# Patient Record
Sex: MALE | Race: WHITE | NOT HISPANIC OR LATINO
[De-identification: names, ages, dates, MRNs, and addresses within clinical notes are randomized per-mention and may not be internally consistent; named-entity substitution may affect disease eponyms.]

---

## 2017-03-01 ENCOUNTER — APPOINTMENT (OUTPATIENT)
Dept: SPINE | Facility: CLINIC | Age: 62
End: 2017-03-01

## 2017-03-01 VITALS
DIASTOLIC BLOOD PRESSURE: 79 MMHG | BODY MASS INDEX: 33.33 KG/M2 | HEART RATE: 79 BPM | WEIGHT: 225 LBS | SYSTOLIC BLOOD PRESSURE: 116 MMHG | HEIGHT: 69 IN

## 2017-03-01 DIAGNOSIS — Z87.891 PERSONAL HISTORY OF NICOTINE DEPENDENCE: ICD-10-CM

## 2017-03-06 ENCOUNTER — APPOINTMENT (OUTPATIENT)
Dept: MRI IMAGING | Facility: CLINIC | Age: 62
End: 2017-03-06

## 2017-03-09 ENCOUNTER — APPOINTMENT (OUTPATIENT)
Dept: MRI IMAGING | Facility: CLINIC | Age: 62
End: 2017-03-09

## 2017-03-09 ENCOUNTER — OUTPATIENT (OUTPATIENT)
Dept: OUTPATIENT SERVICES | Facility: HOSPITAL | Age: 62
LOS: 1 days | End: 2017-03-09
Payer: MEDICAID

## 2017-03-09 DIAGNOSIS — Z98.89 OTHER SPECIFIED POSTPROCEDURAL STATES: Chronic | ICD-10-CM

## 2017-03-09 DIAGNOSIS — Z00.8 ENCOUNTER FOR OTHER GENERAL EXAMINATION: ICD-10-CM

## 2017-03-09 PROCEDURE — 72148 MRI LUMBAR SPINE W/O DYE: CPT

## 2017-03-09 PROCEDURE — 72141 MRI NECK SPINE W/O DYE: CPT

## 2017-03-21 ENCOUNTER — APPOINTMENT (OUTPATIENT)
Dept: SPINE | Facility: CLINIC | Age: 62
End: 2017-03-21

## 2017-03-21 VITALS
HEIGHT: 69 IN | WEIGHT: 225 LBS | SYSTOLIC BLOOD PRESSURE: 124 MMHG | DIASTOLIC BLOOD PRESSURE: 84 MMHG | BODY MASS INDEX: 33.33 KG/M2

## 2017-03-24 ENCOUNTER — OUTPATIENT (OUTPATIENT)
Dept: OUTPATIENT SERVICES | Facility: HOSPITAL | Age: 62
LOS: 1 days | End: 2017-03-24

## 2017-03-24 VITALS
OXYGEN SATURATION: 96 % | HEIGHT: 69 IN | RESPIRATION RATE: 20 BRPM | TEMPERATURE: 98 F | HEART RATE: 77 BPM | WEIGHT: 222.01 LBS | SYSTOLIC BLOOD PRESSURE: 144 MMHG | DIASTOLIC BLOOD PRESSURE: 86 MMHG

## 2017-03-24 DIAGNOSIS — Z98.89 OTHER SPECIFIED POSTPROCEDURAL STATES: Chronic | ICD-10-CM

## 2017-03-24 DIAGNOSIS — M51.25 OTHER INTERVERTEBRAL DISC DISPLACEMENT, THORACOLUMBAR REGION: ICD-10-CM

## 2017-03-24 DIAGNOSIS — Z01.818 ENCOUNTER FOR OTHER PREPROCEDURAL EXAMINATION: ICD-10-CM

## 2017-03-24 NOTE — H&P PST ADULT - HISTORY OF PRESENT ILLNESS
60 yo male with severe back pain, worsening over last year, now hunched over and debilitated with no relief at all. pt states he has tried epidurals with no relief.

## 2017-03-24 NOTE — H&P PST ADULT - NSANTHOSAYNRD_GEN_A_CORE
No. JOSÉ LUIS screening performed.  STOP BANG Legend: 0-2 = LOW Risk; 3-4 = INTERMEDIATE Risk; 5-8 = HIGH Risk

## 2017-03-27 ENCOUNTER — OUTPATIENT (OUTPATIENT)
Dept: OUTPATIENT SERVICES | Facility: HOSPITAL | Age: 62
LOS: 1 days | Discharge: HOME | End: 2017-03-27
Payer: MEDICAID

## 2017-03-27 ENCOUNTER — APPOINTMENT (OUTPATIENT)
Dept: SPINE | Facility: HOSPITAL | Age: 62
End: 2017-03-27

## 2017-03-27 ENCOUNTER — TRANSCRIPTION ENCOUNTER (OUTPATIENT)
Age: 62
End: 2017-03-27

## 2017-03-27 VITALS
RESPIRATION RATE: 15 BRPM | SYSTOLIC BLOOD PRESSURE: 122 MMHG | OXYGEN SATURATION: 100 % | TEMPERATURE: 98 F | HEART RATE: 83 BPM | DIASTOLIC BLOOD PRESSURE: 77 MMHG

## 2017-03-27 VITALS
OXYGEN SATURATION: 95 % | SYSTOLIC BLOOD PRESSURE: 141 MMHG | HEART RATE: 85 BPM | DIASTOLIC BLOOD PRESSURE: 72 MMHG | RESPIRATION RATE: 14 BRPM | TEMPERATURE: 97 F

## 2017-03-27 DIAGNOSIS — Z98.89 OTHER SPECIFIED POSTPROCEDURAL STATES: Chronic | ICD-10-CM

## 2017-03-27 DIAGNOSIS — M51.25 OTHER INTERVERTEBRAL DISC DISPLACEMENT, THORACOLUMBAR REGION: ICD-10-CM

## 2017-03-27 PROCEDURE — 63030 LAMOT DCMPRN NRV RT 1 LMBR: CPT | Mod: RT

## 2017-03-27 PROCEDURE — 76000 FLUOROSCOPY <1 HR PHYS/QHP: CPT

## 2017-03-27 PROCEDURE — 93970 EXTREMITY STUDY: CPT

## 2017-03-27 PROCEDURE — 93970 EXTREMITY STUDY: CPT | Mod: 26

## 2017-03-27 RX ORDER — CYCLOBENZAPRINE HYDROCHLORIDE 10 MG/1
5 TABLET, FILM COATED ORAL THREE TIMES A DAY
Qty: 0 | Refills: 0 | Status: DISCONTINUED | OUTPATIENT
Start: 2017-03-27 | End: 2017-04-11

## 2017-03-27 RX ORDER — CEFAZOLIN SODIUM 1 G
2000 VIAL (EA) INJECTION ONCE
Qty: 0 | Refills: 0 | Status: DISCONTINUED | OUTPATIENT
Start: 2017-03-27 | End: 2017-04-11

## 2017-03-27 RX ORDER — DEXTROSE MONOHYDRATE, SODIUM CHLORIDE, AND POTASSIUM CHLORIDE 50; .745; 4.5 G/1000ML; G/1000ML; G/1000ML
1000 INJECTION, SOLUTION INTRAVENOUS
Qty: 0 | Refills: 0 | Status: DISCONTINUED | OUTPATIENT
Start: 2017-03-27 | End: 2017-04-11

## 2017-03-27 RX ORDER — DEXAMETHASONE 0.5 MG/5ML
8 ELIXIR ORAL ONCE
Qty: 0 | Refills: 0 | Status: COMPLETED | OUTPATIENT
Start: 2017-03-27 | End: 2017-03-27

## 2017-03-27 RX ORDER — PANTOPRAZOLE SODIUM 20 MG/1
40 TABLET, DELAYED RELEASE ORAL
Qty: 0 | Refills: 0 | Status: DISCONTINUED | OUTPATIENT
Start: 2017-03-27 | End: 2017-04-11

## 2017-03-27 RX ORDER — TRAMADOL HYDROCHLORIDE 50 MG/1
50 TABLET ORAL EVERY 4 HOURS
Qty: 0 | Refills: 0 | Status: COMPLETED | OUTPATIENT
Start: 2017-03-27 | End: 2017-04-03

## 2017-03-27 RX ORDER — DOCUSATE SODIUM 100 MG
1 CAPSULE ORAL
Qty: 0 | Refills: 0 | COMMUNITY
Start: 2017-03-27

## 2017-03-27 RX ORDER — ATORVASTATIN CALCIUM 80 MG/1
10 TABLET, FILM COATED ORAL AT BEDTIME
Qty: 0 | Refills: 0 | Status: DISCONTINUED | OUTPATIENT
Start: 2017-03-27 | End: 2017-04-11

## 2017-03-27 RX ORDER — DOCUSATE SODIUM 100 MG
100 CAPSULE ORAL THREE TIMES A DAY
Qty: 0 | Refills: 0 | Status: DISCONTINUED | OUTPATIENT
Start: 2017-03-27 | End: 2017-04-11

## 2017-03-27 RX ORDER — SODIUM CHLORIDE 9 MG/ML
3 INJECTION INTRAMUSCULAR; INTRAVENOUS; SUBCUTANEOUS EVERY 8 HOURS
Qty: 0 | Refills: 0 | Status: DISCONTINUED | OUTPATIENT
Start: 2017-03-27 | End: 2017-03-27

## 2017-03-27 RX ORDER — CEPHALEXIN 500 MG
500 CAPSULE ORAL
Qty: 0 | Refills: 0 | Status: DISCONTINUED | OUTPATIENT
Start: 2017-03-27 | End: 2017-04-11

## 2017-03-27 RX ORDER — CEPHALEXIN 500 MG
1 CAPSULE ORAL
Qty: 4 | Refills: 0 | OUTPATIENT
Start: 2017-03-27

## 2017-03-27 RX ORDER — SENNA PLUS 8.6 MG/1
2 TABLET ORAL
Qty: 0 | Refills: 0 | COMMUNITY
Start: 2017-03-27

## 2017-03-27 RX ORDER — CYCLOBENZAPRINE HYDROCHLORIDE 10 MG/1
1 TABLET, FILM COATED ORAL
Qty: 21 | Refills: 0 | OUTPATIENT
Start: 2017-03-27 | End: 2017-04-03

## 2017-03-27 RX ORDER — ESCITALOPRAM OXALATE 10 MG/1
10 TABLET, FILM COATED ORAL DAILY
Qty: 0 | Refills: 0 | Status: DISCONTINUED | OUTPATIENT
Start: 2017-03-27 | End: 2017-04-11

## 2017-03-27 RX ORDER — BUPROPION HYDROCHLORIDE 150 MG/1
150 TABLET, EXTENDED RELEASE ORAL
Qty: 0 | Refills: 0 | Status: DISCONTINUED | OUTPATIENT
Start: 2017-03-27 | End: 2017-04-11

## 2017-03-27 RX ORDER — CYCLOBENZAPRINE HYDROCHLORIDE 10 MG/1
1 TABLET, FILM COATED ORAL
Qty: 0 | Refills: 0 | DISCHARGE
Start: 2017-03-27

## 2017-03-27 RX ORDER — SENNA PLUS 8.6 MG/1
2 TABLET ORAL AT BEDTIME
Qty: 0 | Refills: 0 | Status: DISCONTINUED | OUTPATIENT
Start: 2017-03-27 | End: 2017-04-11

## 2017-03-27 RX ORDER — GABAPENTIN 400 MG/1
300 CAPSULE ORAL AT BEDTIME
Qty: 0 | Refills: 0 | Status: DISCONTINUED | OUTPATIENT
Start: 2017-03-27 | End: 2017-04-11

## 2017-03-27 RX ORDER — HYDROMORPHONE HYDROCHLORIDE 2 MG/ML
0.5 INJECTION INTRAMUSCULAR; INTRAVENOUS; SUBCUTANEOUS
Qty: 0 | Refills: 0 | Status: DISCONTINUED | OUTPATIENT
Start: 2017-03-27 | End: 2017-03-27

## 2017-03-27 RX ADMIN — DEXTROSE MONOHYDRATE, SODIUM CHLORIDE, AND POTASSIUM CHLORIDE 85 MILLILITER(S): 50; .745; 4.5 INJECTION, SOLUTION INTRAVENOUS at 17:52

## 2017-03-27 RX ADMIN — HYDROMORPHONE HYDROCHLORIDE 0.5 MILLIGRAM(S): 2 INJECTION INTRAMUSCULAR; INTRAVENOUS; SUBCUTANEOUS at 16:38

## 2017-03-27 RX ADMIN — Medication 101.6 MILLIGRAM(S): at 18:22

## 2017-03-27 RX ADMIN — HYDROMORPHONE HYDROCHLORIDE 0.5 MILLIGRAM(S): 2 INJECTION INTRAMUSCULAR; INTRAVENOUS; SUBCUTANEOUS at 16:35

## 2017-03-27 NOTE — ASU DISCHARGE PLAN (ADULT/PEDIATRIC). - NOTIFY
Persistent Nausea and Vomiting/Fever greater than 101/Bleeding that does not stop/Pain not relieved by Medications/Numbness, tingling/Unable to Urinate/Swelling that continues/Increased Irritability or Sluggishness/Numbness, color, or temperature change to extremity

## 2017-03-27 NOTE — ASU DISCHARGE PLAN (ADULT/PEDIATRIC). - MEDICATION SUMMARY - MEDICATIONS TO TAKE
I will START or STAY ON the medications listed below when I get home from the hospital:    Medrol Dosepak 4 mg oral tablet  -- Please dispense a 5 day, 4 mg Medrol Dose pack   -- Directions for Medrol Dosepak (4 mg tablets):  Day 1: Two tablets before breakfast, one after lunch, one after dinner, and two at bedtime. If started late in the day, take all six tablets at once or divide into two or three doses, unless otherwise directed by prescriber.  Day 2: One tablet before breakfast, one after lunch, one after dinner, and two at bedtime  Day 3: One tablet before breakfast, one after lunch, one after dinner, and one at bedtime  Day 4: One tablet before breakfast, one after lunch, and one at bedtime  Day 5: One tablet before breakfast and one at bedtime  Day 6: One tablet before breakfast  -- Indication: For Postop    traMADol 50 mg oral tablet  -- 1 tab(s) by mouth every 4 hours  -- Indication: For Pain    Percocet 5/325 oral tablet  -- 1 tab(s) by mouth every 6 hours  -- Indication: For Pain    gabapentin 300 mg oral capsule  -- 1 cap(s) by mouth once a day (at bedtime)  -- Indication: For Pain    escitalopram 10 mg oral tablet  -- 1 tab(s) by mouth once a day  -- Indication: For Pain    atorvastatin 10 mg oral tablet  -- 1 tab(s) by mouth once a day  -- Indication: For Hyperlipidemia    cephalexin 500 mg oral capsule  -- 1 cap(s) by mouth 4 times a day  -- Indication: For Postoperative    docusate sodium 100 mg oral capsule  -- 1 cap(s) by mouth 3 times a day  -- Indication: For Constipation    senna oral tablet  -- 2 tab(s) by mouth once a day (at bedtime)  -- Indication: For Constipation     cyclobenzaprine 5 mg oral tablet  -- 1 tab(s) by mouth 3 times a day, As needed, Muscle Spasm  -- Indication: For Muscle relaxant    cyclobenzaprine 5 mg oral tablet  -- 1 tab(s) by mouth 3 times a day, As needed, Muscle Spasm  -- Indication: For Muscle relaxant    omeprazole 20 mg oral delayed release capsule  -- 1 cap(s) by mouth once a day  -- Indication: For GERD    Wellbutrin  mg/24 hours oral tablet, extended release  -- 1 tab(s) by mouth every 24 hours  -- Indication: For Pain

## 2017-03-27 NOTE — ASU DISCHARGE PLAN (ADULT/PEDIATRIC). - ASU FOLLOWUP
[facility] ([facility]): 911 or go to the nearest Emergency Room

## 2017-03-30 ENCOUNTER — OTHER (OUTPATIENT)
Age: 62
End: 2017-03-30

## 2017-04-06 ENCOUNTER — APPOINTMENT (OUTPATIENT)
Dept: SPINE | Facility: CLINIC | Age: 62
End: 2017-04-06

## 2017-04-06 VITALS
HEIGHT: 69 IN | SYSTOLIC BLOOD PRESSURE: 117 MMHG | TEMPERATURE: 98.1 F | WEIGHT: 222 LBS | BODY MASS INDEX: 32.88 KG/M2 | HEART RATE: 88 BPM | DIASTOLIC BLOOD PRESSURE: 75 MMHG

## 2017-05-10 ENCOUNTER — APPOINTMENT (OUTPATIENT)
Dept: SPINE | Facility: CLINIC | Age: 62
End: 2017-05-10

## 2017-05-10 VITALS
HEART RATE: 87 BPM | BODY MASS INDEX: 32.88 KG/M2 | SYSTOLIC BLOOD PRESSURE: 116 MMHG | DIASTOLIC BLOOD PRESSURE: 80 MMHG | HEIGHT: 69 IN | WEIGHT: 222 LBS

## 2017-05-10 RX ORDER — PANTOPRAZOLE 20 MG/1
20 TABLET, DELAYED RELEASE ORAL
Qty: 30 | Refills: 0 | Status: COMPLETED | COMMUNITY
Start: 2016-12-22 | End: 2017-05-10

## 2017-05-10 RX ORDER — ATORVASTATIN CALCIUM 10 MG/1
10 TABLET, FILM COATED ORAL
Qty: 30 | Refills: 0 | Status: COMPLETED | COMMUNITY
Start: 2016-12-21 | End: 2017-05-10

## 2017-05-10 RX ORDER — BUPROPION HYDROCHLORIDE 300 MG/1
300 TABLET, EXTENDED RELEASE ORAL
Qty: 30 | Refills: 0 | Status: COMPLETED | COMMUNITY
Start: 2016-12-21 | End: 2017-05-10

## 2017-05-10 RX ORDER — METHYLPREDNISOLONE 4 MG/1
4 TABLET ORAL
Qty: 1 | Refills: 0 | Status: COMPLETED | COMMUNITY
Start: 2017-04-06 | End: 2017-05-10

## 2017-05-10 RX ORDER — ESCITALOPRAM OXALATE 10 MG/1
10 TABLET ORAL
Qty: 30 | Refills: 0 | Status: COMPLETED | COMMUNITY
Start: 2016-08-02 | End: 2017-05-10

## 2017-07-17 ENCOUNTER — APPOINTMENT (OUTPATIENT)
Dept: GASTROENTEROLOGY | Facility: CLINIC | Age: 62
End: 2017-07-17

## 2017-07-17 VITALS
OXYGEN SATURATION: 98 % | HEIGHT: 69 IN | BODY MASS INDEX: 30.81 KG/M2 | DIASTOLIC BLOOD PRESSURE: 78 MMHG | SYSTOLIC BLOOD PRESSURE: 122 MMHG | RESPIRATION RATE: 16 BRPM | HEART RATE: 84 BPM | WEIGHT: 208 LBS

## 2017-07-17 DIAGNOSIS — Z86.39 PERSONAL HISTORY OF OTHER ENDOCRINE, NUTRITIONAL AND METABOLIC DISEASE: ICD-10-CM

## 2017-07-17 DIAGNOSIS — F41.9 ANXIETY DISORDER, UNSPECIFIED: ICD-10-CM

## 2017-07-17 DIAGNOSIS — Z86.59 PERSONAL HISTORY OF OTHER MENTAL AND BEHAVIORAL DISORDERS: ICD-10-CM

## 2017-07-17 RX ORDER — MV-MIN/FOLIC/VIT K/LYCOP/COQ10 200-100MCG
CAPSULE ORAL
Refills: 0 | Status: ACTIVE | COMMUNITY

## 2017-07-28 ENCOUNTER — OUTPATIENT (OUTPATIENT)
Dept: OUTPATIENT SERVICES | Facility: HOSPITAL | Age: 62
LOS: 1 days | Discharge: HOME | End: 2017-07-28

## 2017-07-28 ENCOUNTER — TRANSCRIPTION ENCOUNTER (OUTPATIENT)
Age: 62
End: 2017-07-28

## 2017-07-28 VITALS
RESPIRATION RATE: 12 BRPM | OXYGEN SATURATION: 98 % | DIASTOLIC BLOOD PRESSURE: 70 MMHG | HEIGHT: 69 IN | SYSTOLIC BLOOD PRESSURE: 120 MMHG | HEART RATE: 69 BPM | TEMPERATURE: 97 F | WEIGHT: 215.61 LBS

## 2017-07-28 VITALS
TEMPERATURE: 97 F | DIASTOLIC BLOOD PRESSURE: 83 MMHG | HEART RATE: 70 BPM | SYSTOLIC BLOOD PRESSURE: 139 MMHG | RESPIRATION RATE: 16 BRPM | OXYGEN SATURATION: 97 %

## 2017-07-28 DIAGNOSIS — Z98.89 OTHER SPECIFIED POSTPROCEDURAL STATES: Chronic | ICD-10-CM

## 2017-07-28 RX ORDER — FENTANYL CITRATE 50 UG/ML
50 INJECTION INTRAVENOUS
Qty: 0 | Refills: 0 | Status: DISCONTINUED | OUTPATIENT
Start: 2017-07-28 | End: 2017-07-28

## 2017-07-28 RX ORDER — ONDANSETRON 8 MG/1
4 TABLET, FILM COATED ORAL ONCE
Qty: 0 | Refills: 0 | Status: DISCONTINUED | OUTPATIENT
Start: 2017-07-28 | End: 2017-07-28

## 2017-07-28 RX ORDER — OXYCODONE HYDROCHLORIDE 5 MG/1
1 TABLET ORAL
Qty: 40 | Refills: 0 | OUTPATIENT
Start: 2017-07-28

## 2017-07-28 RX ORDER — TRAMADOL HYDROCHLORIDE 50 MG/1
1 TABLET ORAL
Qty: 0 | Refills: 0 | COMMUNITY

## 2017-07-28 RX ORDER — SODIUM CHLORIDE 9 MG/ML
1000 INJECTION, SOLUTION INTRAVENOUS
Qty: 0 | Refills: 0 | Status: DISCONTINUED | OUTPATIENT
Start: 2017-07-28 | End: 2017-07-28

## 2017-07-28 RX ORDER — FENTANYL CITRATE 50 UG/ML
25 INJECTION INTRAVENOUS
Qty: 0 | Refills: 0 | Status: DISCONTINUED | OUTPATIENT
Start: 2017-07-28 | End: 2017-07-28

## 2017-07-28 RX ORDER — SODIUM CHLORIDE 9 MG/ML
1000 INJECTION, SOLUTION INTRAVENOUS
Qty: 0 | Refills: 0 | Status: DISCONTINUED | OUTPATIENT
Start: 2017-07-28 | End: 2017-08-12

## 2017-07-28 RX ADMIN — SODIUM CHLORIDE 100 MILLILITER(S): 9 INJECTION, SOLUTION INTRAVENOUS at 10:03

## 2017-07-28 NOTE — ASU DISCHARGE PLAN (ADULT/PEDIATRIC). - NOTIFY
Bleeding that does not stop/Pain not relieved by Medications/Numbness, color, or temperature change to extremity/Fever greater than 101

## 2017-07-28 NOTE — BRIEF OPERATIVE NOTE - POST-OP DX
Trigger finger of right thumb  07/28/2017    Active  Severo Olmstead  Trigger middle finger of right hand  07/28/2017    Active  Severo Olmstead

## 2017-07-28 NOTE — ASU DISCHARGE PLAN (ADULT/PEDIATRIC). - MEDICATION SUMMARY - MEDICATIONS TO STOP TAKING
I will STOP taking the medications listed below when I get home from the hospital:    traMADol 50 mg oral tablet  -- 1 tab(s) by mouth every 4 hours    Percocet 5/325 oral tablet  -- 1 tab(s) by mouth every 6 hours, As Needed -for severe pain MDD:4

## 2017-07-28 NOTE — ASU DISCHARGE PLAN (ADULT/PEDIATRIC). - ACTIVITY LEVEL
no exercise/no sports/gym/no heavy lifting/elevate extremity/no weight bearing/No weight bearing Right hand

## 2017-07-28 NOTE — ASU PREOP CHECKLIST - HAND OFF
Anxiety    Claudication in peripheral vascular disease    DVT (deep venous thrombosis)  11/2013  Dyslipidemia    ED (erectile dysfunction)    Pedal edema    PVD (peripheral vascular disease)    Seasonal allergies yes

## 2017-07-28 NOTE — ASU DISCHARGE PLAN (ADULT/PEDIATRIC). - MEDICATION SUMMARY - MEDICATIONS TO TAKE
I will START or STAY ON the medications listed below when I get home from the hospital:    Medrol Dosepak 4 mg oral tablet  -- Please dispense a 5 day, 4 mg Medrol Dose pack   -- Directions for Medrol Dosepak (4 mg tablets):  Day 1: Two tablets before breakfast, one after lunch, one after dinner, and two at bedtime. If started late in the day, take all six tablets at once or divide into two or three doses, unless otherwise directed by prescriber.  Day 2: One tablet before breakfast, one after lunch, one after dinner, and two at bedtime  Day 3: One tablet before breakfast, one after lunch, one after dinner, and one at bedtime  Day 4: One tablet before breakfast, one after lunch, and one at bedtime  Day 5: One tablet before breakfast and one at bedtime  Day 6: One tablet before breakfast  -- Indication: For Home med    acetaminophen-oxycodone 325 mg-5 mg oral tablet  -- 1-2 tab(s) by mouth every 4-6 hours, As Needed -for severe pain MDD:12 tabs  -- Caution federal law prohibits the transfer of this drug to any person other  than the person for whom it was prescribed.  May cause drowsiness.  Alcohol may intensify this effect.  Use care when operating dangerous machinery.  This prescription cannot be refilled.  This product contains acetaminophen.  Do not use  with any other product containing acetaminophen to prevent possible liver damage.  Using more of this medication than prescribed may cause serious breathing problems.    -- Indication: For Pain    gabapentin 300 mg oral capsule  -- 1 cap(s) by mouth once a day (at bedtime)  -- Indication: For home med    escitalopram 10 mg oral tablet  -- 1 tab(s) by mouth once a day  -- Indication: For home med    atorvastatin 10 mg oral tablet  -- 1 tab(s) by mouth once a day  -- Indication: For home med    cephalexin 500 mg oral capsule  -- 1 cap(s) by mouth 4 times a day  -- Indication: For home med    docusate sodium 100 mg oral capsule  -- 1 cap(s) by mouth 3 times a day  -- Indication: For home med    senna oral tablet  -- 2 tab(s) by mouth once a day (at bedtime)  -- Indication: For home med    cyclobenzaprine 5 mg oral tablet  -- 1 tab(s) by mouth 3 times a day, As needed, Muscle Spasm  -- Indication: For home med    omeprazole 20 mg oral delayed release capsule  -- 1 cap(s) by mouth once a day  -- Indication: For home med    Wellbutrin  mg/24 hours oral tablet, extended release  -- 1 tab(s) by mouth every 24 hours  -- Indication: For home med

## 2017-08-01 DIAGNOSIS — Z79.891 LONG TERM (CURRENT) USE OF OPIATE ANALGESIC: ICD-10-CM

## 2017-08-01 DIAGNOSIS — M65.311 TRIGGER THUMB, RIGHT THUMB: ICD-10-CM

## 2017-08-01 DIAGNOSIS — E78.5 HYPERLIPIDEMIA, UNSPECIFIED: ICD-10-CM

## 2017-08-01 DIAGNOSIS — M65.331 TRIGGER FINGER, RIGHT MIDDLE FINGER: ICD-10-CM

## 2017-08-01 DIAGNOSIS — K21.9 GASTRO-ESOPHAGEAL REFLUX DISEASE WITHOUT ESOPHAGITIS: ICD-10-CM

## 2017-08-01 DIAGNOSIS — F41.8 OTHER SPECIFIED ANXIETY DISORDERS: ICD-10-CM

## 2017-08-01 DIAGNOSIS — M65.30 TRIGGER FINGER, UNSPECIFIED FINGER: ICD-10-CM

## 2017-08-05 ENCOUNTER — TRANSCRIPTION ENCOUNTER (OUTPATIENT)
Age: 62
End: 2017-08-05

## 2017-10-10 ENCOUNTER — RX RENEWAL (OUTPATIENT)
Age: 62
End: 2017-10-10

## 2017-11-14 ENCOUNTER — MEDICATION RENEWAL (OUTPATIENT)
Age: 62
End: 2017-11-14

## 2018-04-16 RX ORDER — GABAPENTIN 400 MG/1
1 CAPSULE ORAL
Qty: 0 | Refills: 0 | COMMUNITY

## 2018-04-17 NOTE — H&P ADULT - NSHPREVIEWOFSYSTEMS_GEN_ALL_CORE
General: Pt denies recent weight loss/fever/chills    Neurological: denies numbness or  sensation loss    Cardiovascular: denies chest pain/palpitations/leg edema    Respiratory and Thorax: denies cough/wheezing, (+)DIAL    Gastrointestinal: denies abdominal pain/diarrhea/constipation/bloody stool    Genitourinary: denies urinary frequency/urgency/ dysuria    Musculoskeletal: denies joint pain or swelling, denies restricted motion    Hematologic: denies abnormal bleeding

## 2018-04-17 NOTE — H&P ADULT - NSHPPHYSICALEXAM_GEN_ALL_CORE
NERVOUS SYSTEM:  Alert & Oriented X3, Good concentration  CHEST/LUNG: Clear to auscultation bilaterally; No rales, rhonchi, wheezing, or rubs  HEART: Regular rate and rhythm; No murmurs, rubs, or gallops  ABDOMEN: Soft, Nontender, Nondistended; Bowel sounds present  EXTREMITIES:  2+ Peripheral Pulses, No clubbing, cyanosis, or edema  SKIN: No rashes or lesions NERVOUS SYSTEM:  Alert & Oriented X3, Good concentration  CHEST/LUNG: Clear to auscultation bilaterally; No rales, rhonchi, wheezing, or rubs  HEART: Regular rate and rhythm; (+) HERBERTH at RSB  ABDOMEN: Soft, Nontender, Nondistended; Bowel sounds present  EXTREMITIES:  2+ Peripheral Pulses, No clubbing, cyanosis, or edema  SKIN: No rashes or lesions NERVOUS SYSTEM:  Alert & Oriented X3, Good concentration  CHEST/LUNG: Clear to auscultation bilaterally; No rales, rhonchi, wheezing, or rubs  HEART: Regular rate and rhythm; (+) HERBERTH at RSB  ABDOMEN: Soft, Nontender, Nondistended; Bowel sounds present  EXTREMITIES:  2+ Peripheral Pulses, No clubbing, cyanosis, or edema  SKIN: (+) diffused tattoos on back & all 4 extremities

## 2018-04-17 NOTE — H&P ADULT - ASSESSMENT
This is a 61 y/o male with Hx of HLD smoking hx , with voluntary wt. lose of 67 pounds, pre-op for cosmetic  surgery with possible 4 hours of general  anesthesia.   Patient presents now for an angiogram with possible intervention.  - risk/ benefits discussed  - informed consent obtained This is a 61 y/o male with Hx of HLD smoking hx , with voluntary wt. lose of 67 pounds, pre-op for cosmetic  surgery with possible 4 hours of general  anesthesia. Pt has been c/o DIAL, also echo showed mod-sev AS, Pt referred for Rt & LHC with possible intervention.  -labs & meds reviewed  - risk/ benefits discussed  - informed consent obtained

## 2018-04-18 ENCOUNTER — OUTPATIENT (OUTPATIENT)
Dept: OUTPATIENT SERVICES | Facility: HOSPITAL | Age: 63
LOS: 1 days | Discharge: HOME | End: 2018-04-18

## 2018-04-18 VITALS
HEART RATE: 74 BPM | OXYGEN SATURATION: 100 % | HEIGHT: 69 IN | SYSTOLIC BLOOD PRESSURE: 143 MMHG | RESPIRATION RATE: 18 BRPM | WEIGHT: 166.01 LBS | DIASTOLIC BLOOD PRESSURE: 78 MMHG | TEMPERATURE: 98 F

## 2018-04-18 VITALS
OXYGEN SATURATION: 99 % | SYSTOLIC BLOOD PRESSURE: 120 MMHG | RESPIRATION RATE: 16 BRPM | HEART RATE: 71 BPM | DIASTOLIC BLOOD PRESSURE: 76 MMHG

## 2018-04-18 DIAGNOSIS — Z98.89 OTHER SPECIFIED POSTPROCEDURAL STATES: Chronic | ICD-10-CM

## 2018-04-18 NOTE — ASU PATIENT PROFILE, ADULT - REASON FOR ADMISSION, PROFILE
I was getting clearance for surgery and they found a problem w/ my heart and I had a positive stress test so they sent me here for a heart catheterization

## 2018-04-18 NOTE — CHART NOTE - NSCHARTNOTEFT_GEN_A_CORE
Nurse Practitioner Progress note:     HPI:  This is a 63 y/o male with Hx of HLD smoking hx , with voluntary wt. lose of 67 pounds, pre-op for cosmetic  surgery with possible 4 hours of general  anesthesia. Pt has been c/o DIAL, also echo showed mod-sev AS, Pt referred for Rt & LHC with possible intervention.     s/p LHC : Normal coronaries, Severe AS (ANTWON 0.7)  Pt denies chest pain/SOB/palpitations.    PHYSICAL EXAM:  V/S:  /60           HR  70       RR16  Neurologic: Non-focal, A&Ox3.  No neuro deficits  Cardiac : (+)S1S2,RRR  Lungs: CTA B/L  Procedure Site: Rt. femoral A & V sheath noted. site benign soft no bleeding no hematoma 2+DP      PLAN: 	  -VS, labs, diet, activity as per post cath orders  -Encourage PO fluids  -Continue current medications  -Pt. to be discharged home today  -Plan of care D/W pt. and Dr. Doll, TAVR eval as out pt.  -Post cath instructions reviewed with pt. then pt. verbalizes understanding   -Follow-up with Dr. Doll in 1-2 weeks.

## 2018-04-24 DIAGNOSIS — F41.9 ANXIETY DISORDER, UNSPECIFIED: ICD-10-CM

## 2018-04-24 DIAGNOSIS — I20.9 ANGINA PECTORIS, UNSPECIFIED: ICD-10-CM

## 2018-04-24 DIAGNOSIS — E78.5 HYPERLIPIDEMIA, UNSPECIFIED: ICD-10-CM

## 2018-04-24 DIAGNOSIS — Z87.891 PERSONAL HISTORY OF NICOTINE DEPENDENCE: ICD-10-CM

## 2018-04-24 DIAGNOSIS — I35.0 NONRHEUMATIC AORTIC (VALVE) STENOSIS: ICD-10-CM

## 2018-04-24 DIAGNOSIS — K21.9 GASTRO-ESOPHAGEAL REFLUX DISEASE WITHOUT ESOPHAGITIS: ICD-10-CM

## 2018-05-21 ENCOUNTER — EMERGENCY (EMERGENCY)
Facility: HOSPITAL | Age: 63
LOS: 0 days | Discharge: HOME | End: 2018-05-21
Attending: EMERGENCY MEDICINE | Admitting: EMERGENCY MEDICINE
Payer: MEDICARE

## 2018-05-21 VITALS
RESPIRATION RATE: 15 BRPM | HEART RATE: 85 BPM | SYSTOLIC BLOOD PRESSURE: 112 MMHG | DIASTOLIC BLOOD PRESSURE: 67 MMHG | OXYGEN SATURATION: 99 %

## 2018-05-21 VITALS
OXYGEN SATURATION: 100 % | WEIGHT: 169.98 LBS | HEART RATE: 74 BPM | DIASTOLIC BLOOD PRESSURE: 52 MMHG | SYSTOLIC BLOOD PRESSURE: 99 MMHG | HEIGHT: 69 IN | RESPIRATION RATE: 18 BRPM | TEMPERATURE: 98 F

## 2018-05-21 DIAGNOSIS — K21.9 GASTRO-ESOPHAGEAL REFLUX DISEASE WITHOUT ESOPHAGITIS: ICD-10-CM

## 2018-05-21 DIAGNOSIS — G56.03 CARPAL TUNNEL SYNDROME, BILATERAL UPPER LIMBS: ICD-10-CM

## 2018-05-21 DIAGNOSIS — E78.5 HYPERLIPIDEMIA, UNSPECIFIED: ICD-10-CM

## 2018-05-21 DIAGNOSIS — F32.9 MAJOR DEPRESSIVE DISORDER, SINGLE EPISODE, UNSPECIFIED: ICD-10-CM

## 2018-05-21 DIAGNOSIS — Z98.89 OTHER SPECIFIED POSTPROCEDURAL STATES: Chronic | ICD-10-CM

## 2018-05-21 DIAGNOSIS — R94.31 ABNORMAL ELECTROCARDIOGRAM [ECG] [EKG]: ICD-10-CM

## 2018-05-21 DIAGNOSIS — F41.9 ANXIETY DISORDER, UNSPECIFIED: ICD-10-CM

## 2018-05-21 DIAGNOSIS — I35.0 NONRHEUMATIC AORTIC (VALVE) STENOSIS: ICD-10-CM

## 2018-05-21 DIAGNOSIS — R10.9 UNSPECIFIED ABDOMINAL PAIN: ICD-10-CM

## 2018-05-21 DIAGNOSIS — R10.13 EPIGASTRIC PAIN: ICD-10-CM

## 2018-05-21 LAB
ABO RH CONFIRMATION: SIGNIFICANT CHANGE UP
ALBUMIN SERPL ELPH-MCNC: 3.7 G/DL — SIGNIFICANT CHANGE UP (ref 3.3–5)
ALP SERPL-CCNC: 106 U/L — SIGNIFICANT CHANGE UP (ref 40–120)
ALT FLD-CCNC: 114 U/L — HIGH (ref 12–78)
ANION GAP SERPL CALC-SCNC: 9 MMOL/L — SIGNIFICANT CHANGE UP (ref 5–17)
APPEARANCE UR: CLEAR — SIGNIFICANT CHANGE UP
APTT BLD: 29.2 SEC — SIGNIFICANT CHANGE UP (ref 27.5–37.4)
AST SERPL-CCNC: 156 U/L — HIGH (ref 15–37)
BACTERIA # UR AUTO: (no result)
BASOPHILS # BLD AUTO: 0.06 K/UL — SIGNIFICANT CHANGE UP (ref 0–0.2)
BASOPHILS NFR BLD AUTO: 0.6 % — SIGNIFICANT CHANGE UP (ref 0–2)
BILIRUB SERPL-MCNC: 0.3 MG/DL — SIGNIFICANT CHANGE UP (ref 0.2–1.2)
BILIRUB UR-MCNC: NEGATIVE — SIGNIFICANT CHANGE UP
BLD GP AB SCN SERPL QL: SIGNIFICANT CHANGE UP
BUN SERPL-MCNC: 18 MG/DL — SIGNIFICANT CHANGE UP (ref 7–23)
CALCIUM SERPL-MCNC: 9.2 MG/DL — SIGNIFICANT CHANGE UP (ref 8.5–10.1)
CHLORIDE SERPL-SCNC: 103 MMOL/L — SIGNIFICANT CHANGE UP (ref 96–108)
CK SERPL-CCNC: 316 U/L — HIGH (ref 26–308)
CK SERPL-CCNC: 362 U/L — HIGH (ref 26–308)
CO2 SERPL-SCNC: 27 MMOL/L — SIGNIFICANT CHANGE UP (ref 22–31)
COLOR SPEC: YELLOW — SIGNIFICANT CHANGE UP
CREAT SERPL-MCNC: 1.12 MG/DL — SIGNIFICANT CHANGE UP (ref 0.5–1.3)
DIFF PNL FLD: NEGATIVE — SIGNIFICANT CHANGE UP
EOSINOPHIL # BLD AUTO: 0.03 K/UL — SIGNIFICANT CHANGE UP (ref 0–0.5)
EOSINOPHIL NFR BLD AUTO: 0.3 % — SIGNIFICANT CHANGE UP (ref 0–6)
EPI CELLS # UR: NEGATIVE — SIGNIFICANT CHANGE UP
GLUCOSE SERPL-MCNC: 112 MG/DL — HIGH (ref 70–99)
GLUCOSE UR QL: NEGATIVE MG/DL — SIGNIFICANT CHANGE UP
HCT VFR BLD CALC: 41.6 % — SIGNIFICANT CHANGE UP (ref 39–50)
HGB BLD-MCNC: 14 G/DL — SIGNIFICANT CHANGE UP (ref 13–17)
IMM GRANULOCYTES NFR BLD AUTO: 0.4 % — SIGNIFICANT CHANGE UP (ref 0–1.5)
INR BLD: 0.97 RATIO — SIGNIFICANT CHANGE UP (ref 0.88–1.16)
KETONES UR-MCNC: NEGATIVE — SIGNIFICANT CHANGE UP
LEUKOCYTE ESTERASE UR-ACNC: (no result)
LIDOCAIN IGE QN: 119 U/L — SIGNIFICANT CHANGE UP (ref 73–393)
LYMPHOCYTES # BLD AUTO: 1.5 K/UL — SIGNIFICANT CHANGE UP (ref 1–3.3)
LYMPHOCYTES # BLD AUTO: 15.1 % — SIGNIFICANT CHANGE UP (ref 13–44)
MCHC RBC-ENTMCNC: 30.9 PG — SIGNIFICANT CHANGE UP (ref 27–34)
MCHC RBC-ENTMCNC: 33.7 GM/DL — SIGNIFICANT CHANGE UP (ref 32–36)
MCV RBC AUTO: 91.8 FL — SIGNIFICANT CHANGE UP (ref 80–100)
MONOCYTES # BLD AUTO: 0.57 K/UL — SIGNIFICANT CHANGE UP (ref 0–0.9)
MONOCYTES NFR BLD AUTO: 5.7 % — SIGNIFICANT CHANGE UP (ref 2–14)
NEUTROPHILS # BLD AUTO: 7.75 K/UL — HIGH (ref 1.8–7.4)
NEUTROPHILS NFR BLD AUTO: 77.9 % — HIGH (ref 43–77)
NITRITE UR-MCNC: NEGATIVE — SIGNIFICANT CHANGE UP
NRBC # BLD: 0 /100 WBCS — SIGNIFICANT CHANGE UP (ref 0–0)
PH UR: 8 — SIGNIFICANT CHANGE UP (ref 5–8)
PLATELET # BLD AUTO: 240 K/UL — SIGNIFICANT CHANGE UP (ref 150–400)
POTASSIUM SERPL-MCNC: 3.7 MMOL/L — SIGNIFICANT CHANGE UP (ref 3.5–5.3)
POTASSIUM SERPL-SCNC: 3.7 MMOL/L — SIGNIFICANT CHANGE UP (ref 3.5–5.3)
PROT SERPL-MCNC: 7.1 GM/DL — SIGNIFICANT CHANGE UP (ref 6–8.3)
PROT UR-MCNC: NEGATIVE MG/DL — SIGNIFICANT CHANGE UP
PROTHROM AB SERPL-ACNC: 10.5 SEC — SIGNIFICANT CHANGE UP (ref 9.8–12.7)
RBC # BLD: 4.53 M/UL — SIGNIFICANT CHANGE UP (ref 4.2–5.8)
RBC # FLD: 12.9 % — SIGNIFICANT CHANGE UP (ref 10.3–14.5)
RBC CASTS # UR COMP ASSIST: SIGNIFICANT CHANGE UP /HPF (ref 0–4)
SODIUM SERPL-SCNC: 139 MMOL/L — SIGNIFICANT CHANGE UP (ref 135–145)
SP GR SPEC: 1.01 — SIGNIFICANT CHANGE UP (ref 1.01–1.02)
TROPONIN I SERPL-MCNC: <0.015 NG/ML — SIGNIFICANT CHANGE UP (ref 0.01–0.04)
TROPONIN I SERPL-MCNC: <0.015 NG/ML — SIGNIFICANT CHANGE UP (ref 0.01–0.04)
TYPE + AB SCN PNL BLD: SIGNIFICANT CHANGE UP
UROBILINOGEN FLD QL: NEGATIVE MG/DL — SIGNIFICANT CHANGE UP
WBC # BLD: 9.95 K/UL — SIGNIFICANT CHANGE UP (ref 3.8–10.5)
WBC # FLD AUTO: 9.95 K/UL — SIGNIFICANT CHANGE UP (ref 3.8–10.5)
WBC UR QL: SIGNIFICANT CHANGE UP

## 2018-05-21 PROCEDURE — 71045 X-RAY EXAM CHEST 1 VIEW: CPT | Mod: 26

## 2018-05-21 PROCEDURE — 93010 ELECTROCARDIOGRAM REPORT: CPT

## 2018-05-21 PROCEDURE — 76705 ECHO EXAM OF ABDOMEN: CPT | Mod: 26

## 2018-05-21 PROCEDURE — 74177 CT ABD & PELVIS W/CONTRAST: CPT | Mod: 26

## 2018-05-21 PROCEDURE — 99285 EMERGENCY DEPT VISIT HI MDM: CPT | Mod: 25

## 2018-05-21 RX ORDER — ONDANSETRON 8 MG/1
4 TABLET, FILM COATED ORAL ONCE
Qty: 0 | Refills: 0 | Status: COMPLETED | OUTPATIENT
Start: 2018-05-21 | End: 2018-05-21

## 2018-05-21 RX ORDER — MORPHINE SULFATE 50 MG/1
4 CAPSULE, EXTENDED RELEASE ORAL ONCE
Qty: 0 | Refills: 0 | Status: DISCONTINUED | OUTPATIENT
Start: 2018-05-21 | End: 2018-05-21

## 2018-05-21 RX ORDER — SODIUM CHLORIDE 9 MG/ML
1000 INJECTION INTRAMUSCULAR; INTRAVENOUS; SUBCUTANEOUS ONCE
Qty: 0 | Refills: 0 | Status: COMPLETED | OUTPATIENT
Start: 2018-05-21 | End: 2018-05-21

## 2018-05-21 RX ORDER — SODIUM CHLORIDE 9 MG/ML
3 INJECTION INTRAMUSCULAR; INTRAVENOUS; SUBCUTANEOUS ONCE
Qty: 0 | Refills: 0 | Status: COMPLETED | OUTPATIENT
Start: 2018-05-21 | End: 2018-05-21

## 2018-05-21 RX ADMIN — SODIUM CHLORIDE 3 MILLILITER(S): 9 INJECTION INTRAMUSCULAR; INTRAVENOUS; SUBCUTANEOUS at 02:06

## 2018-05-21 RX ADMIN — SODIUM CHLORIDE 1000 MILLILITER(S): 9 INJECTION INTRAMUSCULAR; INTRAVENOUS; SUBCUTANEOUS at 03:12

## 2018-05-21 RX ADMIN — SODIUM CHLORIDE 1000 MILLILITER(S): 9 INJECTION INTRAMUSCULAR; INTRAVENOUS; SUBCUTANEOUS at 02:07

## 2018-05-21 RX ADMIN — ONDANSETRON 4 MILLIGRAM(S): 8 TABLET, FILM COATED ORAL at 02:06

## 2018-05-21 RX ADMIN — MORPHINE SULFATE 4 MILLIGRAM(S): 50 CAPSULE, EXTENDED RELEASE ORAL at 02:06

## 2018-05-21 NOTE — ED ADULT NURSE REASSESSMENT NOTE - NS ED NURSE REASSESS COMMENT FT1
pt resting comfortably. reports decreased pain s/p administration of morphine 4mg. provided pillow, lights decreased for comfort. copy of lab results and US report printed and provided to pt and daughter. pending CT. updated on poc. questions answered.

## 2018-05-21 NOTE — ED ADULT TRIAGE NOTE - CHIEF COMPLAINT QUOTE
Patient reports that he is having sharp severe epigastric abdominal pain that began at 2100, unrelieved by OTC antacid medications.

## 2018-05-21 NOTE — ED PROVIDER NOTE - CONSTITUTIONAL, MLM
normal... Well appearing, well nourished, awake, alert, oriented to person, place, time/situation and appears uncomfortable holding upper abdomen and moaning in pain.

## 2018-05-21 NOTE — ED ADULT NURSE NOTE - OBJECTIVE STATEMENT
pt c/o sharp, sudden onset of LUQ pain. denies nausea/vomiting. states that he took OTC antacids that lessened the pain for about an hour before coming back. pt unable to lay flat d/t pain.

## 2018-05-21 NOTE — ED PROVIDER NOTE - OBJECTIVE STATEMENT
Pt. is a 63 yo M with depression, anxiety, GERD, hyperlipidemia, aortic stenosis BIB daughter for acute onset of epigastric abdominal pain 5 hours ago.  Pts pain developed 2 hours after dinner and was not associated with eating.  Pt. denies hematuria, dysuria, fever, cough, vomiting or diarrhea.  He c/o low back pain after lifting his grandson a few days ago.  Pt. states pain is in mid epigastric region, non radiating, and has never felt this pain before.  Denies chest pain or trouble breathing.  Took augmentin yesterday prescribed by his daughter for sore throat that developed 2 days ago.  Pt. states "I didn't want to be sick for my aortic valve surgery next week, so I made her prescribed me an antibiotic."  Daughter states she gave zantac and omeprazole prior to arrival without any relief. PMD in [place]

## 2018-06-30 ENCOUNTER — EMERGENCY (EMERGENCY)
Facility: HOSPITAL | Age: 63
LOS: 0 days | Discharge: HOME | End: 2018-06-30
Attending: EMERGENCY MEDICINE | Admitting: EMERGENCY MEDICINE
Payer: MEDICARE

## 2018-06-30 VITALS
RESPIRATION RATE: 18 BRPM | DIASTOLIC BLOOD PRESSURE: 77 MMHG | SYSTOLIC BLOOD PRESSURE: 112 MMHG | OXYGEN SATURATION: 100 % | HEART RATE: 82 BPM | TEMPERATURE: 98 F

## 2018-06-30 VITALS — WEIGHT: 175.93 LBS

## 2018-06-30 DIAGNOSIS — G56.01 CARPAL TUNNEL SYNDROME, RIGHT UPPER LIMB: ICD-10-CM

## 2018-06-30 DIAGNOSIS — Z95.2 PRESENCE OF PROSTHETIC HEART VALVE: ICD-10-CM

## 2018-06-30 DIAGNOSIS — M79.661 PAIN IN RIGHT LOWER LEG: ICD-10-CM

## 2018-06-30 DIAGNOSIS — M54.30 SCIATICA, UNSPECIFIED SIDE: ICD-10-CM

## 2018-06-30 DIAGNOSIS — R25.2 CRAMP AND SPASM: ICD-10-CM

## 2018-06-30 DIAGNOSIS — E78.5 HYPERLIPIDEMIA, UNSPECIFIED: ICD-10-CM

## 2018-06-30 DIAGNOSIS — K21.9 GASTRO-ESOPHAGEAL REFLUX DISEASE WITHOUT ESOPHAGITIS: ICD-10-CM

## 2018-06-30 DIAGNOSIS — Z98.89 OTHER SPECIFIED POSTPROCEDURAL STATES: Chronic | ICD-10-CM

## 2018-06-30 DIAGNOSIS — F41.9 ANXIETY DISORDER, UNSPECIFIED: ICD-10-CM

## 2018-06-30 PROCEDURE — 99285 EMERGENCY DEPT VISIT HI MDM: CPT

## 2018-06-30 PROCEDURE — 93971 EXTREMITY STUDY: CPT | Mod: 26,RT

## 2018-06-30 RX ORDER — DIAZEPAM 5 MG
1 TABLET ORAL
Qty: 12 | Refills: 0 | OUTPATIENT
Start: 2018-06-30 | End: 2018-07-03

## 2018-06-30 RX ORDER — DIAZEPAM 5 MG
1 TABLET ORAL
Qty: 0 | Refills: 0 | DISCHARGE
Start: 2018-06-30 | End: 2018-07-03

## 2018-06-30 RX ORDER — KETOROLAC TROMETHAMINE 30 MG/ML
30 SYRINGE (ML) INJECTION ONCE
Qty: 0 | Refills: 0 | Status: DISCONTINUED | OUTPATIENT
Start: 2018-06-30 | End: 2018-06-30

## 2018-06-30 RX ORDER — LIDOCAINE 4 G/100G
1 CREAM TOPICAL ONCE
Qty: 0 | Refills: 0 | Status: COMPLETED | OUTPATIENT
Start: 2018-06-30 | End: 2018-06-30

## 2018-06-30 RX ADMIN — LIDOCAINE 1 PATCH: 4 CREAM TOPICAL at 11:42

## 2018-06-30 NOTE — ED ADULT NURSE NOTE - OBJECTIVE STATEMENT
intermittent right leg cramping, s/p porcine aortic valve replacement. denies CP, dizziness, SOB. no edema noted. denies recent travel

## 2018-06-30 NOTE — ED STATDOCS - OBJECTIVE STATEMENT
64 y/o M with a PMHx of s/p spine surgery 2 years ago, back pain, aortic valve replacement 4 weeks ago at [facility] presents to the ED c/o RLE tenderness radiating to R hip, back pain. Patient had spine surgery s/p 2 years ago which relieved all back pain. Prior to aortic valve replacement surgery pt notes sudden onset of R calf tenderness described as pulling sensation. Patient states hospitalization (6 days) may have aggravated pain. After hospitalization patient started to take walks which aggravated pain. This morning patient woke up with worsening constant RLE pain radiating to R hip pain. Denies numbness, tingling, bladder incontinence. No blood thinners. Baby ASA in the morning. Denies pain medications. Dr.Mark Stuart, (Spine Surgeon)

## 2018-06-30 NOTE — ED STATDOCS - PROGRESS NOTE DETAILS
SADIQ Teresa:   Patient has been seen, evaluated and orders have been written by the attending in intake. Patient is stable.  I will follow up the results of orders written and I will continue to evaluate/observe the patient.    Pt. with RLE pain radiation from right back to foot.  Posterior aspect of thigh and leg.  Neg. trauma.  History of back surgery 2 years ago.  Neg. trauma.  Pt. states pain x 1 month after surgery.   Denies bladder/bowel incontinence or saddle anesthesia.  Probable sciatica.  Will rule out DVT.  Qiana Teresa PA-C DVT negative.  will treat for sciatica and he will FU with his surgeon for possible MRI.  Qiana eTresa PA-C

## 2018-06-30 NOTE — ED ADULT TRIAGE NOTE - CHIEF COMPLAINT QUOTE
pt c.o right leg cramps, pt states he had an aortic valve replacement 4 weeks ago. pt denies chest pain dizziness, weakness, sob.

## 2018-06-30 NOTE — ED STATDOCS - ATTENDING CONTRIBUTION TO CARE
I, Severo Rg, performed the initial face to face bedside interview with this patient regarding history of present illness, review of symptoms and relevant past medical, social and family history.  I completed an independent physical examination.  I was the initial provider who evaluated this patient. I have signed out the follow up of any pending tests (i.e. labs, radiological studies) to the ACP.  I have communicated the patient’s plan of care and disposition with the ACP.  The history, relevant review of systems, past medical and surgical history, medical decision making, and physical examination was documented by the scribe in my presence and I attest to the accuracy of the documentation.

## 2018-07-25 ENCOUNTER — APPOINTMENT (OUTPATIENT)
Dept: GASTROENTEROLOGY | Facility: CLINIC | Age: 63
End: 2018-07-25
Payer: MEDICARE

## 2018-07-25 VITALS
HEART RATE: 81 BPM | SYSTOLIC BLOOD PRESSURE: 127 MMHG | RESPIRATION RATE: 16 BRPM | DIASTOLIC BLOOD PRESSURE: 77 MMHG | OXYGEN SATURATION: 98 % | BODY MASS INDEX: 26.36 KG/M2 | WEIGHT: 178 LBS | HEIGHT: 69 IN

## 2018-07-25 DIAGNOSIS — Z95.3 PRESENCE OF XENOGENIC HEART VALVE: ICD-10-CM

## 2018-07-25 PROCEDURE — 99214 OFFICE O/P EST MOD 30 MIN: CPT

## 2018-07-25 RX ORDER — OMEPRAZOLE 20 MG/1
20 CAPSULE, DELAYED RELEASE ORAL
Qty: 28 | Refills: 0 | Status: ACTIVE | COMMUNITY
Start: 2018-07-25 | End: 1900-01-01

## 2018-08-09 ENCOUNTER — OTHER (OUTPATIENT)
Age: 63
End: 2018-08-09

## 2018-09-07 LAB
BASOPHILS # BLD AUTO: 0.03 K/UL
BASOPHILS NFR BLD AUTO: 0.4 %
EOSINOPHIL # BLD AUTO: 0.04 K/UL
EOSINOPHIL NFR BLD AUTO: 0.6 %
HCT VFR BLD CALC: 46.4 %
HGB BLD-MCNC: 15 G/DL
IMM GRANULOCYTES NFR BLD AUTO: 0.3 %
LYMPHOCYTES # BLD AUTO: 1.83 K/UL
LYMPHOCYTES NFR BLD AUTO: 26.4 %
MAN DIFF?: NORMAL
MCHC RBC-ENTMCNC: 28.3 PG
MCHC RBC-ENTMCNC: 32.3 GM/DL
MCV RBC AUTO: 87.5 FL
MONOCYTES # BLD AUTO: 0.46 K/UL
MONOCYTES NFR BLD AUTO: 6.6 %
NEUTROPHILS # BLD AUTO: 4.54 K/UL
NEUTROPHILS NFR BLD AUTO: 65.7 %
PLATELET # BLD AUTO: 278 K/UL
RBC # BLD: 5.3 M/UL
RBC # FLD: 15.6 %
WBC # FLD AUTO: 6.92 K/UL

## 2018-09-11 LAB
ANION GAP SERPL CALC-SCNC: 15 MMOL/L
BUN SERPL-MCNC: 17 MG/DL
CALCIUM SERPL-MCNC: 10.2 MG/DL
CHLORIDE SERPL-SCNC: 98 MMOL/L
CO2 SERPL-SCNC: 28 MMOL/L
CREAT SERPL-MCNC: 1.14 MG/DL
GLUCOSE SERPL-MCNC: 122 MG/DL
H PYLORI AG STL QL: NEGATIVE
INR PPP: 0.94 RATIO
MAGNESIUM SERPL-MCNC: 2.2 MG/DL
POTASSIUM SERPL-SCNC: 4.3 MMOL/L
PT BLD: 10.6 SEC
SODIUM SERPL-SCNC: 141 MMOL/L
VIT B12 SERPL-MCNC: 1144 PG/ML

## 2018-10-10 ENCOUNTER — APPOINTMENT (OUTPATIENT)
Dept: GASTROENTEROLOGY | Facility: CLINIC | Age: 63
End: 2018-10-10
Payer: MEDICARE

## 2018-10-10 VITALS
SYSTOLIC BLOOD PRESSURE: 130 MMHG | HEIGHT: 69 IN | WEIGHT: 178 LBS | DIASTOLIC BLOOD PRESSURE: 80 MMHG | BODY MASS INDEX: 26.36 KG/M2 | HEART RATE: 80 BPM

## 2018-10-10 PROCEDURE — 99214 OFFICE O/P EST MOD 30 MIN: CPT

## 2018-12-10 RX ORDER — SODIUM CHLORIDE 9 MG/ML
3 INJECTION INTRAMUSCULAR; INTRAVENOUS; SUBCUTANEOUS EVERY 8 HOURS
Qty: 0 | Refills: 0 | Status: DISCONTINUED | OUTPATIENT
Start: 2018-12-11 | End: 2018-12-11

## 2018-12-11 ENCOUNTER — OUTPATIENT (OUTPATIENT)
Dept: OUTPATIENT SERVICES | Facility: HOSPITAL | Age: 63
LOS: 1 days | Discharge: HOME | End: 2018-12-11
Payer: MEDICARE

## 2018-12-11 ENCOUNTER — RESULT REVIEW (OUTPATIENT)
Age: 63
End: 2018-12-11

## 2018-12-11 VITALS
HEART RATE: 67 BPM | OXYGEN SATURATION: 100 % | WEIGHT: 178.35 LBS | DIASTOLIC BLOOD PRESSURE: 81 MMHG | TEMPERATURE: 97 F | SYSTOLIC BLOOD PRESSURE: 121 MMHG | RESPIRATION RATE: 16 BRPM | HEIGHT: 69 IN

## 2018-12-11 VITALS
DIASTOLIC BLOOD PRESSURE: 95 MMHG | RESPIRATION RATE: 16 BRPM | HEART RATE: 84 BPM | SYSTOLIC BLOOD PRESSURE: 115 MMHG | OXYGEN SATURATION: 97 % | TEMPERATURE: 97 F

## 2018-12-11 DIAGNOSIS — Z98.890 OTHER SPECIFIED POSTPROCEDURAL STATES: Chronic | ICD-10-CM

## 2018-12-11 DIAGNOSIS — Z98.89 OTHER SPECIFIED POSTPROCEDURAL STATES: Chronic | ICD-10-CM

## 2018-12-11 LAB
BLD GP AB SCN SERPL QL: SIGNIFICANT CHANGE UP
HCT VFR BLD CALC: 40.8 % — SIGNIFICANT CHANGE UP (ref 39–50)
HGB BLD-MCNC: 13.5 G/DL — SIGNIFICANT CHANGE UP (ref 13–17)
MCHC RBC-ENTMCNC: 31.1 PG — SIGNIFICANT CHANGE UP (ref 27–34)
MCHC RBC-ENTMCNC: 33.1 GM/DL — SIGNIFICANT CHANGE UP (ref 32–36)
MCV RBC AUTO: 94 FL — SIGNIFICANT CHANGE UP (ref 80–100)
MRSA PCR RESULT.: SIGNIFICANT CHANGE UP
NRBC # BLD: 0 /100 WBCS — SIGNIFICANT CHANGE UP (ref 0–0)
PLATELET # BLD AUTO: 216 K/UL — SIGNIFICANT CHANGE UP (ref 150–400)
RBC # BLD: 4.34 M/UL — SIGNIFICANT CHANGE UP (ref 4.2–5.8)
RBC # FLD: 15 % — HIGH (ref 10.3–14.5)
S AUREUS DNA NOSE QL NAA+PROBE: SIGNIFICANT CHANGE UP
TYPE + AB SCN PNL BLD: SIGNIFICANT CHANGE UP
WBC # BLD: 6.23 K/UL — SIGNIFICANT CHANGE UP (ref 3.8–10.5)
WBC # FLD AUTO: 6.23 K/UL — SIGNIFICANT CHANGE UP (ref 3.8–10.5)

## 2018-12-11 PROCEDURE — 88304 TISSUE EXAM BY PATHOLOGIST: CPT | Mod: 26

## 2018-12-11 RX ORDER — KETOROLAC TROMETHAMINE 30 MG/ML
30 SYRINGE (ML) INJECTION ONCE
Qty: 0 | Refills: 0 | Status: DISCONTINUED | OUTPATIENT
Start: 2018-12-11 | End: 2018-12-11

## 2018-12-11 RX ORDER — OXYCODONE AND ACETAMINOPHEN 5; 325 MG/1; MG/1
1 TABLET ORAL EVERY 4 HOURS
Qty: 0 | Refills: 0 | Status: DISCONTINUED | OUTPATIENT
Start: 2018-12-11 | End: 2018-12-11

## 2018-12-11 RX ORDER — OXYCODONE HYDROCHLORIDE 5 MG/1
5 TABLET ORAL ONCE
Qty: 0 | Refills: 0 | Status: DISCONTINUED | OUTPATIENT
Start: 2018-12-11 | End: 2018-12-11

## 2018-12-11 RX ORDER — FAMOTIDINE 10 MG/ML
20 INJECTION INTRAVENOUS ONCE
Qty: 0 | Refills: 0 | Status: COMPLETED | OUTPATIENT
Start: 2018-12-11 | End: 2018-12-11

## 2018-12-11 RX ORDER — METOCLOPRAMIDE HCL 10 MG
10 TABLET ORAL ONCE
Qty: 0 | Refills: 0 | Status: COMPLETED | OUTPATIENT
Start: 2018-12-11 | End: 2018-12-11

## 2018-12-11 RX ORDER — SODIUM CHLORIDE 9 MG/ML
1000 INJECTION, SOLUTION INTRAVENOUS
Qty: 0 | Refills: 0 | Status: DISCONTINUED | OUTPATIENT
Start: 2018-12-11 | End: 2018-12-26

## 2018-12-11 RX ORDER — FENTANYL CITRATE 50 UG/ML
25 INJECTION INTRAVENOUS
Qty: 0 | Refills: 0 | Status: DISCONTINUED | OUTPATIENT
Start: 2018-12-11 | End: 2018-12-11

## 2018-12-11 RX ORDER — ACETAMINOPHEN WITH CODEINE 300MG-30MG
1 TABLET ORAL EVERY 4 HOURS
Qty: 0 | Refills: 0 | Status: DISCONTINUED | OUTPATIENT
Start: 2018-12-11 | End: 2018-12-11

## 2018-12-11 RX ORDER — OXYCODONE HYDROCHLORIDE 5 MG/1
10 TABLET ORAL ONCE
Qty: 0 | Refills: 0 | Status: DISCONTINUED | OUTPATIENT
Start: 2018-12-11 | End: 2018-12-11

## 2018-12-11 RX ORDER — FENTANYL CITRATE 50 UG/ML
50 INJECTION INTRAVENOUS
Qty: 0 | Refills: 0 | Status: DISCONTINUED | OUTPATIENT
Start: 2018-12-11 | End: 2018-12-11

## 2018-12-11 RX ORDER — ONDANSETRON 8 MG/1
4 TABLET, FILM COATED ORAL ONCE
Qty: 0 | Refills: 0 | Status: DISCONTINUED | OUTPATIENT
Start: 2018-12-11 | End: 2018-12-11

## 2018-12-11 RX ADMIN — OXYCODONE HYDROCHLORIDE 10 MILLIGRAM(S): 5 TABLET ORAL at 07:53

## 2018-12-11 RX ADMIN — Medication 10 MILLIGRAM(S): at 07:52

## 2018-12-11 RX ADMIN — FAMOTIDINE 20 MILLIGRAM(S): 10 INJECTION INTRAVENOUS at 07:52

## 2018-12-11 RX ADMIN — OXYCODONE HYDROCHLORIDE 10 MILLIGRAM(S): 5 TABLET ORAL at 07:52

## 2018-12-11 NOTE — BRIEF OPERATIVE NOTE - PROCEDURE
<<-----Click on this checkbox to enter Procedure Laminectomy by transthoracic approach  12/11/2018    Active  JOSSY

## 2018-12-11 NOTE — ASU DISCHARGE PLAN (ADULT/PEDIATRIC). - NOTIFY
Numbness, color, or temperature change to extremity/Unable to Urinate/Numbness, tingling/Fever greater than 101/Bleeding that does not stop/Increased Irritability or Sluggishness

## 2018-12-11 NOTE — ASU DISCHARGE PLAN (ADULT/PEDIATRIC). - LEAVE STERI-STRIP INTACT
Topic:  Postpartum visit    Normal postpartum visit today    Patient decided on Depo-Provera for her choice of contraception    Prescription was given to her to obtain it and she will call to arrange injection time    Keshawn Brice is doing well and being followed at [facility] hemophilia    Patient will be seen back in 1 year for follow-up    Patient will call should any problems issues or concerns arise during the interim
Statement Selected

## 2018-12-11 NOTE — ASU PATIENT PROFILE, ADULT - NS TRANSFER PROSTHESIS:
Subjective:   Karolina Gilmore is a pleasant 65-year-old female with known history of  1. Pericardial effusion stable  2. Hypertension  3. Dyslipidemia  4. Mild mitral regurgitation   Presenting today for followup evaluation of hypertension, Valvular heart disease and dyslipidemia.  Patient denied any complaints of myalgias while on statins.     No complaints of exertional chest pain pressure or tightness. Denied any complaints of dizziness lightheadedness or LOC. No complaints of lower extremity edema or claudication.        Past Medical History:   Diagnosis Date   • Cerumen debris on tympanic membrane of right ear 5/24/2016   • GERD (gastroesophageal reflux disease)    • WEBBER (nonalcoholic steatohepatitis)    • Vertigo 5/24/2017     Past Surgical History:   Procedure Laterality Date   • CARPAL TUNNEL RELEASE      right   • HYSTERECTOMY, TOTAL ABDOMINAL  96     Family History   Problem Relation Age of Onset   • Heart Attack Mother 76   • Stroke Mother    • Other Father 52     Brain tumour      History   Smoking Status   • Never Smoker   Smokeless Tobacco   • Never Used     Allergies   Allergen Reactions   • Augmentin    • Ciprofloxacin    • Neomycin      Outpatient Encounter Prescriptions as of 10/30/2017   Medication Sig Dispense Refill   • pravastatin (PRAVACHOL) 20 MG Tab Take 1 Tab by mouth every bedtime. 90 Tab 3   • lisinopril (PRINIVIL, ZESTRIL) 40 MG tablet Take 1 Tab by mouth every day. 90 Tab 3   • amlodipine (NORVASC) 10 MG Tab TAKE ONE TABLET BY MOUTH ONE TIME DAILY 90 Tab 3   • estradiol (ESTRACE) 0.5 MG tablet Take 0.5 mg by mouth every day.     • aspirin EC (ECOTRIN) 81 MG Tablet Delayed Response Take 81 mg by mouth every day.     • pantoprazole (PROTONIX) 40 MG TBEC Take 40 mg by mouth every day.     • Probiotic Product (PROBIOTIC DAILY PO) Take  by mouth.     • Multiple Vitamins-Minerals (MULTIVITAMIN PO) Take  by mouth.     • vitamin D (CHOLECALCIFEROL) 1000 UNIT TABS Take 1,000 Units by mouth  "every day.     • docosahexanoic acid (OMEGA 3 FA) 1000 MG CAPS Take 1,000 mg by mouth every day.     • meclizine (ANTIVERT) 12.5 MG Tab TAKE ONE TABLET BY MOUTH THREE TIMES DAILY AS NEEDED 30 Tab 2     No facility-administered encounter medications on file as of 10/30/2017.      Review of Systems   Constitutional: Negative for chills and fever.   HENT: Negative for congestion.    Eyes: Negative for blurred vision.   Respiratory: Negative for cough and shortness of breath.    Cardiovascular: Negative for chest pain, palpitations, orthopnea, claudication, leg swelling and PND.   Gastrointestinal: Negative for abdominal pain.   Musculoskeletal: Negative for joint pain and myalgias.   Skin: Negative for rash.   Neurological: Negative for speech change and loss of consciousness.   Endo/Heme/Allergies: Does not bruise/bleed easily.   Psychiatric/Behavioral: Negative for depression. The patient is not nervous/anxious.    All other systems reviewed and are negative.       Objective:   /60   Pulse 70   Ht 1.626 m (5' 4\")   Wt 84.8 kg (187 lb)   SpO2 93%   BMI 32.10 kg/m²     Physical Exam   Constitutional: She is oriented to person, place, and time. She appears well-developed. No distress.   HENT:   Mouth/Throat: Mucous membranes are normal.   Eyes: Conjunctivae and EOM are normal.   Neck: No JVD present. No tracheal deviation present. No thyroid mass present.   Cardiovascular: Normal rate, regular rhythm and intact distal pulses.    Murmur heard.  Soft systolic murmur best heard in the mitral area   Pulmonary/Chest: Effort normal and breath sounds normal. No respiratory distress. She exhibits no tenderness.   Abdominal: Soft. There is no tenderness.   Musculoskeletal: Normal range of motion. She exhibits no edema.   Neurological: She is alert and oriented to person, place, and time. She has normal strength. She displays no tremor.   Skin: Skin is warm and dry. She is not diaphoretic.   Psychiatric: She has a " normal mood and affect. Her behavior is normal.   Vitals reviewed.  Results for SANTANA MOLINA (MRN [id number]) as of 10/30/2017 13:46   10/5/2016  10/25/2017    Sodium 138 136   Potassium 4.1 4.1   Chloride 105 105   Co2 27 22   Anion Gap 6.0 9.0   Glucose 96 95   Bun 16 12   Creatinine 1.03 0.85   GFR If Non  54 (A) >60   Calcium 9.9 9.1   AST(SGOT) 21 21   ALT(SGPT) 15 15   Alkaline Phosphatase 66 80   Cholesterol,Tot 173 180   Triglycerides 184 (H) 168 (H)   HDL 53 60   LDL 83 86     Thansthoracic Echo: 12/10/13   Normal left ventricular chamber size. Normal left ventricular wall thickness. Normal left ventricular systolic function. Grade I diastolic dysfunction is present. Left ventricular ejection fraction is 60% to 65%. Normal regional wall motion.  Normal left atrial size. Patent foramen ovale present with a left-to-right shunt.  Mild mitral regurgitation.  Aortic valve probably tricuspid. No stenosis or regurgitation seen.   Trace tricuspid regurgitation. Unable to estimate pulmonary artery pressure due to an inadequate tricuspid regurgitant jet.  Small pericardial effusion without evidence of hemodynamic compromise.  Normal aortic root diameter 2.6 cm. Ascending aorta 2.5 cm.   Compared to prior echo done on 13 no change in effusion size.    Transthoracic Echo: 13   Normal left ventricular chamber size. Mild concentric left ventricular hypertrophy. Normal left ventricular systolic function. Grade I diastolic dysfunction is present. Left ventricular ejection fraction is 70% to 75%. Normal regional wall motion.  Trace mitral regurgitation.  Mild tricuspid regurgitation. Right ventricular systolic pressure is estimated to be 15-20 mmHg.  Small pericardial effusion without evidence of hemodynamic compromise.  Normal aortic root diameter.  No prior study for comparison     [id number]   NSR at 66 bpm, normal axis, decreased voltage in limb leads, left atrial enlargement, poor RV  progression,  ms,     CT abdomen: 6/6/13   1. No evidence of urolithiasis.  2. 4-mm angiomyolipoma right kidney.  3. Small cyst in the liver.  4. Moderate pericardial effusion.    Assessment:   1. Hypertension   2. Dyslipidemia  3. Mild mitral regurgitation    Medical Decision Making:  Today's Assessment / Status / Plan:     1. Continue Norvasc 10 mg daily.   Continue lisinopril 40 mg daily.                           2. Continue pravastatin 20 mg qHs.  Hypertriglyceridemia: Improved with dietary modification and exercise.  3. Echo prior to next visit.    Followup in one year.  Echo and labs prior to next visit.    Thank you for allowing me to participate in taking care of patient.    Nuvia Barragan MD.     n/a

## 2018-12-11 NOTE — ASU PATIENT PROFILE, ADULT - PMH
Anxiety    GERD (gastroesophageal reflux disease)    Hyperlipidemia Anxiety    GERD (gastroesophageal reflux disease)    Hyperlipidemia    Skin cancer  external right ear, unsure of type

## 2018-12-11 NOTE — ASU PATIENT PROFILE, ADULT - PSH
S/P carpal tunnel release  right side, left side H/O external ear surgery  excision mass right ear  History of back surgery  laminectomy  S/P carpal tunnel release  right side, left side  S/P trigger finger release  right thumb and middle finger

## 2018-12-12 LAB — SURGICAL PATHOLOGY FINAL REPORT - CH: SIGNIFICANT CHANGE UP

## 2018-12-16 DIAGNOSIS — F41.8 OTHER SPECIFIED ANXIETY DISORDERS: ICD-10-CM

## 2018-12-16 DIAGNOSIS — I10 ESSENTIAL (PRIMARY) HYPERTENSION: ICD-10-CM

## 2018-12-16 DIAGNOSIS — Z95.2 PRESENCE OF PROSTHETIC HEART VALVE: ICD-10-CM

## 2018-12-16 DIAGNOSIS — I51.89 OTHER ILL-DEFINED HEART DISEASES: ICD-10-CM

## 2018-12-16 DIAGNOSIS — M51.26 OTHER INTERVERTEBRAL DISC DISPLACEMENT, LUMBAR REGION: ICD-10-CM

## 2018-12-16 DIAGNOSIS — M47.819 SPONDYLOSIS WITHOUT MYELOPATHY OR RADICULOPATHY, SITE UNSPECIFIED: ICD-10-CM

## 2018-12-16 DIAGNOSIS — Z87.891 PERSONAL HISTORY OF NICOTINE DEPENDENCE: ICD-10-CM

## 2018-12-16 DIAGNOSIS — K21.9 GASTRO-ESOPHAGEAL REFLUX DISEASE WITHOUT ESOPHAGITIS: ICD-10-CM

## 2019-01-04 PROBLEM — C44.90 UNSPECIFIED MALIGNANT NEOPLASM OF SKIN, UNSPECIFIED: Chronic | Status: ACTIVE | Noted: 2018-12-11

## 2019-01-08 ENCOUNTER — APPOINTMENT (OUTPATIENT)
Dept: COLORECTAL SURGERY | Facility: CLINIC | Age: 64
End: 2019-01-08
Payer: MEDICARE

## 2019-01-08 PROCEDURE — 99205 OFFICE O/P NEW HI 60 MIN: CPT

## 2019-01-08 NOTE — HISTORY OF PRESENT ILLNESS
[FreeTextEntry1] : 63-year-old male with complaint of prolapsing internal/external hemorrhoids. Symptoms at the present for many years and worsening. He wishes to have them removed surgically

## 2019-01-08 NOTE — PHYSICAL EXAM
[Excoriation] : excoriations [Manually Reducible] : a manually reducible (grade III) [Tender, Swollen] : tender, swollen [Thrombosed] : that was thrombosed [Skin Tags] : residual hemorrhoidal skin tags were noted [Normal] : was normal [None] : there was no rectal mass  [Normal Breath Sounds] : Normal breath sounds [Normal Heart Sounds] : normal heart sounds [Normal Rate and Rhythm] : normal rate and rhythm [No Rash or Lesion] : No rash or lesion [Alert] : alert [Oriented to Person] : oriented to person [Oriented to Place] : oriented to place [Oriented to Time] : oriented to time [Calm] : calm [Abdomen Masses] : No abdominal masses [Abdomen Tenderness] : ~T No ~M abdominal tenderness [Tender] : nontender [Gross Blood] : no gross blood [JVD] : no jugular venous distention  [de-identified] : Looks well in no distress, of stated age. [de-identified] : pupils equal reactive to light normocephalic atraumatic. [de-identified] : moves all 4 extremities appropriately with 5 over 5 strength

## 2019-01-08 NOTE — ASSESSMENT
[FreeTextEntry1] : 63 year-old male with extensive internal/external hemorrhoids. Recommend hemorrhoidectomy.Risk and benefits of the surgery have been discussed which include bleeding, infection, sepsis, multiorgan failure, inadvertent injury including hollow viscus, solid organ, ureter neurovascular and neurological structures, DVT PE, heart attack, stroke, hernias, recurrence, incontinence and death.

## 2019-01-08 NOTE — CONSULT LETTER
[Dear  ___] : Dear  [unfilled], [Consult Letter:] : I had the pleasure of evaluating your patient, [unfilled]. [Please see my note below.] : Please see my note below. [Consult Closing:] : Thank you very much for allowing me to participate in the care of this patient.  If you have any questions, please do not hesitate to contact me. [Sincerely,] : Sincerely, [FreeTextEntry3] : Bruce Talley M.D. FACS, FASCRS

## 2019-02-20 ENCOUNTER — OUTPATIENT (OUTPATIENT)
Dept: OUTPATIENT SERVICES | Facility: HOSPITAL | Age: 64
LOS: 1 days | Discharge: HOME | End: 2019-02-20
Payer: MEDICARE

## 2019-02-20 VITALS
WEIGHT: 195.99 LBS | DIASTOLIC BLOOD PRESSURE: 88 MMHG | HEIGHT: 69.5 IN | TEMPERATURE: 97 F | RESPIRATION RATE: 18 BRPM | SYSTOLIC BLOOD PRESSURE: 123 MMHG | OXYGEN SATURATION: 97 % | HEART RATE: 68 BPM

## 2019-02-20 DIAGNOSIS — Z98.890 OTHER SPECIFIED POSTPROCEDURAL STATES: Chronic | ICD-10-CM

## 2019-02-20 DIAGNOSIS — Z87.74 PERSONAL HISTORY OF (CORRECTED) CONGENITAL MALFORMATIONS OF HEART AND CIRCULATORY SYSTEM: Chronic | ICD-10-CM

## 2019-02-20 DIAGNOSIS — K64.8 OTHER HEMORRHOIDS: ICD-10-CM

## 2019-02-20 DIAGNOSIS — Z98.89 OTHER SPECIFIED POSTPROCEDURAL STATES: Chronic | ICD-10-CM

## 2019-02-20 LAB
ANION GAP SERPL CALC-SCNC: 6 MMOL/L — SIGNIFICANT CHANGE UP (ref 5–17)
APTT BLD: 30.4 SEC — SIGNIFICANT CHANGE UP (ref 27.5–36.3)
BASOPHILS # BLD AUTO: 0.04 K/UL — SIGNIFICANT CHANGE UP (ref 0–0.2)
BASOPHILS NFR BLD AUTO: 0.6 % — SIGNIFICANT CHANGE UP (ref 0–2)
BUN SERPL-MCNC: 24 MG/DL — HIGH (ref 7–23)
CALCIUM SERPL-MCNC: 9 MG/DL — SIGNIFICANT CHANGE UP (ref 8.5–10.1)
CHLORIDE SERPL-SCNC: 106 MMOL/L — SIGNIFICANT CHANGE UP (ref 96–108)
CO2 SERPL-SCNC: 27 MMOL/L — SIGNIFICANT CHANGE UP (ref 22–31)
CREAT SERPL-MCNC: 1 MG/DL — SIGNIFICANT CHANGE UP (ref 0.5–1.3)
EOSINOPHIL # BLD AUTO: 0.04 K/UL — SIGNIFICANT CHANGE UP (ref 0–0.5)
EOSINOPHIL NFR BLD AUTO: 0.6 % — SIGNIFICANT CHANGE UP (ref 0–6)
GLUCOSE SERPL-MCNC: 86 MG/DL — SIGNIFICANT CHANGE UP (ref 70–99)
HCT VFR BLD CALC: 44.4 % — SIGNIFICANT CHANGE UP (ref 39–50)
HGB BLD-MCNC: 14.3 G/DL — SIGNIFICANT CHANGE UP (ref 13–17)
IMM GRANULOCYTES NFR BLD AUTO: 0.3 % — SIGNIFICANT CHANGE UP (ref 0–1.5)
INR BLD: 0.96 RATIO — SIGNIFICANT CHANGE UP (ref 0.88–1.16)
LYMPHOCYTES # BLD AUTO: 1.96 K/UL — SIGNIFICANT CHANGE UP (ref 1–3.3)
LYMPHOCYTES # BLD AUTO: 31.4 % — SIGNIFICANT CHANGE UP (ref 13–44)
MCHC RBC-ENTMCNC: 31.2 PG — SIGNIFICANT CHANGE UP (ref 27–34)
MCHC RBC-ENTMCNC: 32.2 GM/DL — SIGNIFICANT CHANGE UP (ref 32–36)
MCV RBC AUTO: 96.9 FL — SIGNIFICANT CHANGE UP (ref 80–100)
MONOCYTES # BLD AUTO: 0.43 K/UL — SIGNIFICANT CHANGE UP (ref 0–0.9)
MONOCYTES NFR BLD AUTO: 6.9 % — SIGNIFICANT CHANGE UP (ref 2–14)
NEUTROPHILS # BLD AUTO: 3.76 K/UL — SIGNIFICANT CHANGE UP (ref 1.8–7.4)
NEUTROPHILS NFR BLD AUTO: 60.2 % — SIGNIFICANT CHANGE UP (ref 43–77)
NRBC # BLD: 0 /100 WBCS — SIGNIFICANT CHANGE UP (ref 0–0)
PLATELET # BLD AUTO: 248 K/UL — SIGNIFICANT CHANGE UP (ref 150–400)
POTASSIUM SERPL-MCNC: 5 MMOL/L — SIGNIFICANT CHANGE UP (ref 3.5–5.3)
POTASSIUM SERPL-SCNC: 5 MMOL/L — SIGNIFICANT CHANGE UP (ref 3.5–5.3)
PROTHROM AB SERPL-ACNC: 10.6 SEC — SIGNIFICANT CHANGE UP (ref 10–12.9)
RBC # BLD: 4.58 M/UL — SIGNIFICANT CHANGE UP (ref 4.2–5.8)
RBC # FLD: 12.2 % — SIGNIFICANT CHANGE UP (ref 10.3–14.5)
SODIUM SERPL-SCNC: 139 MMOL/L — SIGNIFICANT CHANGE UP (ref 135–145)
WBC # BLD: 6.25 K/UL — SIGNIFICANT CHANGE UP (ref 3.8–10.5)
WBC # FLD AUTO: 6.25 K/UL — SIGNIFICANT CHANGE UP (ref 3.8–10.5)

## 2019-02-20 PROCEDURE — 93010 ELECTROCARDIOGRAM REPORT: CPT

## 2019-02-20 NOTE — H&P PST ADULT - TEACHING/LEARNING LEARNING PREFERENCES
verbal instruction/computer/internet/skill demonstration/pictorial/individual instruction/group instruction/audio/video/written material

## 2019-02-20 NOTE — H&P PST ADULT - ASSESSMENT
63 y.o male scheduled for Excision of hemorrhoids , proctoplasty  Plan  1. Stop all NSAIDS, herbal supplements and vitamins for 7 days.  2. NPO at midnight.  3. Take the following medications Metoprolol with small sips of water on the morning of your procedure/surgery.  4. Labs, EKG, CXR  as per surgeon  5. PMD Dr Romero medical clearance  for optimization prior to surgery as per surgeon

## 2019-02-20 NOTE — H&P PST ADULT - CARDIOVASCULAR COMMENTS
HTN, Hyperlipidemia, aortic stenosis - bicuspid valve replacement 5/29/2018 stable follows by cardio Dr Doll

## 2019-02-20 NOTE — H&P PST ADULT - PSH
H/O bicuspid aortic valve  replacement- 5/29/18  H/O external ear surgery  excision mass right ear- cancer 2018  History of back surgery  laminectomy x2 last 2018  S/P carpal tunnel release  right side, left side- 2014  S/P trigger finger release  right thumb and middle finger

## 2019-02-20 NOTE — H&P PST ADULT - HISTORY OF PRESENT ILLNESS
63 y.o male presents for PST with hx of recurrent, painful hemorrhoids, bleeding at times, followed by GI, now scheduled for Excision of hemorrhoids, proctoplasty

## 2019-02-20 NOTE — H&P PST ADULT - PMH
Anxiety    Aortic stenosis  Bicuspid Aortic Valve Replacement- 5/29/2018  GERD (gastroesophageal reflux disease)    HTN (hypertension)    Hyperlipidemia    Skin cancer  external right ear, unsure of type

## 2019-02-27 ENCOUNTER — OUTPATIENT (OUTPATIENT)
Dept: OUTPATIENT SERVICES | Facility: HOSPITAL | Age: 64
LOS: 1 days | Discharge: HOME | End: 2019-02-27
Payer: MEDICARE

## 2019-02-27 ENCOUNTER — RESULT REVIEW (OUTPATIENT)
Age: 64
End: 2019-02-27

## 2019-02-27 ENCOUNTER — APPOINTMENT (OUTPATIENT)
Dept: COLORECTAL SURGERY | Facility: HOSPITAL | Age: 64
End: 2019-02-27
Payer: MEDICARE

## 2019-02-27 VITALS
OXYGEN SATURATION: 100 % | DIASTOLIC BLOOD PRESSURE: 55 MMHG | SYSTOLIC BLOOD PRESSURE: 108 MMHG | RESPIRATION RATE: 16 BRPM | HEART RATE: 65 BPM

## 2019-02-27 VITALS
HEART RATE: 65 BPM | OXYGEN SATURATION: 100 % | DIASTOLIC BLOOD PRESSURE: 77 MMHG | TEMPERATURE: 97 F | HEIGHT: 69 IN | WEIGHT: 195.99 LBS | SYSTOLIC BLOOD PRESSURE: 123 MMHG | RESPIRATION RATE: 14 BRPM

## 2019-02-27 DIAGNOSIS — Z98.890 OTHER SPECIFIED POSTPROCEDURAL STATES: Chronic | ICD-10-CM

## 2019-02-27 DIAGNOSIS — Z98.89 OTHER SPECIFIED POSTPROCEDURAL STATES: Chronic | ICD-10-CM

## 2019-02-27 DIAGNOSIS — Z87.74 PERSONAL HISTORY OF (CORRECTED) CONGENITAL MALFORMATIONS OF HEART AND CIRCULATORY SYSTEM: Chronic | ICD-10-CM

## 2019-02-27 PROCEDURE — 45505 REPAIR OF RECTUM: CPT | Mod: 59

## 2019-02-27 PROCEDURE — 88304 TISSUE EXAM BY PATHOLOGIST: CPT | Mod: 26

## 2019-02-27 PROCEDURE — 46260 REMOVE IN/EX HEM GROUPS 2+: CPT

## 2019-02-27 RX ORDER — OXYCODONE HYDROCHLORIDE 5 MG/1
5 TABLET ORAL ONCE
Qty: 0 | Refills: 0 | Status: DISCONTINUED | OUTPATIENT
Start: 2019-02-27 | End: 2019-02-28

## 2019-02-27 RX ORDER — SODIUM CHLORIDE 9 MG/ML
3 INJECTION INTRAMUSCULAR; INTRAVENOUS; SUBCUTANEOUS EVERY 8 HOURS
Qty: 0 | Refills: 0 | Status: DISCONTINUED | OUTPATIENT
Start: 2019-02-27 | End: 2019-02-27

## 2019-02-27 RX ORDER — FAMOTIDINE 10 MG/ML
20 INJECTION INTRAVENOUS ONCE
Qty: 0 | Refills: 0 | Status: COMPLETED | OUTPATIENT
Start: 2019-02-27 | End: 2019-02-27

## 2019-02-27 RX ORDER — ONDANSETRON 8 MG/1
4 TABLET, FILM COATED ORAL EVERY 6 HOURS
Qty: 0 | Refills: 0 | Status: DISCONTINUED | OUTPATIENT
Start: 2019-02-27 | End: 2019-03-14

## 2019-02-27 RX ORDER — ACETAMINOPHEN 500 MG
975 TABLET ORAL ONCE
Qty: 0 | Refills: 0 | Status: COMPLETED | OUTPATIENT
Start: 2019-02-27 | End: 2019-02-27

## 2019-02-27 RX ORDER — OXYCODONE AND ACETAMINOPHEN 5; 325 MG/1; MG/1
1 TABLET ORAL EVERY 4 HOURS
Qty: 0 | Refills: 0 | Status: DISCONTINUED | OUTPATIENT
Start: 2019-02-27 | End: 2019-02-27

## 2019-02-27 RX ORDER — SODIUM CHLORIDE 9 MG/ML
1000 INJECTION, SOLUTION INTRAVENOUS
Qty: 0 | Refills: 0 | Status: DISCONTINUED | OUTPATIENT
Start: 2019-02-27 | End: 2019-02-28

## 2019-02-27 RX ORDER — ASPIRIN/CALCIUM CARB/MAGNESIUM 324 MG
1 TABLET ORAL
Qty: 0 | Refills: 0 | COMMUNITY

## 2019-02-27 RX ORDER — FENTANYL CITRATE 50 UG/ML
50 INJECTION INTRAVENOUS
Qty: 0 | Refills: 0 | Status: DISCONTINUED | OUTPATIENT
Start: 2019-02-27 | End: 2019-02-28

## 2019-02-27 RX ORDER — ONDANSETRON 8 MG/1
4 TABLET, FILM COATED ORAL ONCE
Qty: 0 | Refills: 0 | Status: DISCONTINUED | OUTPATIENT
Start: 2019-02-27 | End: 2019-02-28

## 2019-02-27 RX ADMIN — Medication 975 MILLIGRAM(S): at 08:13

## 2019-02-27 RX ADMIN — FAMOTIDINE 20 MILLIGRAM(S): 10 INJECTION INTRAVENOUS at 08:12

## 2019-02-27 RX ADMIN — SODIUM CHLORIDE 75 MILLILITER(S): 9 INJECTION, SOLUTION INTRAVENOUS at 10:30

## 2019-02-27 NOTE — ASU DISCHARGE PLAN (ADULT/PEDIATRIC). - C. MAKE IMPORTANT PERSONAL OR BUSINESS DECISIONS
Statement Selected Abdominal pain  s/p colonoscopy -2018  BPH (benign prostatic hyperplasia)    DM type 2 (diabetes mellitus, type 2)    Hydrocephalus  s/p  shunt  placed 7/2018  Hypercholesterolemia    PARMINDER (obstructive sleep apnea)    Ptosis of eyelid, bilateral Abdominal pain  s/p colonoscopy -2018  BPH (benign prostatic hyperplasia)    DM type 2 (diabetes mellitus, type 2)    Essential tremor    Hydrocephalus  s/p  shunt  placed 7/2018  Hypercholesterolemia    PARMINDER (obstructive sleep apnea)    Ptosis of eyelid, bilateral

## 2019-02-27 NOTE — ASU DISCHARGE PLAN (ADULT/PEDIATRIC). - MEDICATION SUMMARY - MEDICATIONS TO TAKE
I will START or STAY ON the medications listed below when I get home from the hospital:    Aspirin Enteric Coated 81 mg oral delayed release tablet  -- 1 tab(s) by mouth once a day  -- Indication: For home med    oxycodone-acetaminophen 5 mg-325 mg oral tablet  -- 1 tab(s) by mouth every 6 hours, As Needed -for moderate pain [id number]  -- Caution federal law prohibits the transfer of this drug to any person other  than the person for whom it was prescribed.  May cause drowsiness.  Alcohol may intensify this effect.  Use care when operating dangerous machinery.  This prescription cannot be refilled.  This product contains acetaminophen.  Do not use  with any other product containing acetaminophen to prevent possible liver damage.  Using more of this medication than prescribed may cause serious breathing problems.    -- Indication: For home med    Valium 5 mg oral tablet  -- 1 tab(s) by mouth once a day, As Needed  -- Caution federal law prohibits the transfer of this drug to any person other  than the person for whom it was prescribed.  Do not take this drug if you are pregnant.  May cause drowsiness.  Alcohol may intensify this effect.  Use care when operating dangerous machinery.    -- Indication: For home med    Effexor  mg oral capsule, extended release  -- 1 cap(s) by mouth once a day  -- Indication: For home med    atorvastatin 10 mg oral tablet  -- 1 tab(s) by mouth once a day  -- Indication: For home med    metoprolol succinate 25 mg oral tablet, extended release  -- 1 tab(s) by mouth once a day  -- Indication: For home med    Osteo Bi-Flex  -- orally once a day  -- Indication: For home med    omeprazole 20 mg oral delayed release capsule  -- 1 cap(s) by mouth once a day  -- Indication: For home med    Wellbutrin  mg/24 hours oral tablet, extended release  -- 1 tab(s) by mouth every 24 hours  -- Indication: For home med    multivitamin  -- orally once a day  -- Indication: For home med    Vitamin C  -- orally once a day  -- Indication: For home med

## 2019-02-27 NOTE — ASU DISCHARGE PLAN (ADULT/PEDIATRIC). - NOTIFY
Fever greater than 101/Bleeding that does not stop/Persistent Nausea and Vomiting/Pain not relieved by Medications/Excessive Diarrhea/Unable to Urinate

## 2019-02-27 NOTE — ASU PATIENT PROFILE, ADULT - NS SC CAGE ALCOHOL EYE OPENER
no Eyes with no visual disturbances.  Ears clean and dry and no hearing difficulties. Nose with pink mucosa and no drainage.  Mouth mucous membranes moist and pink.  No tenderness or swelling to throat or neck.

## 2019-02-28 PROBLEM — I10 ESSENTIAL (PRIMARY) HYPERTENSION: Chronic | Status: ACTIVE | Noted: 2019-02-20

## 2019-02-28 PROBLEM — I35.0 NONRHEUMATIC AORTIC (VALVE) STENOSIS: Chronic | Status: ACTIVE | Noted: 2019-02-20

## 2019-02-28 LAB — SURGICAL PATHOLOGY FINAL REPORT - CH: SIGNIFICANT CHANGE UP

## 2019-03-04 DIAGNOSIS — Z85.828 PERSONAL HISTORY OF OTHER MALIGNANT NEOPLASM OF SKIN: ICD-10-CM

## 2019-03-04 DIAGNOSIS — I10 ESSENTIAL (PRIMARY) HYPERTENSION: ICD-10-CM

## 2019-03-04 DIAGNOSIS — I25.10 ATHEROSCLEROTIC HEART DISEASE OF NATIVE CORONARY ARTERY WITHOUT ANGINA PECTORIS: ICD-10-CM

## 2019-03-04 DIAGNOSIS — F41.9 ANXIETY DISORDER, UNSPECIFIED: ICD-10-CM

## 2019-03-04 DIAGNOSIS — Z95.4 PRESENCE OF OTHER HEART-VALVE REPLACEMENT: ICD-10-CM

## 2019-03-04 DIAGNOSIS — Z79.82 LONG TERM (CURRENT) USE OF ASPIRIN: ICD-10-CM

## 2019-03-04 DIAGNOSIS — E78.5 HYPERLIPIDEMIA, UNSPECIFIED: ICD-10-CM

## 2019-03-04 DIAGNOSIS — Z98.61 CORONARY ANGIOPLASTY STATUS: ICD-10-CM

## 2019-03-04 DIAGNOSIS — K64.8 OTHER HEMORRHOIDS: ICD-10-CM

## 2019-03-04 DIAGNOSIS — K21.9 GASTRO-ESOPHAGEAL REFLUX DISEASE WITHOUT ESOPHAGITIS: ICD-10-CM

## 2019-03-04 DIAGNOSIS — K64.4 RESIDUAL HEMORRHOIDAL SKIN TAGS: ICD-10-CM

## 2019-03-29 ENCOUNTER — APPOINTMENT (OUTPATIENT)
Dept: COLORECTAL SURGERY | Facility: CLINIC | Age: 64
End: 2019-03-29
Payer: MEDICARE

## 2019-03-29 VITALS
SYSTOLIC BLOOD PRESSURE: 109 MMHG | RESPIRATION RATE: 14 BRPM | BODY MASS INDEX: 26.36 KG/M2 | WEIGHT: 178 LBS | DIASTOLIC BLOOD PRESSURE: 72 MMHG | TEMPERATURE: 97.8 F | HEIGHT: 69 IN | HEART RATE: 79 BPM

## 2019-03-29 PROCEDURE — 99024 POSTOP FOLLOW-UP VISIT: CPT

## 2019-03-30 NOTE — HISTORY OF PRESENT ILLNESS
[FreeTextEntry1] : 63-year-old male post hemorrhoidectomy. Doing well overall with some minor bleeding and pain

## 2019-03-30 NOTE — ASSESSMENT
[FreeTextEntry1] : 63-year-old male post hemorrhoidectomy.Recommend high fiber diet, Metamucil daily, sitz baths, stool softeners, pain medications p.r.n.

## 2019-03-30 NOTE — PHYSICAL EXAM
[Tender, Swollen] : nontender, non-swollen [Normal] : was normal [None] : there was no rectal mass  [de-identified] : Looks well in no distress, of stated age.

## 2019-05-28 ENCOUNTER — APPOINTMENT (OUTPATIENT)
Dept: UROLOGY | Facility: CLINIC | Age: 64
End: 2019-05-28
Payer: MEDICARE

## 2019-05-28 ENCOUNTER — EMERGENCY (EMERGENCY)
Facility: HOSPITAL | Age: 64
LOS: 0 days | Discharge: HOME | End: 2019-05-28
Attending: EMERGENCY MEDICINE | Admitting: EMERGENCY MEDICINE
Payer: MEDICARE

## 2019-05-28 VITALS — WEIGHT: 169.98 LBS

## 2019-05-28 VITALS
OXYGEN SATURATION: 98 % | HEART RATE: 68 BPM | DIASTOLIC BLOOD PRESSURE: 67 MMHG | RESPIRATION RATE: 16 BRPM | SYSTOLIC BLOOD PRESSURE: 112 MMHG

## 2019-05-28 VITALS
OXYGEN SATURATION: 95 % | HEIGHT: 69 IN | DIASTOLIC BLOOD PRESSURE: 82 MMHG | SYSTOLIC BLOOD PRESSURE: 123 MMHG | HEART RATE: 76 BPM | WEIGHT: 190 LBS | BODY MASS INDEX: 28.14 KG/M2 | TEMPERATURE: 98.1 F

## 2019-05-28 DIAGNOSIS — Z85.828 PERSONAL HISTORY OF OTHER MALIGNANT NEOPLASM OF SKIN: ICD-10-CM

## 2019-05-28 DIAGNOSIS — Z98.890 OTHER SPECIFIED POSTPROCEDURAL STATES: Chronic | ICD-10-CM

## 2019-05-28 DIAGNOSIS — R07.9 CHEST PAIN, UNSPECIFIED: ICD-10-CM

## 2019-05-28 DIAGNOSIS — Z79.82 LONG TERM (CURRENT) USE OF ASPIRIN: ICD-10-CM

## 2019-05-28 DIAGNOSIS — R35.1 NOCTURIA: ICD-10-CM

## 2019-05-28 DIAGNOSIS — Z98.89 OTHER SPECIFIED POSTPROCEDURAL STATES: Chronic | ICD-10-CM

## 2019-05-28 DIAGNOSIS — K21.9 GASTRO-ESOPHAGEAL REFLUX DISEASE WITHOUT ESOPHAGITIS: ICD-10-CM

## 2019-05-28 DIAGNOSIS — Z95.2 PRESENCE OF PROSTHETIC HEART VALVE: ICD-10-CM

## 2019-05-28 DIAGNOSIS — F41.9 ANXIETY DISORDER, UNSPECIFIED: ICD-10-CM

## 2019-05-28 DIAGNOSIS — Z87.74 PERSONAL HISTORY OF (CORRECTED) CONGENITAL MALFORMATIONS OF HEART AND CIRCULATORY SYSTEM: Chronic | ICD-10-CM

## 2019-05-28 DIAGNOSIS — I10 ESSENTIAL (PRIMARY) HYPERTENSION: ICD-10-CM

## 2019-05-28 LAB
ALBUMIN SERPL ELPH-MCNC: 3.6 G/DL — SIGNIFICANT CHANGE UP (ref 3.3–5)
ALP SERPL-CCNC: 100 U/L — SIGNIFICANT CHANGE UP (ref 40–120)
ALT FLD-CCNC: 48 U/L — SIGNIFICANT CHANGE UP (ref 12–78)
ANION GAP SERPL CALC-SCNC: 6 MMOL/L — SIGNIFICANT CHANGE UP (ref 5–17)
APTT BLD: 32.3 SEC — SIGNIFICANT CHANGE UP (ref 27.5–36.3)
AST SERPL-CCNC: 31 U/L — SIGNIFICANT CHANGE UP (ref 15–37)
BILIRUB SERPL-MCNC: 0.3 MG/DL — SIGNIFICANT CHANGE UP (ref 0.2–1.2)
BUN SERPL-MCNC: 20 MG/DL — SIGNIFICANT CHANGE UP (ref 7–23)
CALCIUM SERPL-MCNC: 8.9 MG/DL — SIGNIFICANT CHANGE UP (ref 8.5–10.1)
CHLORIDE SERPL-SCNC: 104 MMOL/L — SIGNIFICANT CHANGE UP (ref 96–108)
CO2 SERPL-SCNC: 27 MMOL/L — SIGNIFICANT CHANGE UP (ref 22–31)
CREAT SERPL-MCNC: 1.14 MG/DL — SIGNIFICANT CHANGE UP (ref 0.5–1.3)
D DIMER BLD IA.RAPID-MCNC: 293 NG/ML DDU — HIGH
GLUCOSE SERPL-MCNC: 81 MG/DL — SIGNIFICANT CHANGE UP (ref 70–99)
HCT VFR BLD CALC: 46 % — SIGNIFICANT CHANGE UP (ref 39–50)
HGB BLD-MCNC: 14.8 G/DL — SIGNIFICANT CHANGE UP (ref 13–17)
INR BLD: 1.06 RATIO — SIGNIFICANT CHANGE UP (ref 0.88–1.16)
MCHC RBC-ENTMCNC: 29.4 PG — SIGNIFICANT CHANGE UP (ref 27–34)
MCHC RBC-ENTMCNC: 32.2 GM/DL — SIGNIFICANT CHANGE UP (ref 32–36)
MCV RBC AUTO: 91.5 FL — SIGNIFICANT CHANGE UP (ref 80–100)
PLATELET # BLD AUTO: 259 K/UL — SIGNIFICANT CHANGE UP (ref 150–400)
POTASSIUM SERPL-MCNC: 4.6 MMOL/L — SIGNIFICANT CHANGE UP (ref 3.5–5.3)
POTASSIUM SERPL-SCNC: 4.6 MMOL/L — SIGNIFICANT CHANGE UP (ref 3.5–5.3)
PROT SERPL-MCNC: 7.9 GM/DL — SIGNIFICANT CHANGE UP (ref 6–8.3)
PROTHROM AB SERPL-ACNC: 11.8 SEC — SIGNIFICANT CHANGE UP (ref 10–12.9)
RBC # BLD: 5.03 M/UL — SIGNIFICANT CHANGE UP (ref 4.2–5.8)
RBC # FLD: 13.1 % — SIGNIFICANT CHANGE UP (ref 10.3–14.5)
SODIUM SERPL-SCNC: 137 MMOL/L — SIGNIFICANT CHANGE UP (ref 135–145)
TROPONIN I SERPL-MCNC: <0.015 NG/ML — SIGNIFICANT CHANGE UP (ref 0.01–0.04)
TROPONIN I SERPL-MCNC: <0.015 NG/ML — SIGNIFICANT CHANGE UP (ref 0.01–0.04)
WBC # BLD: 9.62 K/UL — SIGNIFICANT CHANGE UP (ref 3.8–10.5)
WBC # FLD AUTO: 9.62 K/UL — SIGNIFICANT CHANGE UP (ref 3.8–10.5)

## 2019-05-28 PROCEDURE — 99284 EMERGENCY DEPT VISIT MOD MDM: CPT | Mod: 25

## 2019-05-28 PROCEDURE — 99203 OFFICE O/P NEW LOW 30 MIN: CPT

## 2019-05-28 PROCEDURE — 71275 CT ANGIOGRAPHY CHEST: CPT | Mod: 26

## 2019-05-28 PROCEDURE — 71046 X-RAY EXAM CHEST 2 VIEWS: CPT | Mod: 26

## 2019-05-28 PROCEDURE — 93010 ELECTROCARDIOGRAM REPORT: CPT

## 2019-05-28 RX ORDER — METOPROLOL TARTRATE 25 MG/1
25 TABLET, FILM COATED ORAL
Refills: 0 | Status: ACTIVE | COMMUNITY

## 2019-05-28 RX ORDER — ACETAMINOPHEN 500 MG
1000 TABLET ORAL ONCE
Refills: 0 | Status: COMPLETED | OUTPATIENT
Start: 2019-05-28 | End: 2019-05-28

## 2019-05-28 RX ORDER — BUPROPION HYDROCHLORIDE 300 MG/1
300 TABLET, EXTENDED RELEASE ORAL
Refills: 0 | Status: ACTIVE | COMMUNITY

## 2019-05-28 RX ORDER — VENLAFAXINE HYDROCHLORIDE 150 MG/1
150 CAPSULE, EXTENDED RELEASE ORAL
Refills: 0 | Status: ACTIVE | COMMUNITY

## 2019-05-28 RX ADMIN — Medication 1000 MILLIGRAM(S): at 13:28

## 2019-05-28 RX ADMIN — Medication 1000 MILLIGRAM(S): at 14:10

## 2019-05-28 NOTE — PHYSICAL EXAM
[General Appearance - Well Developed] : well developed [Normal Appearance] : normal appearance [General Appearance - Well Nourished] : well nourished [Well Groomed] : well groomed [Edema] : no peripheral edema [General Appearance - In No Acute Distress] : no acute distress [Abdomen Soft] : soft [Exaggerated Use Of Accessory Muscles For Inspiration] : no accessory muscle use [Respiration, Rhythm And Depth] : normal respiratory rhythm and effort [Urethral Meatus] : meatus normal [Abdomen Tenderness] : non-tender [Costovertebral Angle Tenderness] : no ~M costovertebral angle tenderness [Urinary Bladder Findings] : the bladder was normal on palpation [Scrotum] : the scrotum was normal [Testes Tenderness] : no tenderness of the testes [Prostate Enlargement] : the prostate was not enlarged [Testes Mass (___cm)] : there were no testicular masses [Prostate Tenderness] : the prostate was not tender [Normal Station and Gait] : the gait and station were normal for the patient's age [] : no rash [No Prostate Nodules] : no prostate nodules [No Focal Deficits] : no focal deficits [Oriented To Time, Place, And Person] : oriented to person, place, and time [Affect] : the affect was normal [Mood] : the mood was normal [Not Anxious] : not anxious [No Palpable Adenopathy] : no palpable adenopathy

## 2019-05-28 NOTE — HISTORY OF PRESENT ILLNESS
[FreeTextEntry1] : Mr Bloom is a 62 yo male that presents for annual urologic evaluation.  He has no urinary complaints and states he is doing well.  He recently had lab work done at this PCP's office (Dr Romero) and believes a PSA was done at that time. He does report nocturia 3 times at night which he attributes to drinking coffee until he goes to sleep. This is not bothersome to him.  No hematuria, no dysuria, no flank pain, no fever, no chills, ho urgency, no straining.

## 2019-05-28 NOTE — ASSESSMENT
[FreeTextEntry1] : 62 yo M with nocturia, not bothersome. Otherwise here for routine  exam. No concerning findings. \par Will request labs from PCP. Pt can f/u in 1 year

## 2019-05-28 NOTE — REVIEW OF SYSTEMS
[Heartburn] : heartburn [Wake up at night to urinate  How many times?  ___] : wakes up to urinate [unfilled] times during the night [see HPI] : see HPI [Anxiety] : anxiety [Depression] : depression [Negative] : Endocrine

## 2019-05-28 NOTE — ED ADULT NURSE NOTE - OBJECTIVE STATEMENT
left sided chest wall soreness with deep inspiration x 3 days, denies fever, N/V/D or diaphoresis.  Denies recent travel or illness.  Denies any change in exercise routine.  States he had gastrointestinal issues intermittent, now feels it has resolved.  Back pain x 1.5 weeks ago, tx with steroids and muscle relaxers.

## 2019-05-28 NOTE — ED ADULT TRIAGE NOTE - CHIEF COMPLAINT QUOTE
pt presents to ED due to left sided chest pain x 2 days,  pain with inspiration no hx of recent travel or PE

## 2019-05-28 NOTE — ED STATDOCS - OBJECTIVE STATEMENT
64 y/o male with a PMHx of HTN, GERD, aortic stenosis s/p aortic valve replacement surgery (05/2018) presents to the ED c/o epigastric soreness when breathing deeply for past week after throwing back out. Pt reports evaluation with PMD last week. Pt with h/o back pain and reports his "back went out" last week with onset of left lower back pain. Since then pt c/o constant soreness to epigastric area exclusively with deep breathing. Pt notes h/o similar symptoms and thinks his symptoms are GI related. Takes 81 mg ASA every day, last took today. No recent travel. NKDA. Non-smoker. PCP: [place].

## 2019-05-28 NOTE — ED STATDOCS - ATTENDING CONTRIBUTION TO CARE
I, Alvina Hwang MD,  performed the initial face to face bedside interview with this patient regarding history of present illness, review of symptoms and relevant past medical, social and family history.  I completed an independent physical examination.  I was the initial provider who evaluated this patient. I have signed out the follow up of any pending tests (i.e. labs, radiological studies) to the ACP.  I have communicated the patient’s plan of care and disposition with the ACP.  The history, relevant review of systems, past medical and surgical history, medical decision making, and physical examination was documented by the scribe in my presence and I attest to the accuracy of the documentation.

## 2019-05-28 NOTE — ED STATDOCS - PROGRESS NOTE DETAILS
63 yr. old male PMH: GERD, AS S/P AV replacement, presents to ED with left side chest pain and epigastric soreness upon deep breathing for the past week after throwing back out. No fever or chills. Seen and examined by attending in intake. Plan: IV, Labs, Trop X2 and d-dimer, Chest X-ray. Will F/U with results and re evaluate. Dori SCOTT Results of Lab work and CTA discussed with patient. Instructed to F/U with Cardiologist and PMD. Dori SCOTT

## 2019-05-30 LAB
APPEARANCE: CLEAR
BACTERIA UR CULT: NORMAL
BACTERIA: NEGATIVE
BILIRUBIN URINE: NEGATIVE
BLOOD URINE: NEGATIVE
COLOR: YELLOW
GLUCOSE QUALITATIVE U: NEGATIVE
HYALINE CASTS: 0 /LPF
KETONES URINE: NEGATIVE
LEUKOCYTE ESTERASE URINE: NEGATIVE
MICROSCOPIC-UA: NORMAL
NITRITE URINE: NEGATIVE
PH URINE: 6.5
PROTEIN URINE: NEGATIVE
RED BLOOD CELLS URINE: 0 /HPF
SPECIFIC GRAVITY URINE: 1.02
SQUAMOUS EPITHELIAL CELLS: 0 /HPF
UROBILINOGEN URINE: NORMAL
WHITE BLOOD CELLS URINE: 1 /HPF

## 2019-07-26 NOTE — ASU PATIENT PROFILE, ADULT - PATIENT KNOW
seizure activity. Dysmorphic facial features were again seen: Prominent forehead, mild proptosis,  fish like mouth, hypertelorism, oblique fissures with prominent eyelids. He sat comfortably on the bench.     Reflex Scores: 1+ diffuse     Nursing note and vitals reviewed. Constitutional: he appears well-developed and well-nourished. HENT: Mouth/Throat: Mucous membranes are moist.   Eyes: EOM are normal. Pupils are equal, round, and reactive to light. Neck: Normal range of motion. Neck supple. Cardiovascular: Loud systolic Murmur heard  Pulmonary/Chest: Effort normal and transferred airway sounds heard. Abdominal: Soft. Bowel sounds are normal.   Musculoskeletal: Normal range of motion. Diffuse mild hypotonia. Neurological: he is alert and rest of the exam is as mentioned above. Skin: Skin is warm and dry. Capillary refill takes less than 2 seconds.     RECORD REVIEW: Previous medical records were reviewed at today's visit. 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   2011 - SMA testing - Negative. 2011 - MRI Brain - Normal except for a small cystic area in the left parietal region. 05/2012 - Video EEG - Normal   11/28/2012 - Video EEG - Normal.  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 Minimal, stable. 03/26/2014 - EEG - Normal  10/26/2014 - Video EEG - Normal  04/18/2016 - Video EEG - Normal    07/27/2016 - EEG - Normal   03/31/2017 - Video EEG - Normal   01/10/2018-Video EEG- Normal  04/04/2018- Video EEG- Normal   04/18/2019- EEG- Normal     Results for Dutch Rodríguez (MRN [id number]) as of 7/26/2019 10:03   Ref.  Range 7/22/2019 00:45   Sodium Latest Ref Range: 135 - 144 mmol/L 141   Potassium Latest Ref Range: 3.6 - 4.9 mmol/L 3.6   Chloride Latest Ref Range: 98 - 107 mmol/L 103
yes

## 2020-09-05 ENCOUNTER — EMERGENCY (EMERGENCY)
Facility: HOSPITAL | Age: 65
LOS: 0 days | Discharge: HOME | End: 2020-09-05
Attending: EMERGENCY MEDICINE
Payer: MEDICARE

## 2020-09-05 VITALS
HEART RATE: 77 BPM | OXYGEN SATURATION: 99 % | RESPIRATION RATE: 19 BRPM | TEMPERATURE: 98 F | DIASTOLIC BLOOD PRESSURE: 88 MMHG | SYSTOLIC BLOOD PRESSURE: 144 MMHG

## 2020-09-05 DIAGNOSIS — I10 ESSENTIAL (PRIMARY) HYPERTENSION: ICD-10-CM

## 2020-09-05 DIAGNOSIS — Z98.890 OTHER SPECIFIED POSTPROCEDURAL STATES: Chronic | ICD-10-CM

## 2020-09-05 DIAGNOSIS — G89.29 OTHER CHRONIC PAIN: ICD-10-CM

## 2020-09-05 DIAGNOSIS — M54.5 LOW BACK PAIN: ICD-10-CM

## 2020-09-05 DIAGNOSIS — F41.8 OTHER SPECIFIED ANXIETY DISORDERS: ICD-10-CM

## 2020-09-05 DIAGNOSIS — K21.9 GASTRO-ESOPHAGEAL REFLUX DISEASE WITHOUT ESOPHAGITIS: ICD-10-CM

## 2020-09-05 DIAGNOSIS — Z98.89 OTHER SPECIFIED POSTPROCEDURAL STATES: Chronic | ICD-10-CM

## 2020-09-05 DIAGNOSIS — Z87.74 PERSONAL HISTORY OF (CORRECTED) CONGENITAL MALFORMATIONS OF HEART AND CIRCULATORY SYSTEM: Chronic | ICD-10-CM

## 2020-09-05 DIAGNOSIS — Z95.2 PRESENCE OF PROSTHETIC HEART VALVE: ICD-10-CM

## 2020-09-05 DIAGNOSIS — Z85.828 PERSONAL HISTORY OF OTHER MALIGNANT NEOPLASM OF SKIN: ICD-10-CM

## 2020-09-05 DIAGNOSIS — Z98.61 CORONARY ANGIOPLASTY STATUS: Chronic | ICD-10-CM

## 2020-09-05 PROCEDURE — 99283 EMERGENCY DEPT VISIT LOW MDM: CPT

## 2020-09-05 PROCEDURE — 99283 EMERGENCY DEPT VISIT LOW MDM: CPT | Mod: 25

## 2020-09-05 PROCEDURE — 96372 THER/PROPH/DIAG INJ SC/IM: CPT

## 2020-09-05 RX ORDER — KETOROLAC TROMETHAMINE 30 MG/ML
30 SYRINGE (ML) INJECTION ONCE
Refills: 0 | Status: DISCONTINUED | OUTPATIENT
Start: 2020-09-05 | End: 2020-09-05

## 2020-09-05 RX ADMIN — Medication 30 MILLIGRAM(S): at 16:50

## 2020-09-05 NOTE — ED STATDOCS - NSFOLLOWUPINSTRUCTIONS_ED_ALL_ED_FT
Chronic Back Pain  When back pain lasts longer than 3 months, it is called chronic back pain. The cause of your back pain may not be known. Some common causes include:  Wear and tear (degenerative disease) of the bones, ligaments, or disks in your back.Inflammation and stiffness in your back (arthritis).People who have chronic back pain often go through certain periods in which the pain is more intense (flare-ups). Many people can learn to manage the pain with home care.  Follow these instructions at home:  Pay attention to any changes in your symptoms. Take these actions to help with your pain:  Activity        Avoid bending and other activities that make the problem worse.Maintain a proper position when standing or sitting:  When standing, keep your upper back and neck straight, with your shoulders pulled back. Avoid slouching.When sitting, keep your back straight and relax your shoulders. Do not round your shoulders or pull them backward.Do not sit or  one place for long periods of time.Take brief periods of rest throughout the day. This will reduce your pain. Resting in a lying or standing position is usually better than sitting to rest.When you are resting for longer periods, mix in some mild activity or stretching between periods of rest. This will help to prevent stiffness and pain.Get regular exercise. Ask your health care provider what activities are safe for you.Do not lift anything that is heavier than 10 lb (4.5 kg). Always use proper lifting technique, which includes:  Bending your knees.Keeping the load close to your body.Avoiding twisting.Sleep on a firm mattress in a comfortable position. Try lying on your side with your knees slightly bent. If you lie on your back, put a pillow under your knees.Managing pain     If directed, apply ice to the painful area. Your health care provider may recommend applying ice during the first 24–48 hours after a flare-up begins.  Put ice in a plastic bag.Place a towel between your skin and the bag.Leave the ice on for 20 minutes, 2–3 times per day.If directed, apply heat to the affected area as often as told by your health care provider. Use the heat source that your health care provider recommends, such as a moist heat pack or a heating pad.  Place a towel between your skin and the heat source.Leave the heat on for 20–30 minutes.Remove the heat if your skin turns bright red. This is especially important if you are unable to feel pain, heat, or cold. You may have a greater risk of getting burned.Try soaking in a warm tub.Take over-the-counter and prescription medicines only as told by your health care provider.Keep all follow-up visits as told by your health care provider. This is important.Contact a health care provider if:  You have pain that is not relieved with rest or medicine.Get help right away if:  You have weakness or numbness in one or both of your legs or feet.You have trouble controlling your bladder or your bowels.You have nausea or vomiting.You have pain in your abdomen.You have shortness of breath or you faint.This information is not intended to replace advice given to you by your health care provider. Make sure you discuss any questions you have with your health care provider.

## 2020-09-05 NOTE — ED STATDOCS - OBJECTIVE STATEMENT
64 y/o M with PMHx of chronic back pain s/p 2 spinal surgeries for sciatica with Dr. Staples presents to the ED c/o +chronic back pain exacerbation. Has been doing heavy lifting recently and getting +back spasms. Was given rx for flexeril without relief. Reports only thing that helps is Toradol, went to urgent care they did not have Toradol so was referred to the ED. Has an appt with Dr. Staples on 9/8. No fever. NKDA. 64 y/o M with PMHx of chronic back pain s/p 2 spinal surgeries for sciatica with Dr. Staples presents to the ED c/o +chronic back pain exacerbation. Has been doing heavy lifting recently and getting +back spasms. Was given rx for flexeril without relief. Reports only thing that helps is Toradol, went to urgent care they did not have Toradol so was referred to the ED. Has an appt with Dr. Staples on 9/8. No fever. NKDA. PCP: Dr. Iban Romero.

## 2020-09-05 NOTE — ED STATDOCS - CLINICAL SUMMARY MEDICAL DECISION MAKING FREE TEXT BOX
Acute on chronic back pain.  No red flags.  Here only for Toradol shot.  D/c home in good condition. F/u with spine as scheduled. Acute on chronic back pain.  No red flags.  Here only for Toradol shot.  D/c home in good condition. F/u with spine as scheduled.    PA note: Patient is a 64 y/o male with PMHx of chronic back pain s/p 2 spinal surgeries for sciatica with Dr. Staples presents to [facility] c/o +chronic back pain exacerbation. Patient has been doing heavy lifting recently and getting +back spasms, tried flexeril without relief. Patient states Toradol helps his back pain, went to urgent care they did not have Toradol so was referred to the ED. Has an appt with Dr. Staples on 9/8. No fever. NKDA. PCP: Dr. Iban Romero. ~Jonathon Mcconnell PA-C   Patient seen and evaluated in , toradol given and dc'd patient in stable condition. ~Jonathon Mcconnell PA-C

## 2020-09-05 NOTE — ED STATDOCS - PROGRESS NOTE DETAILS
PA note: Patient is a 66 y/o male with PMHx of chronic back pain s/p 2 spinal surgeries for sciatica with Dr. Staples presents to [facility] c/o +chronic back pain exacerbation. Patient has been doing heavy lifting recently and getting +back spasms, tried flexeril without relief. Patient states Toradol helps his back pain, went to urgent care they did not have Toradol so was referred to the ED. Has an appt with Dr. Staples on 9/8. No fever. NKDA. PCP: Dr. Ibna Romero. ~Jonathon Mcconnell PA-C   Patient seen and evaluated in [facility] Will give Toradol and dc. ~Jonathon Mcconnell PA-C

## 2020-09-05 NOTE — ED STATDOCS - PMH
Anxiety    Aortic stenosis  Bicuspid Aortic Valve Replacement- 5/29/2018  GERD (gastroesophageal reflux disease)    HTN (hypertension)    Hyperlipidemia    Skin cancer  external right ear, unsure of type Anxiety    Aortic stenosis  Bicuspid Aortic Valve Replacement- 5/29/2018  Carpal tunnel syndrome    Chronic neck and back pain    Depressive disorder    Gastrointestinal hemorrhage    GERD (gastroesophageal reflux disease)    Helicobacter pylori infection    Herniated lumbar intervertebral disc    History of colonic polyps    HTN (hypertension)    Hyperlipidemia    Internal and external prolapsed hemorrhoids    Sciatica    Skin cancer  external right ear, unsure of type

## 2020-09-05 NOTE — ED STATDOCS - PHYSICAL EXAMINATION
PA NOTE: GEN: AOX3, NAD. HEENT: Throat clear. Airway is patent. EYES: PERRLA. EOMI. Head: NC/AT. NECK: Supple, No JVD. FROM. C-spine non-tender. CV:S1S2, RRR, LUNGS: Non-labored breathing, no tachypnea. O2sat 100% RA. CTA b/l. No w/r/r. CHEST: Equal chest expansion and rise. No deformity. ABD: Soft, NT/ND, no rebound, no guarding. No CVAT. BACK: +Mild diffuse tenderness lower back lumbar paravertebral area. FROM. No spinal deformity. EXT: No e/c/c. 2+ distal pulses. SKIN: No rashes. NEURO: No focal deficits. CN II-XII intact. FROM. 5/5 motor and sensory. ~Jonathon Mcconnell PA-C

## 2020-09-05 NOTE — ED STATDOCS - PSH
H/O bicuspid aortic valve  replacement- 5/29/18  H/O external ear surgery  excision mass right ear- cancer 2018  History of back surgery  laminectomy x2 last 2018  S/P carpal tunnel release  right side, left side- 2014  S/P trigger finger release  right thumb and middle finger H/O bicuspid aortic valve  replacement- 5/29/18  H/O external ear surgery  excision mass right ear- cancer 2018  History of back surgery  laminectomy x2 last 2018  S/P carpal tunnel release  right side, left side- 2014  S/P trigger finger release  right thumb and middle finger  Status post coronary angioplasty

## 2020-09-05 NOTE — ED STATDOCS - PATIENT PORTAL LINK FT
You can access the FollowMyHealth Patient Portal offered by [facility] by registering at the following website: [email or web address]. By joining [email or web address]’s FollowMyHealth portal, you will also be able to view your health information using other applications (apps) compatible with our system.

## 2020-09-05 NOTE — ED ADULT NURSE NOTE - OBJECTIVE STATEMENT
Patient presents to ED complaining of back pain. Patient complaining of generalized back pain. Patient has hx of chronic back pain, states he has been heavy lifting in last 2 days. Patient states pain is now exacerbated. Patient denies numbness and tingling, fever, chills, CP, SOB

## 2020-09-05 NOTE — ED STATDOCS - CARE PROVIDER_API CALL
Geovanny Staples  ORTHOPAEDIC SURGERY  [address] [place] [id number]  Phone: [phone]  Fax: [phone]  Follow Up Time: 4-6 Days

## 2020-09-08 ENCOUNTER — TRANSCRIPTION ENCOUNTER (OUTPATIENT)
Age: 65
End: 2020-09-08

## 2020-09-08 PROBLEM — F32.9 MAJOR DEPRESSIVE DISORDER, SINGLE EPISODE, UNSPECIFIED: Chronic | Status: ACTIVE | Noted: 2020-09-05

## 2020-09-08 PROBLEM — Z86.010 PERSONAL HISTORY OF COLONIC POLYPS: Chronic | Status: ACTIVE | Noted: 2020-09-05

## 2020-09-08 PROBLEM — A04.8 OTHER SPECIFIED BACTERIAL INTESTINAL INFECTIONS: Chronic | Status: ACTIVE | Noted: 2020-09-05

## 2020-09-08 PROBLEM — Z86.010 PERSONAL HISTORY OF COLON POLYPS: Chronic | Status: ACTIVE | Noted: 2020-09-05

## 2020-09-08 PROBLEM — M54.30 SCIATICA, UNSPECIFIED SIDE: Chronic | Status: ACTIVE | Noted: 2020-09-05

## 2020-09-08 PROBLEM — K92.2 GASTROINTESTINAL HEMORRHAGE, UNSPECIFIED: Chronic | Status: ACTIVE | Noted: 2020-09-05

## 2020-09-08 PROBLEM — M51.26 OTHER INTERVERTEBRAL DISC DISPLACEMENT, LUMBAR REGION: Chronic | Status: ACTIVE | Noted: 2020-09-05

## 2020-09-08 PROBLEM — M54.2 CERVICALGIA: Chronic | Status: ACTIVE | Noted: 2020-09-05

## 2020-09-09 ENCOUNTER — APPOINTMENT (OUTPATIENT)
Dept: NEUROSURGERY | Facility: CLINIC | Age: 65
End: 2020-09-09
Payer: MEDICARE

## 2020-09-09 VITALS
SYSTOLIC BLOOD PRESSURE: 148 MMHG | HEART RATE: 76 BPM | BODY MASS INDEX: 31.99 KG/M2 | WEIGHT: 216 LBS | TEMPERATURE: 93.4 F | HEIGHT: 69 IN | DIASTOLIC BLOOD PRESSURE: 94 MMHG | OXYGEN SATURATION: 97 %

## 2020-09-09 PROCEDURE — 99203 OFFICE O/P NEW LOW 30 MIN: CPT

## 2020-09-09 RX ORDER — OMEPRAZOLE 20 MG/1
20 CAPSULE, DELAYED RELEASE ORAL
Qty: 30 | Refills: 0 | Status: DISCONTINUED | COMMUNITY
Start: 2017-07-17 | End: 2020-09-09

## 2020-09-09 RX ORDER — AMOXICILLIN 500 MG/1
500 TABLET, FILM COATED ORAL TWICE DAILY
Qty: 56 | Refills: 0 | Status: DISCONTINUED | COMMUNITY
Start: 2018-07-25 | End: 2020-09-09

## 2020-09-09 RX ORDER — CLARITHROMYCIN 500 MG/1
500 TABLET, FILM COATED ORAL TWICE DAILY
Qty: 28 | Refills: 0 | Status: DISCONTINUED | COMMUNITY
Start: 2018-07-25 | End: 2020-09-09

## 2020-09-09 RX ORDER — OMEPRAZOLE 20 MG/1
20 CAPSULE, DELAYED RELEASE ORAL
Qty: 90 | Refills: 3 | Status: DISCONTINUED | COMMUNITY
Start: 2017-11-14 | End: 2020-09-09

## 2020-09-09 RX ORDER — ATORVASTATIN CALCIUM 10 MG/1
10 TABLET, FILM COATED ORAL
Refills: 0 | Status: DISCONTINUED | COMMUNITY
End: 2020-09-09

## 2020-09-09 RX ORDER — ESCITALOPRAM OXALATE 10 MG/1
10 TABLET ORAL
Refills: 0 | Status: DISCONTINUED | COMMUNITY
End: 2020-09-09

## 2020-09-09 RX ORDER — BUPROPION HYDROCHLORIDE 300 MG/1
300 TABLET, EXTENDED RELEASE ORAL
Refills: 0 | Status: DISCONTINUED | COMMUNITY
Start: 2017-07-17 | End: 2020-09-09

## 2020-09-09 RX ORDER — OMEPRAZOLE 20 MG/1
20 CAPSULE, DELAYED RELEASE ORAL
Refills: 0 | Status: DISCONTINUED | COMMUNITY
Start: 2017-07-17 | End: 2020-09-09

## 2020-09-15 NOTE — CONSULT LETTER
[Dear  ___] : Dear  [unfilled], [Courtesy Letter:] : I had the pleasure of seeing your patient, [unfilled], in my office today. [Sincerely,] : Sincerely, [FreeTextEntry1] : Mr. Bloom is a pleasant 65-year-old male patient who was seen in our office in regards to chronic low back discomfort radiating down the left leg.\par \par The patient endorses a longstanding history of this discomfort which occurs on an infrequent basis.  The patient states that the pain often occurs with motions of the lower back.  The patient's pain occurs for several minutes and then resolves.  The patient also occasionally has left-sided numbness in the lateral aspect of his thigh.  The patient states that he has been afraid to exercise for fear of precipitating this pain.  The patient denies any new bowel or bladder symptoms.  The  patient does not recall any specific inciting event, but states that he has had a previous laminectomy in the lumbar spine.  The patient has been treating his pain with chiropractic manipulation and nerve blocks/injections but states that this does not provide any lasting relief.\par \par The patient's past medical history includes anxiety and depression, the patient is currently taking metoprolol, bupropion, venlafaxine, omeprazole, and atorvastatin.  The patient also takes cyclobenzaprine as needed.\par \par On examination, the patient is alert, oriented, and compliant with the exam.  The patient demonstrates 5/5 strength in the upper and lower extremities bilaterally.  The patient ambulates well. Currently, the patient is in no pain.\par \par The patient is accompanied with images of the lumbar spine dated October 28, 2019.  These images demonstrate mild degenerative changes in the lumbar spine most pronounced at L2-L5.  There are disc bulges throughout with bilateral foraminal narrowing without compression of nerve roots.  The patient has a disc bulge at L4/5 eccentric to the right side.  There is evidence of a previous  laminotomy at this level.\par \par Taken together, the patient has a clinical history and radiographic findings most consistent with chronic low back pain secondary to muscular causes.  Specifically, the patient describes low back pain most consistent with muscle spasms.  I explained to the patient that the best long-lasting treatment for this particular problem is physical therapy for lumbar spine range of motion as well as strengthening exercises.  However, the patient would like to defer this treatment modality at this time.  The patient had, perhaps jokingly, mentioned the possibility of a spinal fusion in his lumbar spine but I was clear with him that I did not believe that this would provide any benefit with regards to his low back pain based on his description.  I have thus provided the patient a list of exercises to possibly perform at home which he took with him.  The patient had also requested a list of pain management physicians around the area which I provided to him.  At this time, the patient has expressed his desire to follow-up with us on an as-needed basis and will follow up with us at his convenience. [FreeTextEntry3] : Viktor Hwang MD, PhD, FRCPSC \par Attending Neurosurgeon \par [facility] \par [address], [address]nd floor \[place] [address] \par Office: [phone] \par Fax: [phone]\par \par  [FreeTextEntry2] : Iban Arias MD\par [address]\[place] [address]

## 2020-09-23 ENCOUNTER — EMERGENCY (EMERGENCY)
Facility: HOSPITAL | Age: 65
LOS: 0 days | Discharge: HOME | End: 2020-09-23
Attending: EMERGENCY MEDICINE
Payer: MEDICARE

## 2020-09-23 VITALS
HEART RATE: 73 BPM | DIASTOLIC BLOOD PRESSURE: 92 MMHG | RESPIRATION RATE: 18 BRPM | OXYGEN SATURATION: 100 % | TEMPERATURE: 98 F | SYSTOLIC BLOOD PRESSURE: 154 MMHG

## 2020-09-23 VITALS — HEIGHT: 78 IN

## 2020-09-23 DIAGNOSIS — Z86.19 PERSONAL HISTORY OF OTHER INFECTIOUS AND PARASITIC DISEASES: ICD-10-CM

## 2020-09-23 DIAGNOSIS — M51.26 OTHER INTERVERTEBRAL DISC DISPLACEMENT, LUMBAR REGION: ICD-10-CM

## 2020-09-23 DIAGNOSIS — Z98.89 OTHER SPECIFIED POSTPROCEDURAL STATES: Chronic | ICD-10-CM

## 2020-09-23 DIAGNOSIS — Z79.899 OTHER LONG TERM (CURRENT) DRUG THERAPY: ICD-10-CM

## 2020-09-23 DIAGNOSIS — Z87.74 PERSONAL HISTORY OF (CORRECTED) CONGENITAL MALFORMATIONS OF HEART AND CIRCULATORY SYSTEM: Chronic | ICD-10-CM

## 2020-09-23 DIAGNOSIS — K21.9 GASTRO-ESOPHAGEAL REFLUX DISEASE WITHOUT ESOPHAGITIS: ICD-10-CM

## 2020-09-23 DIAGNOSIS — Z95.2 PRESENCE OF PROSTHETIC HEART VALVE: ICD-10-CM

## 2020-09-23 DIAGNOSIS — Z98.890 OTHER SPECIFIED POSTPROCEDURAL STATES: Chronic | ICD-10-CM

## 2020-09-23 DIAGNOSIS — G89.29 OTHER CHRONIC PAIN: ICD-10-CM

## 2020-09-23 DIAGNOSIS — Z98.61 CORONARY ANGIOPLASTY STATUS: Chronic | ICD-10-CM

## 2020-09-23 DIAGNOSIS — M54.9 DORSALGIA, UNSPECIFIED: ICD-10-CM

## 2020-09-23 DIAGNOSIS — E78.5 HYPERLIPIDEMIA, UNSPECIFIED: ICD-10-CM

## 2020-09-23 DIAGNOSIS — I10 ESSENTIAL (PRIMARY) HYPERTENSION: ICD-10-CM

## 2020-09-23 DIAGNOSIS — M54.30 SCIATICA, UNSPECIFIED SIDE: ICD-10-CM

## 2020-09-23 DIAGNOSIS — Z87.19 PERSONAL HISTORY OF OTHER DISEASES OF THE DIGESTIVE SYSTEM: ICD-10-CM

## 2020-09-23 DIAGNOSIS — F32.9 MAJOR DEPRESSIVE DISORDER, SINGLE EPISODE, UNSPECIFIED: ICD-10-CM

## 2020-09-23 PROCEDURE — 99284 EMERGENCY DEPT VISIT MOD MDM: CPT

## 2020-09-23 RX ORDER — LIDOCAINE 4 G/100G
1 CREAM TOPICAL ONCE
Refills: 0 | Status: COMPLETED | OUTPATIENT
Start: 2020-09-23 | End: 2020-09-23

## 2020-09-23 RX ORDER — OXYCODONE HYDROCHLORIDE 5 MG/1
5 TABLET ORAL ONCE
Refills: 0 | Status: DISCONTINUED | OUTPATIENT
Start: 2020-09-23 | End: 2020-09-23

## 2020-09-23 RX ORDER — DIAZEPAM 5 MG
5 TABLET ORAL ONCE
Refills: 0 | Status: DISCONTINUED | OUTPATIENT
Start: 2020-09-23 | End: 2020-09-23

## 2020-09-23 RX ORDER — IBUPROFEN 200 MG
600 TABLET ORAL ONCE
Refills: 0 | Status: COMPLETED | OUTPATIENT
Start: 2020-09-23 | End: 2020-09-23

## 2020-09-23 RX ADMIN — Medication 600 MILLIGRAM(S): at 16:55

## 2020-09-23 RX ADMIN — OXYCODONE HYDROCHLORIDE 5 MILLIGRAM(S): 5 TABLET ORAL at 16:56

## 2020-09-23 RX ADMIN — Medication 5 MILLIGRAM(S): at 16:55

## 2020-09-23 RX ADMIN — LIDOCAINE 1 PATCH: 4 CREAM TOPICAL at 16:55

## 2020-09-23 NOTE — ED STATDOCS - PROGRESS NOTE DETAILS
66 y/o M presents with back pain x 2 hours. Pt reports chronic back pain, is having a neurostimulator placed on friday. Today his back went into spasm, no improvement with home meds. Denies urinary retention/incontinence, saddle anesthesias, weakness, or other complaints at this time. -Amari Glynn PA-C pt feeling better, pt to FU with Dr. hernandez. Pt asked to return to ED immediately for any new or concerning sx or worsening. Pt acknowledges and understands plan -Amari Glynn PA-C

## 2020-09-23 NOTE — ED STATDOCS - NS ED ROS FT
Constitutional: No fever or chills  Eyes: No visual changes  HEENT: No throat pain  CV: No chest pain  Resp: No SOB no cough  GI: No abd pain, nausea or vomiting  : No dysuria  MSK: +back pain   Skin: No rash  Neuro: No headache

## 2020-09-23 NOTE — ED STATDOCS - PMH
Anxiety    Aortic stenosis  Bicuspid Aortic Valve Replacement- 5/29/2018  Carpal tunnel syndrome    Chronic neck and back pain    Depressive disorder    Gastrointestinal hemorrhage    GERD (gastroesophageal reflux disease)    Helicobacter pylori infection    Herniated lumbar intervertebral disc    History of colonic polyps    HTN (hypertension)    Hyperlipidemia    Internal and external prolapsed hemorrhoids    Sciatica    Skin cancer  external right ear, unsure of type

## 2020-09-23 NOTE — ED STATDOCS - NSFOLLOWUPINSTRUCTIONS_ED_ALL_ED_FT
Please follow up with Primary MD in 1-2 days. Return to ED immediately for any new or concerning symptoms or worsening symptoms    Back Pain    WHAT YOU NEED TO KNOW:    Back pain is common. It can be caused by many conditions, such as arthritis or the breakdown of spinal discs. Your risk for back pain is increased by injuries, lack of activity, or repeated bending and twisting. You may feel sore or stiff on one or both sides of your back. The pain may spread to your buttocks or thighs.    DISCHARGE INSTRUCTIONS:    Return to the emergency department if:     You have pain, numbness, or weakness in one or both legs.      Your pain becomes so severe that you cannot walk.      You cannot control your urine or bowel movements.      You have severe back pain with chest pain.      You have severe back pain, nausea, and vomiting.      You have severe back pain that spreads to your side or genital area.    Contact your healthcare provider if:     You have back pain that does not get better with rest and pain medicine.      You have a fever.      You have pain that worsens when you are on your back or when you rest.      You have pain that worsens when you cough or sneeze.      You lose weight without trying.      You have questions or concerns about your condition or care.    Medicines:     NSAIDs help decrease swelling and pain. This medicine is available with or without a doctor's order. NSAIDs can cause stomach bleeding or kidney problems in certain people. If you take blood thinner medicine, always ask your healthcare provider if NSAIDs are safe for you. Always read the medicine label and follow directions.      Acetaminophen decreases pain and fever. It is available without a doctor's order. Ask how much to take and how often to take it. Follow directions. Read the labels of all other medicines you are using to see if they also contain acetaminophen, or ask your doctor or pharmacist. Acetaminophen can cause liver damage if not taken correctly. Do not use more than 4 grams (4,000 milligrams) total of acetaminophen in one day.       Muscle relaxers help decrease muscle spasms and back pain.      Prescription pain medicine may be given. Ask your healthcare provider how to take this medicine safely. Some prescription pain medicines contain acetaminophen. Do not take other medicines that contain acetaminophen without talking to your healthcare provider. Too much acetaminophen may cause liver damage. Prescription pain medicine may cause constipation. Ask your healthcare provider how to prevent or treat constipation.       Take your medicine as directed. Contact your healthcare provider if you think your medicine is not helping or if you have side effects. Tell him or her if you are allergic to any medicine. Keep a list of the medicines, vitamins, and herbs you take. Include the amounts, and when and why you take them. Bring the list or the pill bottles to follow-up visits. Carry your medicine list with you in case of an emergency.    How to manage your back pain:     Apply ice on your back for 15 to 20 minutes every hour or as directed. Use an ice pack, or put crushed ice in a plastic bag. Cover it with a towel before you apply it to your skin. Ice helps prevent tissue damage and decreases pain.      Apply heat on your back for 20 to 30 minutes every 2 hours for as many days as directed. Heat helps decrease pain and muscle spasms.      Stay active as much as you can without causing more pain. Bed rest could make your back pain worse. Avoid heavy lifting until your pain is gone.      Go to physical therapy as directed. A physical therapist can teach you exercises to help improve movement and strength, and to decrease pain.    Follow up with your healthcare provider in 2 weeks, or as directed: Write down your questions so you remember to ask them during your visits.

## 2020-09-23 NOTE — ED STATDOCS - PATIENT PORTAL LINK FT
You can access the FollowMyHealth Patient Portal offered by [facility] by registering at the following website: [email or web address]. By joining [facility] FollowMyHealth portal, you will also be able to view your health information using other applications (apps) compatible with our system.

## 2020-09-23 NOTE — ED STATDOCS - ATTENDING CONTRIBUTION TO CARE
I evaluated the patient in the intake section of the emergency department. The initial assessment was performed by myself and then the patient was handed off to the ACP. The patient was followed and re-evaluated by the ACP. All labs, imaging and procedures were evaluated and performed by the ACP and I was available for consultation if any questions in the patients care came up.
no

## 2020-09-23 NOTE — ED STATDOCS - OBJECTIVE STATEMENT
66 y/o M with a PMHx of chronic back pain resenting to the ED c/o worsening back pain x2 hours. Patient reports that he has a hx of back pain, spasms, states he is due for surgery on Friday. Patient had worsening spasms today, typically resolves shortly after onset, has been lasting for the past few hours now. Came to the ED for further evaluation. No relief with muscle relaxers.  Denies any recent trauma, urinary symptoms, abd pain, CP, fever or chills.

## 2020-09-23 NOTE — ED STATDOCS - CLINICAL SUMMARY MEDICAL DECISION MAKING FREE TEXT BOX
65 M presents to the ED with back pain/spams. Patient is due for surgery on Friday for spasms. No red flags. No midline pain. Denies urinary complaints, abd pain, CP, fever or chills. Likely MSK, will symptom control, reassess. 65 M presents to the ED with back pain/spams. Patient is due for surgery on Friday for spasms. No red flags. No midline pain. Denies urinary complaints, abd pain, CP, fever or chills. Likely MSK, No risk factors or findings concerning for epidural abscess, diskitis, vertebral osteo, cord compression, cauda equina, vertebral fracture or viri malignancy, AAA, or pyelonephritis.  Patient instructed to consider further imaging and workup through their spine surgeon as an outpatient, if symptoms persist.

## 2020-09-23 NOTE — ED STATDOCS - NS_ ATTENDINGSCRIBEDETAILS _ED_A_ED_FT
I, Bruce Sherman MD,  performed the initial face to face bedside interview with this patient regarding history of present illness, review of symptoms and relevant past medical, social and family history.  I completed an independent physical examination.  I was the initial provider who evaluated this patient.  The history, relevant review of systems, past medical and surgical history, medical decision making, and physical examination was documented by the scribe in my presence and I attest to the accuracy of the documentation.

## 2020-09-23 NOTE — ED STATDOCS - PHYSICAL EXAMINATION
Constitutional: mild distress AAOx3  Eyes: PERRLA EOMI  Head: Normocephalic atraumatic  Mouth: MMM  Cardiac: regular rate   Resp: Lungs CTAB  GI: Abd s/nt/nd No CVA TTP.   Neuro: CN2-12 intact  Skin: No rashes   MSK: No midline spinal tenderness. No saddle anesthesia. +b/l lumbar tenderness

## 2020-10-28 ENCOUNTER — OUTPATIENT (OUTPATIENT)
Dept: OUTPATIENT SERVICES | Facility: HOSPITAL | Age: 65
LOS: 1 days | End: 2020-10-28
Payer: MEDICARE

## 2020-10-28 VITALS
TEMPERATURE: 98 F | OXYGEN SATURATION: 97 % | HEIGHT: 69 IN | SYSTOLIC BLOOD PRESSURE: 134 MMHG | HEART RATE: 75 BPM | WEIGHT: 214.95 LBS | RESPIRATION RATE: 18 BRPM | DIASTOLIC BLOOD PRESSURE: 93 MMHG

## 2020-10-28 DIAGNOSIS — Z01.818 ENCOUNTER FOR OTHER PREPROCEDURAL EXAMINATION: ICD-10-CM

## 2020-10-28 DIAGNOSIS — Z98.890 OTHER SPECIFIED POSTPROCEDURAL STATES: Chronic | ICD-10-CM

## 2020-10-28 DIAGNOSIS — Z90.89 ACQUIRED ABSENCE OF OTHER ORGANS: Chronic | ICD-10-CM

## 2020-10-28 DIAGNOSIS — Z98.89 OTHER SPECIFIED POSTPROCEDURAL STATES: Chronic | ICD-10-CM

## 2020-10-28 DIAGNOSIS — Z98.61 CORONARY ANGIOPLASTY STATUS: Chronic | ICD-10-CM

## 2020-10-28 DIAGNOSIS — Z87.74 PERSONAL HISTORY OF (CORRECTED) CONGENITAL MALFORMATIONS OF HEART AND CIRCULATORY SYSTEM: Chronic | ICD-10-CM

## 2020-10-28 LAB
ANION GAP SERPL CALC-SCNC: 2 MMOL/L — LOW (ref 5–17)
APPEARANCE UR: CLEAR — SIGNIFICANT CHANGE UP
BASOPHILS # BLD AUTO: 0.04 K/UL — SIGNIFICANT CHANGE UP (ref 0–0.2)
BASOPHILS NFR BLD AUTO: 0.6 % — SIGNIFICANT CHANGE UP (ref 0–2)
BILIRUB UR-MCNC: NEGATIVE — SIGNIFICANT CHANGE UP
BUN SERPL-MCNC: 23 MG/DL — SIGNIFICANT CHANGE UP (ref 7–23)
CALCIUM SERPL-MCNC: 9.3 MG/DL — SIGNIFICANT CHANGE UP (ref 8.5–10.1)
CHLORIDE SERPL-SCNC: 107 MMOL/L — SIGNIFICANT CHANGE UP (ref 96–108)
CO2 SERPL-SCNC: 30 MMOL/L — SIGNIFICANT CHANGE UP (ref 22–31)
COLOR SPEC: YELLOW — SIGNIFICANT CHANGE UP
CREAT SERPL-MCNC: 1.31 MG/DL — HIGH (ref 0.5–1.3)
DIFF PNL FLD: NEGATIVE — SIGNIFICANT CHANGE UP
EOSINOPHIL # BLD AUTO: 0.04 K/UL — SIGNIFICANT CHANGE UP (ref 0–0.5)
EOSINOPHIL NFR BLD AUTO: 0.6 % — SIGNIFICANT CHANGE UP (ref 0–6)
GLUCOSE SERPL-MCNC: 94 MG/DL — SIGNIFICANT CHANGE UP (ref 70–99)
GLUCOSE UR QL: NEGATIVE MG/DL — SIGNIFICANT CHANGE UP
HCT VFR BLD CALC: 45.5 % — SIGNIFICANT CHANGE UP (ref 39–50)
HGB BLD-MCNC: 15 G/DL — SIGNIFICANT CHANGE UP (ref 13–17)
IMM GRANULOCYTES NFR BLD AUTO: 0.3 % — SIGNIFICANT CHANGE UP (ref 0–1.5)
INR BLD: 1.1 RATIO — SIGNIFICANT CHANGE UP (ref 0.88–1.16)
KETONES UR-MCNC: NEGATIVE — SIGNIFICANT CHANGE UP
LEUKOCYTE ESTERASE UR-ACNC: NEGATIVE — SIGNIFICANT CHANGE UP
LYMPHOCYTES # BLD AUTO: 1.48 K/UL — SIGNIFICANT CHANGE UP (ref 1–3.3)
LYMPHOCYTES # BLD AUTO: 21.6 % — SIGNIFICANT CHANGE UP (ref 13–44)
MCHC RBC-ENTMCNC: 29.4 PG — SIGNIFICANT CHANGE UP (ref 27–34)
MCHC RBC-ENTMCNC: 33 GM/DL — SIGNIFICANT CHANGE UP (ref 32–36)
MCV RBC AUTO: 89.2 FL — SIGNIFICANT CHANGE UP (ref 80–100)
MONOCYTES # BLD AUTO: 0.51 K/UL — SIGNIFICANT CHANGE UP (ref 0–0.9)
MONOCYTES NFR BLD AUTO: 7.4 % — SIGNIFICANT CHANGE UP (ref 2–14)
NEUTROPHILS # BLD AUTO: 4.77 K/UL — SIGNIFICANT CHANGE UP (ref 1.8–7.4)
NEUTROPHILS NFR BLD AUTO: 69.5 % — SIGNIFICANT CHANGE UP (ref 43–77)
NITRITE UR-MCNC: NEGATIVE — SIGNIFICANT CHANGE UP
PH UR: 6 — SIGNIFICANT CHANGE UP (ref 5–8)
PLATELET # BLD AUTO: 264 K/UL — SIGNIFICANT CHANGE UP (ref 150–400)
POTASSIUM SERPL-MCNC: 4.5 MMOL/L — SIGNIFICANT CHANGE UP (ref 3.5–5.3)
POTASSIUM SERPL-SCNC: 4.5 MMOL/L — SIGNIFICANT CHANGE UP (ref 3.5–5.3)
PROT UR-MCNC: NEGATIVE MG/DL — SIGNIFICANT CHANGE UP
PROTHROM AB SERPL-ACNC: 12.8 SEC — SIGNIFICANT CHANGE UP (ref 10.6–13.6)
RBC # BLD: 5.1 M/UL — SIGNIFICANT CHANGE UP (ref 4.2–5.8)
RBC # FLD: 13.2 % — SIGNIFICANT CHANGE UP (ref 10.3–14.5)
SODIUM SERPL-SCNC: 139 MMOL/L — SIGNIFICANT CHANGE UP (ref 135–145)
SP GR SPEC: 1.02 — SIGNIFICANT CHANGE UP (ref 1.01–1.02)
UROBILINOGEN FLD QL: NEGATIVE MG/DL — SIGNIFICANT CHANGE UP
WBC # BLD: 6.86 K/UL — SIGNIFICANT CHANGE UP (ref 3.8–10.5)
WBC # FLD AUTO: 6.86 K/UL — SIGNIFICANT CHANGE UP (ref 3.8–10.5)

## 2020-10-28 PROCEDURE — 87641 MR-STAPH DNA AMP PROBE: CPT

## 2020-10-28 PROCEDURE — 86900 BLOOD TYPING SEROLOGIC ABO: CPT

## 2020-10-28 PROCEDURE — G0463: CPT | Mod: 25

## 2020-10-28 PROCEDURE — 87640 STAPH A DNA AMP PROBE: CPT

## 2020-10-28 PROCEDURE — 86901 BLOOD TYPING SEROLOGIC RH(D): CPT

## 2020-10-28 PROCEDURE — 93005 ELECTROCARDIOGRAM TRACING: CPT

## 2020-10-28 PROCEDURE — 36415 COLL VENOUS BLD VENIPUNCTURE: CPT

## 2020-10-28 PROCEDURE — 85610 PROTHROMBIN TIME: CPT

## 2020-10-28 PROCEDURE — 93010 ELECTROCARDIOGRAM REPORT: CPT

## 2020-10-28 PROCEDURE — 85025 COMPLETE CBC W/AUTO DIFF WBC: CPT

## 2020-10-28 PROCEDURE — 71046 X-RAY EXAM CHEST 2 VIEWS: CPT | Mod: 26

## 2020-10-28 PROCEDURE — 81003 URINALYSIS AUTO W/O SCOPE: CPT

## 2020-10-28 PROCEDURE — 80048 BASIC METABOLIC PNL TOTAL CA: CPT

## 2020-10-28 PROCEDURE — 86850 RBC ANTIBODY SCREEN: CPT

## 2020-10-28 PROCEDURE — 71046 X-RAY EXAM CHEST 2 VIEWS: CPT

## 2020-10-28 RX ORDER — GLUCOSAMINE HCL/CHONDROITIN SU 500-400 MG
0 CAPSULE ORAL
Qty: 0 | Refills: 0 | DISCHARGE

## 2020-10-28 NOTE — H&P PST ADULT - HISTORY OF PRESENT ILLNESS
64 y/o male with complain of chronic lower back pain, back spasms and numbness to right lower leg. Pt takes oxycodone for pain with mild pain relief. He is here for PST for planned Dorsal column stimulator implant, pulse generator implant, complex programming.

## 2020-10-28 NOTE — H&P PST ADULT - SKIN
Wound culture results reviewed.  Wound notes and photos reviewed. Message left on pt's VM. Bactrim DS ordered BID x 10 days.  
No lesions; no rash

## 2020-10-28 NOTE — H&P PST ADULT - ASSESSMENT
64 y/o male with complain of chronic lower back pain, back spasms and numbness to right lower leg. Pt scheduled for Dorsal column stimulator implant, pulse generator implant, complex programming.   Plan  1. Stop all NSAIDS, herbal supplements and vitamins for 7 days.  2. NPO as per ASU instructions  3. Take the following medications ( Metoprolol, Omeprazole, Wellbutrin, Effexor ) with small sips of water on the morning of your procedure/surgery.  4. Use EZ sponges as directed  5. Use mupirocin as directed  6. Labs, EKG, CXR as per surgeon. COVID test on 11/03/2020, pt instructed.  7. PMD visit for optimization prior to surgery as per surgeon.  8. Advised to quarantine prior to surgery

## 2020-10-28 NOTE — H&P PST ADULT - TEACHING/LEARNING LEARNING PREFERENCES
skill demonstration/individual instruction/audio/group instruction/video/written material/computer/internet/pictorial/verbal instruction

## 2020-10-28 NOTE — H&P PST ADULT - NSICDXPASTSURGICALHX_GEN_ALL_CORE_FT
PAST SURGICAL HISTORY:  H/O bicuspid aortic valve replacement- 5/29/18    H/O external ear surgery excision mass right ear- cancer 2018    H/O hemorrhoidectomy     History of back surgery laminectomy x2 last 2018    S/P carpal tunnel release right side, left side- 2014    S/P tonsillectomy     S/P trigger finger release right thumb and middle finger    Status post coronary angioplasty pt not sure he had this procedure

## 2020-10-29 DIAGNOSIS — Z01.818 ENCOUNTER FOR OTHER PREPROCEDURAL EXAMINATION: ICD-10-CM

## 2020-10-29 LAB
MRSA PCR RESULT.: SIGNIFICANT CHANGE UP
S AUREUS DNA NOSE QL NAA+PROBE: SIGNIFICANT CHANGE UP

## 2020-11-02 DIAGNOSIS — M54.16 RADICULOPATHY, LUMBAR REGION: ICD-10-CM

## 2020-11-03 ENCOUNTER — OUTPATIENT (OUTPATIENT)
Dept: OUTPATIENT SERVICES | Facility: HOSPITAL | Age: 65
LOS: 1 days | End: 2020-11-03
Payer: MEDICARE

## 2020-11-03 DIAGNOSIS — M54.16 RADICULOPATHY, LUMBAR REGION: ICD-10-CM

## 2020-11-03 DIAGNOSIS — Z98.890 OTHER SPECIFIED POSTPROCEDURAL STATES: Chronic | ICD-10-CM

## 2020-11-03 DIAGNOSIS — Z90.89 ACQUIRED ABSENCE OF OTHER ORGANS: Chronic | ICD-10-CM

## 2020-11-03 DIAGNOSIS — Z11.59 ENCOUNTER FOR SCREENING FOR OTHER VIRAL DISEASES: ICD-10-CM

## 2020-11-03 DIAGNOSIS — Z87.74 PERSONAL HISTORY OF (CORRECTED) CONGENITAL MALFORMATIONS OF HEART AND CIRCULATORY SYSTEM: Chronic | ICD-10-CM

## 2020-11-03 DIAGNOSIS — Z98.89 OTHER SPECIFIED POSTPROCEDURAL STATES: Chronic | ICD-10-CM

## 2020-11-03 DIAGNOSIS — Z98.61 CORONARY ANGIOPLASTY STATUS: Chronic | ICD-10-CM

## 2020-11-03 LAB — SARS-COV-2 RNA SPEC QL NAA+PROBE: SIGNIFICANT CHANGE UP

## 2020-11-03 PROCEDURE — U0003: CPT

## 2020-11-05 RX ORDER — ONDANSETRON 8 MG/1
4 TABLET, FILM COATED ORAL EVERY 6 HOURS
Refills: 0 | Status: DISCONTINUED | OUTPATIENT
Start: 2020-11-06 | End: 2020-11-06

## 2020-11-05 RX ORDER — SODIUM CHLORIDE 9 MG/ML
1000 INJECTION, SOLUTION INTRAVENOUS
Refills: 0 | Status: DISCONTINUED | OUTPATIENT
Start: 2020-11-06 | End: 2020-11-06

## 2020-11-05 RX ORDER — FENTANYL CITRATE 50 UG/ML
50 INJECTION INTRAVENOUS
Refills: 0 | Status: DISCONTINUED | OUTPATIENT
Start: 2020-11-06 | End: 2020-11-06

## 2020-11-06 ENCOUNTER — OUTPATIENT (OUTPATIENT)
Dept: INPATIENT UNIT | Facility: HOSPITAL | Age: 65
LOS: 1 days | Discharge: HOME | End: 2020-11-06
Payer: MEDICARE

## 2020-11-06 VITALS
TEMPERATURE: 97 F | HEIGHT: 69 IN | HEART RATE: 85 BPM | OXYGEN SATURATION: 100 % | SYSTOLIC BLOOD PRESSURE: 135 MMHG | WEIGHT: 214.95 LBS | RESPIRATION RATE: 16 BRPM | DIASTOLIC BLOOD PRESSURE: 91 MMHG

## 2020-11-06 VITALS
OXYGEN SATURATION: 98 % | TEMPERATURE: 98 F | SYSTOLIC BLOOD PRESSURE: 113 MMHG | DIASTOLIC BLOOD PRESSURE: 71 MMHG | HEART RATE: 67 BPM | RESPIRATION RATE: 14 BRPM

## 2020-11-06 DIAGNOSIS — Z98.890 OTHER SPECIFIED POSTPROCEDURAL STATES: Chronic | ICD-10-CM

## 2020-11-06 DIAGNOSIS — Z87.74 PERSONAL HISTORY OF (CORRECTED) CONGENITAL MALFORMATIONS OF HEART AND CIRCULATORY SYSTEM: Chronic | ICD-10-CM

## 2020-11-06 DIAGNOSIS — Z98.89 OTHER SPECIFIED POSTPROCEDURAL STATES: Chronic | ICD-10-CM

## 2020-11-06 DIAGNOSIS — Z01.818 ENCOUNTER FOR OTHER PREPROCEDURAL EXAMINATION: ICD-10-CM

## 2020-11-06 DIAGNOSIS — Z98.61 CORONARY ANGIOPLASTY STATUS: Chronic | ICD-10-CM

## 2020-11-06 DIAGNOSIS — Z90.89 ACQUIRED ABSENCE OF OTHER ORGANS: Chronic | ICD-10-CM

## 2020-11-06 PROCEDURE — C1767: CPT

## 2020-11-06 PROCEDURE — C1778: CPT

## 2020-11-06 PROCEDURE — C1889: CPT

## 2020-11-06 PROCEDURE — 76000 FLUOROSCOPY <1 HR PHYS/QHP: CPT

## 2020-11-06 RX ORDER — OXYCODONE HYDROCHLORIDE 5 MG/1
5 TABLET ORAL ONCE
Refills: 0 | Status: DISCONTINUED | OUTPATIENT
Start: 2020-11-06 | End: 2020-11-06

## 2020-11-06 RX ORDER — METOPROLOL TARTRATE 50 MG
1 TABLET ORAL
Qty: 0 | Refills: 0 | DISCHARGE

## 2020-11-06 RX ORDER — FAMOTIDINE 10 MG/ML
20 INJECTION INTRAVENOUS ONCE
Refills: 0 | Status: COMPLETED | OUTPATIENT
Start: 2020-11-06 | End: 2020-11-06

## 2020-11-06 RX ORDER — ACETAMINOPHEN 500 MG
975 TABLET ORAL ONCE
Refills: 0 | Status: COMPLETED | OUTPATIENT
Start: 2020-11-06 | End: 2020-11-06

## 2020-11-06 RX ADMIN — Medication 975 MILLIGRAM(S): at 10:02

## 2020-11-06 RX ADMIN — FAMOTIDINE 20 MILLIGRAM(S): 10 INJECTION INTRAVENOUS at 10:02

## 2020-11-06 RX ADMIN — OXYCODONE HYDROCHLORIDE 5 MILLIGRAM(S): 5 TABLET ORAL at 13:19

## 2020-11-06 NOTE — ASU DISCHARGE PLAN (ADULT/PEDIATRIC) - CARE PROVIDER_API CALL
Anthony Guillermo  PAIN MEDICINE  [address] [place] [id number]  Phone: [phone]  Fax: [phone]  Follow Up Time:

## 2020-11-06 NOTE — ASU PATIENT PROFILE, ADULT - PSH
H/O bicuspid aortic valve  replacement- 5/29/18  H/O external ear surgery  excision mass right ear- cancer 2018  H/O hemorrhoidectomy    History of back surgery  laminectomy x2 last 2018  S/P carpal tunnel release  right side, left side- 2014  S/P tonsillectomy    S/P trigger finger release  right thumb and middle finger  Status post coronary angioplasty  pt not sure he had this procedure

## 2020-11-06 NOTE — ASU PATIENT PROFILE, ADULT - PMH
Anxiety    Aortic stenosis  Bicuspid Aortic Valve Replacement- 5/29/2018  Carpal tunnel syndrome  had surgery  Chronic neck and back pain    Depressive disorder    Gastrointestinal hemorrhage    GERD (gastroesophageal reflux disease)    Helicobacter pylori infection    Herniated lumbar intervertebral disc    History of colonic polyps    HTN (hypertension)    Hyperlipidemia    Internal and external prolapsed hemorrhoids  had surgery  Loss of hearing    Sciatica    Skin cancer  external right ear, unsure of type

## 2020-11-06 NOTE — ASU PATIENT PROFILE, ADULT - TEACHING/LEARNING LEARNING PREFERENCES
verbal instruction/pictorial/video/skill demonstration/computer/internet/written material/group instruction/individual instruction/audio

## 2020-11-12 DIAGNOSIS — Z95.2 PRESENCE OF PROSTHETIC HEART VALVE: ICD-10-CM

## 2020-11-12 DIAGNOSIS — G89.29 OTHER CHRONIC PAIN: ICD-10-CM

## 2020-11-12 DIAGNOSIS — F17.210 NICOTINE DEPENDENCE, CIGARETTES, UNCOMPLICATED: ICD-10-CM

## 2020-11-12 DIAGNOSIS — Z79.82 LONG TERM (CURRENT) USE OF ASPIRIN: ICD-10-CM

## 2020-11-12 DIAGNOSIS — M54.16 RADICULOPATHY, LUMBAR REGION: ICD-10-CM

## 2020-11-12 DIAGNOSIS — Z85.828 PERSONAL HISTORY OF OTHER MALIGNANT NEOPLASM OF SKIN: ICD-10-CM

## 2020-11-12 DIAGNOSIS — I10 ESSENTIAL (PRIMARY) HYPERTENSION: ICD-10-CM

## 2020-11-12 DIAGNOSIS — E78.5 HYPERLIPIDEMIA, UNSPECIFIED: ICD-10-CM

## 2020-11-12 DIAGNOSIS — F41.9 ANXIETY DISORDER, UNSPECIFIED: ICD-10-CM

## 2020-11-12 DIAGNOSIS — F32.9 MAJOR DEPRESSIVE DISORDER, SINGLE EPISODE, UNSPECIFIED: ICD-10-CM

## 2020-11-12 DIAGNOSIS — I25.10 ATHEROSCLEROTIC HEART DISEASE OF NATIVE CORONARY ARTERY WITHOUT ANGINA PECTORIS: ICD-10-CM

## 2020-11-12 DIAGNOSIS — K21.9 GASTRO-ESOPHAGEAL REFLUX DISEASE WITHOUT ESOPHAGITIS: ICD-10-CM

## 2020-12-11 NOTE — ASU PATIENT PROFILE, ADULT - PRO MENTAL HEALTH SX RECENT
Report given to  Albaro COOK. Pt resting comfortably. No acute distress noted. Safety precautions maintained.     Chart check completed.    none

## 2021-02-05 NOTE — H&P ADULT - HISTORY OF PRESENT ILLNESS
This is a 63 y/o male with Hx of HLD smoking hx , with voluntary wt. lose of 67 pounds, pre-op for cosmetic  surgery with possible 4 hours of general  anesthesia. [No Acute Distress] : no acute distress [Well Nourished] : well nourished [Well Groomed] : well groomed [No Deformities] : no deformities [Well Developed] : well developed [Normal Oropharynx] : normal oropharynx [Normal Appearance] : normal appearance [Supple] : supple [No Neck Mass] : no neck mass [No JVD] : no jvd [Normal Rate/Rhythm] : normal rate/rhythm This is a 63 y/o male with Hx of HLD smoking hx , with voluntary wt. lose of 67 pounds, pre-op for cosmetic  surgery with possible 4 hours of general  anesthesia. Pt has been c/o DIAL, also echo showed mod-sev AS, Pt referred for Rt & LHC with possible intervention. [Normal Pulses] : normal pulses [Normal S1, S2] : normal s1, s2 [No Murmurs] : no murmurs [No Resp Distress] : no resp distress [Rhonchi] : rhonchi [No Abnormalities] : no abnormalities [Benign] : benign [Normal Gait] : normal gait [No Clubbing] : no clubbing [No Cyanosis] : no cyanosis [No Edema] : no edema [FROM] : FROM [Normal Color/ Pigmentation] : normal color/ pigmentation [No Focal Deficits] : no focal deficits [Oriented x3] : oriented x3 [Normal Mood] : normal mood [Normal Affect] : normal affect [TextBox_68] : BL rhonci and expiratory wheezes throughout

## 2021-03-31 PROBLEM — K64.8 OTHER HEMORRHOIDS: Chronic | Status: ACTIVE | Noted: 2020-09-05

## 2021-03-31 PROBLEM — G56.00 CARPAL TUNNEL SYNDROME, UNSPECIFIED UPPER LIMB: Chronic | Status: ACTIVE | Noted: 2020-09-05

## 2021-03-31 PROBLEM — H91.90 UNSPECIFIED HEARING LOSS, UNSPECIFIED EAR: Chronic | Status: ACTIVE | Noted: 2020-10-28

## 2021-04-13 ENCOUNTER — OUTPATIENT (OUTPATIENT)
Dept: OUTPATIENT SERVICES | Facility: HOSPITAL | Age: 66
LOS: 1 days | End: 2021-04-13
Payer: MEDICARE

## 2021-04-13 VITALS
OXYGEN SATURATION: 98 % | HEIGHT: 66 IN | TEMPERATURE: 98 F | RESPIRATION RATE: 16 BRPM | HEART RATE: 67 BPM | SYSTOLIC BLOOD PRESSURE: 106 MMHG | WEIGHT: 216.05 LBS | DIASTOLIC BLOOD PRESSURE: 68 MMHG

## 2021-04-13 DIAGNOSIS — Z98.890 OTHER SPECIFIED POSTPROCEDURAL STATES: Chronic | ICD-10-CM

## 2021-04-13 DIAGNOSIS — Z01.818 ENCOUNTER FOR OTHER PREPROCEDURAL EXAMINATION: ICD-10-CM

## 2021-04-13 DIAGNOSIS — Z87.74 PERSONAL HISTORY OF (CORRECTED) CONGENITAL MALFORMATIONS OF HEART AND CIRCULATORY SYSTEM: Chronic | ICD-10-CM

## 2021-04-13 DIAGNOSIS — Z98.61 CORONARY ANGIOPLASTY STATUS: Chronic | ICD-10-CM

## 2021-04-13 DIAGNOSIS — E66.01 MORBID (SEVERE) OBESITY DUE TO EXCESS CALORIES: ICD-10-CM

## 2021-04-13 DIAGNOSIS — Z29.9 ENCOUNTER FOR PROPHYLACTIC MEASURES, UNSPECIFIED: ICD-10-CM

## 2021-04-13 DIAGNOSIS — Z98.89 OTHER SPECIFIED POSTPROCEDURAL STATES: Chronic | ICD-10-CM

## 2021-04-13 DIAGNOSIS — Z90.89 ACQUIRED ABSENCE OF OTHER ORGANS: Chronic | ICD-10-CM

## 2021-04-13 DIAGNOSIS — Z96.89 PRESENCE OF OTHER SPECIFIED FUNCTIONAL IMPLANTS: Chronic | ICD-10-CM

## 2021-04-13 LAB
ALBUMIN SERPL ELPH-MCNC: 3.9 G/DL — SIGNIFICANT CHANGE UP (ref 3.3–5)
ANION GAP SERPL CALC-SCNC: 2 MMOL/L — LOW (ref 5–17)
APPEARANCE UR: CLEAR — SIGNIFICANT CHANGE UP
APTT BLD: 31.3 SEC — SIGNIFICANT CHANGE UP (ref 27.5–35.5)
BASOPHILS # BLD AUTO: 0.05 K/UL — SIGNIFICANT CHANGE UP (ref 0–0.2)
BASOPHILS NFR BLD AUTO: 0.8 % — SIGNIFICANT CHANGE UP (ref 0–2)
BILIRUB UR-MCNC: NEGATIVE — SIGNIFICANT CHANGE UP
BLOOD GAS SOURCE: SIGNIFICANT CHANGE UP
BUN SERPL-MCNC: 32 MG/DL — HIGH (ref 7–23)
CALCIUM SERPL-MCNC: 9.8 MG/DL — SIGNIFICANT CHANGE UP (ref 8.5–10.1)
CHLORIDE SERPL-SCNC: 103 MMOL/L — SIGNIFICANT CHANGE UP (ref 96–108)
CO2 SERPL-SCNC: 28 MMOL/L — SIGNIFICANT CHANGE UP (ref 22–31)
COHGB MFR BLDV: 1.3 % — SIGNIFICANT CHANGE UP (ref 0–1.5)
COLOR SPEC: YELLOW — SIGNIFICANT CHANGE UP
CREAT SERPL-MCNC: 1.46 MG/DL — HIGH (ref 0.5–1.3)
DIFF PNL FLD: NEGATIVE — SIGNIFICANT CHANGE UP
EOSINOPHIL # BLD AUTO: 0.01 K/UL — SIGNIFICANT CHANGE UP (ref 0–0.5)
EOSINOPHIL NFR BLD AUTO: 0.2 % — SIGNIFICANT CHANGE UP (ref 0–6)
GLUCOSE SERPL-MCNC: 71 MG/DL — SIGNIFICANT CHANGE UP (ref 70–99)
GLUCOSE UR QL: NEGATIVE MG/DL — SIGNIFICANT CHANGE UP
HCT VFR BLD CALC: 47.9 % — SIGNIFICANT CHANGE UP (ref 39–50)
HGB BLD-MCNC: 15.6 G/DL — SIGNIFICANT CHANGE UP (ref 13–17)
IMM GRANULOCYTES NFR BLD AUTO: 0.3 % — SIGNIFICANT CHANGE UP (ref 0–1.5)
INR BLD: 1.12 RATIO — SIGNIFICANT CHANGE UP (ref 0.88–1.16)
KETONES UR-MCNC: NEGATIVE — SIGNIFICANT CHANGE UP
LEUKOCYTE ESTERASE UR-ACNC: NEGATIVE — SIGNIFICANT CHANGE UP
LYMPHOCYTES # BLD AUTO: 1.81 K/UL — SIGNIFICANT CHANGE UP (ref 1–3.3)
LYMPHOCYTES # BLD AUTO: 27.9 % — SIGNIFICANT CHANGE UP (ref 13–44)
MCHC RBC-ENTMCNC: 29.1 PG — SIGNIFICANT CHANGE UP (ref 27–34)
MCHC RBC-ENTMCNC: 32.6 GM/DL — SIGNIFICANT CHANGE UP (ref 32–36)
MCV RBC AUTO: 89.2 FL — SIGNIFICANT CHANGE UP (ref 80–100)
MONOCYTES # BLD AUTO: 0.39 K/UL — SIGNIFICANT CHANGE UP (ref 0–0.9)
MONOCYTES NFR BLD AUTO: 6 % — SIGNIFICANT CHANGE UP (ref 2–14)
NEUTROPHILS # BLD AUTO: 4.21 K/UL — SIGNIFICANT CHANGE UP (ref 1.8–7.4)
NEUTROPHILS NFR BLD AUTO: 64.8 % — SIGNIFICANT CHANGE UP (ref 43–77)
NITRITE UR-MCNC: NEGATIVE — SIGNIFICANT CHANGE UP
PH UR: 5 — SIGNIFICANT CHANGE UP (ref 5–8)
PLATELET # BLD AUTO: 285 K/UL — SIGNIFICANT CHANGE UP (ref 150–400)
POTASSIUM SERPL-MCNC: 4.6 MMOL/L — SIGNIFICANT CHANGE UP (ref 3.5–5.3)
POTASSIUM SERPL-SCNC: 4.6 MMOL/L — SIGNIFICANT CHANGE UP (ref 3.5–5.3)
PROT UR-MCNC: NEGATIVE MG/DL — SIGNIFICANT CHANGE UP
PROTHROM AB SERPL-ACNC: 12.9 SEC — SIGNIFICANT CHANGE UP (ref 10.6–13.6)
RBC # BLD: 5.37 M/UL — SIGNIFICANT CHANGE UP (ref 4.2–5.8)
RBC # FLD: 13.4 % — SIGNIFICANT CHANGE UP (ref 10.3–14.5)
SODIUM SERPL-SCNC: 133 MMOL/L — LOW (ref 135–145)
SP GR SPEC: 1.01 — SIGNIFICANT CHANGE UP (ref 1.01–1.02)
UROBILINOGEN FLD QL: NEGATIVE MG/DL — SIGNIFICANT CHANGE UP
WBC # BLD: 6.49 K/UL — SIGNIFICANT CHANGE UP (ref 3.8–10.5)
WBC # FLD AUTO: 6.49 K/UL — SIGNIFICANT CHANGE UP (ref 3.8–10.5)

## 2021-04-13 PROCEDURE — 86900 BLOOD TYPING SEROLOGIC ABO: CPT

## 2021-04-13 PROCEDURE — 82040 ASSAY OF SERUM ALBUMIN: CPT

## 2021-04-13 PROCEDURE — 85610 PROTHROMBIN TIME: CPT

## 2021-04-13 PROCEDURE — 36415 COLL VENOUS BLD VENIPUNCTURE: CPT

## 2021-04-13 PROCEDURE — 86901 BLOOD TYPING SEROLOGIC RH(D): CPT

## 2021-04-13 PROCEDURE — 85025 COMPLETE CBC W/AUTO DIFF WBC: CPT

## 2021-04-13 PROCEDURE — 80048 BASIC METABOLIC PNL TOTAL CA: CPT

## 2021-04-13 PROCEDURE — 85730 THROMBOPLASTIN TIME PARTIAL: CPT

## 2021-04-13 PROCEDURE — 81003 URINALYSIS AUTO W/O SCOPE: CPT

## 2021-04-13 PROCEDURE — 86850 RBC ANTIBODY SCREEN: CPT

## 2021-04-13 PROCEDURE — G0463: CPT | Mod: 25

## 2021-04-13 PROCEDURE — 82375 ASSAY CARBOXYHB QUANT: CPT

## 2021-04-13 RX ORDER — METOPROLOL TARTRATE 50 MG
1 TABLET ORAL
Qty: 0 | Refills: 0 | DISCHARGE

## 2021-04-13 RX ORDER — VENLAFAXINE HCL 75 MG
1 CAPSULE, EXT RELEASE 24 HR ORAL
Qty: 0 | Refills: 0 | DISCHARGE

## 2021-04-13 RX ORDER — ASCORBIC ACID 60 MG
0 TABLET,CHEWABLE ORAL
Qty: 0 | Refills: 0 | DISCHARGE

## 2021-04-13 RX ORDER — ASPIRIN/CALCIUM CARB/MAGNESIUM 324 MG
1 TABLET ORAL
Qty: 0 | Refills: 0 | DISCHARGE

## 2021-04-13 NOTE — H&P PST ADULT - ASSESSMENT
65 years old male present to PST prior to laparoscopic gastric bypass with Dr. Amor.    Plan   1. NPO after midnight  2. Take the following medications with sips of water on the day of procedure: Venlafaxine Metoprolol and Bupropion. May take Xanax anxious.   3. Use E-Z sponge as directed  4. To schedule Covid swab appointment foe 2021. Number given.  5. Drink a quart of extra  fluids the day before your surgery.  6 Medical optimization for surgery with Dr. Iban Romero  7. CBC, BMP, Carboxyhemoglobin, Albumin, PT/ INR and PTT, Urinalysis, Type and Screen, MRSA sent to lab  8. EKG and Chest x- ray on chart from 10/2021    CAPRINI SCORE [CLOT]    AGE RELATED RISK FACTORS                                                       MOBILITY RELATED FACTORS  [ ] Age 41-60 years                                            (1 Point)                  [ ] Bed rest                                                        (1 Point)  [x ] Age: 61-74 years                                           (2 Points)                 [ ] Plaster cast                                                   (2 Points)  [ ] Age= 75 years                                              (3 Points)                 [ ] Bed bound for more than 72 hours                 (2 Points)    DISEASE RELATED RISK FACTORS                                               GENDER SPECIFIC FACTORS  [ ] Edema in the lower extremities                       (1 Point)                  [ ] Pregnancy                                                     (1 Point)  [ ] Varicose veins                                               (1 Point)                  [ ] Post-partum < 6 weeks                                   (1 Point)             [x ] BMI > 25 Kg/m2                                            (1 Point)                  [ ] Hormonal therapy  or oral contraception          (1 Point)                 [ ] Sepsis (in the previous month)                        (1 Point)                  [ ] History of pregnancy complications                 (1 point)  [ ] Pneumonia or serious lung disease                                               [ ] Unexplained or recurrent                     (1 Point)           (in the previous month)                               (1 Point)  [ ] Abnormal pulmonary function test                     (1 Point)                 SURGERY RELATED RISK FACTORS  [ ] Acute myocardial infarction                              (1 Point)                 [ ]  Section                                             (1 Point)  [ ] Congestive heart failure (in the previous month)  (1 Point)               [ ] Minor surgery                                                  (1 Point)   [ ] Inflammatory bowel disease                             (1 Point)                 [ ] Arthroscopic surgery                                        (2 Points)  [ ] Central venous access                                      (2 Points)                [x ] General surgery lasting more than 45 minutes   (2 Points)       [ ] Stroke (in the previous month)                          (5 Points)               [ ] Elective arthroplasty                                         (5 Points)            [x ] malignancy present or previous                      (2 points)                                                                                                                                   HEMATOLOGY RELATED FACTORS                                                 TRAUMA RELATED RISK FACTORS  [ ] Prior episodes of VTE                                     (3 Points)                 [ ] Fracture of the hip, pelvis, or leg                       (5 Points)  [ ] Positive family history for VTE                         (3 Points)                 [ ] Acute spinal cord injury (in the previous month)  (5 Points)  [ ] Prothrombin 19823 A                                     (3 Points)                 [ ] Paralysis  (less than 1 month)                             (5 Points)  [ ] Factor V Leiden                                             (3 Points)                  [ ] Multiple Trauma within 1 month                        (5 Points)  [ ] Lupus anticoagulants                                     (3 Points)                                                           [ ] Anticardiolipin antibodies                               (3 Points)                                                       [ ] High homocysteine in the blood                      (3 Points)                                             [ ] Other congenital or acquired thrombophilia      (3 Points)                                                [ ] Heparin induced thrombocytopenia                  (3 Points)                                          Total Score [         7 ]

## 2021-04-13 NOTE — H&P PST ADULT - NSICDXPASTSURGICALHX_GEN_ALL_CORE_FT
PAST SURGICAL HISTORY:  H/O bicuspid aortic valve replacement- 5/29/18    H/O external ear surgery excision mass right ear- cancer 2018    H/O hemorrhoidectomy     History of back surgery laminectomy x2 last 2018    S/P carpal tunnel release right side, left side- 2014    S/P tonsillectomy     S/P trigger finger release right thumb and middle finger    Spinal cord stimulator status 10/2020    Status post coronary angioplasty pt not sure he had this procedure

## 2021-04-13 NOTE — H&P PST ADULT - HISTORY OF PRESENT ILLNESS
65 years old male with morbid obesity. Patient lost 60 pounds in 2018 which he has since regained. Difficulty maintaining healthy weight . Planned gastric bypass.

## 2021-04-13 NOTE — H&P PST ADULT - HEALTH CARE MAINTENANCE
Denies travel outside of state or country in the last 14 days.   Denies contact with known Coronavirus positive person.  Denies fever, chills, cough, congestion, runny nose, SOB or difficulty breathing, fatigue, muscle or body ache, headache. loss of taste or smell, N/V/D.    influenza vaccine 10/2020  Covid vaccine completed 4/2021

## 2021-04-13 NOTE — H&P PST ADULT - HEART RATE (BEATS/MIN)
67

## 2021-04-14 DIAGNOSIS — Z01.818 ENCOUNTER FOR OTHER PREPROCEDURAL EXAMINATION: ICD-10-CM

## 2021-04-14 DIAGNOSIS — E66.01 MORBID (SEVERE) OBESITY DUE TO EXCESS CALORIES: ICD-10-CM

## 2021-04-18 ENCOUNTER — APPOINTMENT (OUTPATIENT)
Dept: DISASTER EMERGENCY | Facility: CLINIC | Age: 66
End: 2021-04-18

## 2021-04-18 DIAGNOSIS — Z01.818 ENCOUNTER FOR OTHER PREPROCEDURAL EXAMINATION: ICD-10-CM

## 2021-04-19 LAB — SARS-COV-2 N GENE NPH QL NAA+PROBE: NOT DETECTED

## 2021-04-21 ENCOUNTER — INPATIENT (INPATIENT)
Facility: HOSPITAL | Age: 66
LOS: 0 days | Discharge: HOME | DRG: 621 | End: 2021-04-22
Attending: SURGERY | Admitting: SURGERY
Payer: MEDICARE

## 2021-04-21 VITALS
OXYGEN SATURATION: 98 % | SYSTOLIC BLOOD PRESSURE: 135 MMHG | RESPIRATION RATE: 16 BRPM | WEIGHT: 207.01 LBS | DIASTOLIC BLOOD PRESSURE: 91 MMHG | HEART RATE: 99 BPM | TEMPERATURE: 98 F | HEIGHT: 66 IN

## 2021-04-21 DIAGNOSIS — Z98.61 CORONARY ANGIOPLASTY STATUS: Chronic | ICD-10-CM

## 2021-04-21 DIAGNOSIS — E66.01 MORBID (SEVERE) OBESITY DUE TO EXCESS CALORIES: ICD-10-CM

## 2021-04-21 DIAGNOSIS — Z98.890 OTHER SPECIFIED POSTPROCEDURAL STATES: Chronic | ICD-10-CM

## 2021-04-21 DIAGNOSIS — Z87.74 PERSONAL HISTORY OF (CORRECTED) CONGENITAL MALFORMATIONS OF HEART AND CIRCULATORY SYSTEM: Chronic | ICD-10-CM

## 2021-04-21 DIAGNOSIS — Z90.89 ACQUIRED ABSENCE OF OTHER ORGANS: Chronic | ICD-10-CM

## 2021-04-21 DIAGNOSIS — Z98.89 OTHER SPECIFIED POSTPROCEDURAL STATES: Chronic | ICD-10-CM

## 2021-04-21 DIAGNOSIS — Z96.89 PRESENCE OF OTHER SPECIFIED FUNCTIONAL IMPLANTS: Chronic | ICD-10-CM

## 2021-04-21 PROCEDURE — 80048 BASIC METABOLIC PNL TOTAL CA: CPT

## 2021-04-21 PROCEDURE — 82962 GLUCOSE BLOOD TEST: CPT

## 2021-04-21 PROCEDURE — C9113: CPT

## 2021-04-21 PROCEDURE — C1889: CPT

## 2021-04-21 PROCEDURE — 86769 SARS-COV-2 COVID-19 ANTIBODY: CPT

## 2021-04-21 PROCEDURE — 85025 COMPLETE CBC W/AUTO DIFF WBC: CPT

## 2021-04-21 PROCEDURE — 36415 COLL VENOUS BLD VENIPUNCTURE: CPT

## 2021-04-21 PROCEDURE — C9399: CPT

## 2021-04-21 RX ORDER — OXYCODONE HYDROCHLORIDE 5 MG/1
5 TABLET ORAL EVERY 4 HOURS
Refills: 0 | Status: DISCONTINUED | OUTPATIENT
Start: 2021-04-21 | End: 2021-04-22

## 2021-04-21 RX ORDER — SODIUM CHLORIDE 9 MG/ML
1000 INJECTION, SOLUTION INTRAVENOUS
Refills: 0 | Status: DISCONTINUED | OUTPATIENT
Start: 2021-04-21 | End: 2021-04-22

## 2021-04-21 RX ORDER — APREPITANT 80 MG/1
80 CAPSULE ORAL ONCE
Refills: 0 | Status: COMPLETED | OUTPATIENT
Start: 2021-04-21 | End: 2021-04-21

## 2021-04-21 RX ORDER — KETOROLAC TROMETHAMINE 30 MG/ML
30 SYRINGE (ML) INJECTION EVERY 6 HOURS
Refills: 0 | Status: DISCONTINUED | OUTPATIENT
Start: 2021-04-21 | End: 2021-04-22

## 2021-04-21 RX ORDER — ONDANSETRON 8 MG/1
4 TABLET, FILM COATED ORAL ONCE
Refills: 0 | Status: DISCONTINUED | OUTPATIENT
Start: 2021-04-21 | End: 2021-04-21

## 2021-04-21 RX ORDER — SODIUM CHLORIDE 9 MG/ML
1000 INJECTION, SOLUTION INTRAVENOUS
Refills: 0 | Status: DISCONTINUED | OUTPATIENT
Start: 2021-04-21 | End: 2021-04-21

## 2021-04-21 RX ORDER — HEPARIN SODIUM 5000 [USP'U]/ML
5000 INJECTION INTRAVENOUS; SUBCUTANEOUS ONCE
Refills: 0 | Status: COMPLETED | OUTPATIENT
Start: 2021-04-21 | End: 2021-04-21

## 2021-04-21 RX ORDER — ONDANSETRON 8 MG/1
4 TABLET, FILM COATED ORAL EVERY 6 HOURS
Refills: 0 | Status: DISCONTINUED | OUTPATIENT
Start: 2021-04-21 | End: 2021-04-22

## 2021-04-21 RX ORDER — PANTOPRAZOLE SODIUM 20 MG/1
40 TABLET, DELAYED RELEASE ORAL EVERY 24 HOURS
Refills: 0 | Status: DISCONTINUED | OUTPATIENT
Start: 2021-04-21 | End: 2021-04-22

## 2021-04-21 RX ORDER — HYDROMORPHONE HYDROCHLORIDE 2 MG/ML
0.5 INJECTION INTRAMUSCULAR; INTRAVENOUS; SUBCUTANEOUS
Refills: 0 | Status: DISCONTINUED | OUTPATIENT
Start: 2021-04-21 | End: 2021-04-21

## 2021-04-21 RX ORDER — ACETAMINOPHEN 500 MG
1000 TABLET ORAL ONCE
Refills: 0 | Status: COMPLETED | OUTPATIENT
Start: 2021-04-21 | End: 2021-04-21

## 2021-04-21 RX ORDER — ACETAMINOPHEN 500 MG
1000 TABLET ORAL ONCE
Refills: 0 | Status: COMPLETED | OUTPATIENT
Start: 2021-04-22 | End: 2021-04-22

## 2021-04-21 RX ORDER — OXYCODONE HYDROCHLORIDE 5 MG/1
10 TABLET ORAL EVERY 4 HOURS
Refills: 0 | Status: DISCONTINUED | OUTPATIENT
Start: 2021-04-21 | End: 2021-04-22

## 2021-04-21 RX ORDER — ENOXAPARIN SODIUM 100 MG/ML
40 INJECTION SUBCUTANEOUS EVERY 12 HOURS
Refills: 0 | Status: DISCONTINUED | OUTPATIENT
Start: 2021-04-21 | End: 2021-04-22

## 2021-04-21 RX ADMIN — PANTOPRAZOLE SODIUM 40 MILLIGRAM(S): 20 TABLET, DELAYED RELEASE ORAL at 17:25

## 2021-04-21 RX ADMIN — OXYCODONE HYDROCHLORIDE 10 MILLIGRAM(S): 5 TABLET ORAL at 20:07

## 2021-04-21 RX ADMIN — Medication 0.5 MILLIGRAM(S): at 23:43

## 2021-04-21 RX ADMIN — ENOXAPARIN SODIUM 40 MILLIGRAM(S): 100 INJECTION SUBCUTANEOUS at 22:35

## 2021-04-21 RX ADMIN — Medication 400 MILLIGRAM(S): at 22:07

## 2021-04-21 RX ADMIN — APREPITANT 80 MILLIGRAM(S): 80 CAPSULE ORAL at 10:48

## 2021-04-21 RX ADMIN — Medication 30 MILLIGRAM(S): at 22:14

## 2021-04-21 RX ADMIN — Medication 30 MILLIGRAM(S): at 22:07

## 2021-04-21 RX ADMIN — Medication 1000 MILLIGRAM(S): at 22:14

## 2021-04-21 RX ADMIN — HEPARIN SODIUM 5000 UNIT(S): 5000 INJECTION INTRAVENOUS; SUBCUTANEOUS at 10:48

## 2021-04-21 RX ADMIN — HYDROMORPHONE HYDROCHLORIDE 0.5 MILLIGRAM(S): 2 INJECTION INTRAMUSCULAR; INTRAVENOUS; SUBCUTANEOUS at 17:20

## 2021-04-21 RX ADMIN — HYDROMORPHONE HYDROCHLORIDE 0.5 MILLIGRAM(S): 2 INJECTION INTRAMUSCULAR; INTRAVENOUS; SUBCUTANEOUS at 17:10

## 2021-04-21 RX ADMIN — OXYCODONE HYDROCHLORIDE 10 MILLIGRAM(S): 5 TABLET ORAL at 19:37

## 2021-04-21 NOTE — BRIEF OPERATIVE NOTE - OPERATION/FINDINGS
lap gastric bypass   bp limb : 100cm  christopher limb : 150cm   antecolic gj anastomosis   endoscopy  tap block

## 2021-04-21 NOTE — CHART NOTE - NSCHARTNOTEFT_GEN_A_CORE
Operative Note    Patient: Lopez Bloom  : 1955  Surgery date: 2021    Pre-Operative Diagnosis: Refractory morbid obesity with multiple comorbid conditions.    Post-operative Diagnosis: Same, hiatal hernia    Primary Procedure  [41659] Crow-En-Y Gastric Bypass - Laparoscopic     Secondary Procedure(s)  [07708] Hiatal Hernia Repair - Laparoscopic   [12051] Transversus Abdominis Plan (TAP) Block Bilateral   [21039] Uppr Gi Endoscopy, Diagnosis     Surgeon: Bruce Amor M.D., FACS     Assistant Surgeon: Marcel Briones MD    Anesthesia type: General Anesthesia     ICD9 Code   Diagnosis   [E66.01]   MORBID SEVERE OBES D/T EXCESS TULIO (Stable)   [E78.5]   HYPERLIPIDEMIA UNSPECIFIED (Stable)   [F32.9]   MDAELEINE DEPRESS D/O SINGLE EPIS UNS (Stable)   [F41.9]   ANXIETY DISORDER UNSPECIFIED (Stable)   [I25.10]   ASHD NATIVE CA W/O ANGINA PECTORIS (Stable)   [K21.9]   GERD WITHOUT ESOPHAGITIS (Stable)   [K44.9]   DIAPH HERNIA W/O OBST/GANGRENE (Stable)   [Z68.41]   BODY MASS INDEX 40.0-44.9 ADULT (Stable)     Estimated blood loss: 30 ml     DRAINS: none    COMPLICATIONS: none    INDICATIONS FOR THE PROCEDURE:  The patient is a 65-year-old male who is morbidly obese with a body mass index of 40. The patient had multiple comorbidities due to his morbid obesity including coronary artery disease and hyperlipidemia. The patient tried multiple medical diets to try to lose weight but has been unable to lose any significant amount of weight, so surgical correction of her morbid obesity was indicated. The patient meets NIH criteria fro bariatric surgery and underwent appropriate preoperative workup, education, and signed the consent form freely. The patient had risks and benefits explained including, but not limited to, bleeding, leak, infection, disability, injury to transient structures, aspiration, DVT, pulmonary embolism, and death. The patient understood and agreed to proceed with surgery.    DESCRIPTION OF THE PROCEDURE:  The patient was identified properly via a time-out. The patient was brought into the operating room and was placed on the operating room table in supine position. The patient was then given general endotracheal anesthesia and was given preoperative antibiotics. Preoperative heparin was given in the ambulatory surgery unit. The patient was then prepped and draped in the usual sterile fashion. An Exparel mixture was mixed at the back table with 20 mL of Exparel, 30 mL of 0.25% Marcaine and 150 mL of normal saline. A Veress needle was placed in the left upper quadrant. The abdomen was insufflated to 15 millimeters of mercury. Once the abdomen was insufflated, the Exparel mixture was given subcutaneously at the sites of incision. An incision was made within the umbilicus and a 12-millimeter trocar was placed in the abdomen. The laparoscope was inserted and there was no injury on initial trocar placement or initial Veress needle placement. A Transversus Abdominus Plane Block was then conducted by placing 20 mL of the Exparel mixture preperitoneal at the distribution of the spinal nerves on the lateral abdominal wall. This was started at the costal margin at the mid axillary line. 20cc of the Exparel mixture was placed in this area. Another 20 mL was placed four centimeters caudal to this area. Another 20 mL was placed four centimeters caudal to this area and another 15 mL was placed four centimeters caudal to this area. This was repeated on the opposite side of the body in a similar fashion. The rest of the Exparel was placed into the subcutaneous tissues and preperitoneal at every trocar site. A 5-millimeter and 12-millimeter trocar was placed in the right upper quadrant. A 12-millimeter and 5-millimeter trocar was placed in left upper quadrant. An incision was made in subxiphoid area and a liver retractor was placed to hold the liver anteriorly and superiorly and was held in place using Mediflex device. The patient was then placed into steep reverse Trendelenburg position. The ligament of Treitz was identified after retracting the colon and greater omentum superiorly. The small bowel was measured for 100 cm from the ligament of Treitz and divided using a 60 mm I drive stapler using the tan load. The Crow limb was then measured at this point for 150 cm. The biliopancreatic limb and the Crow limb were then approximated using two sutures using the Endostitch device. Enterotomies were made in both these limbs. A single firing of the 60 mm tan load was then used to create a functional side to side anastomosis. The enterotomies sites were close using a running 2-0 Vicryl suture using the Endostitch device. On completion of the anastomosis, the mesenteric defects between the small bowel mesentery were closed with the 2-0 Surgidac suture using the Endostitch device. The greater omentum was then divided longitudinally to create space for the Crow limb to come into the anticolic and antigastric position. At this time, this section was done at the angle of His to create a window into the lesser sac using the harmonic scalpel. A moderate sized hiatal hernia was identified. The stomach is gently retracted into the abdomen to assess its degree of tethering in the thorax. The peritoneum overlying the right vlad is incised, and the plane along the hernia sac is developed. The dissection is extended anteriorlyand laterally to the left vlad. The base of the crural confluence is dissected free of adhesions. The hernia sac is carefully dissected into the mediastinum with caudal traction. The interfaces between the pleura, pericardium, spine, and aorta are developed as the dissection is carried cephalad to the top of the esophagus. The esophagus is identified and dissected circumferentially and along its mediastinal course in order to reduce tension, allowing the gastroesophageal junction to rest comfortably within the abdominal cavity. Care is taken to identify and preserve the vagus nerves. The retro-esophageal window is developed, and the esophagus is retracted caudally. The crural pillars are then approximated with a 2-0 non absorbable V-Loc suture in a running fashion. The tightness of the repair is gauged visually. A window was then created in a perigastric fashion in the lesser sac along the lesser curvature between the second and third veins along the lesser curvature. At this time, the Orogastric tube and esophageal thermometer were removed. A 45 mm purple load was used to create a transverse cut into the stomach for the creation of the pouch and three other vertical firings using the same load were made to complete the 30 mL pouch. The Crow limb and the gastric pouch were then approximated using 2-0 Vicryl suture using the Endostitch device. Enterotomies measuring approximately 1 cm were made in both the gastric pouch and the Crow limb. A completely hand sewn anastomosis was then performed using an inner layer of running Vicryl suture and we then performed an outer layer of running 2-0 Vicryl suture in a Lembert fashion. Petersons defect was then closed with 2-0 nonabsorbable V lock. After completion of the anastomosis, the Crow limb was clamped and in intraoperative endoscopy was performed. It was found that there was no bleeding or stricture at the anastomosis, and after pumping air and submerging the anastomosis underwater no leak was found. Hemostasis was assured. The abdominal cavity was irrigated and suctioned. Satisfied with the surgery at this point, the liver retractor was removed under direct vision. The remaining trocars were then removed. The skin was then closed with running Monocryl sutures and dermabond was applied over that. The patient tolerated the procedure well. The patient was extubated in the operating room.    Disposition  To recovery room, VS stable.

## 2021-04-22 ENCOUNTER — TRANSCRIPTION ENCOUNTER (OUTPATIENT)
Age: 66
End: 2021-04-22

## 2021-04-22 VITALS
HEART RATE: 67 BPM | RESPIRATION RATE: 18 BRPM | SYSTOLIC BLOOD PRESSURE: 111 MMHG | OXYGEN SATURATION: 97 % | TEMPERATURE: 97 F | DIASTOLIC BLOOD PRESSURE: 66 MMHG

## 2021-04-22 LAB
COVID-19 SPIKE DOMAIN AB INTERP: POSITIVE
COVID-19 SPIKE DOMAIN ANTIBODY RESULT: >250 U/ML — HIGH
SARS-COV-2 IGG+IGM SERPL QL IA: >250 U/ML — HIGH
SARS-COV-2 IGG+IGM SERPL QL IA: POSITIVE

## 2021-04-22 RX ORDER — ACETAMINOPHEN 500 MG
1000 TABLET ORAL ONCE
Refills: 0 | Status: COMPLETED | OUTPATIENT
Start: 2021-04-22 | End: 2021-04-22

## 2021-04-22 RX ADMIN — Medication 30 MILLIGRAM(S): at 11:41

## 2021-04-22 RX ADMIN — Medication 0.5 MILLIGRAM(S): at 07:28

## 2021-04-22 RX ADMIN — Medication 400 MILLIGRAM(S): at 11:41

## 2021-04-22 RX ADMIN — Medication 400 MILLIGRAM(S): at 06:31

## 2021-04-22 RX ADMIN — Medication 1000 MILLIGRAM(S): at 06:33

## 2021-04-22 RX ADMIN — Medication 30 MILLIGRAM(S): at 06:33

## 2021-04-22 RX ADMIN — Medication 30 MILLIGRAM(S): at 06:24

## 2021-04-22 RX ADMIN — ENOXAPARIN SODIUM 40 MILLIGRAM(S): 100 INJECTION SUBCUTANEOUS at 11:42

## 2021-04-22 NOTE — DISCHARGE NOTE PROVIDER - NSDCMRMEDTOKEN_GEN_ALL_CORE_FT
atorvastatin 10 mg oral tablet: 1 tab(s) orally once a day (in the morning)  fenofibrate 54 mg oral tablet: 1 tab(s) orally once a day (in the morning)  metoprolol succinate 25 mg oral tablet, extended release: 1 tab(s) orally once a day (in the morning)  omeprazole 20 mg oral delayed release capsule: 1 cap(s) orally once a day (in the morning)  venlafaxine 150 mg oral capsule, extended release: 1 cap(s) orally once a day (in the morning)  Wellbutrin  mg/24 hours oral tablet, extended release: 1 tab(s) orally every 24 hours  Xanax 0.25 mg oral tablet: 1 tab(s) orally 3 times a day, As Needed

## 2021-04-22 NOTE — DISCHARGE NOTE NURSING/CASE MANAGEMENT/SOCIAL WORK - PATIENT PORTAL LINK FT
You can access the FollowMyHealth Patient Portal offered by [facility] by registering at the following website: [email or web address]. By joining [facility]’s FollowMyHealth portal, you will also be able to view your health information using other applications (apps) compatible with our system.

## 2021-04-22 NOTE — DISCHARGE NOTE PROVIDER - CARE PROVIDER_API CALL
Bruce Amor)  Surgery  [address], [address]  [place] [address]  Phone: [phone]  Fax: [phone]  Follow Up Time: 2 weeks

## 2021-05-02 DIAGNOSIS — K44.9 DIAPHRAGMATIC HERNIA WITHOUT OBSTRUCTION OR GANGRENE: ICD-10-CM

## 2021-05-02 DIAGNOSIS — F32.9 MAJOR DEPRESSIVE DISORDER, SINGLE EPISODE, UNSPECIFIED: ICD-10-CM

## 2021-05-02 DIAGNOSIS — Z95.2 PRESENCE OF PROSTHETIC HEART VALVE: ICD-10-CM

## 2021-05-02 DIAGNOSIS — G47.33 OBSTRUCTIVE SLEEP APNEA (ADULT) (PEDIATRIC): ICD-10-CM

## 2021-05-02 DIAGNOSIS — I10 ESSENTIAL (PRIMARY) HYPERTENSION: ICD-10-CM

## 2021-05-02 DIAGNOSIS — I25.10 ATHEROSCLEROTIC HEART DISEASE OF NATIVE CORONARY ARTERY WITHOUT ANGINA PECTORIS: ICD-10-CM

## 2021-05-02 DIAGNOSIS — K21.9 GASTRO-ESOPHAGEAL REFLUX DISEASE WITHOUT ESOPHAGITIS: ICD-10-CM

## 2021-05-02 DIAGNOSIS — E66.01 MORBID (SEVERE) OBESITY DUE TO EXCESS CALORIES: ICD-10-CM

## 2021-05-02 DIAGNOSIS — E78.5 HYPERLIPIDEMIA, UNSPECIFIED: ICD-10-CM

## 2021-05-02 DIAGNOSIS — F41.9 ANXIETY DISORDER, UNSPECIFIED: ICD-10-CM

## 2021-08-20 ENCOUNTER — EMERGENCY (EMERGENCY)
Facility: HOSPITAL | Age: 66
LOS: 0 days | Discharge: HOME | End: 2021-08-20
Attending: EMERGENCY MEDICINE
Payer: MEDICARE

## 2021-08-20 VITALS
TEMPERATURE: 98 F | RESPIRATION RATE: 18 BRPM | SYSTOLIC BLOOD PRESSURE: 122 MMHG | DIASTOLIC BLOOD PRESSURE: 70 MMHG | HEART RATE: 70 BPM | OXYGEN SATURATION: 100 %

## 2021-08-20 VITALS — HEIGHT: 66 IN | WEIGHT: 153 LBS

## 2021-08-20 DIAGNOSIS — Z86.79 PERSONAL HISTORY OF OTHER DISEASES OF THE CIRCULATORY SYSTEM: ICD-10-CM

## 2021-08-20 DIAGNOSIS — Z98.89 OTHER SPECIFIED POSTPROCEDURAL STATES: Chronic | ICD-10-CM

## 2021-08-20 DIAGNOSIS — Z98.890 OTHER SPECIFIED POSTPROCEDURAL STATES: Chronic | ICD-10-CM

## 2021-08-20 DIAGNOSIS — Z90.89 ACQUIRED ABSENCE OF OTHER ORGANS: ICD-10-CM

## 2021-08-20 DIAGNOSIS — K21.9 GASTRO-ESOPHAGEAL REFLUX DISEASE WITHOUT ESOPHAGITIS: ICD-10-CM

## 2021-08-20 DIAGNOSIS — M54.9 DORSALGIA, UNSPECIFIED: ICD-10-CM

## 2021-08-20 DIAGNOSIS — Z86.010 PERSONAL HISTORY OF COLONIC POLYPS: ICD-10-CM

## 2021-08-20 DIAGNOSIS — E66.01 MORBID (SEVERE) OBESITY DUE TO EXCESS CALORIES: ICD-10-CM

## 2021-08-20 DIAGNOSIS — Z90.89 ACQUIRED ABSENCE OF OTHER ORGANS: Chronic | ICD-10-CM

## 2021-08-20 DIAGNOSIS — E78.5 HYPERLIPIDEMIA, UNSPECIFIED: ICD-10-CM

## 2021-08-20 DIAGNOSIS — A04.8 OTHER SPECIFIED BACTERIAL INTESTINAL INFECTIONS: ICD-10-CM

## 2021-08-20 DIAGNOSIS — Z85.820 PERSONAL HISTORY OF MALIGNANT MELANOMA OF SKIN: ICD-10-CM

## 2021-08-20 DIAGNOSIS — Z95.4 PRESENCE OF OTHER HEART-VALVE REPLACEMENT: ICD-10-CM

## 2021-08-20 DIAGNOSIS — Z98.61 CORONARY ANGIOPLASTY STATUS: ICD-10-CM

## 2021-08-20 DIAGNOSIS — Z79.02 LONG TERM (CURRENT) USE OF ANTITHROMBOTICS/ANTIPLATELETS: ICD-10-CM

## 2021-08-20 DIAGNOSIS — M54.2 CERVICALGIA: ICD-10-CM

## 2021-08-20 DIAGNOSIS — Z79.899 OTHER LONG TERM (CURRENT) DRUG THERAPY: ICD-10-CM

## 2021-08-20 DIAGNOSIS — Z87.19 PERSONAL HISTORY OF OTHER DISEASES OF THE DIGESTIVE SYSTEM: ICD-10-CM

## 2021-08-20 DIAGNOSIS — Z86.59 PERSONAL HISTORY OF OTHER MENTAL AND BEHAVIORAL DISORDERS: ICD-10-CM

## 2021-08-20 DIAGNOSIS — Z87.74 PERSONAL HISTORY OF (CORRECTED) CONGENITAL MALFORMATIONS OF HEART AND CIRCULATORY SYSTEM: Chronic | ICD-10-CM

## 2021-08-20 DIAGNOSIS — Z98.871: ICD-10-CM

## 2021-08-20 DIAGNOSIS — R10.9 UNSPECIFIED ABDOMINAL PAIN: ICD-10-CM

## 2021-08-20 DIAGNOSIS — G89.29 OTHER CHRONIC PAIN: ICD-10-CM

## 2021-08-20 DIAGNOSIS — Z98.61 CORONARY ANGIOPLASTY STATUS: Chronic | ICD-10-CM

## 2021-08-20 DIAGNOSIS — Z87.738 PERSONAL HISTORY OF OTHER SPECIFIED (CORRECTED) CONGENITAL MALFORMATIONS OF DIGESTIVE SYSTEM: ICD-10-CM

## 2021-08-20 DIAGNOSIS — Z96.89 PRESENCE OF OTHER SPECIFIED FUNCTIONAL IMPLANTS: Chronic | ICD-10-CM

## 2021-08-20 DIAGNOSIS — I10 ESSENTIAL (PRIMARY) HYPERTENSION: ICD-10-CM

## 2021-08-20 LAB
ALBUMIN SERPL ELPH-MCNC: 3.4 G/DL — SIGNIFICANT CHANGE UP (ref 3.3–5)
ALP SERPL-CCNC: 75 U/L — SIGNIFICANT CHANGE UP (ref 40–120)
ALT FLD-CCNC: 18 U/L — SIGNIFICANT CHANGE UP (ref 12–78)
ANION GAP SERPL CALC-SCNC: 8 MMOL/L — SIGNIFICANT CHANGE UP (ref 5–17)
APTT BLD: 34.1 SEC — SIGNIFICANT CHANGE UP (ref 27.5–35.5)
AST SERPL-CCNC: 21 U/L — SIGNIFICANT CHANGE UP (ref 15–37)
BASOPHILS # BLD AUTO: 0.04 K/UL — SIGNIFICANT CHANGE UP (ref 0–0.2)
BASOPHILS NFR BLD AUTO: 0.7 % — SIGNIFICANT CHANGE UP (ref 0–2)
BILIRUB SERPL-MCNC: 0.4 MG/DL — SIGNIFICANT CHANGE UP (ref 0.2–1.2)
BUN SERPL-MCNC: 23 MG/DL — SIGNIFICANT CHANGE UP (ref 7–23)
CALCIUM SERPL-MCNC: 8.7 MG/DL — SIGNIFICANT CHANGE UP (ref 8.5–10.1)
CHLORIDE SERPL-SCNC: 105 MMOL/L — SIGNIFICANT CHANGE UP (ref 96–108)
CO2 SERPL-SCNC: 26 MMOL/L — SIGNIFICANT CHANGE UP (ref 22–31)
CREAT SERPL-MCNC: 1 MG/DL — SIGNIFICANT CHANGE UP (ref 0.5–1.3)
EOSINOPHIL # BLD AUTO: 0.03 K/UL — SIGNIFICANT CHANGE UP (ref 0–0.5)
EOSINOPHIL NFR BLD AUTO: 0.5 % — SIGNIFICANT CHANGE UP (ref 0–6)
GLUCOSE SERPL-MCNC: 84 MG/DL — SIGNIFICANT CHANGE UP (ref 70–99)
HCT VFR BLD CALC: 36.7 % — LOW (ref 39–50)
HGB BLD-MCNC: 11.9 G/DL — LOW (ref 13–17)
IMM GRANULOCYTES NFR BLD AUTO: 0.2 % — SIGNIFICANT CHANGE UP (ref 0–1.5)
INR BLD: 1.14 RATIO — SIGNIFICANT CHANGE UP (ref 0.88–1.16)
LIDOCAIN IGE QN: 202 U/L — SIGNIFICANT CHANGE UP (ref 73–393)
LYMPHOCYTES # BLD AUTO: 1.15 K/UL — SIGNIFICANT CHANGE UP (ref 1–3.3)
LYMPHOCYTES # BLD AUTO: 20.8 % — SIGNIFICANT CHANGE UP (ref 13–44)
MCHC RBC-ENTMCNC: 30.1 PG — SIGNIFICANT CHANGE UP (ref 27–34)
MCHC RBC-ENTMCNC: 32.4 GM/DL — SIGNIFICANT CHANGE UP (ref 32–36)
MCV RBC AUTO: 92.7 FL — SIGNIFICANT CHANGE UP (ref 80–100)
MONOCYTES # BLD AUTO: 0.46 K/UL — SIGNIFICANT CHANGE UP (ref 0–0.9)
MONOCYTES NFR BLD AUTO: 8.3 % — SIGNIFICANT CHANGE UP (ref 2–14)
NEUTROPHILS # BLD AUTO: 3.85 K/UL — SIGNIFICANT CHANGE UP (ref 1.8–7.4)
NEUTROPHILS NFR BLD AUTO: 69.5 % — SIGNIFICANT CHANGE UP (ref 43–77)
PLATELET # BLD AUTO: 276 K/UL — SIGNIFICANT CHANGE UP (ref 150–400)
POTASSIUM SERPL-MCNC: 3.3 MMOL/L — LOW (ref 3.5–5.3)
POTASSIUM SERPL-SCNC: 3.3 MMOL/L — LOW (ref 3.5–5.3)
PROT SERPL-MCNC: 6.8 GM/DL — SIGNIFICANT CHANGE UP (ref 6–8.3)
PROTHROM AB SERPL-ACNC: 13.3 SEC — SIGNIFICANT CHANGE UP (ref 10.6–13.6)
RBC # BLD: 3.96 M/UL — LOW (ref 4.2–5.8)
RBC # FLD: 14.1 % — SIGNIFICANT CHANGE UP (ref 10.3–14.5)
SODIUM SERPL-SCNC: 139 MMOL/L — SIGNIFICANT CHANGE UP (ref 135–145)
WBC # BLD: 5.54 K/UL — SIGNIFICANT CHANGE UP (ref 3.8–10.5)
WBC # FLD AUTO: 5.54 K/UL — SIGNIFICANT CHANGE UP (ref 3.8–10.5)

## 2021-08-20 PROCEDURE — 83690 ASSAY OF LIPASE: CPT

## 2021-08-20 PROCEDURE — 96375 TX/PRO/DX INJ NEW DRUG ADDON: CPT

## 2021-08-20 PROCEDURE — 85730 THROMBOPLASTIN TIME PARTIAL: CPT

## 2021-08-20 PROCEDURE — 99284 EMERGENCY DEPT VISIT MOD MDM: CPT | Mod: 25

## 2021-08-20 PROCEDURE — 80053 COMPREHEN METABOLIC PANEL: CPT

## 2021-08-20 PROCEDURE — 96374 THER/PROPH/DIAG INJ IV PUSH: CPT | Mod: XU

## 2021-08-20 PROCEDURE — 85025 COMPLETE CBC W/AUTO DIFF WBC: CPT

## 2021-08-20 PROCEDURE — 76705 ECHO EXAM OF ABDOMEN: CPT | Mod: 26

## 2021-08-20 PROCEDURE — 74177 CT ABD & PELVIS W/CONTRAST: CPT

## 2021-08-20 PROCEDURE — 74177 CT ABD & PELVIS W/CONTRAST: CPT | Mod: 26,MA

## 2021-08-20 PROCEDURE — 76705 ECHO EXAM OF ABDOMEN: CPT

## 2021-08-20 PROCEDURE — 99284 EMERGENCY DEPT VISIT MOD MDM: CPT

## 2021-08-20 PROCEDURE — 85610 PROTHROMBIN TIME: CPT

## 2021-08-20 PROCEDURE — 36415 COLL VENOUS BLD VENIPUNCTURE: CPT

## 2021-08-20 RX ORDER — THIAMINE MONONITRATE (VIT B1) 100 MG
100 TABLET ORAL ONCE
Refills: 0 | Status: COMPLETED | OUTPATIENT
Start: 2021-08-20 | End: 2021-08-20

## 2021-08-20 RX ORDER — SODIUM CHLORIDE 9 MG/ML
1000 INJECTION, SOLUTION INTRAVENOUS
Refills: 0 | Status: DISCONTINUED | OUTPATIENT
Start: 2021-08-20 | End: 2021-08-20

## 2021-08-20 RX ORDER — SODIUM CHLORIDE 9 MG/ML
1000 INJECTION, SOLUTION INTRAVENOUS
Refills: 0 | Status: COMPLETED | OUTPATIENT
Start: 2021-08-20 | End: 2021-08-20

## 2021-08-20 RX ORDER — FOLIC ACID 0.8 MG
1 TABLET ORAL ONCE
Refills: 0 | Status: COMPLETED | OUTPATIENT
Start: 2021-08-20 | End: 2021-08-20

## 2021-08-20 RX ORDER — MORPHINE SULFATE 50 MG/1
4 CAPSULE, EXTENDED RELEASE ORAL ONCE
Refills: 0 | Status: DISCONTINUED | OUTPATIENT
Start: 2021-08-20 | End: 2021-08-20

## 2021-08-20 RX ADMIN — MORPHINE SULFATE 4 MILLIGRAM(S): 50 CAPSULE, EXTENDED RELEASE ORAL at 10:48

## 2021-08-20 RX ADMIN — Medication 1 MILLIGRAM(S): at 16:27

## 2021-08-20 RX ADMIN — SODIUM CHLORIDE 500 MILLILITER(S): 9 INJECTION, SOLUTION INTRAVENOUS at 16:27

## 2021-08-20 RX ADMIN — Medication 100 MILLIGRAM(S): at 16:26

## 2021-08-20 NOTE — ED PROVIDER NOTE - PROGRESS NOTE DETAILS
Pt assessed by Dr. Amor and surgical resident who recommend d/c home with outpatient hida scan.  Pt comfortable with plan.  Well appearing on multiple reassessments.  D/c home with strict return precautions and prompt outpatient f/u.

## 2021-08-20 NOTE — CONSULT NOTE ADULT - SUBJECTIVE AND OBJECTIVE BOX
HPI (as per chart): 67 y/o male with a PMHx of anxiety, aortic stenosis, carpal tunnel syndrome, colonic polyps, chronic neck and back pain, depressive disorder, GI hemorrhage, H pylori, herniated lumbar intervertebral disc, HTN, HLD, internal and external prolapsed hemorrhoids, morbid obesity, sciatica, skin cancer presents to the ED c/o abd pain x4 weeks. Pt s/p gastric bypass on 4/21. Pain 10/10. Pt sent by Dr. Amor for CT. No other complaints at this time.      Patient seen and examined at bedside. Patient has no complaints and reports pain feels like a dull ache in bilateral upper qudaratns. Nurse denies any acute events. Vitals reviewed and WNL.    Vitals:  T(C): 36.7 (08-20 @ 10:57), Max: 36.7 (08-20 @ 10:57)  HR: 68 (08-20 @ 10:57) (68 - 83)  BP: 110/77 (08-20 @ 10:57) (110/77 - 123/76)  RR: 18 (08-20 @ 10:57) (18 - 18)  SpO2: 100% (08-20 @ 10:57) (100% - 100%)      Physical Exam:  General: AAOx3, Well developed, NAD  Chest: Normal respiratory effort  Heart: RRR  Abdomen: Soft, NTND, no masses  Neuro/Psych: No localized deficits. Normal speech, normal tone  Skin: Normal, no rashes, no lesions noted.   Extremities: Warm, well perfused, no edema, Pulses intact    08-20 @ 10:20                    11.9  CBC: 5.54>)-------(<276                     36.7                 139 | 105 | 23    CMP:  ----------------------< 84               3.3 | 26 | 1.00                      Ca:8.7  Phos:-  Mg:-               0.4|      |21        LFTs:  ------|75|-----             -|      |-      Current Inpatient Medications:  dextrose 5% + sodium chloride 0.45% 1000 milliLiter(s) (500 mL/Hr) IV Continuous <Continuous>

## 2021-08-20 NOTE — ED PROVIDER NOTE - PATIENT PORTAL LINK FT
You can access the FollowMyHealth Patient Portal offered by [facility] by registering at the following website: [email or web address]. By joining [facility]’s FollowMyHealth portal, you will also be able to view your health information using other applications (apps) compatible with our system.

## 2021-08-20 NOTE — ED ADULT NURSE NOTE - OBJECTIVE STATEMENT
Pt. to the ED sent by Dr. Amor for CT Scan of Abdomen due to Severe Epigastric Pain x 4 weeks- Pt. denies N/V/D and CP- Hx. of Gastric Bypass Surgery last April 2021 -- patient states he saw his primary doctor and r/o gallstones and thyroid disease, patient states he was told his liver enzymes are elevated

## 2021-08-20 NOTE — ED ADULT NURSE NOTE - NSIMPLEMENTINTERV_GEN_ALL_ED
Skyrizi Amount: 150 mg Lot # (Optional): [id number] Include J-Code In Bill: No Consent: The risks of pain and injection site reactions were reviewed with the patient prior to the injection. Detail Level: None Administered By (Optional): Eula J-Code:  Expiration Date (Optional): 07/2020 Implemented All Universal Safety Interventions:  [place] to call system. Call bell, personal items and telephone within reach. Instruct patient to call for assistance. Room bathroom lighting operational. Non-slip footwear when patient is off stretcher. Physically safe environment: no spills, clutter or unnecessary equipment. Stretcher in lowest position, wheels locked, appropriate side rails in place.

## 2021-08-20 NOTE — ED ADULT NURSE NOTE - NSFALLRSKASSESSTYPE_ED_ALL_ED
Anesthesia Post Evaluation    Patient: Marylu Augustin    Procedure(s) Performed: Procedure(s) (LRB):  EXCISION, SUBMANDIBULAR GLAND (Right)    Final Anesthesia Type: general  Patient location during evaluation: PACU  Patient participation: Yes- Able to Participate  Level of consciousness: awake and alert  Post-procedure vital signs: reviewed and stable  Pain management: adequate  Airway patency: patent  PONV status at discharge: No PONV  Anesthetic complications: no      Cardiovascular status: blood pressure returned to baseline  Respiratory status: unassisted  Hydration status: euvolemic  Follow-up not needed.          Vitals Value Taken Time   /58 7/5/2019 11:05 AM   Temp 36.6 °C (97.8 °F) 7/5/2019 11:05 AM   Pulse 80 7/5/2019 11:05 AM   Resp 18 7/5/2019 11:05 AM   SpO2 98 % 7/5/2019 11:05 AM         Event Time     Out of Recovery 10:57:29          Pain/Antony Score: Pain Rating Prior to Med Admin: 7 (7/5/2019  3:02 PM)  Pain Rating Post Med Admin: 0 (7/5/2019  9:25 AM)  Antony Score: 10 (7/5/2019 10:57 AM)        
Initial (On Arrival)

## 2021-08-20 NOTE — ED PROVIDER NOTE - SKIN, MLM
Clinic hours for Dr. Oh:  Monday 7 am - 5 pm  Tuesday 7 am - 5 pm  Wednesday 7 am - 5 pm  Thursday 7 am - 5 pm  Friday  7 am - 4 pm    If you need a refill on your prescription please call your pharmacy and let them know. Please be proactive and call before your medication runs out. The pharmacy will then contact us for the refill. Please allow 24-48 hours for the refill to be processed.     If your physician has ordered additional laboratory or radiology testing as part of your ongoing plan of care, please allow 7-10 business days from the day of your lab draw or test for the results to be sent and reviewed by your provider. If your results are critical and require more immediate intervention, you will be contacted sooner. Your results will be conveyed to you via a phone call or letter.    You may be receiving a patient satisfaction survey in the mail. Please take the time to complete, as your feedback is very important to us. We strive to make your experience exceptional and your comments help us with that goal. We look forward to hearing from you.      
Skin normal color for race, warm, dry and intact. No evidence of rash.

## 2021-08-20 NOTE — ED ADULT TRIAGE NOTE - CHIEF COMPLAINT QUOTE
Pt. to the ED sent by Dr. Amor for CT Scan of Abdomen due to Severe Epigastric Pain x 4 weeks- Pt. denies N/V/D and CP- Hx. of Gastric Bypass Surgery last April 2021 --

## 2021-08-20 NOTE — ED PROVIDER NOTE - OBJECTIVE STATEMENT
67 y/o male with a PMHx of anxiety, aortic stenosis, carpal tunnel syndrome, colonic polyps, chronic neck and back pain, depressive disorder, GI hemorrhage, H pylori, herniated lumbar intervertebral disc, HTN, HLD, internal and external prolapsed hemorrhoids, morbid obesity, sciatica, skin cancer presents to the ED c/o abd pain x4 weeks. Pt s/p gastric bypass on 4/21. Pain 10/10. Pt sent by Dr. Amor for CT. No other complaints at this time.

## 2021-08-20 NOTE — CONSULT NOTE ADULT - ASSESSMENT
67yo M presenting with vague upper qudarant abdominal pain s/p RYGB 04/21    Plan:  Imaging reviewed; CT and RUQ U/S negative for pathology  Started on IV hydration with multivitamins and thiamine/folate   67yo M presenting with vague upper qudarant abdominal pain s/p RYGB 04/21    Plan:  Imaging reviewed; CT and RUQ U/S negative for pathology  Started on IV hydration with multivitamins and thiamine/folate  Patient will need quantitative HIDA with CCK but cannot obtain now because morphine given  Will follow up as outpatient and be scheduled for imaging    Plan discussed with Dr. Amor

## 2021-08-20 NOTE — ED PROVIDER NOTE - CARE PROVIDER_API CALL
Bruce Amor)  Surgery  [address], [address]  [place] [address]  Phone: [phone]  Fax: [phone]  Follow Up Time: 1-3 Days

## 2021-08-20 NOTE — ED PROVIDER NOTE - CLINICAL SUMMARY MEDICAL DECISION MAKING FREE TEXT BOX
As per recommendation from Dr. Amor, will CT abd pelvis with PO contrast, discuss with surgical team, reassess.

## 2021-08-20 NOTE — ED PROVIDER NOTE - NSICDXPASTMEDICALHX_GEN_ALL_CORE_FT
PAST MEDICAL HISTORY:  Anxiety     Aortic stenosis Bicuspid Aortic Valve Replacement- 5/29/2018    Carpal tunnel syndrome had surgery    Chronic neck and back pain     Depressive disorder     Gastrointestinal hemorrhage     GERD (gastroesophageal reflux disease)     Helicobacter pylori infection     Herniated lumbar intervertebral disc     History of colonic polyps     HTN (hypertension)     Hyperlipidemia     Internal and external prolapsed hemorrhoids had surgery    Loss of hearing     Morbid obesity     Sciatica     Skin cancer external right ear, unsure of type

## 2021-08-21 ENCOUNTER — EMERGENCY (EMERGENCY)
Facility: HOSPITAL | Age: 66
LOS: 0 days | Discharge: HOME | End: 2021-08-21
Attending: EMERGENCY MEDICINE
Payer: MEDICARE

## 2021-08-21 VITALS — HEIGHT: 66 IN | WEIGHT: 169.98 LBS

## 2021-08-21 VITALS
HEART RATE: 69 BPM | SYSTOLIC BLOOD PRESSURE: 135 MMHG | OXYGEN SATURATION: 99 % | RESPIRATION RATE: 18 BRPM | TEMPERATURE: 98 F | DIASTOLIC BLOOD PRESSURE: 71 MMHG

## 2021-08-21 DIAGNOSIS — I10 ESSENTIAL (PRIMARY) HYPERTENSION: ICD-10-CM

## 2021-08-21 DIAGNOSIS — Z87.74 PERSONAL HISTORY OF (CORRECTED) CONGENITAL MALFORMATIONS OF HEART AND CIRCULATORY SYSTEM: Chronic | ICD-10-CM

## 2021-08-21 DIAGNOSIS — Z87.19 PERSONAL HISTORY OF OTHER DISEASES OF THE DIGESTIVE SYSTEM: ICD-10-CM

## 2021-08-21 DIAGNOSIS — Z87.39 PERSONAL HISTORY OF OTHER DISEASES OF THE MUSCULOSKELETAL SYSTEM AND CONNECTIVE TISSUE: ICD-10-CM

## 2021-08-21 DIAGNOSIS — R10.821 RIGHT UPPER QUADRANT REBOUND ABDOMINAL TENDERNESS: ICD-10-CM

## 2021-08-21 DIAGNOSIS — Z98.84 BARIATRIC SURGERY STATUS: ICD-10-CM

## 2021-08-21 DIAGNOSIS — Z87.74 PERSONAL HISTORY OF (CORRECTED) CONGENITAL MALFORMATIONS OF HEART AND CIRCULATORY SYSTEM: ICD-10-CM

## 2021-08-21 DIAGNOSIS — E66.01 MORBID (SEVERE) OBESITY DUE TO EXCESS CALORIES: ICD-10-CM

## 2021-08-21 DIAGNOSIS — R10.816 EPIGASTRIC ABDOMINAL TENDERNESS: ICD-10-CM

## 2021-08-21 DIAGNOSIS — E78.5 HYPERLIPIDEMIA, UNSPECIFIED: ICD-10-CM

## 2021-08-21 DIAGNOSIS — I35.0 NONRHEUMATIC AORTIC (VALVE) STENOSIS: ICD-10-CM

## 2021-08-21 DIAGNOSIS — Z90.89 ACQUIRED ABSENCE OF OTHER ORGANS: ICD-10-CM

## 2021-08-21 DIAGNOSIS — Z98.890 OTHER SPECIFIED POSTPROCEDURAL STATES: ICD-10-CM

## 2021-08-21 DIAGNOSIS — Z98.890 OTHER SPECIFIED POSTPROCEDURAL STATES: Chronic | ICD-10-CM

## 2021-08-21 DIAGNOSIS — Z98.89 OTHER SPECIFIED POSTPROCEDURAL STATES: Chronic | ICD-10-CM

## 2021-08-21 DIAGNOSIS — R10.9 UNSPECIFIED ABDOMINAL PAIN: ICD-10-CM

## 2021-08-21 DIAGNOSIS — H91.90 UNSPECIFIED HEARING LOSS, UNSPECIFIED EAR: ICD-10-CM

## 2021-08-21 DIAGNOSIS — Z98.61 CORONARY ANGIOPLASTY STATUS: Chronic | ICD-10-CM

## 2021-08-21 DIAGNOSIS — F41.8 OTHER SPECIFIED ANXIETY DISORDERS: ICD-10-CM

## 2021-08-21 DIAGNOSIS — Z96.89 PRESENCE OF OTHER SPECIFIED FUNCTIONAL IMPLANTS: Chronic | ICD-10-CM

## 2021-08-21 DIAGNOSIS — K21.9 GASTRO-ESOPHAGEAL REFLUX DISEASE WITHOUT ESOPHAGITIS: ICD-10-CM

## 2021-08-21 DIAGNOSIS — Z20.822 CONTACT WITH AND (SUSPECTED) EXPOSURE TO COVID-19: ICD-10-CM

## 2021-08-21 DIAGNOSIS — Z90.89 ACQUIRED ABSENCE OF OTHER ORGANS: Chronic | ICD-10-CM

## 2021-08-21 LAB
ALBUMIN SERPL ELPH-MCNC: 3.9 G/DL — SIGNIFICANT CHANGE UP (ref 3.3–5)
ALP SERPL-CCNC: 85 U/L — SIGNIFICANT CHANGE UP (ref 40–120)
ALT FLD-CCNC: 21 U/L — SIGNIFICANT CHANGE UP (ref 12–78)
ANION GAP SERPL CALC-SCNC: 6 MMOL/L — SIGNIFICANT CHANGE UP (ref 5–17)
AST SERPL-CCNC: 31 U/L — SIGNIFICANT CHANGE UP (ref 15–37)
BASOPHILS # BLD AUTO: 0.04 K/UL — SIGNIFICANT CHANGE UP (ref 0–0.2)
BASOPHILS NFR BLD AUTO: 0.6 % — SIGNIFICANT CHANGE UP (ref 0–2)
BILIRUB SERPL-MCNC: 0.4 MG/DL — SIGNIFICANT CHANGE UP (ref 0.2–1.2)
BUN SERPL-MCNC: 19 MG/DL — SIGNIFICANT CHANGE UP (ref 7–23)
CALCIUM SERPL-MCNC: 9.3 MG/DL — SIGNIFICANT CHANGE UP (ref 8.5–10.1)
CHLORIDE SERPL-SCNC: 106 MMOL/L — SIGNIFICANT CHANGE UP (ref 96–108)
CO2 SERPL-SCNC: 28 MMOL/L — SIGNIFICANT CHANGE UP (ref 22–31)
CREAT SERPL-MCNC: 1.15 MG/DL — SIGNIFICANT CHANGE UP (ref 0.5–1.3)
EOSINOPHIL # BLD AUTO: 0.02 K/UL — SIGNIFICANT CHANGE UP (ref 0–0.5)
EOSINOPHIL NFR BLD AUTO: 0.3 % — SIGNIFICANT CHANGE UP (ref 0–6)
GLUCOSE SERPL-MCNC: 76 MG/DL — SIGNIFICANT CHANGE UP (ref 70–99)
HCT VFR BLD CALC: 40.1 % — SIGNIFICANT CHANGE UP (ref 39–50)
HGB BLD-MCNC: 12.9 G/DL — LOW (ref 13–17)
IMM GRANULOCYTES NFR BLD AUTO: 0.1 % — SIGNIFICANT CHANGE UP (ref 0–1.5)
LIDOCAIN IGE QN: 361 U/L — SIGNIFICANT CHANGE UP (ref 73–393)
LYMPHOCYTES # BLD AUTO: 1.07 K/UL — SIGNIFICANT CHANGE UP (ref 1–3.3)
LYMPHOCYTES # BLD AUTO: 15.2 % — SIGNIFICANT CHANGE UP (ref 13–44)
MCHC RBC-ENTMCNC: 30.1 PG — SIGNIFICANT CHANGE UP (ref 27–34)
MCHC RBC-ENTMCNC: 32.2 GM/DL — SIGNIFICANT CHANGE UP (ref 32–36)
MCV RBC AUTO: 93.5 FL — SIGNIFICANT CHANGE UP (ref 80–100)
MONOCYTES # BLD AUTO: 0.53 K/UL — SIGNIFICANT CHANGE UP (ref 0–0.9)
MONOCYTES NFR BLD AUTO: 7.5 % — SIGNIFICANT CHANGE UP (ref 2–14)
NEUTROPHILS # BLD AUTO: 5.35 K/UL — SIGNIFICANT CHANGE UP (ref 1.8–7.4)
NEUTROPHILS NFR BLD AUTO: 76.3 % — SIGNIFICANT CHANGE UP (ref 43–77)
PLATELET # BLD AUTO: 300 K/UL — SIGNIFICANT CHANGE UP (ref 150–400)
POTASSIUM SERPL-MCNC: 3.4 MMOL/L — LOW (ref 3.5–5.3)
POTASSIUM SERPL-SCNC: 3.4 MMOL/L — LOW (ref 3.5–5.3)
PROT SERPL-MCNC: 7.9 GM/DL — SIGNIFICANT CHANGE UP (ref 6–8.3)
RBC # BLD: 4.29 M/UL — SIGNIFICANT CHANGE UP (ref 4.2–5.8)
RBC # FLD: 14.3 % — SIGNIFICANT CHANGE UP (ref 10.3–14.5)
SARS-COV-2 RNA SPEC QL NAA+PROBE: SIGNIFICANT CHANGE UP
SODIUM SERPL-SCNC: 140 MMOL/L — SIGNIFICANT CHANGE UP (ref 135–145)
WBC # BLD: 7.02 K/UL — SIGNIFICANT CHANGE UP (ref 3.8–10.5)
WBC # FLD AUTO: 7.02 K/UL — SIGNIFICANT CHANGE UP (ref 3.8–10.5)

## 2021-08-21 PROCEDURE — G1004: CPT

## 2021-08-21 PROCEDURE — 99284 EMERGENCY DEPT VISIT MOD MDM: CPT

## 2021-08-21 PROCEDURE — 85025 COMPLETE CBC W/AUTO DIFF WBC: CPT

## 2021-08-21 PROCEDURE — 93010 ELECTROCARDIOGRAM REPORT: CPT

## 2021-08-21 PROCEDURE — 78226 HEPATOBILIARY SYSTEM IMAGING: CPT | Mod: 26,ME

## 2021-08-21 PROCEDURE — 78226 HEPATOBILIARY SYSTEM IMAGING: CPT

## 2021-08-21 PROCEDURE — 80053 COMPREHEN METABOLIC PANEL: CPT

## 2021-08-21 PROCEDURE — A9537: CPT

## 2021-08-21 PROCEDURE — 36415 COLL VENOUS BLD VENIPUNCTURE: CPT

## 2021-08-21 PROCEDURE — 93005 ELECTROCARDIOGRAM TRACING: CPT

## 2021-08-21 PROCEDURE — 83690 ASSAY OF LIPASE: CPT

## 2021-08-21 PROCEDURE — 99284 EMERGENCY DEPT VISIT MOD MDM: CPT | Mod: 25

## 2021-08-21 PROCEDURE — 87635 SARS-COV-2 COVID-19 AMP PRB: CPT

## 2021-08-21 RX ORDER — SODIUM CHLORIDE 9 MG/ML
1000 INJECTION INTRAMUSCULAR; INTRAVENOUS; SUBCUTANEOUS ONCE
Refills: 0 | Status: COMPLETED | OUTPATIENT
Start: 2021-08-21 | End: 2021-08-21

## 2021-08-21 RX ADMIN — SODIUM CHLORIDE 1000 MILLILITER(S): 9 INJECTION INTRAMUSCULAR; INTRAVENOUS; SUBCUTANEOUS at 11:33

## 2021-08-21 NOTE — CONSULT NOTE ADULT - SUBJECTIVE AND OBJECTIVE BOX
65 y/o male with a PMHx of anxiety, aortic stenosis, carpal tunnel syndrome, colonic polyps, chronic neck and back pain, depressive disorder, GI hemorrhage, H pylori, herniated lumbar intervertebral disc, HTN, HLD, internal and external prolapsed hemorrhoids, morbid obesity, sciatica, skin cancer presents to the ED c/o abd pain x4 weeks. Pt is s/p gastric bypass from 4/2021 and lost approximately 80lbs since. Pt was in the ED yesterday with the same complaint and underwent a CT abdo/Pelvis and a RUQ ultrasound. CT was negative for any acute abdominal pathology, questionable thickening of rectosigmoid and distended gallbladder; ultrasound negative. Pt did state that the pain was better today than yesterday but it is still present. Also because he couldnt get the HIDA yesterday he wanted to get it today. Denied nausea, vomiting and PO intolerance and no increase pain with fatty food. Last EGD was about a yr ago. Labs reviewed to be normal.      Vital Signs Last 24 Hrs  T(C): 36.6 (21 Aug 2021 10:51), Max: 36.6 (20 Aug 2021 16:00)  T(F): 97.8 (21 Aug 2021 10:51), Max: 97.9 (20 Aug 2021 16:00)  HR: 80 (21 Aug 2021 10:51) (70 - 80)  BP: 104/80 (21 Aug 2021 10:51) (104/80 - 130/74)  BP(mean): 89 (21 Aug 2021 10:51) (89 - 106)  ABP: --  ABP(mean): --  RR: 18 (21 Aug 2021 10:51) (18 - 18)  SpO2: 100% (21 Aug 2021 10:51) (100% - 100%)     PE:   General: NAD,   Neck: Supple  Chest: Equal expansion bilaterally, equal breath sounds  CV: S1S2 Present  Abdomen: Soft, non distended, non-tender to palpation, non-tympanic, bowel sounds present, surgical sites well healed  Extremities: Grossly symmetric    Labs:                   12.9<L>                140  | 28   | 19           7.02  >-----------< 300     ------------------------< 76                    40.1                 3.4<L>| 106  | 1.15                                                                      Ca 9.3   Mg x     Ph x        ,             11.9<L>                139  | 26   | 23           5.54  >-----------< 276     ------------------------< 84                    36.7<L>                3.3<L>| 105  | 1.00                                                                      Ca 8.7   Mg x     Ph x                Imaging:  CT Abdomen and Pelvis w/ Oral Cont and w/ IV Cont (08.20.21 @ 13:03) >  FINDINGS:  LOWER CHEST: Within normal limits.    LIVER: Few scattered subcentimeter hepatic hypodensities, too small to characterize.  BILE DUCTS: Normal caliber.  GALLBLADDER: Distended.  SPLEEN: Within normal limits.  PANCREAS: Within normal limits.  ADRENALS: Within normal limits.  KIDNEYS/URETERS: Within normal limits.    BLADDER: Within normal limits.  REPRODUCTIVE ORGANS: Prostate within normal limits.    BOWEL: Status post gastric bypass. No bowel obstruction. Appendix is normal. Focal underdistention versus thickening at the rectosigmoid junction. Large amount stool throughout the colon  PERITONEUM: No ascites.  VESSELS: Atherosclerotic changes.  RETROPERITONEUM/LYMPH NODES: No lymphadenopathy.  ABDOMINAL WALL: Small left inguinal hernia containing fluid. Tiny fat-containing umbilical hernia. Neurostimulator device in the left gluteal region.  BONES: Degenerative changes.    IMPRESSION:  Status post gastric bypass. No bowel obstruction.    Distended gallbladder. Correlate with right upper quadrant ultrasound if clinically warranted.    Focal underdistention versus thickening at the rectosigmoid junction. Correlation with recent or follow-up colonoscopy is recommended.      US Abdomen Upper Quadrant Right (08.20.21 @ 14:08) >  IMPRESSION:    Normal right upper quadrant abdominal ultrasound.

## 2021-08-21 NOTE — ED STATDOCS - CLINICAL SUMMARY MEDICAL DECISION MAKING FREE TEXT BOX
Pt seen by me yesterday with extensive unremarkable workup and evaluation by Dr. Amor with recommendation for outpatient HIDA scan. Will perform HIDA, discuss with Dr. Amor and reassess.

## 2021-08-21 NOTE — ED STATDOCS - CARE PROVIDER_API CALL
Socrates Rowe  GASTROENTEROLOGY  [address], [address]  [place] [address]  Phone: [phone]  Fax: [phone]  Follow Up Time:

## 2021-08-21 NOTE — ED STATDOCS - PROGRESS NOTE DETAILS
patient reporting worsening pain today.  Case d/w surgical resident, aware patient is here, HIDA ordered.  spoke with nuclear medicine team who will be in shortly to do the test, patient updated -Selene Quintana PA-C No acute findings on workup, surgical resident advised follow up with GI, patient follows with Dr. Rowe.  Reviewed PPI orally, follow up for endoscopy/colonoscopy if indicated.  REturn precautions reviewed -Selene Quintana PA-C

## 2021-08-21 NOTE — ED STATDOCS - PATIENT PORTAL LINK FT
You can access the FollowMyHealth Patient Portal offered by [facility] by registering at the following website: [email or web address]. By joining [facility]’s FollowMyHealth portal, you will also be able to view your health information using other applications (apps) compatible with our system.

## 2021-08-21 NOTE — ED STATDOCS - OBJECTIVE STATEMENT
65 y/o male with a PMHx of anxiety, aortic stenosis, carpal tunnel syndrome, colonic polyps, chronic neck and back pain, depressive disorder, GI hemorrhage, H pylori, herniated lumbar intervertebral disc, HTN, HLD, internal and external prolapsed hemorrhoids, morbid obesity, sciatica, skin cancer presents to the ED c/o abd pain x4 weeks. Pt s/p gastric bypass on 4/21/21. Pt was seen in ED yesterday for same complaint and was d/c with outpatient HIDA scan. Pt presents today for worsening pain. Denies n/v. No other complaints at this time.

## 2021-08-21 NOTE — ED ADULT NURSE NOTE - NSIMPLEMENTINTERV_GEN_ALL_ED
Implemented All Universal Safety Interventions:  [place] to call system. Call bell, personal items and telephone within reach. Instruct patient to call for assistance. Room bathroom lighting operational. Non-slip footwear when patient is off stretcher. Physically safe environment: no spills, clutter or unnecessary equipment. Stretcher in lowest position, wheels locked, appropriate side rails in place.

## 2021-08-21 NOTE — ED ADULT NURSE NOTE - OBJECTIVE STATEMENT
pt presents to Ed with complaints of abdominal pain s/p gastric bypass with Md Amor on 4/21. pt was seen in [facility] yesterday for same complaint and was seen by MD Amor in ED. pt was told to follow up out pt and to obtain out pt HIDA scan by MD Amor. pt returns today as he could not obtain appointment for HIDA scan for at least 2 weeks. pt states "I just want to know what is wrong with me." pt reports eating breakfast of egg white omelet and fruit at 10 am. 20 g placed to left FA. labs sent.

## 2021-08-21 NOTE — CONSULT NOTE ADULT - ASSESSMENT
67 YO M with vague abdominal pain    Will Follow up MIRIAM  Pt will need an EGD and Colonoscopy follow up  Mostly likely dispo is DC with GI follow up  Final recs pending MIRIAM    Case discussed with Dr Amor 65 YO M with vague abdominal pain    HIDA scan reviewed, normal scan  Pt will need an EGD and Colonoscopy follow up with GI; colonoscopy for rectal thickening seen on previous CT  Pt is cleared for discharge    Case discussed with Dr Amor 65 YO M with vague abdominal pain    HIDA scan reviewed, normal scan  Pt will need an EGD to evaluate for Marginal Ulcer and Colonoscopy for rectal thickening seen on previous CT  Discharge with protonix and carafate  Pt is cleared for discharge    Case discussed with Dr Amor

## 2021-08-21 NOTE — ED ADULT TRIAGE NOTE - CHIEF COMPLAINT QUOTE
Pt. to the ED C/O Abdominal Pain -- Pt. states Gastric Bypass back in April- Pt. also states he was evaluated in ED yesterday and was DC Home

## 2021-08-21 NOTE — ED STATDOCS - CONSTITUTIONAL, MLM
normal... severe sepsis 2/2 pna. Here with hypoxia, tachypnea, leukocytosis with lalo on admission  - Will continue vanc/zosyn.  - F/u blood cultures well appearing and in no apparent distress.

## 2021-08-31 ENCOUNTER — EMERGENCY (EMERGENCY)
Facility: HOSPITAL | Age: 66
LOS: 0 days | Discharge: HOME | End: 2021-08-31
Attending: STUDENT IN AN ORGANIZED HEALTH CARE EDUCATION/TRAINING PROGRAM
Payer: COMMERCIAL

## 2021-08-31 VITALS
SYSTOLIC BLOOD PRESSURE: 107 MMHG | OXYGEN SATURATION: 98 % | TEMPERATURE: 98 F | HEIGHT: 66 IN | HEART RATE: 75 BPM | RESPIRATION RATE: 18 BRPM | WEIGHT: 149.91 LBS | DIASTOLIC BLOOD PRESSURE: 69 MMHG

## 2021-08-31 DIAGNOSIS — G89.29 OTHER CHRONIC PAIN: ICD-10-CM

## 2021-08-31 DIAGNOSIS — I10 ESSENTIAL (PRIMARY) HYPERTENSION: ICD-10-CM

## 2021-08-31 DIAGNOSIS — Z96.89 PRESENCE OF OTHER SPECIFIED FUNCTIONAL IMPLANTS: Chronic | ICD-10-CM

## 2021-08-31 DIAGNOSIS — Z98.890 OTHER SPECIFIED POSTPROCEDURAL STATES: Chronic | ICD-10-CM

## 2021-08-31 DIAGNOSIS — Z96.82 PRESENCE OF NEUROSTIMULATOR: ICD-10-CM

## 2021-08-31 DIAGNOSIS — Z95.4 PRESENCE OF OTHER HEART-VALVE REPLACEMENT: ICD-10-CM

## 2021-08-31 DIAGNOSIS — Z90.89 ACQUIRED ABSENCE OF OTHER ORGANS: Chronic | ICD-10-CM

## 2021-08-31 DIAGNOSIS — Z87.39 PERSONAL HISTORY OF OTHER DISEASES OF THE MUSCULOSKELETAL SYSTEM AND CONNECTIVE TISSUE: ICD-10-CM

## 2021-08-31 DIAGNOSIS — Z20.822 CONTACT WITH AND (SUSPECTED) EXPOSURE TO COVID-19: ICD-10-CM

## 2021-08-31 DIAGNOSIS — Z86.59 PERSONAL HISTORY OF OTHER MENTAL AND BEHAVIORAL DISORDERS: ICD-10-CM
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
## 2021-08-31 DIAGNOSIS — Z86.19 PERSONAL HISTORY OF OTHER INFECTIOUS AND PARASITIC DISEASES: ICD-10-CM

## 2021-08-31 DIAGNOSIS — S09.92XA UNSPECIFIED INJURY OF NOSE, INITIAL ENCOUNTER: ICD-10-CM

## 2021-08-31 DIAGNOSIS — Z87.74 PERSONAL HISTORY OF (CORRECTED) CONGENITAL MALFORMATIONS OF HEART AND CIRCULATORY SYSTEM: Chronic | ICD-10-CM

## 2021-08-31 DIAGNOSIS — Z85.820 PERSONAL HISTORY OF MALIGNANT MELANOMA OF SKIN: ICD-10-CM

## 2021-08-31 DIAGNOSIS — M54.2 CERVICALGIA: ICD-10-CM

## 2021-08-31 DIAGNOSIS — H91.90 UNSPECIFIED HEARING LOSS, UNSPECIFIED EAR: ICD-10-CM

## 2021-08-31 DIAGNOSIS — Z79.899 OTHER LONG TERM (CURRENT) DRUG THERAPY: ICD-10-CM

## 2021-08-31 DIAGNOSIS — Z98.89 OTHER SPECIFIED POSTPROCEDURAL STATES: Chronic | ICD-10-CM

## 2021-08-31 DIAGNOSIS — R04.0 EPISTAXIS: ICD-10-CM

## 2021-08-31 DIAGNOSIS — Z87.19 PERSONAL HISTORY OF OTHER DISEASES OF THE DIGESTIVE SYSTEM: ICD-10-CM

## 2021-08-31 DIAGNOSIS — K21.9 GASTRO-ESOPHAGEAL REFLUX DISEASE WITHOUT ESOPHAGITIS: ICD-10-CM

## 2021-08-31 DIAGNOSIS — E78.5 HYPERLIPIDEMIA, UNSPECIFIED: ICD-10-CM

## 2021-08-31 DIAGNOSIS — Z98.61 CORONARY ANGIOPLASTY STATUS: Chronic | ICD-10-CM

## 2021-08-31 DIAGNOSIS — Z86.010 PERSONAL HISTORY OF COLONIC POLYPS: ICD-10-CM

## 2021-08-31 DIAGNOSIS — Z90.89 ACQUIRED ABSENCE OF OTHER ORGANS: ICD-10-CM

## 2021-08-31 DIAGNOSIS — E66.01 MORBID (SEVERE) OBESITY DUE TO EXCESS CALORIES: ICD-10-CM

## 2021-08-31 DIAGNOSIS — Z86.79 PERSONAL HISTORY OF OTHER DISEASES OF THE CIRCULATORY SYSTEM: ICD-10-CM

## 2021-08-31 PROCEDURE — 99283 EMERGENCY DEPT VISIT LOW MDM: CPT

## 2021-08-31 RX ORDER — METHOCARBAMOL 500 MG/1
750 TABLET, FILM COATED ORAL ONCE
Refills: 0 | Status: COMPLETED | OUTPATIENT
Start: 2021-08-31 | End: 2021-08-31

## 2021-08-31 RX ADMIN — METHOCARBAMOL 750 MILLIGRAM(S): 500 TABLET, FILM COATED ORAL at 15:34

## 2021-08-31 NOTE — ED STATDOCS - NS ED ROS FT
Constitutional: No fever.  Neurological: No headache.  Eyes: No vision changes.   Ears, Nose, Mouth, Throat: No congestion. +Epistaxis   Cardiovascular: No chest pain.  Respiratory: No difficulty breathing.  Gastrointestinal: No nausea or vomiting.  Genitourinary: No dysuria.  Musculoskeletal: No joint pain.  Integumentary (skin and/or breast): No rash.

## 2021-08-31 NOTE — ED STATDOCS - PATIENT PORTAL LINK FT
You can access the FollowMyHealth Patient Portal offered by [facility] by registering at the following website: [email or web address]. By joining [facility]’s FollowMyHealth portal, you will also be able to view your health information using other applications (apps) compatible with our system.
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
## 2021-08-31 NOTE — ED STATDOCS - ATTENDING CONTRIBUTION TO CARE
I, Lissette Santos DO, personally saw the patient with ACP.  I have personally performed a face to face diagnostic evaluation on this patient and formulated the patient plan. The case was discussed with, and handed off to ACP who followed the case through to the re-evaluation and disposition.
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
## 2021-08-31 NOTE — ED STATDOCS - PHYSICAL EXAMINATION
Vital signs as available reviewed.  General:  Comfortable, no acute distress.  Head:  Normocephalic, atraumatic.  Eyes:  Conjunctiva pink, no icterus. Pupils equal, round, reactive to light, extra-occular eye movements intact.  ENT: +nasal TTP without obvious displacement. No septal hematoma.   Cardiovascular:  Regular rate, no obvious murmur.  Respiratory:  Clear to auscultation, good air entry bilaterally.  Abdomen:  Soft, non-tender.  Musculoskeletal:  No tenderness to palpation over c/t/l spine. No tenderness to palpation over chest wall, clavicles, shoulders, upper/lower arms, hip/pelvis, upper / lower legs.  Neurologic: Alert and oriented, moving all extremities. Sensation intact.  Skin:  Warm and dry. No skin break.
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
## 2021-11-07 NOTE — ASU PATIENT PROFILE, ADULT - CAREGIVER
46 y.o. female pt arrives to the ED A&Ox4 for c.o. dizziness, headache, and hypertension. Pt states her symptoms began a couple days ago. PT denies cp or sob. PT reports some blurry vision as well.   
Yes

## 2021-11-20 ENCOUNTER — APPOINTMENT (OUTPATIENT)
Dept: MRI IMAGING | Facility: CLINIC | Age: 66
End: 2021-11-20

## 2021-11-29 ENCOUNTER — OUTPATIENT (OUTPATIENT)
Dept: EMERGENCY DEPT | Facility: HOSPITAL | Age: 66
LOS: 1 days | Discharge: HOME | End: 2021-11-29
Payer: MEDICARE

## 2021-11-29 ENCOUNTER — TRANSCRIPTION ENCOUNTER (OUTPATIENT)
Age: 66
End: 2021-11-29

## 2021-11-29 ENCOUNTER — RESULT REVIEW (OUTPATIENT)
Age: 66
End: 2021-11-29

## 2021-11-29 VITALS
HEIGHT: 67 IN | SYSTOLIC BLOOD PRESSURE: 106 MMHG | WEIGHT: 149.03 LBS | TEMPERATURE: 98 F | RESPIRATION RATE: 18 BRPM | HEART RATE: 59 BPM | OXYGEN SATURATION: 100 % | DIASTOLIC BLOOD PRESSURE: 69 MMHG

## 2021-11-29 DIAGNOSIS — Z98.89 OTHER SPECIFIED POSTPROCEDURAL STATES: Chronic | ICD-10-CM

## 2021-11-29 DIAGNOSIS — Z98.890 OTHER SPECIFIED POSTPROCEDURAL STATES: Chronic | ICD-10-CM

## 2021-11-29 DIAGNOSIS — Z98.61 CORONARY ANGIOPLASTY STATUS: Chronic | ICD-10-CM

## 2021-11-29 DIAGNOSIS — Z96.89 PRESENCE OF OTHER SPECIFIED FUNCTIONAL IMPLANTS: Chronic | ICD-10-CM

## 2021-11-29 DIAGNOSIS — Z87.74 PERSONAL HISTORY OF (CORRECTED) CONGENITAL MALFORMATIONS OF HEART AND CIRCULATORY SYSTEM: Chronic | ICD-10-CM

## 2021-11-29 DIAGNOSIS — Z90.89 ACQUIRED ABSENCE OF OTHER ORGANS: Chronic | ICD-10-CM

## 2021-11-29 DIAGNOSIS — R10.9 UNSPECIFIED ABDOMINAL PAIN: ICD-10-CM

## 2021-11-29 LAB
ALBUMIN SERPL ELPH-MCNC: 3.2 G/DL — LOW (ref 3.3–5)
ALP SERPL-CCNC: 90 U/L — SIGNIFICANT CHANGE UP (ref 40–120)
ALT FLD-CCNC: 23 U/L — SIGNIFICANT CHANGE UP (ref 12–78)
ANION GAP SERPL CALC-SCNC: 6 MMOL/L — SIGNIFICANT CHANGE UP (ref 5–17)
APTT BLD: 31.8 SEC — SIGNIFICANT CHANGE UP (ref 27.5–35.5)
AST SERPL-CCNC: 30 U/L — SIGNIFICANT CHANGE UP (ref 15–37)
BASOPHILS # BLD AUTO: 0.03 K/UL — SIGNIFICANT CHANGE UP (ref 0–0.2)
BASOPHILS NFR BLD AUTO: 0.5 % — SIGNIFICANT CHANGE UP (ref 0–2)
BILIRUB SERPL-MCNC: 0.3 MG/DL — SIGNIFICANT CHANGE UP (ref 0.2–1.2)
BUN SERPL-MCNC: 19 MG/DL — SIGNIFICANT CHANGE UP (ref 7–23)
CALCIUM SERPL-MCNC: 8.4 MG/DL — LOW (ref 8.5–10.1)
CHLORIDE SERPL-SCNC: 106 MMOL/L — SIGNIFICANT CHANGE UP (ref 96–108)
CO2 SERPL-SCNC: 25 MMOL/L — SIGNIFICANT CHANGE UP (ref 22–31)
CREAT SERPL-MCNC: 1.06 MG/DL — SIGNIFICANT CHANGE UP (ref 0.5–1.3)
EOSINOPHIL # BLD AUTO: 0.03 K/UL — SIGNIFICANT CHANGE UP (ref 0–0.5)
EOSINOPHIL NFR BLD AUTO: 0.5 % — SIGNIFICANT CHANGE UP (ref 0–6)
GLUCOSE SERPL-MCNC: 94 MG/DL — SIGNIFICANT CHANGE UP (ref 70–99)
HCT VFR BLD CALC: 37.4 % — LOW (ref 39–50)
HCV AB S/CO SERPL IA: 0.07 S/CO — SIGNIFICANT CHANGE UP (ref 0–0.99)
HCV AB SERPL-IMP: SIGNIFICANT CHANGE UP
HGB BLD-MCNC: 12.1 G/DL — LOW (ref 13–17)
IMM GRANULOCYTES NFR BLD AUTO: 0.3 % — SIGNIFICANT CHANGE UP (ref 0–1.5)
INR BLD: 1.02 RATIO — SIGNIFICANT CHANGE UP (ref 0.88–1.16)
LYMPHOCYTES # BLD AUTO: 1.44 K/UL — SIGNIFICANT CHANGE UP (ref 1–3.3)
LYMPHOCYTES # BLD AUTO: 24 % — SIGNIFICANT CHANGE UP (ref 13–44)
MCHC RBC-ENTMCNC: 30.3 PG — SIGNIFICANT CHANGE UP (ref 27–34)
MCHC RBC-ENTMCNC: 32.4 GM/DL — SIGNIFICANT CHANGE UP (ref 32–36)
MCV RBC AUTO: 93.5 FL — SIGNIFICANT CHANGE UP (ref 80–100)
MONOCYTES # BLD AUTO: 0.37 K/UL — SIGNIFICANT CHANGE UP (ref 0–0.9)
MONOCYTES NFR BLD AUTO: 6.2 % — SIGNIFICANT CHANGE UP (ref 2–14)
NEUTROPHILS # BLD AUTO: 4.12 K/UL — SIGNIFICANT CHANGE UP (ref 1.8–7.4)
NEUTROPHILS NFR BLD AUTO: 68.5 % — SIGNIFICANT CHANGE UP (ref 43–77)
PLATELET # BLD AUTO: 275 K/UL — SIGNIFICANT CHANGE UP (ref 150–400)
POTASSIUM SERPL-MCNC: 3.7 MMOL/L — SIGNIFICANT CHANGE UP (ref 3.5–5.3)
POTASSIUM SERPL-SCNC: 3.7 MMOL/L — SIGNIFICANT CHANGE UP (ref 3.5–5.3)
PROT SERPL-MCNC: 7.1 GM/DL — SIGNIFICANT CHANGE UP (ref 6–8.3)
PROTHROM AB SERPL-ACNC: 11.8 SEC — SIGNIFICANT CHANGE UP (ref 10.6–13.6)
RBC # BLD: 4 M/UL — LOW (ref 4.2–5.8)
RBC # FLD: 12.6 % — SIGNIFICANT CHANGE UP (ref 10.3–14.5)
SARS-COV-2 RNA SPEC QL NAA+PROBE: SIGNIFICANT CHANGE UP
SODIUM SERPL-SCNC: 137 MMOL/L — SIGNIFICANT CHANGE UP (ref 135–145)
WBC # BLD: 6.01 K/UL — SIGNIFICANT CHANGE UP (ref 3.8–10.5)
WBC # FLD AUTO: 6.01 K/UL — SIGNIFICANT CHANGE UP (ref 3.8–10.5)

## 2021-11-29 PROCEDURE — 93005 ELECTROCARDIOGRAM TRACING: CPT | Mod: XU

## 2021-11-29 PROCEDURE — 88302 TISSUE EXAM BY PATHOLOGIST: CPT | Mod: 26

## 2021-11-29 PROCEDURE — 88305 TISSUE EXAM BY PATHOLOGIST: CPT

## 2021-11-29 PROCEDURE — 84100 ASSAY OF PHOSPHORUS: CPT

## 2021-11-29 PROCEDURE — 36415 COLL VENOUS BLD VENIPUNCTURE: CPT

## 2021-11-29 PROCEDURE — 88305 TISSUE EXAM BY PATHOLOGIST: CPT | Mod: 26

## 2021-11-29 PROCEDURE — 88302 TISSUE EXAM BY PATHOLOGIST: CPT

## 2021-11-29 PROCEDURE — 99285 EMERGENCY DEPT VISIT HI MDM: CPT

## 2021-11-29 PROCEDURE — C1889: CPT

## 2021-11-29 PROCEDURE — 80048 BASIC METABOLIC PNL TOTAL CA: CPT

## 2021-11-29 PROCEDURE — 85027 COMPLETE CBC AUTOMATED: CPT

## 2021-11-29 PROCEDURE — C9399: CPT

## 2021-11-29 PROCEDURE — 83735 ASSAY OF MAGNESIUM: CPT

## 2021-11-29 PROCEDURE — 71045 X-RAY EXAM CHEST 1 VIEW: CPT | Mod: 26

## 2021-11-29 PROCEDURE — 71045 X-RAY EXAM CHEST 1 VIEW: CPT

## 2021-11-29 PROCEDURE — 88304 TISSUE EXAM BY PATHOLOGIST: CPT

## 2021-11-29 PROCEDURE — 88304 TISSUE EXAM BY PATHOLOGIST: CPT | Mod: 26

## 2021-11-29 PROCEDURE — 93010 ELECTROCARDIOGRAM REPORT: CPT

## 2021-11-29 PROCEDURE — 86769 SARS-COV-2 COVID-19 ANTIBODY: CPT | Mod: 59

## 2021-11-29 RX ORDER — OXYCODONE HYDROCHLORIDE 5 MG/1
5 TABLET ORAL EVERY 6 HOURS
Refills: 0 | Status: DISCONTINUED | OUTPATIENT
Start: 2021-11-29 | End: 2021-11-30

## 2021-11-29 RX ORDER — OXYCODONE HYDROCHLORIDE 5 MG/1
10 TABLET ORAL ONCE
Refills: 0 | Status: DISCONTINUED | OUTPATIENT
Start: 2021-11-29 | End: 2021-11-29

## 2021-11-29 RX ORDER — METOPROLOL TARTRATE 50 MG
1 TABLET ORAL
Qty: 0 | Refills: 0 | DISCHARGE

## 2021-11-29 RX ORDER — ACETAMINOPHEN 500 MG
650 TABLET ORAL EVERY 6 HOURS
Refills: 0 | Status: DISCONTINUED | OUTPATIENT
Start: 2021-11-29 | End: 2021-11-30

## 2021-11-29 RX ORDER — OMEPRAZOLE 10 MG/1
1 CAPSULE, DELAYED RELEASE ORAL
Qty: 0 | Refills: 0 | DISCHARGE

## 2021-11-29 RX ORDER — PANTOPRAZOLE SODIUM 20 MG/1
40 TABLET, DELAYED RELEASE ORAL DAILY
Refills: 0 | Status: DISCONTINUED | OUTPATIENT
Start: 2021-11-29 | End: 2021-11-30

## 2021-11-29 RX ORDER — FENTANYL CITRATE 50 UG/ML
50 INJECTION INTRAVENOUS
Refills: 0 | Status: DISCONTINUED | OUTPATIENT
Start: 2021-11-29 | End: 2021-11-29

## 2021-11-29 RX ORDER — ONDANSETRON 8 MG/1
4 TABLET, FILM COATED ORAL ONCE
Refills: 0 | Status: DISCONTINUED | OUTPATIENT
Start: 2021-11-29 | End: 2021-11-29

## 2021-11-29 RX ORDER — VENLAFAXINE HCL 75 MG
75 CAPSULE, EXT RELEASE 24 HR ORAL
Refills: 0 | Status: DISCONTINUED | OUTPATIENT
Start: 2021-11-29 | End: 2021-11-30

## 2021-11-29 RX ORDER — PANTOPRAZOLE SODIUM 20 MG/1
40 TABLET, DELAYED RELEASE ORAL
Refills: 0 | Status: DISCONTINUED | OUTPATIENT
Start: 2021-11-29 | End: 2021-11-30

## 2021-11-29 RX ORDER — FENOFIBRATE,MICRONIZED 130 MG
1 CAPSULE ORAL
Qty: 0 | Refills: 0 | DISCHARGE

## 2021-11-29 RX ORDER — ATORVASTATIN CALCIUM 80 MG/1
10 TABLET, FILM COATED ORAL AT BEDTIME
Refills: 0 | Status: DISCONTINUED | OUTPATIENT
Start: 2021-11-29 | End: 2021-11-30

## 2021-11-29 RX ORDER — VENLAFAXINE HCL 75 MG
150 CAPSULE, EXT RELEASE 24 HR ORAL DAILY
Refills: 0 | Status: DISCONTINUED | OUTPATIENT
Start: 2021-11-29 | End: 2021-11-30

## 2021-11-29 RX ORDER — VENLAFAXINE HCL 75 MG
1 CAPSULE, EXT RELEASE 24 HR ORAL
Qty: 0 | Refills: 0 | DISCHARGE

## 2021-11-29 RX ORDER — ENOXAPARIN SODIUM 100 MG/ML
30 INJECTION SUBCUTANEOUS DAILY
Refills: 0 | Status: DISCONTINUED | OUTPATIENT
Start: 2021-11-29 | End: 2021-11-29

## 2021-11-29 RX ORDER — BUPROPION HYDROCHLORIDE 150 MG/1
100 TABLET, EXTENDED RELEASE ORAL THREE TIMES A DAY
Refills: 0 | Status: DISCONTINUED | OUTPATIENT
Start: 2021-11-29 | End: 2021-11-30

## 2021-11-29 RX ORDER — ALPRAZOLAM 0.25 MG
1 TABLET ORAL
Qty: 0 | Refills: 0 | DISCHARGE

## 2021-11-29 RX ORDER — ONDANSETRON 8 MG/1
4 TABLET, FILM COATED ORAL EVERY 6 HOURS
Refills: 0 | Status: DISCONTINUED | OUTPATIENT
Start: 2021-11-29 | End: 2021-11-30

## 2021-11-29 RX ORDER — ENOXAPARIN SODIUM 100 MG/ML
40 INJECTION SUBCUTANEOUS DAILY
Refills: 0 | Status: DISCONTINUED | OUTPATIENT
Start: 2021-11-29 | End: 2021-11-30

## 2021-11-29 RX ORDER — SODIUM CHLORIDE 9 MG/ML
1000 INJECTION, SOLUTION INTRAVENOUS
Refills: 0 | Status: DISCONTINUED | OUTPATIENT
Start: 2021-11-29 | End: 2021-11-29

## 2021-11-29 RX ORDER — SODIUM CHLORIDE 9 MG/ML
1000 INJECTION, SOLUTION INTRAVENOUS
Refills: 0 | Status: DISCONTINUED | OUTPATIENT
Start: 2021-11-29 | End: 2021-11-30

## 2021-11-29 RX ORDER — FENOFIBRATE,MICRONIZED 130 MG
48 CAPSULE ORAL DAILY
Refills: 0 | Status: DISCONTINUED | OUTPATIENT
Start: 2021-11-29 | End: 2021-11-30

## 2021-11-29 RX ORDER — FENOFIBRATE,MICRONIZED 130 MG
1 CAPSULE ORAL
Qty: 0 | Refills: 0 | DISCHARGE
Start: 2021-11-29

## 2021-11-29 RX ORDER — METOPROLOL TARTRATE 50 MG
25 TABLET ORAL DAILY
Refills: 0 | Status: DISCONTINUED | OUTPATIENT
Start: 2021-11-29 | End: 2021-11-30

## 2021-11-29 RX ADMIN — FENTANYL CITRATE 50 MICROGRAM(S): 50 INJECTION INTRAVENOUS at 20:52

## 2021-11-29 RX ADMIN — OXYCODONE HYDROCHLORIDE 10 MILLIGRAM(S): 5 TABLET ORAL at 19:07

## 2021-11-29 RX ADMIN — OXYCODONE HYDROCHLORIDE 10 MILLIGRAM(S): 5 TABLET ORAL at 22:05

## 2021-11-29 RX ADMIN — OXYCODONE HYDROCHLORIDE 10 MILLIGRAM(S): 5 TABLET ORAL at 21:35

## 2021-11-29 RX ADMIN — OXYCODONE HYDROCHLORIDE 10 MILLIGRAM(S): 5 TABLET ORAL at 19:03

## 2021-11-29 RX ADMIN — FENTANYL CITRATE 50 MICROGRAM(S): 50 INJECTION INTRAVENOUS at 20:22

## 2021-11-29 RX ADMIN — SODIUM CHLORIDE 75 MILLILITER(S): 9 INJECTION, SOLUTION INTRAVENOUS at 19:07

## 2021-11-29 RX ADMIN — BUPROPION HYDROCHLORIDE 100 MILLIGRAM(S): 150 TABLET, EXTENDED RELEASE ORAL at 23:58

## 2021-11-29 RX ADMIN — Medication 75 MILLIGRAM(S): at 23:58

## 2021-11-29 NOTE — BRIEF OPERATIVE NOTE - NSICDXBRIEFPROCEDURE_GEN_ALL_CORE_FT
PROCEDURES:  Laparoscopic cholecystectomy by bariatric surgery 29-Nov-2021 18:47:18  JONNATHAN MORAN  Laparoscopic appendectomy 29-Nov-2021 18:47:34  JONNATHAN MORAN  Lysis of intestinal adhesions 29-Nov-2021 18:47:54  JONNATHAN MORAN

## 2021-11-29 NOTE — H&P ADULT - HISTORY OF PRESENT ILLNESS
67 y/o male with a PMHx of anxiety, aortic stenosis, carpal tunnel syndrome, colonic polyps, chronic neck and back pain, depressive disorder, GI hemorrhage, H pylori, herniated lumbar intervertebral disc, HTN, HLD, internal and external prolapsed hemorrhoids, morbid obesity, sciatica, skin cancer presents to the ED complaint of repeat abdominal pain for past few months. Patient is s/p gastric bypass from 4/2021 and lost approximately 80lbs since. Pt was in the ED has hx of US and CT without discrete pathology. Patient presents for surgery today. patient denies nausea, vomiting and PO intolerance and no increase pain with fatty food. Last EGD was about a yr ago.

## 2021-11-29 NOTE — ED ADULT NURSE NOTE - OBJECTIVE STATEMENT
pt arrives to ED with complaints of intermittent abdominal pain for months following bariatric surgery by MD Amor. Pain described as sharp, intermittent. denies nausea, vomiting, diarrhea. alert and oriented x4. ambulatory.

## 2021-11-29 NOTE — DISCHARGE NOTE PROVIDER - CARE PROVIDER_API CALL
Bruce Amor)  Surgery  [address], [address]  [place] [address]  Phone: [phone]  Fax: [phone]  Follow Up Time:

## 2021-11-29 NOTE — ED PROVIDER NOTE - PHYSICAL EXAMINATION
GEN: Patient awake alert NAD.   HEENT: normocephalic, atraumatic, EOMI, no scleral icterus, moist MM  CARDIAC: RRR, S1, S2, no murmur.   PULM: CTA B/L no wheeze, rhonchi, rales.   ABD: soft NT, ND, no rebound no guarding, no CVA tenderness.   MSK: Moving all extremities, no edema. 5/5 strength and full ROM in all extremities.     NEURO: A&Ox3, gait normal, no focal neurological deficits, CN 2-12 grossly intact  SKIN: warm, dry, no rash.

## 2021-11-29 NOTE — BRIEF OPERATIVE NOTE - NSICDXBRIEFPOSTOP_GEN_ALL_CORE_FT
POST-OP DIAGNOSIS:  Intestinal adhesions 29-Nov-2021 18:50:03  JONNATHAN MORAN  Biliary stone 29-Nov-2021 18:50:23  JONNATHAN MORAN

## 2021-11-29 NOTE — ED PROVIDER NOTE - PRIOR EKG STATUS
COVID-19 PCR test completed. Patient handout For Patients Who Have Been Tested for Covid-19 (Coronavirus) was given to the patient, which includes test result notification process.     21 aug 21/the EKG is unchanged from prior EKG

## 2021-11-29 NOTE — ED ADULT NURSE NOTE - CHIEF COMPLAINT QUOTE
Pt reports being sent by Dr. Amor with known abdominal issues. Per pt, DR. Amor recommends exploratory surgery for recurrent issues.

## 2021-11-29 NOTE — ED PROVIDER NOTE - NS ED ROS FT
GENERAL: No fever, chills  EYES: no vision changes, no discharge.   ENT: no difficulty swallowing or speaking   CARDIAC: no chest pain/pressure, SOB, lower extremity swelling  PULMONARY: no cough, SOB  GI: +abdominal pain, +nausea, no vomiting and diarrhea  : no dysuria, no hematuria  SKIN: no rashes, no ecchymosis  NEURO: no headache, lightheadedness  MSK: No joint pain, myalgia, weakness.

## 2021-11-29 NOTE — H&P ADULT - ASSESSMENT
67 yo m presents with recurrent abdominal pain s/p lap bypass     Plan:   admit to Dr. moe   pain control   nausea control   NPO, IVF hydration   encourage IS use   encourage OOB/A  DVT/GI ppx   plan for dx lap with [place]     plan discussed with Dr. Moe

## 2021-11-29 NOTE — PHARMACOTHERAPY INTERVENTION NOTE - COMMENTS
Medication history complete, reviewed medications with patient and confirmed with [facility] access hospital[facility].

## 2021-11-29 NOTE — ED PROVIDER NOTE - OBJECTIVE STATEMENT
65 y/o male with PMHx of anxiety, aortic stenosis, carpal tunnel syndrome, colonic polyps, chronic neck and back pain, depressive disorder, GI hemorrhage, H pylori, herniated lumbar intervertebral disc, HTN, HLD, internal and external prolapsed hemorrhoids, morbid obesity, sciatica, skin cancer  presents to the ED c/o persistent upper abdominal pain and nausea. Pt reports he is supposed to be having some tests for his recurrent issues.

## 2021-11-29 NOTE — ED PROVIDER NOTE - CLINICAL SUMMARY MEDICAL DECISION MAKING FREE TEXT BOX
67 yo M hx of gastric bypass surgery, pw abdominal pain, sent in by surgeon for admission and plan for OR today or tomorrow. no abd tenderness on exam, well appearing. pre-op labs, ekg, and CXR. No CT abdomen indicated at this time as pt has benign abdomen, recent US and CT abdomen unremarkable, and surgery does not require one for planning.

## 2021-11-29 NOTE — DISCHARGE NOTE PROVIDER - HOSPITAL COURSE
65 y/o male with a PMHx of anxiety, aortic stenosis, carpal tunnel syndrome, colonic polyps, chronic neck and back pain, depressive disorder, GI hemorrhage, H pylori, herniated lumbar intervertebral disc, HTN, HLD, internal and external prolapsed hemorrhoids, morbid obesity, sciatica, skin cancer presents to the ED complaint of repeat abdominal pain for past few months. Patient is s/p gastric bypass from 4/2021 and lost approximately 80lbs since. Pt was in the ED has hx of US and CT without discrete pathology. Patient presents for surgery today. patient denies nausea, vomiting and PO intolerance and no increase pain with fatty food. Last EGD was about a yr ago. Patient underwent Dx lap and found to have adhesion lysis carried out patient also had gallbladder and appendix removed.

## 2021-11-29 NOTE — DISCHARGE NOTE PROVIDER - NSDCMRMEDTOKEN_GEN_ALL_CORE_FT
Aspirin Enteric Coated 81 mg oral delayed release tablet: 1 tab(s) orally once a day  atorvastatin 10 mg oral tablet: 1 tab(s) orally once a day (in the morning)  buPROPion 100 mg oral tablet: 1 tab(s) orally 3 times a day  fenofibrate 48 mg oral tablet: 1 tab(s) orally once a day  ferrous sulfate 325 mg (65 mg elemental iron) oral tablet: 1 tab(s) orally once a day  metoprolol tartrate 25 mg oral tablet: 1 tab(s) orally once a day  Moderna COVID-19 Vaccine  mcg/0.5 mL intramuscular suspension: 0.5 milliliter(s) intramuscular once  ***3rd dose in Sept***  Multiple Vitamins oral tablet: 1 tab(s) orally once a day  venlafaxine 75 mg oral tablet: 1 tab(s) orally 2 times a day  Vitamin D3 25 mcg (1000 intl units) oral tablet: 1 tab(s) orally once a day

## 2021-11-29 NOTE — ED PROVIDER NOTE - PROGRESS NOTE DETAILS
Cindy Quiroz M.D. [employer] Fellow   spoke to Dr. Amor who sent the patient in for admission. Requesting pre-op labs, Cxr and EKG, no imaging needed. Pt agreeable to plan.

## 2021-11-29 NOTE — H&P ADULT - NSHPPHYSICALEXAM_GEN_ALL_CORE
Gen: NAD, cooperative   HEENT: supple, no JVD, EOMI  Lungs: CTA b/l, aerating well   CV: S1 and S2 present  Abd: BS present, NT,ND, no RGR, surgical scars healed well   Ext: FROM, pulse present, no cyanosis, No edema   Skin: warm, dry

## 2021-11-29 NOTE — ED ADULT TRIAGE NOTE - CHIEF COMPLAINT QUOTE
Pt reports being sent by Dr. Amor Pt reports being sent by Dr. Amor with known abdominal issues. Per pt, DR. Amor recommends exploratory surgery for recurrent issues.

## 2021-11-30 ENCOUNTER — TRANSCRIPTION ENCOUNTER (OUTPATIENT)
Age: 66
End: 2021-11-30

## 2021-11-30 VITALS
OXYGEN SATURATION: 98 % | TEMPERATURE: 98 F | RESPIRATION RATE: 16 BRPM | SYSTOLIC BLOOD PRESSURE: 118 MMHG | DIASTOLIC BLOOD PRESSURE: 64 MMHG | HEART RATE: 72 BPM

## 2021-11-30 LAB
ANION GAP SERPL CALC-SCNC: 4 MMOL/L — LOW (ref 5–17)
BUN SERPL-MCNC: 14 MG/DL — SIGNIFICANT CHANGE UP (ref 7–23)
CALCIUM SERPL-MCNC: 8.7 MG/DL — SIGNIFICANT CHANGE UP (ref 8.5–10.1)
CHLORIDE SERPL-SCNC: 105 MMOL/L — SIGNIFICANT CHANGE UP (ref 96–108)
CO2 SERPL-SCNC: 28 MMOL/L — SIGNIFICANT CHANGE UP (ref 22–31)
COVID-19 NUCLEOCAPSID GAM AB INTERP: NEGATIVE — SIGNIFICANT CHANGE UP
COVID-19 NUCLEOCAPSID TOTAL GAM ANTIBODY RESULT: 0.19 INDEX — SIGNIFICANT CHANGE UP
COVID-19 SPIKE DOMAIN AB INTERP: POSITIVE
COVID-19 SPIKE DOMAIN ANTIBODY RESULT: >250 U/ML — HIGH
CREAT SERPL-MCNC: 1.07 MG/DL — SIGNIFICANT CHANGE UP (ref 0.5–1.3)
GLUCOSE SERPL-MCNC: 110 MG/DL — HIGH (ref 70–99)
HCT VFR BLD CALC: 37.1 % — LOW (ref 39–50)
HGB BLD-MCNC: 11.9 G/DL — LOW (ref 13–17)
MAGNESIUM SERPL-MCNC: 2.1 MG/DL — SIGNIFICANT CHANGE UP (ref 1.6–2.6)
MCHC RBC-ENTMCNC: 30.2 PG — SIGNIFICANT CHANGE UP (ref 27–34)
MCHC RBC-ENTMCNC: 32.1 GM/DL — SIGNIFICANT CHANGE UP (ref 32–36)
MCV RBC AUTO: 94.2 FL — SIGNIFICANT CHANGE UP (ref 80–100)
PHOSPHATE SERPL-MCNC: 3.7 MG/DL — SIGNIFICANT CHANGE UP (ref 2.5–4.5)
PLATELET # BLD AUTO: 246 K/UL — SIGNIFICANT CHANGE UP (ref 150–400)
POTASSIUM SERPL-MCNC: 4.2 MMOL/L — SIGNIFICANT CHANGE UP (ref 3.5–5.3)
POTASSIUM SERPL-SCNC: 4.2 MMOL/L — SIGNIFICANT CHANGE UP (ref 3.5–5.3)
RBC # BLD: 3.94 M/UL — LOW (ref 4.2–5.8)
RBC # FLD: 12.6 % — SIGNIFICANT CHANGE UP (ref 10.3–14.5)
SARS-COV-2 IGG+IGM SERPL QL IA: 0.19 INDEX — SIGNIFICANT CHANGE UP
SARS-COV-2 IGG+IGM SERPL QL IA: >250 U/ML — HIGH
SARS-COV-2 IGG+IGM SERPL QL IA: NEGATIVE — SIGNIFICANT CHANGE UP
SARS-COV-2 IGG+IGM SERPL QL IA: POSITIVE
SODIUM SERPL-SCNC: 137 MMOL/L — SIGNIFICANT CHANGE UP (ref 135–145)
WBC # BLD: 6.81 K/UL — SIGNIFICANT CHANGE UP (ref 3.8–10.5)
WBC # FLD AUTO: 6.81 K/UL — SIGNIFICANT CHANGE UP (ref 3.8–10.5)

## 2021-11-30 RX ORDER — TAMSULOSIN HYDROCHLORIDE 0.4 MG/1
0.4 CAPSULE ORAL ONCE
Refills: 0 | Status: COMPLETED | OUTPATIENT
Start: 2021-11-30 | End: 2021-11-30

## 2021-11-30 RX ORDER — OXYCODONE HYDROCHLORIDE 5 MG/1
10 TABLET ORAL ONCE
Refills: 0 | Status: DISCONTINUED | OUTPATIENT
Start: 2021-11-30 | End: 2021-11-30

## 2021-11-30 RX ADMIN — OXYCODONE HYDROCHLORIDE 10 MILLIGRAM(S): 5 TABLET ORAL at 04:02

## 2021-11-30 RX ADMIN — Medication 75 MILLIGRAM(S): at 08:30

## 2021-11-30 RX ADMIN — BUPROPION HYDROCHLORIDE 100 MILLIGRAM(S): 150 TABLET, EXTENDED RELEASE ORAL at 06:07

## 2021-11-30 RX ADMIN — PANTOPRAZOLE SODIUM 40 MILLIGRAM(S): 20 TABLET, DELAYED RELEASE ORAL at 06:07

## 2021-11-30 RX ADMIN — ENOXAPARIN SODIUM 40 MILLIGRAM(S): 100 INJECTION SUBCUTANEOUS at 09:02

## 2021-11-30 RX ADMIN — TAMSULOSIN HYDROCHLORIDE 0.4 MILLIGRAM(S): 0.4 CAPSULE ORAL at 09:02

## 2021-11-30 RX ADMIN — OXYCODONE HYDROCHLORIDE 10 MILLIGRAM(S): 5 TABLET ORAL at 03:32

## 2021-11-30 NOTE — DISCHARGE NOTE NURSING/CASE MANAGEMENT/SOCIAL WORK - PATIENT PORTAL LINK FT
You can access the FollowMyHealth Patient Portal offered by [facility] by registering at the following website: [email or web address]. By joining [facility]’s FollowMyHealth portal, you will also be able to view your health information using other applications (apps) compatible with our system.

## 2021-11-30 NOTE — DIETITIAN INITIAL EVALUATION ADULT. - PERTINENT LABORATORY DATA
11-30    137  |  105  |  14  ----------------------------<  110<H>  4.2   |  28  |  1.07    Ca    8.7      30 Nov 2021 09:11  Phos  3.7     11-30  Mg     2.1     11-30    TPro  7.1  /  Alb  3.2<L>  /  TBili  0.3  /  DBili  x   /  AST  30  /  ALT  23  /  AlkPhos  90  11-29

## 2021-11-30 NOTE — DIETITIAN INITIAL EVALUATION ADULT. - OTHER INFO
67 y/o male with a PMHx of anxiety, aortic stenosis, carpal tunnel syndrome, colonic polyps, chronic neck and back pain, depressive disorder, GI hemorrhage, H pylori, herniated lumbar intervertebral disc, HTN, HLD, internal and external prolapsed hemorrhoids, morbid obesity, sciatica, skin cancer presents to the ED complaint of repeat abdominal pain for past few months. Patient is s/p gastric bypass from 4/2021 and lost approximately 80lbs since. Pt was in the ED has hx of US and CT without discrete pathology. Patient presents for surgery today. patient denies nausea, vomiting and PO intolerance and no increase pain with fatty food. Last EGD was about a yr ago. 67 y/o male with a PMHx of anxiety, aortic stenosis, carpal tunnel syndrome, colonic polyps, chronic neck and back pain, depressive disorder, GI hemorrhage, H pylori, herniated lumbar intervertebral disc, HTN, HLD, internal and external prolapsed hemorrhoids, morbid obesity, sciatica, skin cancer presents to the ED complaint of repeat abdominal pain for past few months. Patient is s/p gastric bypass from 4/2021 and lost approximately 80lbs since. Pt was in the ED has hx of US and CT without discrete pathology. Patient presents for surgery today. patient denies nausea, vomiting and PO intolerance and no increase pain with fatty food. Last EGD was about a yr ago.    Pt continues to have pain in abdomen.  Appetite increasing.  Pt ordered full breakfast this morning, and is hungry.  Last meal prior was his home baked muffins and protein bars.   For past several weeks pt couldn "not tell if I was hungry or not."  Pt's goal  was 150 lbs, he reached 142 lbs, now back to 149.    Pt had gall bladder and appendix removed.    No other GI issues  No issues chew/swallow

## 2021-11-30 NOTE — DIETITIAN INITIAL EVALUATION ADULT. - PERTINENT MEDS FT
MEDICATIONS  (STANDING):  atorvastatin 10 milliGRAM(s) Oral at bedtime  buPROPion . 100 milliGRAM(s) Oral three times a day  enoxaparin Injectable 40 milliGRAM(s) SubCutaneous daily  fenofibrate Tablet 48 milliGRAM(s) Oral daily  metoprolol succinate ER 25 milliGRAM(s) Oral daily  pantoprazole    Tablet 40 milliGRAM(s) Oral before breakfast  pantoprazole  Injectable 40 milliGRAM(s) IV Push daily  venlafaxine 75 milliGRAM(s) Oral two times a day with meals    MEDICATIONS  (PRN):  acetaminophen     Tablet .. 650 milliGRAM(s) Oral every 6 hours PRN Temp greater or equal to 38.5C (101.3F), Mild Pain (1 - 3)  ondansetron Injectable 4 milliGRAM(s) IV Push every 6 hours PRN Nausea and/or Vomiting  oxyCODONE    IR 5 milliGRAM(s) Oral every 6 hours PRN Moderate Pain (4 - 6)

## 2021-11-30 NOTE — DIETITIAN NUTRITION RISK NOTIFICATION - ADDITIONAL COMMENTS/DIETITIAN RECOMMENDATIONS
Maintain DASH diet  Add MVI w minerals  Record PO intake in EMR after each meal (nursing.)   Encourage PO intake.   Monitor PO intake, tolerance, labs and weight.

## 2021-11-30 NOTE — DIETITIAN INITIAL EVALUATION ADULT. - ORAL INTAKE PTA/DIET HISTORY
Pt had Bariatric surgery 4/21, has been on decreased PO intake, high protein.  Recently decreased PO intake.

## 2021-11-30 NOTE — DIETITIAN INITIAL EVALUATION ADULT. - MALNUTRITION
Patient meets criteria for moderate protein-calorie malnutrition in context of chronic disease .  Much of wt loss due to Bariatric surgery Patient meets criteria for moderate protein-calorie malnutrition in context of chronic disease

## 2021-11-30 NOTE — PROGRESS NOTE ADULT - ASSESSMENT
65yo M presents w/ abdominal pain, s/p diagnostic laparoscopy, lysis of adhesion, cholecystectomy and appendectomy POD#1    Plan:  - pain control prn  - monitor VS  - diet: ADA  - antiemetic control prn  - DVT PPX  - encourage IS/OOB  - flomax this morning. Once patient urinates and has BF, plan for discharge    Plan discussed with surgery team and attending, Dr. Amor

## 2021-11-30 NOTE — DIETITIAN INITIAL EVALUATION ADULT. - NAME AND PHONE
Valerie Grimes RDN, CDN, [facility]      [phone]   [email or web address]

## 2021-11-30 NOTE — DIETITIAN INITIAL EVALUATION ADULT. - ADD RECOMMEND
Record PO intake in EMR after each meal (nursing.) Add MVI w minerals.  Encourage PO intake. Monitor PO intake, tolerance, labs and weight.

## 2021-11-30 NOTE — DISCHARGE NOTE NURSING/CASE MANAGEMENT/SOCIAL WORK - NSDCPEFALRISK_GEN_ALL_CORE
For information on Fall & Injury Prevention, visit: [email or web address] OR  [email or web address] OR  https://www.cdc.gov/steadi/patient.html

## 2021-12-08 DIAGNOSIS — M51.26 OTHER INTERVERTEBRAL DISC DISPLACEMENT, LUMBAR REGION: ICD-10-CM

## 2021-12-08 DIAGNOSIS — K35.80 UNSPECIFIED ACUTE APPENDICITIS: ICD-10-CM

## 2021-12-08 DIAGNOSIS — I10 ESSENTIAL (PRIMARY) HYPERTENSION: ICD-10-CM

## 2021-12-08 DIAGNOSIS — K80.20 CALCULUS OF GALLBLADDER WITHOUT CHOLECYSTITIS WITHOUT OBSTRUCTION: ICD-10-CM

## 2021-12-08 DIAGNOSIS — R10.9 UNSPECIFIED ABDOMINAL PAIN: ICD-10-CM

## 2021-12-08 DIAGNOSIS — G89.29 OTHER CHRONIC PAIN: ICD-10-CM

## 2021-12-08 DIAGNOSIS — F41.8 OTHER SPECIFIED ANXIETY DISORDERS: ICD-10-CM

## 2021-12-08 DIAGNOSIS — K80.10 CALCULUS OF GALLBLADDER WITH CHRONIC CHOLECYSTITIS WITHOUT OBSTRUCTION: ICD-10-CM

## 2021-12-08 DIAGNOSIS — K21.9 GASTRO-ESOPHAGEAL REFLUX DISEASE WITHOUT ESOPHAGITIS: ICD-10-CM

## 2021-12-08 DIAGNOSIS — Z86.010 PERSONAL HISTORY OF COLONIC POLYPS: ICD-10-CM

## 2021-12-08 DIAGNOSIS — E78.5 HYPERLIPIDEMIA, UNSPECIFIED: ICD-10-CM

## 2021-12-26 NOTE — CHART NOTE - NSCHARTNOTEFT_GEN_A_CORE
SURGERY DATE: 11-29-21    PREOP DIAGNOSIS: Acute Cholecystitis, Chronic Abdominal Pain    POSTOP DIAGNOSIS:  Same, Open Turner's defect, adhesion causing PSBO    PROCEDURE:  Laparoscipic Cholecystectomy, Laparoscopic Appendectomy, Laparoscopic Closure of Turner's Defect and Lysis of adhesions, TAP Block    SURGEON: Bruce Amor MD    ASSISTANT:  Lorrie Lovell MD    ANESTHESIA: GETA    EBL: 30cc    SPECIMEN:  Gallbladder, appendix and adhesion sent to pathology    DRAINS: There were no drains.    COMPLICATIONS : none    INDICATIONS FOR THE PROCEDURE: The patient is a 67 y/o male presents to the ED complaint of repeat abdominal pain for past few months. Patient is s/p gastric bypass from 4/2021 and lost approximately 80lbs since. Pt was in the ED has hx of US and CT without discrete pathology. Patient presents for surgery today. patient denies nausea, vomiting and PO intolerance and no increase pain with fatty food. Last EGD was about a yr ago.    DESCRIPTION OF THE PROCEDURE:  The patient was identified properly via timeout.  The patient was brought into the operating room and was placed onto the operating room table in supine position.  The patient was then given general endotracheal anesthesia and was given preoperative antibiotics.  The patient was then prepped and draped in the usual sterile fashion.  An Exparel mixture was mixed at the back table with 20 mL of Exparel, 30 mL of 0.25% Marcaine and 150 mL of normal saline.  A Veress needle was placed in left upper quadrant.  The abdomen was insufflated to 15 mmHg.  Once the abdomen was insufflated, the Exparel mixture was given subcutaneously at the sites of incision.  An incision was made within the umbilicus and a 5 mm trocar was placed in the abdomen.  The laparoscope was inserted and there was no injury on initial trocar placement or initial Veress needle placement.  A transversus abdominus plane Block was then conducted by placing 20 mL of the Exparel mixture preperitoneal at the distribution of the spinal nerves on the lateral abdominal wall.  This was started at the costal margin at the mid axillary line.  20 mL of the Exparel mixture was placed in this area.  Another 20 mL was placed 4 cm caudal to this area.  Another 20 cm was placed 4 cm caudal to this area and another 15 mL was placed 4 cm caudal to this area.  This was repeated on the opposite side of the body in a similar fashion.  The rest of the Exparel was placed into the subcutaneous tissues and preperitoneal space at every trocar site.  Two 5 mm trochars were placed into the right upper quadrant.  A 12 mm trocar was placed into the left upper quadrant.  The gallbladder was then visualized.  The gallbladder was then brought over the liver using a grasper and the infundibulum was grasped and retracted towards the appendix.  This maneuver exposed Calot's triangle.  The cystic artery and cystic duct were identified.  They were circumferentially dissected and the critical view was obtained.  The cystic duct was then triply clipped using the clip applier and the cystic duct was divided close to the gallbladder.  The cystic artery was then taken with the LigaSure device.  The gallbladder was then removed from the gallbladder wall fossa using electrocautery.  Hemostasis was obtained.  The gallbladder was then placed into an Endo catch bag and removed from the 12 mm trocar.  We then turn our attention to the appendix as a source of his abdominal pain.  The tip of the appendix was then grasped and the mesoappendix was taken with the LigaSure device.  The base of the appendix was then free and the base was transected with an Endo LUCIO with a 45 mm tan load.  The appendix was freed and placed into an Endo catch bag and removed from the 12 mm trocar.  The abdomen was irrigated and suctioned.  Hemostasis Was achieved.  we then decided to run the bowel.  The Crow limb was identified and Petersons defect appeared larger than what was acceptable.  We closed Petersons defect with a 2-0 nonabsorbable VLOC suture.  The rest of the small bowel was then run and a large adhesion from the JJ to the omentum was seen which may have been causing a partial small bowel obstruction.  And may be the cause of his abdominal pain.  The adhesion was cut and removed from the abdomen.  The abdomen was irrigated and suctioned.  Hemostasis was achieved.  The trochars were then removed and the skin was closed with 4-0 Monocryl.  Dermabond was applied over that.  The patient tolerated the procedure well and was brought to the recovery room in stable condition.

## 2022-03-23 NOTE — H&P PST ADULT - MS GEN HX ROS MEA POS PC
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 Interval History: 3/23/2022 Pt presents today with her daughter & CT Chest for f/u mediastinal lymphadenopathy. She now only uses O2 with exertion (exercise bike, which she uses 20 mins qd) & hs. She endorses sob with activity & with hot showers, as well as decreased stamina/strenth which are improving. She also experiences a band of tightness in her lower chest each AM that subsides after a few hrs; she has not tried albuterol for these sxs. She is living independently & doing well. Pepcid improved sxs & she continues on this. Pt f/w a private practice physician who has specialized in [address] [place] [address]. Interval History: 2/8/2022 D/c'd home on 4 L O2. \"Itchy\" feeling extending from throat into chest; was tx'd for thrush & also added humidification to O2, which helped. Here with her daughter, whom she has been living with since d/c. Using 3 L O2. If she tries to turn down even by 1/2 L she gets very fatigued & gets perioral grey color. Sometimes has desats down to low 80s% with O2 on--this has improved lately. Daughter asking about a d-dimer. Does PT BID & weakness is improving, no aerobic exercise. Albuterol doesn't help much. Performing ADLs by herself, asking when returning home would be appropriate, lives ~25 mins apart from daughter & would have close neighbor support. Presenting HPI: 12/2/2021 80 yo female admitted 12/1/2021 after presenting to ED with known Covid+ 10 days prior; she started taking Ivermectin at home. Presented to the ED after SpO2 fell to 70% at home. Was not vaccinated for COVID-19. Pt was hospitalized for 22 days; course included ICU care with BiPAP & prolonged Vapotherm requirement. reports that she has never smoked.  She has never used smokeless tobacco.    PHYSICAL EXAM:  Blood pressure 122/73, pulse 85, temperature 96.9 °F (36.1 °C), resp. rate 18, height 5' 4\" (1.626 m), weight 141 lb 3.2 oz (64 kg), SpO2 95 %, not currently breastfeeding.' on RA  Constitutional:  No acute distress. Neck: No tracheal deviation present. Cardiovascular: Normal heart sounds. No lower extremity edema. Pulmonary/Chest: Clear breath sounds. No wheezes. No rhonchi. No rales. No decreased breath sounds. No accessory muscle usage or stridor. Musculoskeletal: No cyanosis. No clubbing. Skin: Skin is warm and dry. Psychiatric: Normal mood and affect. Neurologic: Speech fluent, alert and oriented, strength symmetric     DATA:  12/1/2021 BC NG  12/10/2021 Resp NG  12/10/2021 BC NG   12/10/2021 UC NG    CTPA 12/1/2021  1. No evidence of pulmonary embolism. 2. Extensive bilateral multifocal airspace disease compatible with multifocal   pneumonia, specifically COVID pneumonia. CXR 12/18/2021   1. Stable appropriate position of right arm PICC. 2. No significant change in appearance of multifocal airspace disease, most   consistent with a combination of multifocal pneumonia and superimposed   pulmonary edema. CTPA 12/21/2021 personally reviewed  Pulmonary Arteries: Pulmonary arteries are adequately opacified for evaluation.  No evidence of intraluminal filling defect to suggest pulmonary embolism.  Main pulmonary artery is normal in caliber. Mediastinum: There is mild diffuse mediastinal adenopathy which is likely reactive. Lungs/pleura: There is extensive diffuse bilateral ground-glass type airspace disease.       Impression   Findings consistent with extensive bilateral pneumonia     CXR 2/8/2022  There is significant interval improvement in multifocal bilateral airspace  disease. Residual opacities may be related to residual airspace disease or  could be related to underlying fibrosis. The mediastinum and cardiac  silhouette are unremarkable.     CT Chest 2/8/2022  Mediastinum: Calcified thyroid nodule seen on the left, incidentally noted.       No intimal flap seen in aorta.  No pericardial effusion is seen.  Small   hiatal hernia is seen. [place] is nonspecific thickening at the GE junction. Small mediastinal and hilar nodes are noted. , decreased compared to prior.  A   subcarinal node measures 1.4 cm x 1.5 cm, previously 2.3 cm x 1.9 cm       Lungs/pleura: Scattered ground-glass opacities are seen throughout the left   lung, significantly decreased compared to prior.  Scattered ground-glass   opacities remain however with a few subpleural linear and curvilinear   bandlike opacities also seen.       Scattered ground-glass opacities throughout the right lung are significantly   decreased compared to prior with scattered residual ground-glass opacities   remain.  A few linear and curvilinear subpleural opacities are seen in the   right lung.       A few areas of bronchiectasis are seen.  Cystic changes are seen in the left   lower lobe       Upper Abdomen: Adrenal glands are unremarkable.  Hypodense nodule seen   medially in the left kidney measuring 2.1 cm, compatible with cyst.   Calcified granuloma seen within the spleen. Impression   Improved aeration of the lungs bilaterally with scattered ground-glass   opacities remaining.  There is persistent but decreased mediastinal   adenopathy.       Scattered subpleural linear and curvilinear opacities are seen throughout the   lungs, suggesting mild fibrosis.       Cystic changes seen in the left lower lobe, new compared to prior, which may   be post infectious pneumatocele. 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 Hospitalized 12/1/21 - 12/23/21  · Mediastinal lymphadenopathy - improved      PLAN:  Supplemental oxygen to maintain SaO2 >92%; wean as tolerated.  Discussed titration. Albuterol as needed, although I do not suspect RAD given minimal to no relief historically. Recommend scheduled aerobic exercise regimen for cardiopulmonary reconditioning   No follow-up imaging indicated  Offered referral to [facility] clinic, persistent desaturations and prolonged fatigue/weakness. Pt will consider this and call back if she changes her mind. CRP level, per pt request  Walk test in office to qualify for O2 please -- pt qualified for 2 L with exertion  Routine f/u to be with an alternative medicine physician whom she is established with    Discussed pt's assessment, plan and reviewed imaging with my attending physician Dr. Karlee Campbell. right hand numbness/tingling, pain

## 2022-06-24 ENCOUNTER — APPOINTMENT (OUTPATIENT)
Dept: HEPATOLOGY | Facility: CLINIC | Age: 67
End: 2022-06-24

## 2022-07-28 NOTE — ASU PATIENT PROFILE, ADULT - PATIENT'S HEIGHT AND WEIGHT RECORDED IN THE VITAL SIGNS FLOWSHEET
[FreeTextEntry1] : Patient is experiencing bilateral heel pain
[FreeTextEntry1] : Patient is experiencing bilateral heel pain
yes

## 2022-09-29 NOTE — ED ADULT TRIAGE NOTE - DOMESTIC TRAVEL HIGH RISK QUESTION
Pt states she believes she is having an allergic reaction to a new medication she started today. Medicine is Sumatriptan. Pt states she feels \"super weak and like she was given contrast for CT\". States her body feels on fire all over. Pt denies any respiratory issues  
No

## 2022-11-15 ENCOUNTER — INPATIENT (INPATIENT)
Facility: HOSPITAL | Age: 67
LOS: 3 days | Discharge: HOME | DRG: 372 | End: 2022-11-19
Attending: HOSPITALIST | Admitting: HOSPITALIST
Payer: MEDICARE

## 2022-11-15 VITALS — HEIGHT: 66 IN | WEIGHT: 149.91 LBS

## 2022-11-15 DIAGNOSIS — Z90.89 ACQUIRED ABSENCE OF OTHER ORGANS: Chronic | ICD-10-CM

## 2022-11-15 DIAGNOSIS — Z87.74 PERSONAL HISTORY OF (CORRECTED) CONGENITAL MALFORMATIONS OF HEART AND CIRCULATORY SYSTEM: Chronic | ICD-10-CM

## 2022-11-15 DIAGNOSIS — R19.7 DIARRHEA, UNSPECIFIED: ICD-10-CM

## 2022-11-15 DIAGNOSIS — Z98.890 OTHER SPECIFIED POSTPROCEDURAL STATES: Chronic | ICD-10-CM

## 2022-11-15 DIAGNOSIS — Z98.61 CORONARY ANGIOPLASTY STATUS: Chronic | ICD-10-CM

## 2022-11-15 DIAGNOSIS — Z98.89 OTHER SPECIFIED POSTPROCEDURAL STATES: Chronic | ICD-10-CM

## 2022-11-15 DIAGNOSIS — Z96.89 PRESENCE OF OTHER SPECIFIED FUNCTIONAL IMPLANTS: Chronic | ICD-10-CM

## 2022-11-15 LAB
ALBUMIN SERPL ELPH-MCNC: 3 G/DL — LOW (ref 3.3–5)
ALP SERPL-CCNC: 62 U/L — SIGNIFICANT CHANGE UP (ref 40–120)
ALT FLD-CCNC: 34 U/L — SIGNIFICANT CHANGE UP (ref 12–78)
ANION GAP SERPL CALC-SCNC: 8 MMOL/L — SIGNIFICANT CHANGE UP (ref 5–17)
APPEARANCE UR: CLEAR — SIGNIFICANT CHANGE UP
AST SERPL-CCNC: 34 U/L — SIGNIFICANT CHANGE UP (ref 15–37)
BASOPHILS # BLD AUTO: 0 K/UL — SIGNIFICANT CHANGE UP (ref 0–0.2)
BASOPHILS NFR BLD AUTO: 0 % — SIGNIFICANT CHANGE UP (ref 0–2)
BILIRUB SERPL-MCNC: 0.4 MG/DL — SIGNIFICANT CHANGE UP (ref 0.2–1.2)
BILIRUB UR-MCNC: NEGATIVE — SIGNIFICANT CHANGE UP
BUN SERPL-MCNC: 36 MG/DL — HIGH (ref 7–23)
CALCIUM SERPL-MCNC: 8.2 MG/DL — LOW (ref 8.5–10.1)
CHLORIDE SERPL-SCNC: 103 MMOL/L — SIGNIFICANT CHANGE UP (ref 96–108)
CO2 SERPL-SCNC: 22 MMOL/L — SIGNIFICANT CHANGE UP (ref 22–31)
COLOR SPEC: YELLOW — SIGNIFICANT CHANGE UP
CREAT SERPL-MCNC: 1.36 MG/DL — HIGH (ref 0.5–1.3)
DIFF PNL FLD: ABNORMAL
EGFR: 57 ML/MIN/1.73M2 — LOW
EOSINOPHIL # BLD AUTO: 0 K/UL — SIGNIFICANT CHANGE UP (ref 0–0.5)
EOSINOPHIL NFR BLD AUTO: 0 % — SIGNIFICANT CHANGE UP (ref 0–6)
FLUAV AG NPH QL: SIGNIFICANT CHANGE UP
FLUBV AG NPH QL: SIGNIFICANT CHANGE UP
GLUCOSE SERPL-MCNC: 132 MG/DL — HIGH (ref 70–99)
GLUCOSE UR QL: NEGATIVE — SIGNIFICANT CHANGE UP
HCT VFR BLD CALC: 38.9 % — LOW (ref 39–50)
HGB BLD-MCNC: 13 G/DL — SIGNIFICANT CHANGE UP (ref 13–17)
HIV 1 & 2 AB SERPL IA.RAPID: SIGNIFICANT CHANGE UP
KETONES UR-MCNC: NEGATIVE — SIGNIFICANT CHANGE UP
LEUKOCYTE ESTERASE UR-ACNC: ABNORMAL
LIDOCAIN IGE QN: 132 U/L — SIGNIFICANT CHANGE UP (ref 73–393)
LYMPHOCYTES # BLD AUTO: 0.17 K/UL — LOW (ref 1–3.3)
LYMPHOCYTES # BLD AUTO: 6 % — LOW (ref 13–44)
MCHC RBC-ENTMCNC: 32.7 PG — SIGNIFICANT CHANGE UP (ref 27–34)
MCHC RBC-ENTMCNC: 33.4 GM/DL — SIGNIFICANT CHANGE UP (ref 32–36)
MCV RBC AUTO: 98 FL — SIGNIFICANT CHANGE UP (ref 80–100)
MONOCYTES # BLD AUTO: 0.29 K/UL — SIGNIFICANT CHANGE UP (ref 0–0.9)
MONOCYTES NFR BLD AUTO: 10 % — SIGNIFICANT CHANGE UP (ref 2–14)
NEUTROPHILS # BLD AUTO: 2.44 K/UL — SIGNIFICANT CHANGE UP (ref 1.8–7.4)
NEUTROPHILS NFR BLD AUTO: 83 % — HIGH (ref 43–77)
NITRITE UR-MCNC: NEGATIVE — SIGNIFICANT CHANGE UP
NRBC # BLD: SIGNIFICANT CHANGE UP /100 WBCS (ref 0–0)
PH UR: 5 — SIGNIFICANT CHANGE UP (ref 5–8)
PLATELET # BLD AUTO: 177 K/UL — SIGNIFICANT CHANGE UP (ref 150–400)
POTASSIUM SERPL-MCNC: 3 MMOL/L — LOW (ref 3.5–5.3)
POTASSIUM SERPL-SCNC: 3 MMOL/L — LOW (ref 3.5–5.3)
PROT SERPL-MCNC: 6.8 GM/DL — SIGNIFICANT CHANGE UP (ref 6–8.3)
PROT UR-MCNC: SIGNIFICANT CHANGE UP MG/DL
RBC # BLD: 3.97 M/UL — LOW (ref 4.2–5.8)
RBC # FLD: 13.4 % — SIGNIFICANT CHANGE UP (ref 10.3–14.5)
RSV RNA NPH QL NAA+NON-PROBE: SIGNIFICANT CHANGE UP
SARS-COV-2 RNA SPEC QL NAA+PROBE: SIGNIFICANT CHANGE UP
SODIUM SERPL-SCNC: 133 MMOL/L — LOW (ref 135–145)
SP GR SPEC: 1.01 — SIGNIFICANT CHANGE UP (ref 1.01–1.02)
UROBILINOGEN FLD QL: NEGATIVE — SIGNIFICANT CHANGE UP
WBC # BLD: 2.91 K/UL — LOW (ref 3.8–10.5)
WBC # FLD AUTO: 2.91 K/UL — LOW (ref 3.8–10.5)

## 2022-11-15 PROCEDURE — 74177 CT ABD & PELVIS W/CONTRAST: CPT | Mod: 26,MA

## 2022-11-15 PROCEDURE — 87493 C DIFF AMPLIFIED PROBE: CPT

## 2022-11-15 PROCEDURE — 36415 COLL VENOUS BLD VENIPUNCTURE: CPT

## 2022-11-15 PROCEDURE — 83735 ASSAY OF MAGNESIUM: CPT

## 2022-11-15 PROCEDURE — 99285 EMERGENCY DEPT VISIT HI MDM: CPT

## 2022-11-15 PROCEDURE — 87507 IADNA-DNA/RNA PROBE TQ 12-25: CPT

## 2022-11-15 PROCEDURE — 84100 ASSAY OF PHOSPHORUS: CPT

## 2022-11-15 PROCEDURE — 80048 BASIC METABOLIC PNL TOTAL CA: CPT

## 2022-11-15 PROCEDURE — 87077 CULTURE AEROBIC IDENTIFY: CPT

## 2022-11-15 PROCEDURE — 85027 COMPLETE CBC AUTOMATED: CPT

## 2022-11-15 PROCEDURE — 99223 1ST HOSP IP/OBS HIGH 75: CPT

## 2022-11-15 PROCEDURE — 87045 FECES CULTURE AEROBIC BACT: CPT

## 2022-11-15 RX ORDER — SODIUM CHLORIDE 9 MG/ML
1000 INJECTION INTRAMUSCULAR; INTRAVENOUS; SUBCUTANEOUS
Refills: 0 | Status: DISCONTINUED | OUTPATIENT
Start: 2022-11-15 | End: 2022-11-16

## 2022-11-15 RX ORDER — EMTRICITABINE AND TENOFOVIR DISOPROXIL FUMARATE 200; 300 MG/1; MG/1
1 TABLET, FILM COATED ORAL DAILY
Refills: 0 | Status: DISCONTINUED | OUTPATIENT
Start: 2022-11-15 | End: 2022-11-19

## 2022-11-15 RX ORDER — ONDANSETRON 8 MG/1
4 TABLET, FILM COATED ORAL EVERY 8 HOURS
Refills: 0 | Status: DISCONTINUED | OUTPATIENT
Start: 2022-11-15 | End: 2022-11-15

## 2022-11-15 RX ORDER — BUPROPION HYDROCHLORIDE 150 MG/1
300 TABLET, EXTENDED RELEASE ORAL DAILY
Refills: 0 | Status: DISCONTINUED | OUTPATIENT
Start: 2022-11-15 | End: 2022-11-19

## 2022-11-15 RX ORDER — ONDANSETRON 8 MG/1
4 TABLET, FILM COATED ORAL EVERY 8 HOURS
Refills: 0 | Status: DISCONTINUED | OUTPATIENT
Start: 2022-11-15 | End: 2022-11-19

## 2022-11-15 RX ORDER — POTASSIUM CHLORIDE 20 MEQ
40 PACKET (EA) ORAL ONCE
Refills: 0 | Status: COMPLETED | OUTPATIENT
Start: 2022-11-15 | End: 2022-11-15

## 2022-11-15 RX ORDER — CX-024414 0.2 MG/ML
0.5 INJECTION, SUSPENSION INTRAMUSCULAR
Qty: 0 | Refills: 0 | DISCHARGE

## 2022-11-15 RX ORDER — SODIUM CHLORIDE 9 MG/ML
1000 INJECTION INTRAMUSCULAR; INTRAVENOUS; SUBCUTANEOUS ONCE
Refills: 0 | Status: COMPLETED | OUTPATIENT
Start: 2022-11-15 | End: 2022-11-15

## 2022-11-15 RX ORDER — VANCOMYCIN HCL 1 G
500 VIAL (EA) INTRAVENOUS EVERY 6 HOURS
Refills: 0 | Status: DISCONTINUED | OUTPATIENT
Start: 2022-11-15 | End: 2022-11-16

## 2022-11-15 RX ORDER — ONDANSETRON 8 MG/1
4 TABLET, FILM COATED ORAL ONCE
Refills: 0 | Status: COMPLETED | OUTPATIENT
Start: 2022-11-15 | End: 2022-11-15

## 2022-11-15 RX ORDER — FERROUS SULFATE 325(65) MG
1 TABLET ORAL
Qty: 0 | Refills: 0 | DISCHARGE

## 2022-11-15 RX ORDER — NICOTINE POLACRILEX 2 MG
2 GUM BUCCAL
Refills: 0 | Status: DISCONTINUED | OUTPATIENT
Start: 2022-11-15 | End: 2022-11-19

## 2022-11-15 RX ORDER — VENLAFAXINE HCL 75 MG
75 CAPSULE, EXT RELEASE 24 HR ORAL DAILY
Refills: 0 | Status: DISCONTINUED | OUTPATIENT
Start: 2022-11-15 | End: 2022-11-19

## 2022-11-15 RX ORDER — METOPROLOL TARTRATE 50 MG
25 TABLET ORAL DAILY
Refills: 0 | Status: DISCONTINUED | OUTPATIENT
Start: 2022-11-15 | End: 2022-11-19

## 2022-11-15 RX ORDER — CHOLECALCIFEROL (VITAMIN D3) 125 MCG
1 CAPSULE ORAL
Qty: 0 | Refills: 0 | DISCHARGE

## 2022-11-15 RX ORDER — LANOLIN ALCOHOL/MO/W.PET/CERES
3 CREAM (GRAM) TOPICAL AT BEDTIME
Refills: 0 | Status: DISCONTINUED | OUTPATIENT
Start: 2022-11-15 | End: 2022-11-19

## 2022-11-15 RX ORDER — ACETAMINOPHEN 500 MG
650 TABLET ORAL EVERY 6 HOURS
Refills: 0 | Status: DISCONTINUED | OUTPATIENT
Start: 2022-11-15 | End: 2022-11-19

## 2022-11-15 RX ORDER — CELECOXIB 200 MG/1
200 CAPSULE ORAL DAILY
Refills: 0 | Status: DISCONTINUED | OUTPATIENT
Start: 2022-11-15 | End: 2022-11-19

## 2022-11-15 RX ORDER — ENOXAPARIN SODIUM 100 MG/ML
40 INJECTION SUBCUTANEOUS EVERY 24 HOURS
Refills: 0 | Status: DISCONTINUED | OUTPATIENT
Start: 2022-11-15 | End: 2022-11-19

## 2022-11-15 RX ORDER — ATORVASTATIN CALCIUM 80 MG/1
10 TABLET, FILM COATED ORAL AT BEDTIME
Refills: 0 | Status: DISCONTINUED | OUTPATIENT
Start: 2022-11-15 | End: 2022-11-19

## 2022-11-15 RX ORDER — ASPIRIN/CALCIUM CARB/MAGNESIUM 324 MG
81 TABLET ORAL DAILY
Refills: 0 | Status: DISCONTINUED | OUTPATIENT
Start: 2022-11-15 | End: 2022-11-19

## 2022-11-15 RX ADMIN — SODIUM CHLORIDE 1000 MILLILITER(S): 9 INJECTION INTRAMUSCULAR; INTRAVENOUS; SUBCUTANEOUS at 13:00

## 2022-11-15 RX ADMIN — SODIUM CHLORIDE 125 MILLILITER(S): 9 INJECTION INTRAMUSCULAR; INTRAVENOUS; SUBCUTANEOUS at 20:46

## 2022-11-15 RX ADMIN — ONDANSETRON 4 MILLIGRAM(S): 8 TABLET, FILM COATED ORAL at 13:00

## 2022-11-15 RX ADMIN — Medication 30 MILLILITER(S): at 23:45

## 2022-11-15 RX ADMIN — BUPROPION HYDROCHLORIDE 300 MILLIGRAM(S): 150 TABLET, EXTENDED RELEASE ORAL at 21:25

## 2022-11-15 RX ADMIN — Medication 25 MILLIGRAM(S): at 18:53

## 2022-11-15 RX ADMIN — Medication 75 MILLIGRAM(S): at 18:54

## 2022-11-15 RX ADMIN — Medication 500 MILLIGRAM(S): at 23:45

## 2022-11-15 RX ADMIN — Medication 500 MILLIGRAM(S): at 18:53

## 2022-11-15 RX ADMIN — SODIUM CHLORIDE 1000 MILLILITER(S): 9 INJECTION INTRAMUSCULAR; INTRAVENOUS; SUBCUTANEOUS at 09:45

## 2022-11-15 RX ADMIN — ATORVASTATIN CALCIUM 10 MILLIGRAM(S): 80 TABLET, FILM COATED ORAL at 21:25

## 2022-11-15 RX ADMIN — Medication 40 MILLIEQUIVALENT(S): at 12:59

## 2022-11-15 RX ADMIN — ONDANSETRON 4 MILLIGRAM(S): 8 TABLET, FILM COATED ORAL at 09:45

## 2022-11-15 RX ADMIN — Medication 81 MILLIGRAM(S): at 18:53

## 2022-11-15 RX ADMIN — EMTRICITABINE AND TENOFOVIR DISOPROXIL FUMARATE 1 TABLET(S): 200; 300 TABLET, FILM COATED ORAL at 18:59

## 2022-11-15 NOTE — CONSULT NOTE ADULT - SUBJECTIVE AND OBJECTIVE BOX
66 y/o male with a PMHx of anxiety, aortic stenosis, carpal tunnel syndrome, chronic neck and back pain, colon polyps, depressive disorder, GI hemorrhage, GERD, H pylori, herniated lumbar disc, HTN, HLD, internal and external prolapsed hemorrhoids, loss of hearing, morbid obesity, sciatica, skin cancer presents to the ED c/o abd pain and diarrhea x3 days.  Denies CP, SOB, n/v. Recent abx use. No other complaints at this time.    ICU Vital Signs Last 24 Hrs  T(C): 36.2 (15 Nov 2022 19:00), Max: 37.6 (15 Nov 2022 15:20)  T(F): 97.2 (15 Nov 2022 19:00), Max: 99.7 (15 Nov 2022 15:20)  HR: 92 (15 Nov 2022 19:00) (61 - 92)  BP: 123/64 (15 Nov 2022 19:00) (92/55 - 123/64)  BP(mean): 66 (15 Nov 2022 15:20) (66 - 81)  ABP: --  ABP(mean): --  RR: 18 (15 Nov 2022 19:00) (18 - 18)  SpO2: 99% (15 Nov 2022 19:00) (96% - 99%)    O2 Parameters below as of 15 Nov 2022 19:00  Patient On (Oxygen Delivery Method): room air.    · CONSTITUTIONAL: Well appearing, awake, alert, oriented to person, place, time/situation and in no apparent distress.  · ENMT: Airway patent, Nasal mucosa clear. Mouth with dry mucosa. Throat has no vesicles, no oropharyngeal exudates and uvula is midline.  · EYES: Clear bilaterally, pupils equal, round and reactive to light.  · CARDIAC: Normal rate, regular rhythm.  Heart sounds S1, S2.  No murmurs, rubs or gallops.  · RESPIRATORY: Breath sounds clear and equal bilaterally.  · GASTROINTESTINAL: Abdomen soft, no guarding. LLQ TTP  · MUSCULOSKELETAL: Spine appears normal, range of motion is not limited, no muscle or joint tenderness  · NEUROLOGICAL: Alert and oriented, no focal deficits, no motor or sensory deficits.  · SKIN: Skin normal color for race, warm, dry and intact. No evidence of rash.                          13.0   2.91  )-----------( 177      ( 15 Nov 2022 09:36 )             38.9     11-15    133<L>  |  103  |  36<H>  ----------------------------<  132<H>  3.0<L>   |  22  |  1.36<H>    Ca    8.2<L>      15 Nov 2022 09:36    TPro  6.8  /  Alb  3.0<L>  /  TBili  0.4  /  DBili  x   /  AST  34  /  ALT  34  /  AlkPhos  62  11-15

## 2022-11-15 NOTE — ED ADULT NURSE NOTE - OBJECTIVE STATEMENT
diarrhea, nausea, LUQ pain since Sunday. pt. is ambulatory but c/o weakness, is able to tolerate small amounts of food.   pt. denies chills, fevers,   sp gastric sx 2 years ago.

## 2022-11-15 NOTE — ED ADULT NURSE REASSESSMENT NOTE - NS ED NURSE REASSESS COMMENT FT1
Pt resting comfortably in bed with no acute distress noted. Pt updated on their status, the current plan of care, and available results no needs or requests at this time. Call bell within reach. Will continue to monitor/reassess.   Contact precautions maintained

## 2022-11-15 NOTE — ED PROVIDER NOTE - NS ED MD DISPO ISOLATION TYPES
Appt with Dr. Dorsey MICKY Reeder  
None

## 2022-11-15 NOTE — ED ADULT TRIAGE NOTE - IDEAL BODY WEIGHT(KG)
64 Performing Laboratory: [id number] Lab Facility: [id number] Bill For Surgical Tray: no Expected Date Of Service: 11/02/2022 Billing Type: [employer]

## 2022-11-15 NOTE — ED PROVIDER NOTE - OBJECTIVE STATEMENT
68 y/o male with a PMHx of anxiety, aortic stenosis, carpal tunnel syndrome, chronic neck and back pain, colon polyps, depressive disorder, GI hemorrhage, GERD, H pylori, herniated lumbar disc, HTN, HLD, internal and external prolapsed hemorrhoids, loss of hearing, morbid obesity, sciatica, skin cancer presents to the ED c/o abd pain and diarrhea x2 days.  Denies CP, SOB, n/v. No recent abx use. No other complaints at this time. 68 y/o male with a PMHx of anxiety, aortic stenosis, carpal tunnel syndrome, chronic neck and back pain, colon polyps, depressive disorder, GI hemorrhage, GERD, H pylori, herniated lumbar disc, HTN, HLD, internal and external prolapsed hemorrhoids, loss of hearing, morbid obesity, sciatica, skin cancer presents to the ED c/o abd pain and diarrhea x3 days.  Denies CP, SOB, n/v. Recent abx use. No other complaints at this time.

## 2022-11-15 NOTE — H&P ADULT - HISTORY OF PRESENT ILLNESS
HPI:  67M w/PMH depression, HTN, gastric bypass 2021, presents today c/o intractable watery diarrhea over past several days, already many episodes today.  He denies associated abd pain, n/v, fever/chills.  He had recent abx use over 1 month ago for UTI.  He denies recent travel or sick contacts.     IN ED, wbc 2.9, Na 133, K 3, Cr 1.3 (prior 1.0), given 2L ivf, KCl, CT a/p New mural thickening of the ascending, transverse, descending, and sigmoid colon as well as of the rectum, suggestive of a nonspecific proctocolitis. The common bile duct is mildly dilated. The pancreatic parenchyma appears  unremarkable. The main pancreatic duct in the pancreatic head is dilated.     PAST MEDICAL & SURGICAL HISTORY:  Hyperlipidemia  GERD (gastroesophageal reflux disease)  Anxiety  Skin cancer external right ear, unsure of type  HTN (hypertension)  Aortic stenosis Bicuspid Aortic Valve Replacement- 2018  Chronic neck and back pain  Sciatica  Internal and external prolapsed hemorrhoidshad surgery  Helicobacter pylori infection  Gastrointestinal hemorrhage  Carpal tunnel syndromehad surgery  Depressive disorder  Morbid obesity s/p gastric bypass  s/p gastric bypass 2021  S/P carpal tunnel release right side, left side-   History of back surgery laminectomy x2 last   S/P trigger finger release right thumb and middle finger  H/O external ear surgery excision mass right ear- cancer 2018  H/O bicuspid aortic valve replacement- 18  S/P tonsillectomy  H/O hemorrhoidectomy  Spinal cord stimulator status 10/2020  s/p cholecystectomy 2021  s/p appendectomy 2021      Review of Systems:   CONSTITUTIONAL: No fever.  EYES: No eye pain or discharge.  ENMT:  No sinus or throat pain  NECK: No pain or stiffness  RESPIRATORY: No cough, wheezing, chills or hemoptysis; No shortness of breath  CARDIOVASCULAR: No chest pain, palpitations, dizziness, or leg swelling  GASTROINTESTINAL: ++ abdominal  pain. No nausea, vomiting, or hematemesis; ++ diarrhea  No melena or hematochezia.  GENITOURINARY: No dysuria or incontinence  NEUROLOGICAL: No headaches, memory loss, loss of strength, numbness, or tremors  SKIN: No rashes.  MUSCULOSKELETAL: No joint pain or swelling; No muscle, back, or extremity pain  PSYCHIATRIC: ++ depression,       Social History:   few cigs/day  denies etoh/drug  lives w/ dtr, ind ADLs    FAMILY HISTORY:  FH: HTN (hypertension) father    MEDICATIONS  (STANDING):  aspirin enteric coated 81 milliGRAM(s) Oral daily  atorvastatin 10 milliGRAM(s) Oral at bedtime  buPROPion XL (24-Hour) . 300 milliGRAM(s) Oral daily  celecoxib 200 milliGRAM(s) Oral daily  emtricitabine 200 mG/tenofovir alafenamide 25 mG (DESCOVY) Tablet 1 Tablet(s) Oral daily  enoxaparin Injectable 40 milliGRAM(s) SubCutaneous every 24 hours  metoprolol tartrate 25 milliGRAM(s) Oral daily  sodium chloride 0.9%. 1000 milliLiter(s) (125 mL/Hr) IV Continuous <Continuous>  vancomycin    Solution 500 milliGRAM(s) Oral every 6 hours  venlafaxine 75 milliGRAM(s) Oral daily    MEDICATIONS  (PRN):  acetaminophen     Tablet .. 650 milliGRAM(s) Oral every 6 hours PRN Temp greater or equal to 38C (100.4F), Mild Pain (1 - 3)  aluminum hydroxide/magnesium hydroxide/simethicone Suspension 30 milliLiter(s) Oral every 4 hours PRN Dyspepsia  melatonin 3 milliGRAM(s) Oral at bedtime PRN Insomnia  nicotine  Polacrilex Gum 2 milliGRAM(s) Oral every 2 hours PRN breakthrough cravings  ondansetron Injectable 4 milliGRAM(s) IV Push every 8 hours PRN Nausea and/or Vomiting    PHYSICAL EXAM:  Vital Signs Last 24 Hrs  T(C): 36.2 (15 Nov 2022 19:00), Max: 37.6 (15 Nov 2022 15:20)  T(F): 97.2 (15 Nov 2022 19:00), Max: 99.7 (15 Nov 2022 15:20)  HR: 92 (15 Nov 2022 19:00) (61 - 92)  BP: 123/64 (15 Nov 2022 19:00) (92/55 - 123/64)  BP(mean): 66 (15 Nov 2022 15:20) (66 - 81)  RR: 18 (15 Nov 2022 19:00) (18 - 18)  SpO2: 99% (15 Nov 2022 19:00) (96% - 99%)    Parameters below as of 15 Nov 2022 19:00  Patient On (Oxygen Delivery Method): room air      GENERAL: NAD,   HEAD:  Atraumatic, Normocephalic  EYES: EOMI, PERRLA, conjunctiva and sclera clear  ENT: normal hearing, no nasal discharge, throat clear, dentition normal  NECK: Supple, No JVD, no LAD, no thyromegaly   CHEST/LUNG: Clear to auscultation bilaterally; No wheeze, respirations unlabored  HEART: Regular rate and rhythm; No murmurs, rubs, or gallops  ABDOMEN: Soft, Nontender, Nondistended; Bowel sounds present, no HSM  EXTREMITIES:  2+ Peripheral Pulses, No clubbing, cyanosis, or edema  PSYCH: AAOx3, normal behavior  NEUROLOGY: non-focal, sensory and cn 2-12 intact, speech/language intact  SKIN: No visible rashes or lesions    LABS:                        13.0   2.91  )-----------( 177      ( 15 Nov 2022 09:36 )             38.9     11-15    133<L>  |  103  |  36<H>  ----------------------------<  132<H>  3.0<L>   |  22  |  1.36<H>    Ca    8.2<L>      15 Nov 2022 09:36    TPro  6.8  /  Alb  3.0<L>  /  TBili  0.4  /  DBili  x   /  AST  34  /  ALT  34  /  AlkPhos  62  11-15          Urinalysis Basic - ( 15 Nov 2022 09:36 )    Color: Yellow / Appearance: Clear / S.015 / pH: x  Gluc: x / Ketone: Negative  / Bili: Negative / Urobili: Negative   Blood: x / Protein: Trace mg/dL / Nitrite: Negative   Leuk Esterase: Trace / RBC: 3-5 /HPF / WBC 3-5   Sq Epi: x / Non Sq Epi: Few / Bacteria: Few        RADIOLOGY & ADDITIONAL TESTS:    Imaging Personally Reviewed:  ct a/p New mural thickening of the ascending, transverse, descending, and sigmoid colon as well as of the rectum, suggestive of a nonspecific proctocolitis. Clinical correlation is recommended. Follow-up colonoscopy after treatment/resolution of acute disease may be pursued for additional evaluation.  The common bile duct is mildly dilated. The pancreatic parenchyma appears  unremarkable. The main pancreatic duct in the pancreatic head is dilated. Abdominal MR without and with IV contrast including MRCP may be pursued  for further evaluation.    EKG Personally Reviewed:  n/a  Consultant(s) Notes Reviewed:      Care Discussed with Consultants/Other Providers:

## 2022-11-15 NOTE — H&P ADULT - ASSESSMENT
[facility]    [address]    [address]    [place] [address]    [phone] Fax: [phone]    Foot and Nail Assessment    Certification Period: 2/28/2017 - 5/29/2017  Referring Physician: CJ Velazquez  Primary Physician: Michelle Castro MD  Consulting Physicians:   Start of Care: 2/28/2017      Subjective:       HPI: Pt living at home with wife. Pt referred to the wound clinic for foot wounds of unspecified locations.  Pt states that he is not sure why the wounds developed on the dorsum of his toes.  He does not think it is his footwear but possible trauma. Discharged 2/8/2017, Referred for foot and nail care by CJ Adair    History of foot ulceration(s): Yes_x___ No_____ rt foot 2nd and 3rd toes  History of foot surgery: Yes____ No_X___    Describe:____________________________________________________    Employed: Y ___ N _X__ Job description: __retired_______________    Current Residence: ___lives with wife______      Pain: Denies    Past Medical History:   Past Medical History   Diagnosis Date   • Arthritis    • Diabetes    • Diabetic neuropathy    • Hyperlipidemia    • Hypertension    • Back pain    • Diverticulitis    • Pulmonary nodule      Allergies: Pcn    Current Medications:   Current outpatient prescriptions:   •  levothyroxine (SYNTHROID) 50 MCG Tab, Take 50 mcg by mouth Every morning on an empty stomach., Disp: , Rfl:   •  metformin (GLUCOPHAGE) 1000 MG tablet, Take 1,000 mg by mouth 2 times a day, with meals., Disp: , Rfl:   •  atorvastatin (LIPITOR) 10 MG Tab, Take 10 mg by mouth every evening., Disp: , Rfl:   •  levothyroxine (SYNTHROID) 25 mcg/mL Suspension, Take 4 mcg/kg/day by mouth Every morning on an empty stomach., Disp: , Rfl:      Social History     Social History   • Marital Status:      Spouse Name: N/A   • Number of Children: N/A   • Years of Education: N/A     Occupational History   • Not on file.     Social History Main Topics   • Smoking  "status: Former Smoker -- 1.00 packs/day for 59 years     Start date: 01/01/1956     Quit date: 06/18/2014   • Smokeless tobacco: Not on file   • Alcohol Use: No   • Drug Use: No   • Sexual Activity: Not on file     Other Topics Concern   • Not on file     Social History Narrative     Fall Risk Assessment (edgardo all that apply with an X):   Completed at initial eval + fall risk      Objective:     Tests and Measures: Pt doesn't measure blood sugars. Good resistance strength AMAURY feet dorsiflextion and plantar flexion against clinician's hands. Good ROM of ankles. Denies pain in calves when walking. Feet warm, pulses to AMAURY DP palpable, faint  Austen tech Rt foot DP/PT biphasic PAOLA /140=1 /140 = 1.1   Pt states that feeling in feet \"coming back. It's different, I feel things better.\" Pt has ticklish feet      5/29/2017 5.07 Monofilament testing (10 pt): Right__10/10_ ______ Left_10/10__  HgbA1C: 3/17/17/7.1    Vascular   R   L       1+ 1+ DP pulse     Non palp Non palp PT pulse     x x Capillary refill < 3 sec         Deformities    R   L        2-5  1-5 Hammer/claw toe      yes  yes Hallux Valgus      1st  1st Prominent Metatarsal Heads         Charcot Foot         Drop Foot         Rocker Bottom         Prior amputation:         Other:       Lt 2nd toe slightly overlaps 3rd           Clinical appearance/skin    Dryness___mild_ _________ Swelling__mild____________ great toe Lt slightly red____________Temperature__warm_ ______    Callus__Rt 1st MTH plantar; Lt 1st MTH plantar; LT 5th plantar MTH __ _______________________________________    Ulceration none_      Clinical appearance/toenails    Onychomycosis___all 10 thick_ ______________    Ingrown toenails_______    Deformities_________________________________    Other______________________________________        Orthotic, protective, supportive devices: diabetic footwear. Pt states these shoes are 15 mos old, will get new pair soon.     Procedures: Pt's " feet were washed with soapy water, then dried. In between toes rubbed with gauze to remove debris. Cuticle and nailbed margins were established and debris removed from around and beneath toes with a curette. All ten onychomycotic nails were trimmed with clippers and then ground down with dremel, clipped again, then smoothed and finished with dremel and jian board. Scalpel used to excise callus to Rt 1st MTH plantar; Lt 1st MTH plantar; LT 5th MTH. Tea tree oil applied to nailbeds and cuticles. Feet were moisturized bilaterally except for between toes. Feet, toes examined, no nicks or cuts noted. Pt tolerated procedure well.       Patient Education:  IInstructed pt that no cuts or nicks sustained during treatment; to get new diabetic footwear; tea tree oil for TX of fungus, to moisturize feet daily except between toes with water-based moisturizer; to return to [facility] for foot and nail care every 2-3 months. Pt verbalized understanding of all instruction. Pt knowledgeable on examining feet daily for new areas of redness, wounds.       Professional Collaboration:  none today.       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   In ED, wbc 2.9, Na 133, K 3, Cr 1.3 (prior 1.0), given 2L ivf, KCl, CT a/p New mural thickening of the ascending, transverse, descending, and sigmoid colon as well as of the rectum, suggestive of a nonspecific proctocolitis. The common bile duct is mildly dilated. The pancreatic parenchyma appears  unremarkable. The main pancreatic duct in the pancreatic head is dilated.     #Diarrhea, evaluate for infectious  #colitis  - check c diff, GI PCR  - ivf  - start vanco po empirically   - monitor vitals, labs  - GI, ID cs  - bariatric surgery cs- to evaluate if related to his prior surgery    #hyponatremia  #Hypokalemia  - likely 2/2 gi losses  - replete   - check am labs    #RAYMUNDO- 2/2 diarrhea-  - ivf  - am labs, further work up if worsens    #Dilated cbd - suspect d/t prior cholecystectomy, pt denies any RUQ pain, LFTs normal  - further recs per GI if pt should have MRI/MRCP    #PPX- lovenox

## 2022-11-15 NOTE — ED ADULT NURSE REASSESSMENT NOTE - NS ED NURSE REASSESS COMMENT FT1
pt gave RN rx order for cialis 20 mg. spoke with pharmacist on and notified that pt currently prescribed 5 mg daily, and 20 mg prior to intercourse. pt states he takes 20 mg daily. notified pt that in order to receive this dose, prescribing MD must be notified to speak with admitting MD. pt refusing to take any other dose at this time.

## 2022-11-15 NOTE — PATIENT PROFILE ADULT - FALL HARM RISK - UNIVERSAL INTERVENTIONS
Bed in lowest position, wheels locked, appropriate side rails in place/Call bell, personal items and telephone in reach/Instruct patient to call for assistance before getting out of bed or chair/Non-slip footwear when patient is out of bed/[place] to call system/Physically safe environment - no spills, clutter or unnecessary equipment/Purposeful Proactive Rounding/Room/bathroom lighting operational, light cord in reach

## 2022-11-15 NOTE — ED PROVIDER NOTE - CLINICAL SUMMARY MEDICAL DECISION MAKING FREE TEXT BOX
Adult male hx of bariatric surgery presents with diarrhea x3 days. Pt not a diabetic. Pt did complete course of abx recently for ?UTI vs colitis. Pt had over 5 episodes of diarrhea today. Pt dry on exam. On lab workup, pt found to be hypokalemic and hyponatremic. CT shows diffuse proctocolitis as well as common bile duct dilation. Will test for Cdiff and likely admit to medicine.

## 2022-11-16 LAB
ANION GAP SERPL CALC-SCNC: 6 MMOL/L — SIGNIFICANT CHANGE UP (ref 5–17)
BUN SERPL-MCNC: 20 MG/DL — SIGNIFICANT CHANGE UP (ref 7–23)
C DIFF BY PCR RESULT: SIGNIFICANT CHANGE UP
CALCIUM SERPL-MCNC: 8.3 MG/DL — LOW (ref 8.5–10.1)
CHLORIDE SERPL-SCNC: 108 MMOL/L — SIGNIFICANT CHANGE UP (ref 96–108)
CO2 SERPL-SCNC: 23 MMOL/L — SIGNIFICANT CHANGE UP (ref 22–31)
CREAT SERPL-MCNC: 0.99 MG/DL — SIGNIFICANT CHANGE UP (ref 0.5–1.3)
CULTURE RESULTS: SIGNIFICANT CHANGE UP
EGFR: 83 ML/MIN/1.73M2 — SIGNIFICANT CHANGE UP
GI PCR PANEL: DETECTED
GLUCOSE SERPL-MCNC: 107 MG/DL — HIGH (ref 70–99)
HCT VFR BLD CALC: 34.5 % — LOW (ref 39–50)
HGB BLD-MCNC: 11.8 G/DL — LOW (ref 13–17)
MCHC RBC-ENTMCNC: 33.2 PG — SIGNIFICANT CHANGE UP (ref 27–34)
MCHC RBC-ENTMCNC: 34.2 GM/DL — SIGNIFICANT CHANGE UP (ref 32–36)
MCV RBC AUTO: 97.2 FL — SIGNIFICANT CHANGE UP (ref 80–100)
PLATELET # BLD AUTO: 163 K/UL — SIGNIFICANT CHANGE UP (ref 150–400)
POTASSIUM SERPL-MCNC: 3 MMOL/L — LOW (ref 3.5–5.3)
POTASSIUM SERPL-SCNC: 3 MMOL/L — LOW (ref 3.5–5.3)
RBC # BLD: 3.55 M/UL — LOW (ref 4.2–5.8)
RBC # FLD: 13.2 % — SIGNIFICANT CHANGE UP (ref 10.3–14.5)
SHIGELLA DNA SPEC QL NAA+PROBE: DETECTED
SODIUM SERPL-SCNC: 137 MMOL/L — SIGNIFICANT CHANGE UP (ref 135–145)
SPECIMEN SOURCE: SIGNIFICANT CHANGE UP
WBC # BLD: 4.79 K/UL — SIGNIFICANT CHANGE UP (ref 3.8–10.5)
WBC # FLD AUTO: 4.79 K/UL — SIGNIFICANT CHANGE UP (ref 3.8–10.5)

## 2022-11-16 PROCEDURE — 99233 SBSQ HOSP IP/OBS HIGH 50: CPT

## 2022-11-16 RX ORDER — CIPROFLOXACIN LACTATE 400MG/40ML
400 VIAL (ML) INTRAVENOUS ONCE
Refills: 0 | Status: COMPLETED | OUTPATIENT
Start: 2022-11-16 | End: 2022-11-16

## 2022-11-16 RX ORDER — POTASSIUM CHLORIDE 20 MEQ
40 PACKET (EA) ORAL EVERY 4 HOURS
Refills: 0 | Status: COMPLETED | OUTPATIENT
Start: 2022-11-16 | End: 2022-11-16

## 2022-11-16 RX ORDER — VANCOMYCIN HCL 1 G
125 VIAL (EA) INTRAVENOUS EVERY 6 HOURS
Refills: 0 | Status: DISCONTINUED | OUTPATIENT
Start: 2022-11-16 | End: 2022-11-16

## 2022-11-16 RX ORDER — DEXTROSE MONOHYDRATE, SODIUM CHLORIDE, AND POTASSIUM CHLORIDE 50; .745; 4.5 G/1000ML; G/1000ML; G/1000ML
1000 INJECTION, SOLUTION INTRAVENOUS
Refills: 0 | Status: DISCONTINUED | OUTPATIENT
Start: 2022-11-16 | End: 2022-11-19

## 2022-11-16 RX ORDER — CIPROFLOXACIN LACTATE 400MG/40ML
VIAL (ML) INTRAVENOUS
Refills: 0 | Status: DISCONTINUED | OUTPATIENT
Start: 2022-11-16 | End: 2022-11-19

## 2022-11-16 RX ORDER — CIPROFLOXACIN LACTATE 400MG/40ML
400 VIAL (ML) INTRAVENOUS EVERY 12 HOURS
Refills: 0 | Status: DISCONTINUED | OUTPATIENT
Start: 2022-11-17 | End: 2022-11-19

## 2022-11-16 RX ADMIN — Medication 40 MILLIEQUIVALENT(S): at 21:28

## 2022-11-16 RX ADMIN — ENOXAPARIN SODIUM 40 MILLIGRAM(S): 100 INJECTION SUBCUTANEOUS at 12:12

## 2022-11-16 RX ADMIN — Medication 200 MILLIGRAM(S): at 20:33

## 2022-11-16 RX ADMIN — Medication 25 MILLIGRAM(S): at 12:13

## 2022-11-16 RX ADMIN — SODIUM CHLORIDE 125 MILLILITER(S): 9 INJECTION INTRAMUSCULAR; INTRAVENOUS; SUBCUTANEOUS at 16:21

## 2022-11-16 RX ADMIN — ATORVASTATIN CALCIUM 10 MILLIGRAM(S): 80 TABLET, FILM COATED ORAL at 21:29

## 2022-11-16 RX ADMIN — Medication 125 MILLIGRAM(S): at 12:14

## 2022-11-16 RX ADMIN — Medication 125 MILLIGRAM(S): at 16:18

## 2022-11-16 RX ADMIN — BUPROPION HYDROCHLORIDE 300 MILLIGRAM(S): 150 TABLET, EXTENDED RELEASE ORAL at 13:02

## 2022-11-16 RX ADMIN — EMTRICITABINE AND TENOFOVIR DISOPROXIL FUMARATE 1 TABLET(S): 200; 300 TABLET, FILM COATED ORAL at 13:04

## 2022-11-16 RX ADMIN — Medication 500 MILLIGRAM(S): at 06:20

## 2022-11-16 RX ADMIN — CELECOXIB 200 MILLIGRAM(S): 200 CAPSULE ORAL at 13:39

## 2022-11-16 RX ADMIN — Medication 40 MILLIEQUIVALENT(S): at 16:18

## 2022-11-16 RX ADMIN — Medication 75 MILLIGRAM(S): at 13:02

## 2022-11-16 RX ADMIN — Medication 30 MILLILITER(S): at 17:57

## 2022-11-16 RX ADMIN — Medication 81 MILLIGRAM(S): at 12:13

## 2022-11-16 RX ADMIN — CELECOXIB 200 MILLIGRAM(S): 200 CAPSULE ORAL at 13:02

## 2022-11-16 RX ADMIN — Medication 30 MILLILITER(S): at 21:59

## 2022-11-16 NOTE — CONSULT NOTE ADULT - SUBJECTIVE AND OBJECTIVE BOX
Patient is a 67y old  Male who presents with a chief complaint of diarrhea (15 Nov 2022 15:48)    HPI:  HPI:  67M w/PMH depression, HTN, gastric bypass 2021, admitted on 11/15 for evaluation of numerous bowel movements, diarrhea, over preceding few days; has no appetite, no abdominal pain, no sick contacts, no recent travel, but anticipates a [place] cruise to leave in a few days; the patient was on antibiotics a few months ago for a UTI. No uncooked fish, chicken, no sushi use. The patient is on [place], for PreP.       PAST MEDICAL & SURGICAL HISTORY:  Hyperlipidemia  GERD (gastroesophageal reflux disease)  Anxiety  Skin cancer external right ear, unsure of type  HTN (hypertension)  Aortic stenosis Bicuspid Aortic Valve Replacement- 2018  Chronic neck and back pain  Sciatica  Internal and external prolapsed hemorrhoidshad surgery  Helicobacter pylori infection  Gastrointestinal hemorrhage  Carpal tunnel syndromehad surgery  Depressive disorder  Morbid obesity s/p gastric bypass  s/p gastric bypass 2021  S/P carpal tunnel release right side, left side-   History of back surgery laminectomy x2 last   S/P trigger finger release right thumb and middle finger  H/O external ear surgery excision mass right ear- cancer 2018  H/O bicuspid aortic valve replacement- 18  S/P tonsillectomy  H/O hemorrhoidectomy  Spinal cord stimulator status 10/2020  s/p cholecystectomy 2021  s/p appendectomy 2021      PMH: as above  PSH: as above  Meds: per reconciliation sheet, noted below  MEDICATIONS  (STANDING):  aspirin enteric coated 81 milliGRAM(s) Oral daily  atorvastatin 10 milliGRAM(s) Oral at bedtime  buPROPion XL (24-Hour) . 300 milliGRAM(s) Oral daily  celecoxib 200 milliGRAM(s) Oral daily  emtricitabine 200 mG/tenofovir alafenamide 25 mG (DESCOVY) Tablet 1 Tablet(s) Oral daily  enoxaparin Injectable 40 milliGRAM(s) SubCutaneous every 24 hours  metoprolol tartrate 25 milliGRAM(s) Oral daily  sodium chloride 0.9%. 1000 milliLiter(s) (125 mL/Hr) IV Continuous <Continuous>  vancomycin    Solution 125 milliGRAM(s) Oral every 6 hours  venlafaxine 75 milliGRAM(s) Oral daily    MEDICATIONS  (PRN):  acetaminophen     Tablet .. 650 milliGRAM(s) Oral every 6 hours PRN Temp greater or equal to 38C (100.4F), Mild Pain (1 - 3)  aluminum hydroxide/magnesium hydroxide/simethicone Suspension 30 milliLiter(s) Oral every 4 hours PRN Dyspepsia  melatonin 3 milliGRAM(s) Oral at bedtime PRN Insomnia  nicotine  Polacrilex Gum 2 milliGRAM(s) Oral every 2 hours PRN breakthrough cravings  ondansetron Injectable 4 milliGRAM(s) IV Push every 8 hours PRN Nausea and/or Vomiting    Allergies    No Known Allergies    Intolerances      Social: positive smoking, no alcohol, no illegal drugs; no recent travel, no exposure to TB  FAMILY HISTORY:  FH: HTN (hypertension)  father       no history of premature cardiovascular disease in first degree relatives  ROS: the patient denies fever, no chills, no HA, no dizziness, no sore throat, no blurry vision, no CP, no palpitations, no SOB, no cough, no abdominal pain,  no N/V, no dysuria, no leg pain, no claudication, no rash, no joint aches, no rectal pain or bleeding, no night sweats  All other systems reviewed and are negative    Vital Signs Last 24 Hrs  T(C): 36.8 (2022 07:46), Max: 37.6 (15 Nov 2022 15:20)  T(F): 98.2 (2022 07:46), Max: 99.7 (15 Nov 2022 15:20)  HR: 68 (2022 07:46) (68 - 92)  BP: 100/58 (2022 07:46) (92/55 - 124/69)  BP(mean): 70 (2022 07:46) (66 - 85)  RR: 16 (2022 07:46) (16 - 18)  SpO2: 98% (2022 07:46) (98% - 99%)    Parameters below as of 2022 04:36  Patient On (Oxygen Delivery Method): room air      Daily     Daily     PE:    Constitutional: frail looking  HEENT: NC/AT, EOMI, PERRLA, conjunctivae clear; ears and nose atraumatic; pharynx clear  Neck: supple; thyroid not palpable  Back: no tenderness  Respiratory: respiratory effort normal; clear to auscultation  Cardiovascular: S1S2 regular, no murmurs  Abdomen: soft, not tender, not distended, positive BS; no liver or spleen organomegaly  Genitourinary: no suprapubic tenderness  Musculoskeletal: no muscle tenderness, no joint swelling or tenderness  Neurological/ Psychiatric: AxOx3, judgement and insight normal;  moving all extremities  Skin: no rashes; no palpable lesions; numerous piercings, tattoos    Labs: all available labs reviewed                        11.8   4.79  )-----------( 163      ( 2022 06:11 )             34.5     11-    137  |  108  |  20  ----------------------------<  107<H>  3.0<L>   |  23  |  0.99    Ca    8.3<L>      2022 06:11    TPro  6.8  /  Alb  3.0<L>  /  TBili  0.4  /  DBili  x   /  AST  34  /  ALT  34  /  AlkPhos  62  -15     LIVER FUNCTIONS - ( 15 Nov 2022 09:36 )  Alb: 3.0 g/dL / Pro: 6.8 gm/dL / ALK PHOS: 62 U/L / ALT: 34 U/L / AST: 34 U/L / GGT: x           Urinalysis Basic - ( 15 Nov 2022 09:36 )    Color: Yellow / Appearance: Clear / S.015 / pH: x  Gluc: x / Ketone: Negative  / Bili: Negative / Urobili: Negative   Blood: x / Protein: Trace mg/dL / Nitrite: Negative   Leuk Esterase: Trace / RBC: 3-5 /HPF / WBC 3-5   Sq Epi: x / Non Sq Epi: Few / Bacteria: Few      < from: CT Abdomen and Pelvis w/ IV Cont (11.15.22 @ 10:28) >    ACC: [id number] EXAM:  CT ABDOMEN AND PELVIS IC                          PROCEDURE DATE:  11/15/2022          INTERPRETATION:  CLINICAL INFORMATION: Left lower quadrant abdominal pain   with rebound tenderness.    COMPARISON: 2021    CONTRAST/COMPLICATIONS:  IV Contrast: Omnipaque 350  90 cc administered   10 cc discarded  Oral Contrast: NONE  Complications: None reported at time of study completion    PROCEDURE:  CT of the Abdomen and Pelvis was performed.  Sagittal and coronal reformats were performed.    FINDINGS:  LOWER CHEST: Small bilateral atelectasis.    LIVER: A few tiny hypodense lesions in the liver, too small to   characterize.  BILE DUCTS: The common bile duct is mildly dilated.  GALLBLADDER: Interval cholecystectomy.  SPLEEN: Within normal limits.  PANCREAS: The pancreatic parenchyma appears unremarkable. The main   pancreatic duct in the pancreatic head is dilated. Abdominal MR without   and with IV contrast including MRCP may be pursued for further evaluation.  ADRENALS: Within normal limits.  KIDNEYS/URETERS: Within normal limits.    BLADDER: Underdistended.  REPRODUCTIVE ORGANS: The prostate and seminal vesicles appear grossly   unremarkable.    BOWEL: No bowel obstruction. Appendix not visualized. No secondary signs   for acute appendicitis. Surgical clips adjacent to the cecum. New mural   thickening of the ascending, transverse, descending, and sigmoid colon as   well as of the rectum, suggestive of a nonspecific proctocolitis.   Clinical correlation is recommended. Follow-up colonoscopy after   treatment/resolution of acute disease may be pursued for additional   evaluation.  PERITONEUM: No free air or ascites.  VESSELS: Calcified atherosclerotic disease.  RETROPERITONEUM/LYMPH NODES: No lymphadenopathy.  ABDOMINAL WALL: Small fat-containing umbilical hernia. Mild left inguinal   hernia which contains fluid, unchanged. Implantable neural stimulator at   the left buttock.  BONES: Mild degenerative spondylosis.    IMPRESSION:  New mural thickening of the ascending, transverse, descending, and   sigmoid colon as well as of the rectum, suggestive of a nonspecific   proctocolitis. Clinical correlation is recommended. Follow-up colonoscopy   after treatment/resolution of acute disease may be pursued for additional   evaluation.    The common bile duct is mildly dilated. The pancreatic parenchyma appears   unremarkable. The main pancreatic duct in the pancreatic head is dilated.   Abdominal MR without and with IV contrast including MRCP may be pursued   for further evaluation.    < end of copied text >      Radiology: all available radiological tests reviewed    Advanced directives addressed: full resuscitation

## 2022-11-17 LAB
ANION GAP SERPL CALC-SCNC: 4 MMOL/L — LOW (ref 5–17)
BUN SERPL-MCNC: 12 MG/DL — SIGNIFICANT CHANGE UP (ref 7–23)
CALCIUM SERPL-MCNC: 7.8 MG/DL — LOW (ref 8.5–10.1)
CHLORIDE SERPL-SCNC: 112 MMOL/L — HIGH (ref 96–108)
CO2 SERPL-SCNC: 25 MMOL/L — SIGNIFICANT CHANGE UP (ref 22–31)
CREAT SERPL-MCNC: 0.74 MG/DL — SIGNIFICANT CHANGE UP (ref 0.5–1.3)
EGFR: 99 ML/MIN/1.73M2 — SIGNIFICANT CHANGE UP
GLUCOSE SERPL-MCNC: 118 MG/DL — HIGH (ref 70–99)
HCT VFR BLD CALC: 33.8 % — LOW (ref 39–50)
HGB BLD-MCNC: 11.8 G/DL — LOW (ref 13–17)
MAGNESIUM SERPL-MCNC: 2 MG/DL — SIGNIFICANT CHANGE UP (ref 1.6–2.6)
MCHC RBC-ENTMCNC: 33.2 PG — SIGNIFICANT CHANGE UP (ref 27–34)
MCHC RBC-ENTMCNC: 34.9 GM/DL — SIGNIFICANT CHANGE UP (ref 32–36)
MCV RBC AUTO: 95.2 FL — SIGNIFICANT CHANGE UP (ref 80–100)
PLATELET # BLD AUTO: 180 K/UL — SIGNIFICANT CHANGE UP (ref 150–400)
POTASSIUM SERPL-MCNC: 3.8 MMOL/L — SIGNIFICANT CHANGE UP (ref 3.5–5.3)
POTASSIUM SERPL-SCNC: 3.8 MMOL/L — SIGNIFICANT CHANGE UP (ref 3.5–5.3)
RBC # BLD: 3.55 M/UL — LOW (ref 4.2–5.8)
RBC # FLD: 13.2 % — SIGNIFICANT CHANGE UP (ref 10.3–14.5)
SODIUM SERPL-SCNC: 141 MMOL/L — SIGNIFICANT CHANGE UP (ref 135–145)
WBC # BLD: 4.07 K/UL — SIGNIFICANT CHANGE UP (ref 3.8–10.5)
WBC # FLD AUTO: 4.07 K/UL — SIGNIFICANT CHANGE UP (ref 3.8–10.5)

## 2022-11-17 PROCEDURE — 99232 SBSQ HOSP IP/OBS MODERATE 35: CPT

## 2022-11-17 RX ORDER — MORPHINE SULFATE 50 MG/1
2 CAPSULE, EXTENDED RELEASE ORAL EVERY 6 HOURS
Refills: 0 | Status: DISCONTINUED | OUTPATIENT
Start: 2022-11-17 | End: 2022-11-19

## 2022-11-17 RX ORDER — MORPHINE SULFATE 50 MG/1
4 CAPSULE, EXTENDED RELEASE ORAL EVERY 6 HOURS
Refills: 0 | Status: DISCONTINUED | OUTPATIENT
Start: 2022-11-17 | End: 2022-11-19

## 2022-11-17 RX ADMIN — Medication 30 MILLILITER(S): at 09:00

## 2022-11-17 RX ADMIN — ONDANSETRON 4 MILLIGRAM(S): 8 TABLET, FILM COATED ORAL at 17:03

## 2022-11-17 RX ADMIN — CELECOXIB 200 MILLIGRAM(S): 200 CAPSULE ORAL at 10:17

## 2022-11-17 RX ADMIN — Medication 75 MILLIGRAM(S): at 09:00

## 2022-11-17 RX ADMIN — DEXTROSE MONOHYDRATE, SODIUM CHLORIDE, AND POTASSIUM CHLORIDE 75 MILLILITER(S): 50; .745; 4.5 INJECTION, SOLUTION INTRAVENOUS at 21:58

## 2022-11-17 RX ADMIN — DEXTROSE MONOHYDRATE, SODIUM CHLORIDE, AND POTASSIUM CHLORIDE 75 MILLILITER(S): 50; .745; 4.5 INJECTION, SOLUTION INTRAVENOUS at 03:41

## 2022-11-17 RX ADMIN — BUPROPION HYDROCHLORIDE 300 MILLIGRAM(S): 150 TABLET, EXTENDED RELEASE ORAL at 10:28

## 2022-11-17 RX ADMIN — DEXTROSE MONOHYDRATE, SODIUM CHLORIDE, AND POTASSIUM CHLORIDE 75 MILLILITER(S): 50; .745; 4.5 INJECTION, SOLUTION INTRAVENOUS at 08:58

## 2022-11-17 RX ADMIN — Medication 200 MILLIGRAM(S): at 17:03

## 2022-11-17 RX ADMIN — MORPHINE SULFATE 2 MILLIGRAM(S): 50 CAPSULE, EXTENDED RELEASE ORAL at 17:03

## 2022-11-17 RX ADMIN — CELECOXIB 200 MILLIGRAM(S): 200 CAPSULE ORAL at 08:59

## 2022-11-17 RX ADMIN — ATORVASTATIN CALCIUM 10 MILLIGRAM(S): 80 TABLET, FILM COATED ORAL at 21:49

## 2022-11-17 RX ADMIN — ENOXAPARIN SODIUM 40 MILLIGRAM(S): 100 INJECTION SUBCUTANEOUS at 09:00

## 2022-11-17 RX ADMIN — EMTRICITABINE AND TENOFOVIR DISOPROXIL FUMARATE 1 TABLET(S): 200; 300 TABLET, FILM COATED ORAL at 10:37

## 2022-11-17 RX ADMIN — Medication 30 MILLILITER(S): at 03:41

## 2022-11-17 RX ADMIN — Medication 81 MILLIGRAM(S): at 08:58

## 2022-11-17 RX ADMIN — Medication 200 MILLIGRAM(S): at 06:19

## 2022-11-17 RX ADMIN — Medication 30 MILLILITER(S): at 22:00

## 2022-11-17 RX ADMIN — Medication 25 MILLIGRAM(S): at 08:59

## 2022-11-17 RX ADMIN — DEXTROSE MONOHYDRATE, SODIUM CHLORIDE, AND POTASSIUM CHLORIDE 75 MILLILITER(S): 50; .745; 4.5 INJECTION, SOLUTION INTRAVENOUS at 06:18

## 2022-11-17 RX ADMIN — MORPHINE SULFATE 4 MILLIGRAM(S): 50 CAPSULE, EXTENDED RELEASE ORAL at 20:36

## 2022-11-17 RX ADMIN — MORPHINE SULFATE 2 MILLIGRAM(S): 50 CAPSULE, EXTENDED RELEASE ORAL at 18:25

## 2022-11-17 RX ADMIN — MORPHINE SULFATE 4 MILLIGRAM(S): 50 CAPSULE, EXTENDED RELEASE ORAL at 21:06

## 2022-11-18 PROCEDURE — 99232 SBSQ HOSP IP/OBS MODERATE 35: CPT

## 2022-11-18 RX ORDER — MORPHINE SULFATE 50 MG/1
2 CAPSULE, EXTENDED RELEASE ORAL ONCE
Refills: 0 | Status: DISCONTINUED | OUTPATIENT
Start: 2022-11-18 | End: 2022-11-18

## 2022-11-18 RX ADMIN — EMTRICITABINE AND TENOFOVIR DISOPROXIL FUMARATE 1 TABLET(S): 200; 300 TABLET, FILM COATED ORAL at 09:02

## 2022-11-18 RX ADMIN — Medication 200 MILLIGRAM(S): at 18:47

## 2022-11-18 RX ADMIN — Medication 25 MILLIGRAM(S): at 09:02

## 2022-11-18 RX ADMIN — Medication 75 MILLIGRAM(S): at 09:02

## 2022-11-18 RX ADMIN — CELECOXIB 200 MILLIGRAM(S): 200 CAPSULE ORAL at 10:50

## 2022-11-18 RX ADMIN — ATORVASTATIN CALCIUM 10 MILLIGRAM(S): 80 TABLET, FILM COATED ORAL at 20:26

## 2022-11-18 RX ADMIN — MORPHINE SULFATE 2 MILLIGRAM(S): 50 CAPSULE, EXTENDED RELEASE ORAL at 09:03

## 2022-11-18 RX ADMIN — CELECOXIB 200 MILLIGRAM(S): 200 CAPSULE ORAL at 09:02

## 2022-11-18 RX ADMIN — ONDANSETRON 4 MILLIGRAM(S): 8 TABLET, FILM COATED ORAL at 09:03

## 2022-11-18 RX ADMIN — Medication 30 MILLILITER(S): at 10:44

## 2022-11-18 RX ADMIN — Medication 30 MILLILITER(S): at 05:18

## 2022-11-18 RX ADMIN — ENOXAPARIN SODIUM 40 MILLIGRAM(S): 100 INJECTION SUBCUTANEOUS at 09:04

## 2022-11-18 RX ADMIN — Medication 30 MILLILITER(S): at 15:20

## 2022-11-18 RX ADMIN — MORPHINE SULFATE 2 MILLIGRAM(S): 50 CAPSULE, EXTENDED RELEASE ORAL at 10:00

## 2022-11-18 RX ADMIN — DEXTROSE MONOHYDRATE, SODIUM CHLORIDE, AND POTASSIUM CHLORIDE 75 MILLILITER(S): 50; .745; 4.5 INJECTION, SOLUTION INTRAVENOUS at 09:03

## 2022-11-18 RX ADMIN — BUPROPION HYDROCHLORIDE 300 MILLIGRAM(S): 150 TABLET, EXTENDED RELEASE ORAL at 09:02

## 2022-11-18 RX ADMIN — Medication 81 MILLIGRAM(S): at 09:02

## 2022-11-18 RX ADMIN — Medication 200 MILLIGRAM(S): at 06:11

## 2022-11-18 RX ADMIN — MORPHINE SULFATE 4 MILLIGRAM(S): 50 CAPSULE, EXTENDED RELEASE ORAL at 05:17

## 2022-11-18 RX ADMIN — Medication 30 MILLILITER(S): at 18:47

## 2022-11-18 RX ADMIN — MORPHINE SULFATE 4 MILLIGRAM(S): 50 CAPSULE, EXTENDED RELEASE ORAL at 06:10

## 2022-11-18 RX ADMIN — MORPHINE SULFATE 4 MILLIGRAM(S): 50 CAPSULE, EXTENDED RELEASE ORAL at 15:21

## 2022-11-18 NOTE — CONSULT NOTE ADULT - SUBJECTIVE AND OBJECTIVE BOX
Patient is a 67y old  Male who presents with a chief complaint of diarrhea    HPI:  This is a 67 year old male with past medical history of depression, HTN, gastric bypass 2021 presented with reported watery diarrhea.    Seen at bedside on hospital day #4. GI on-call consulted on 11/15 and reconsulted  to no avail. Our team on-call today: now seeing patient.  Hospital course includes + stool culture for shigella/e.coli. Patient seen at bedside now eating regular diet with formed stools. Denies nausea, vomiting. Does have 2/10 abdominal dull ache centralized. Did have dose of morphine over night.       PAST MEDICAL & SURGICAL HISTORY:  Hyperlipidemia  GERD (gastroesophageal reflux disease)  Anxiety  Skin cancer external right ear, unsure of type  HTN (hypertension)  Aortic stenosis Bicuspid Aortic Valve Replacement- 2018  Chronic neck and back pain  Sciatica  Internal and external prolapsed hemorrhoidshad surgery  Helicobacter pylori infection  Gastrointestinal hemorrhage  Carpal tunnel syndromehad surgery  Depressive disorder  Morbid obesity s/p gastric bypass  s/p gastric bypass 2021  S/P carpal tunnel release right side, left side-   History of back surgery laminectomy x2 last   S/P trigger finger release right thumb and middle finger  H/O external ear surgery excision mass right ear- cancer 2018  H/O bicuspid aortic valve replacement- 18  S/P tonsillectomy  H/O hemorrhoidectomy  Spinal cord stimulator status 10/2020  s/p cholecystectomy 2021  s/p appendectomy 2021      Review of Systems:   CONSTITUTIONAL: No fever.  EYES: No eye pain or discharge.  ENMT:  No sinus or throat pain  NECK: No pain or stiffness  RESPIRATORY: No cough, wheezing, chills or hemoptysis; No shortness of breath  CARDIOVASCULAR: No chest pain, palpitations, dizziness, or leg swelling  GASTROINTESTINAL: ++ abdominal  pain. No nausea, vomiting, or hematemesis; ++ diarrhea  No melena or hematochezia.  GENITOURINARY: No dysuria or incontinence  NEUROLOGICAL: No headaches, memory loss, loss of strength, numbness, or tremors  SKIN: No rashes.  MUSCULOSKELETAL: No joint pain or swelling; No muscle, back, or extremity pain  PSYCHIATRIC: ++ depression,       Social History:   few cigs/day  denies etoh/drug  lives w/ dtr, ind ADLs    FAMILY HISTORY:  FH: HTN (hypertension) father    MEDICATIONS  (STANDING):  aspirin enteric coated 81 milliGRAM(s) Oral daily  atorvastatin 10 milliGRAM(s) Oral at bedtime  buPROPion XL (24-Hour) . 300 milliGRAM(s) Oral daily  celecoxib 200 milliGRAM(s) Oral daily  emtricitabine 200 mG/tenofovir alafenamide 25 mG (DESCOVY) Tablet 1 Tablet(s) Oral daily  enoxaparin Injectable 40 milliGRAM(s) SubCutaneous every 24 hours  metoprolol tartrate 25 milliGRAM(s) Oral daily  sodium chloride 0.9%. 1000 milliLiter(s) (125 mL/Hr) IV Continuous <Continuous>  vancomycin    Solution 500 milliGRAM(s) Oral every 6 hours  venlafaxine 75 milliGRAM(s) Oral daily    MEDICATIONS  (PRN):  acetaminophen     Tablet .. 650 milliGRAM(s) Oral every 6 hours PRN Temp greater or equal to 38C (100.4F), Mild Pain (1 - 3)  aluminum hydroxide/magnesium hydroxide/simethicone Suspension 30 milliLiter(s) Oral every 4 hours PRN Dyspepsia  melatonin 3 milliGRAM(s) Oral at bedtime PRN Insomnia  nicotine  Polacrilex Gum 2 milliGRAM(s) Oral every 2 hours PRN breakthrough cravings  ondansetron Injectable 4 milliGRAM(s) IV Push every 8 hours PRN Nausea and/or Vomiting    PHYSICAL EXAM:  Vital Signs Last 24 Hrs  T(C): 36.2 (15 Nov 2022 19:00), Max: 37.6 (15 Nov 2022 15:20)  T(F): 97.2 (15 Nov 2022 19:00), Max: 99.7 (15 Nov 2022 15:20)  HR: 92 (15 Nov 2022 19:00) (61 - 92)  BP: 123/64 (15 Nov 2022 19:00) (92/55 - 123/64)  BP(mean): 66 (15 Nov 2022 15:20) (66 - 81)  RR: 18 (15 Nov 2022 19:00) (18 - 18)  SpO2: 99% (15 Nov 2022 19:00) (96% - 99%)    Parameters below as of 15 Nov 2022 19:00  Patient On (Oxygen Delivery Method): room air      GENERAL: NAD,   HEAD:  Atraumatic, Normocephalic  EYES: EOMI, PERRLA, conjunctiva and sclera clear  ENT: normal hearing, no nasal discharge, throat clear, dentition normal  NECK: Supple, No JVD, no LAD, no thyromegaly   CHEST/LUNG: Clear to auscultation bilaterally; No wheeze, respirations unlabored  HEART: Regular rate and rhythm; No murmurs, rubs, or gallops  ABDOMEN: Soft, Nontender, Nondistended; Bowel sounds present, no HSM  EXTREMITIES:  2+ Peripheral Pulses, No clubbing, cyanosis, or edema  PSYCH: AAOx3, normal behavior  NEUROLOGY: non-focal, sensory and cn 2-12 intact, speech/language intact  SKIN: No visible rashes or lesions    LABS:                        13.0   2.91  )-----------( 177      ( 15 Nov 2022 09:36 )             38.9     11-15    133<L>  |  103  |  36<H>  ----------------------------<  132<H>  3.0<L>   |  22  |  1.36<H>    Ca    8.2<L>      15 Nov 2022 09:36    TPro  6.8  /  Alb  3.0<L>  /  TBili  0.4  /  DBili  x   /  AST  34  /  ALT  34  /  AlkPhos  62  11-15          Urinalysis Basic - ( 15 Nov 2022 09:36 )    Color: Yellow / Appearance: Clear / S.015 / pH: x  Gluc: x / Ketone: Negative  / Bili: Negative / Urobili: Negative   Blood: x / Protein: Trace mg/dL / Nitrite: Negative   Leuk Esterase: Trace / RBC: 3-5 /HPF / WBC 3-5   Sq Epi: x / Non Sq Epi: Few / Bacteria: Few        RADIOLOGY & ADDITIONAL TESTS:    Imaging Personally Reviewed:  ct a/p New mural thickening of the ascending, transverse, descending, and sigmoid colon as well as of the rectum, suggestive of a nonspecific proctocolitis. Clinical correlation is recommended. Follow-up colonoscopy after treatment/resolution of acute disease may be pursued for additional evaluation.  The common bile duct is mildly dilated. The pancreatic parenchyma appears  unremarkable. The main pancreatic duct in the pancreatic head is dilated. Abdominal MR without and with IV contrast including MRCP may be pursued  for further evaluation.    EKG Personally Reviewed:  n/a  Consultant(s) Notes Reviewed:      Care Discussed with Consultants/Other Providers:   (15 Nov 2022 15:48)      PAST MEDICAL & SURGICAL HISTORY:  Hyperlipidemia      GERD (gastroesophageal reflux disease)      Anxiety      Skin cancer  external right ear, unsure of type      HTN (hypertension)      Aortic stenosis  Bicuspid Aortic Valve Replacement- 2018      Chronic neck and back pain      Sciatica      Internal and external prolapsed hemorrhoids  had surgery      Helicobacter pylori infection      History of colonic polyps      Herniated lumbar intervertebral disc      Gastrointestinal hemorrhage      Carpal tunnel syndrome  had surgery      Depressive disorder      Loss of hearing      Morbid obesity      S/P carpal tunnel release  right side, left side-       History of back surgery  laminectomy x2 last       S/P trigger finger release  right thumb and middle finger      H/O external ear surgery  excision mass right ear- cancer 2018      H/O bicuspid aortic valve  replacement- 18      Status post coronary angioplasty  pt not sure he had this procedure      S/P tonsillectomy      H/O hemorrhoidectomy      Spinal cord stimulator status  10/2020          MEDICATIONS  (STANDING):  aspirin enteric coated 81 milliGRAM(s) Oral daily  atorvastatin 10 milliGRAM(s) Oral at bedtime  buPROPion XL (24-Hour) . 300 milliGRAM(s) Oral daily  celecoxib 200 milliGRAM(s) Oral daily  ciprofloxacin   IVPB      ciprofloxacin   IVPB 400 milliGRAM(s) IV Intermittent every 12 hours  emtricitabine 200 mG/tenofovir alafenamide 25 mG (DESCOVY) Tablet 1 Tablet(s) Oral daily  enoxaparin Injectable 40 milliGRAM(s) SubCutaneous every 24 hours  metoprolol tartrate 25 milliGRAM(s) Oral daily  sodium chloride 0.9% with potassium chloride 20 mEq/L 1000 milliLiter(s) (75 mL/Hr) IV Continuous <Continuous>  venlafaxine 75 milliGRAM(s) Oral daily    MEDICATIONS  (PRN):  acetaminophen     Tablet .. 650 milliGRAM(s) Oral every 6 hours PRN Temp greater or equal to 38C (100.4F), Mild Pain (1 - 3)  aluminum hydroxide/magnesium hydroxide/simethicone Suspension 30 milliLiter(s) Oral every 4 hours PRN Dyspepsia  melatonin 3 milliGRAM(s) Oral at bedtime PRN Insomnia  morphine  - Injectable 4 milliGRAM(s) IV Push every 6 hours PRN Severe Pain (7 - 10)  morphine  - Injectable 2 milliGRAM(s) IV Push every 6 hours PRN Moderate Pain (4 - 6)  nicotine  Polacrilex Gum 2 milliGRAM(s) Oral every 2 hours PRN breakthrough cravings  ondansetron Injectable 4 milliGRAM(s) IV Push every 8 hours PRN Nausea and/or Vomiting      Allergies    No Known Allergies    Intolerances        SOCIAL HISTORY:    FAMILY HISTORY:  FH: HTN (hypertension)  father        REVIEW OF SYSTEMS:    CONSTITUTIONAL: No weakness, fevers or chills  EYES/ENT: No visual changes;  No vertigo or throat pain   NECK: No pain or stiffness  RESPIRATORY: No cough, wheezing, hemoptysis; No shortness of breath  CARDIOVASCULAR: No chest pain or palpitations  GASTROINTESTINAL: No abdominal or epigastric pain. No nausea, vomiting, or hematemesis; No diarrhea or constipation. No melena or hematochezia.  GENITOURINARY: No dysuria, frequency or hematuria  NEUROLOGICAL: No numbness or weakness  SKIN: No itching, burning, rashes, or lesions   PSYCH: Normal mood and affect  All other review of systems is negative unless indicated above.    Vital Signs Last 24 Hrs  T(C): 36.7 (2022 10:49), Max: 36.8 (2022 06:19)  T(F): 98 (2022 10:49), Max: 98.2 (2022 06:19)  HR: 72 (2022 15:33) (63 - 90)  BP: 126/78 (2022 15:33) (126/78 - 134/68)  BP(mean): --  RR: 16 (2022 15:33) (16 - 20)  SpO2: 97% (2022 15:33) (97% - 99%)    Parameters below as of 2022 15:33  Patient On (Oxygen Delivery Method): room air        PHYSICAL EXAM:    Constitutional: No acute distress, well-developed, non-toxic appearing  HEENT: masked, good phonation, not icteric  Neck: supple, no lymphadenopathy  Respiratory: clear to ascultation bilaterally, no wheezing  Cardiovascular: S1 and S2, regular rate and rhythm, no murmurs rubs or gallops  Gastrointestinal: soft, non-tender, non-distended, +bowel sounds, no rebound or guarding, no surgical scars, no drains  Extremities: No peripheral edema, no cyanosis or clubbing  Vascular: 2+ peripheral pulses, no venous stasis  Neurological: A/O x 3, no focal deficits, no asterixis  Psychiatric: Normal mood, normal affect  Skin: No rashes, not jaundiced    LABS:                        11.8   4.07  )-----------( 180      ( 2022 06:10 )             33.8         141  |  112<H>  |  12  ----------------------------<  118<H>  3.8   |  25  |  0.74    Ca    7.8<L>      2022 06:10  Mg     2.0                 RADIOLOGY & ADDITIONAL STUDIES: Patient is a 67y old  Male who presents with a chief complaint of diarrhea    HPI:  This is a 67 year old male with past medical history of depression, HTN, gastric bypass 4/2021 presented with reported watery diarrhea.    Patient states he had sudden onset watery diarrhea for a few days. Multiple episodes a day. Denies hematochezia or melena. Denies any travel history or known sick contacts. No eating raw or undercooked food. No fevers or chills. Of note, when probing more for sources, is recent MSM and had intercourse recently, with receptive anal and then oral, in that order. Seen at bedside on hospital day #4. GI on-call consulted on 11/15 and reconsulted 11/16 to no avail. Our team on-call today: now seeing patient. This is Dr. Rowe's outpatient and will follow up with him upon discharge. Hospital course includes + stool culture for shigella/e.coli. Patient seen at bedside now eating regular diet with formed stools. Denies nausea, vomiting. Does have 2/10 abdominal dull ache centralized. Did have dose of morphine over night.       PAST MEDICAL & SURGICAL HISTORY:  Hyperlipidemia      GERD (gastroesophageal reflux disease)      Anxiety      Skin cancer  external right ear, unsure of type      HTN (hypertension)      Aortic stenosis  Bicuspid Aortic Valve Replacement- 5/29/2018      Chronic neck and back pain      Sciatica      Internal and external prolapsed hemorrhoids  had surgery      Helicobacter pylori infection      History of colonic polyps      Herniated lumbar intervertebral disc      Gastrointestinal hemorrhage      Carpal tunnel syndrome  had surgery      Depressive disorder      Loss of hearing      Morbid obesity      S/P carpal tunnel release  right side, left side- 2014      History of back surgery  laminectomy x2 last 2018      S/P trigger finger release  right thumb and middle finger      H/O external ear surgery  excision mass right ear- cancer 2018      H/O bicuspid aortic valve  replacement- 5/29/18      Status post coronary angioplasty  pt not sure he had this procedure      S/P tonsillectomy      H/O hemorrhoidectomy      Spinal cord stimulator status  10/2020          MEDICATIONS  (STANDING):  aspirin enteric coated 81 milliGRAM(s) Oral daily  atorvastatin 10 milliGRAM(s) Oral at bedtime  buPROPion XL (24-Hour) . 300 milliGRAM(s) Oral daily  celecoxib 200 milliGRAM(s) Oral daily  ciprofloxacin   IVPB      ciprofloxacin   IVPB 400 milliGRAM(s) IV Intermittent every 12 hours  emtricitabine 200 mG/tenofovir alafenamide 25 mG (DESCOVY) Tablet 1 Tablet(s) Oral daily  enoxaparin Injectable 40 milliGRAM(s) SubCutaneous every 24 hours  metoprolol tartrate 25 milliGRAM(s) Oral daily  sodium chloride 0.9% with potassium chloride 20 mEq/L 1000 milliLiter(s) (75 mL/Hr) IV Continuous <Continuous>  venlafaxine 75 milliGRAM(s) Oral daily    MEDICATIONS  (PRN):  acetaminophen     Tablet .. 650 milliGRAM(s) Oral every 6 hours PRN Temp greater or equal to 38C (100.4F), Mild Pain (1 - 3)  aluminum hydroxide/magnesium hydroxide/simethicone Suspension 30 milliLiter(s) Oral every 4 hours PRN Dyspepsia  melatonin 3 milliGRAM(s) Oral at bedtime PRN Insomnia  morphine  - Injectable 4 milliGRAM(s) IV Push every 6 hours PRN Severe Pain (7 - 10)  morphine  - Injectable 2 milliGRAM(s) IV Push every 6 hours PRN Moderate Pain (4 - 6)  nicotine  Polacrilex Gum 2 milliGRAM(s) Oral every 2 hours PRN breakthrough cravings  ondansetron Injectable 4 milliGRAM(s) IV Push every 8 hours PRN Nausea and/or Vomiting      Allergies    No Known Allergies    Intolerances    SOCIAL HISTORY:  no smoking, drinking or drugs    FAMILY HISTORY:  FH: HTN (hypertension)  father    no colon or stomach cancer        REVIEW OF SYSTEMS:    CONSTITUTIONAL: No weakness, fevers or chills  EYES/ENT: No visual changes;  No vertigo or throat pain   NECK: No pain or stiffness  RESPIRATORY: No cough, wheezing, hemoptysis; No shortness of breath  CARDIOVASCULAR: No chest pain or palpitations  GASTROINTESTINAL: per HPI  GENITOURINARY: No dysuria, frequency or hematuria  NEUROLOGICAL: No numbness or weakness  SKIN: No itching, burning, rashes, or lesions   PSYCH: Normal mood and affect  All other review of systems is negative unless indicated above.    Vital Signs Last 24 Hrs  T(C): 36.7 (18 Nov 2022 10:49), Max: 36.8 (18 Nov 2022 06:19)  T(F): 98 (18 Nov 2022 10:49), Max: 98.2 (18 Nov 2022 06:19)  HR: 72 (18 Nov 2022 15:33) (63 - 90)  BP: 126/78 (18 Nov 2022 15:33) (126/78 - 134/68)  BP(mean): --  RR: 16 (18 Nov 2022 15:33) (16 - 20)  SpO2: 97% (18 Nov 2022 15:33) (97% - 99%)    Parameters below as of 18 Nov 2022 15:33  Patient On (Oxygen Delivery Method): room air        PHYSICAL EXAM:    Constitutional: No acute distress, well-developed, non-toxic appearing  HEENT: masked, good phonation, not icteric  Neck: supple, no lymphadenopathy  Respiratory: clear to ascultation bilaterally, no wheezing  Cardiovascular: S1 and S2, regular rate and rhythm, no murmurs rubs or gallops  Gastrointestinal: soft, non-tender, non-distended, +bowel sounds, no rebound or guarding,   Extremities: No peripheral edema, no cyanosis or clubbing  Vascular: 2+ peripheral pulses, no venous stasis  Neurological: A/O x 3, no focal deficits, no asterixis  Psychiatric: Normal mood, normal affect  Skin: No rashes, not jaundiced    LABS:                        11.8   4.07  )-----------( 180      ( 17 Nov 2022 06:10 )             33.8     11-17    141  |  112<H>  |  12  ----------------------------<  118<H>  3.8   |  25  |  0.74    Ca    7.8<L>      17 Nov 2022 06:10  Mg     2.0     11-17            RADIOLOGY & ADDITIONAL STUDIES: HIV negative, +ecoli

## 2022-11-18 NOTE — CONSULT NOTE ADULT - ASSESSMENT
68 y/o male with a PMHx of anxiety, aortic stenosis, carpal tunnel syndrome, chronic neck and back pain, colon polyps, depressive disorder, GI hemorrhage, GERD, H pylori, herniated lumbar disc, HTN, HLD, internal and external prolapsed hemorrhoids, loss of hearing, morbid obesity, sciatica, skin cancer presents to the ED c/o abd pain and diarrhea x3 days.  Denies CP, SOB, n/v. Recent abx use. No other complaints at this time.    Plan:    - Please get GI evaluation .  - Adequate hydration .  - No surgical intervention needed at this point.  - Patient can be discharged jenelle from surgery stand point.      Plan was discussed with Dr. Amor 
67M w/PMH depression, HTN, gastric bypass 4/2021, admitted on 11/15 for evaluation of numerous bowel movements, diarrhea, over preceding few days; has no appetite, no abdominal pain, no sick contacts, no recent travel, but anticipates a [place] cruise to leave in a few days; the patient was on antibiotics a few months ago for a UTI. No uncooked fish, chicken, no sushi use. The patient is on Desc, for PreP.     1. Patient admitted with acute gastroenteritis, pancolitis, seen on imaging; possibly Cdiff versus enteral bacterial or viral infection  - follow up cultures   - serial cbc and monitor temperature   - iv hydration and supportive care   - reviewed prior medical records to evaluate for resistant or atypical pathogens   - diet as tolerated  - will continue po vancomycin for now  - hold on systemic antibiotics, pending the GI PCR, especially if there is an enterotoxigenic E coli, given risk of HUS  - continue isolation  - CDiff toxin assay is pending  2. other issues: per medicine  D/w hospitalist
This is a 67 year old male with past medical history of depression, HTN, gastric bypass 4/2021 presented with reported watery diarrhea.    Patient with improvement on appropriate antibiotics.   As we feel source was from receptive anal and then oral sex with MSM, advised more hygenic practices.   Otherwise doing much better.   He will follow up with Dr. Rowe after discharge

## 2022-11-19 ENCOUNTER — TRANSCRIPTION ENCOUNTER (OUTPATIENT)
Age: 67
End: 2022-11-19

## 2022-11-19 VITALS
DIASTOLIC BLOOD PRESSURE: 77 MMHG | HEART RATE: 63 BPM | TEMPERATURE: 99 F | SYSTOLIC BLOOD PRESSURE: 135 MMHG | RESPIRATION RATE: 17 BRPM | OXYGEN SATURATION: 98 %

## 2022-11-19 LAB
ANION GAP SERPL CALC-SCNC: 3 MMOL/L — LOW (ref 5–17)
BUN SERPL-MCNC: 7 MG/DL — SIGNIFICANT CHANGE UP (ref 7–23)
CALCIUM SERPL-MCNC: 7.4 MG/DL — LOW (ref 8.5–10.1)
CHLORIDE SERPL-SCNC: 106 MMOL/L — SIGNIFICANT CHANGE UP (ref 96–108)
CO2 SERPL-SCNC: 30 MMOL/L — SIGNIFICANT CHANGE UP (ref 22–31)
CREAT SERPL-MCNC: 0.75 MG/DL — SIGNIFICANT CHANGE UP (ref 0.5–1.3)
EGFR: 99 ML/MIN/1.73M2 — SIGNIFICANT CHANGE UP
GLUCOSE SERPL-MCNC: 143 MG/DL — HIGH (ref 70–99)
MAGNESIUM SERPL-MCNC: 1.9 MG/DL — SIGNIFICANT CHANGE UP (ref 1.6–2.6)
PHOSPHATE SERPL-MCNC: 1.9 MG/DL — LOW (ref 2.5–4.5)
POTASSIUM SERPL-MCNC: 3.4 MMOL/L — LOW (ref 3.5–5.3)
POTASSIUM SERPL-SCNC: 3.4 MMOL/L — LOW (ref 3.5–5.3)
SODIUM SERPL-SCNC: 139 MMOL/L — SIGNIFICANT CHANGE UP (ref 135–145)

## 2022-11-19 PROCEDURE — 99239 HOSP IP/OBS DSCHRG MGMT >30: CPT

## 2022-11-19 RX ORDER — OXYCODONE AND ACETAMINOPHEN 5; 325 MG/1; MG/1
1 TABLET ORAL
Qty: 12 | Refills: 0
Start: 2022-11-19 | End: 2022-11-21

## 2022-11-19 RX ORDER — TRAMADOL HYDROCHLORIDE 50 MG/1
25 TABLET ORAL EVERY 6 HOURS
Refills: 0 | Status: DISCONTINUED | OUTPATIENT
Start: 2022-11-19 | End: 2022-11-19

## 2022-11-19 RX ORDER — PANTOPRAZOLE SODIUM 20 MG/1
40 TABLET, DELAYED RELEASE ORAL EVERY 12 HOURS
Refills: 0 | Status: DISCONTINUED | OUTPATIENT
Start: 2022-11-19 | End: 2022-11-19

## 2022-11-19 RX ORDER — POTASSIUM CHLORIDE 20 MEQ
40 PACKET (EA) ORAL ONCE
Refills: 0 | Status: COMPLETED | OUTPATIENT
Start: 2022-11-19 | End: 2022-11-19

## 2022-11-19 RX ORDER — SODIUM,POTASSIUM PHOSPHATES 278-250MG
1 POWDER IN PACKET (EA) ORAL
Qty: 9 | Refills: 0
Start: 2022-11-19 | End: 2022-11-21

## 2022-11-19 RX ORDER — OXYCODONE AND ACETAMINOPHEN 5; 325 MG/1; MG/1
1 TABLET ORAL EVERY 6 HOURS
Refills: 0 | Status: DISCONTINUED | OUTPATIENT
Start: 2022-11-19 | End: 2022-11-19

## 2022-11-19 RX ORDER — SODIUM,POTASSIUM PHOSPHATES 278-250MG
1 POWDER IN PACKET (EA) ORAL
Refills: 0 | Status: DISCONTINUED | OUTPATIENT
Start: 2022-11-19 | End: 2022-11-19

## 2022-11-19 RX ORDER — CIPROFLOXACIN LACTATE 400MG/40ML
500 VIAL (ML) INTRAVENOUS ONCE
Refills: 0 | Status: COMPLETED | OUTPATIENT
Start: 2022-11-19 | End: 2022-11-19

## 2022-11-19 RX ADMIN — MORPHINE SULFATE 4 MILLIGRAM(S): 50 CAPSULE, EXTENDED RELEASE ORAL at 03:07

## 2022-11-19 RX ADMIN — Medication 25 MILLIGRAM(S): at 09:58

## 2022-11-19 RX ADMIN — ENOXAPARIN SODIUM 40 MILLIGRAM(S): 100 INJECTION SUBCUTANEOUS at 09:57

## 2022-11-19 RX ADMIN — CELECOXIB 200 MILLIGRAM(S): 200 CAPSULE ORAL at 10:21

## 2022-11-19 RX ADMIN — EMTRICITABINE AND TENOFOVIR DISOPROXIL FUMARATE 1 TABLET(S): 200; 300 TABLET, FILM COATED ORAL at 09:57

## 2022-11-19 RX ADMIN — Medication 1 PACKET(S): at 10:43

## 2022-11-19 RX ADMIN — CELECOXIB 200 MILLIGRAM(S): 200 CAPSULE ORAL at 09:58

## 2022-11-19 RX ADMIN — Medication 30 MILLILITER(S): at 03:08

## 2022-11-19 RX ADMIN — Medication 1 PACKET(S): at 11:56

## 2022-11-19 RX ADMIN — Medication 500 MILLIGRAM(S): at 16:59

## 2022-11-19 RX ADMIN — BUPROPION HYDROCHLORIDE 300 MILLIGRAM(S): 150 TABLET, EXTENDED RELEASE ORAL at 09:58

## 2022-11-19 RX ADMIN — PANTOPRAZOLE SODIUM 40 MILLIGRAM(S): 20 TABLET, DELAYED RELEASE ORAL at 10:41

## 2022-11-19 RX ADMIN — Medication 200 MILLIGRAM(S): at 05:13

## 2022-11-19 RX ADMIN — Medication 75 MILLIGRAM(S): at 09:58

## 2022-11-19 RX ADMIN — Medication 30 MILLILITER(S): at 08:01

## 2022-11-19 RX ADMIN — Medication 200 MILLIGRAM(S): at 15:57

## 2022-11-19 RX ADMIN — MORPHINE SULFATE 4 MILLIGRAM(S): 50 CAPSULE, EXTENDED RELEASE ORAL at 03:22

## 2022-11-19 RX ADMIN — Medication 81 MILLIGRAM(S): at 09:57

## 2022-11-19 RX ADMIN — Medication 1 PACKET(S): at 16:59

## 2022-11-19 RX ADMIN — Medication 40 MILLIEQUIVALENT(S): at 09:57

## 2022-11-19 NOTE — DISCHARGE NOTE PROVIDER - NSDCMRMEDTOKEN_GEN_ALL_CORE_FT
Aspirin Enteric Coated 81 mg oral delayed release tablet: 1 tab(s) orally once a day  atorvastatin 10 mg oral tablet: 1 tab(s) orally once a day (in the morning)  buPROPion 100 mg oral tablet: 1 tab(s) orally 3 times a day  Descovy 200 mg-25 mg oral tablet: 1 tab(s) orally once a day  meloxicam 15 mg oral tablet: 1 tab(s) orally once a day  metoprolol tartrate 25 mg oral tablet: 1 tab(s) orally once a day  Multiple Vitamins oral tablet: 1 tab(s) orally once a day  oxycodone-acetaminophen 5 mg-325 mg oral tablet: 1 tab(s) orally every 6 hours, As needed, Severe Pain (7 - 10) MDD:4 tablets  Phospha 250 Neutral oral tablet: 1 tab(s) orally 3 times a day   Prevacid 30 mg oral delayed release capsule: 1 cap(s) orally 2 times a day  tadalafil 20 mg oral tablet: 1 tab(s) orally once a day, As Needed before sex  venlafaxine 75 mg oral tablet: 1 tab(s) orally 2 times a day

## 2022-11-19 NOTE — DISCHARGE NOTE NURSING/CASE MANAGEMENT/SOCIAL WORK - NSDCPEWEB_GEN_ALL_CORE
[facility] for Tobacco Control website --- [email or web address] --- [email or web address]

## 2022-11-19 NOTE — DISCHARGE NOTE PROVIDER - PROVIDER TOKENS
PROVIDER:[TOKEN:[[id number]],FOLLOWUP:[1 week],ESTABLISHEDPATIENT:[T]],PROVIDER:[TOKEN:[[id number]],FOLLOWUP:[1 month],ESTABLISHEDPATIENT:[T]]

## 2022-11-19 NOTE — PROGRESS NOTE ADULT - SUBJECTIVE AND OBJECTIVE BOX
Date of service: 11-19-22 @ 11:05      Patient diarrhea has resolved, now with formed stool, afebrile      ROS: no fever or chills; denies dizziness, no HA, no SOB or cough, no abdominal pain, no diarrhea or constipation; no dysuria, no urinary frequency, no legs pain, no rashes    MEDICATIONS  (STANDING):  aspirin enteric coated 81 milliGRAM(s) Oral daily  atorvastatin 10 milliGRAM(s) Oral at bedtime  buPROPion XL (24-Hour) . 300 milliGRAM(s) Oral daily  celecoxib 200 milliGRAM(s) Oral daily  ciprofloxacin   IVPB      ciprofloxacin   IVPB 400 milliGRAM(s) IV Intermittent every 12 hours  emtricitabine 200 mG/tenofovir alafenamide 25 mG (DESCOVY) Tablet 1 Tablet(s) Oral daily  enoxaparin Injectable 40 milliGRAM(s) SubCutaneous every 24 hours  metoprolol tartrate 25 milliGRAM(s) Oral daily  pantoprazole    Tablet 40 milliGRAM(s) Oral every 12 hours  potassium phosphate / sodium phosphate Powder (PHOS-NaK) 1 Packet(s) Oral three times a day with meals  venlafaxine 75 milliGRAM(s) Oral daily    MEDICATIONS  (PRN):  acetaminophen     Tablet .. 650 milliGRAM(s) Oral every 6 hours PRN Temp greater or equal to 38C (100.4F), Mild Pain (1 - 3)  aluminum hydroxide/magnesium hydroxide/simethicone Suspension 30 milliLiter(s) Oral every 4 hours PRN Dyspepsia  melatonin 3 milliGRAM(s) Oral at bedtime PRN Insomnia  nicotine  Polacrilex Gum 2 milliGRAM(s) Oral every 2 hours PRN breakthrough cravings  ondansetron Injectable 4 milliGRAM(s) IV Push every 8 hours PRN Nausea and/or Vomiting  oxycodone    5 mG/acetaminophen 325 mG 1 Tablet(s) Oral every 6 hours PRN Severe Pain (7 - 10)  traMADol 25 milliGRAM(s) Oral every 6 hours PRN Moderate Pain (4 - 6)      Vital Signs Last 24 Hrs  T(C): 36.9 (19 Nov 2022 09:16), Max: 37.2 (19 Nov 2022 05:09)  T(F): 98.5 (19 Nov 2022 09:16), Max: 98.9 (19 Nov 2022 05:09)  HR: 62 (19 Nov 2022 09:16) (62 - 72)  BP: 127/69 (19 Nov 2022 09:16) (126/78 - 139/73)  BP(mean): --  RR: 17 (19 Nov 2022 09:16) (16 - 18)  SpO2: 99% (19 Nov 2022 09:16) (97% - 100%)    Parameters below as of 19 Nov 2022 09:16  Patient On (Oxygen Delivery Method): room air            Physical Exam:            Physical Exam:        PE:    Constitutional: frail looking  HEENT: NC/AT, EOMI, PERRLA, conjunctivae clear; ears and nose atraumatic; pharynx clear  Neck: supple; thyroid not palpable  Back: no tenderness  Respiratory: respiratory effort normal; clear to auscultation  Cardiovascular: S1S2 regular, no murmurs  Abdomen: soft, not tender, not distended, positive BS; no liver or spleen organomegaly  Genitourinary: no suprapubic tenderness  Musculoskeletal: no muscle tenderness, no joint swelling or tenderness  Neurological/ Psychiatric: AxOx3, judgement and insight normal;  moving all extremities  Skin: no rashes; no palpable lesions; numerous piercings, tattoos    Labs: all available labs reviewed                          Labs:                     Labs:                        11.8   4.07  )-----------( 180      ( 17 Nov 2022 06:10 )             33.8     11-17    141  |  112<H>  |  12  ----------------------------<  118<H>  3.8   |  25  |  0.74    Ca    7.8<L>      17 Nov 2022 06:10  Mg     2.0     11-17             Cultures:       Culture - Reflex Stool (collected 11-15-22 @ 20:27)  Source: .Stool None  Preliminary Report (11-17-22 @ 12:33):    Culture in progress    Culture - Urine (collected 11-15-22 @ 09:36)  Source: Clean Catch Clean Catch (Midstream)  Final Report (11-16-22 @ 14:47):    <10,000 CFU/mL Normal Urogenital Mackenzie                GI PCR Panel Stool (11.15.22 @ 20:27)    GI PCR Panel: Detected: GI Panel PCR evaluates for:  Campylobacter, Plesiomonas shigelloides, Salmonella, Vibrio, Yersinia  enterocolitica, Enteroaggregative Escherichia (EAEC), Enteropathogenic E.  coli (EPEC), Enterotoxigenic E. coli (ETEC), Shiga-like toxin producing  E.coli (STEC), E. coli O157, Shigella/Enteroinvasive E. coli (EIEC),  Adenovirus, Astrovirus, Norovirus, Rotavirus, Sapovirus, Cryptosporidium,  Cyclospora cayetanensis, Entamoeba histolytica, Giardia lamblia.  For culture and susceptibility reports refer to “reflex stool culture”.    Shigella/ Enteroinvasive E. coli: Detected: Test results will be confirmed by culture. If Shigella is isolated,  susceptibilities will be performed.                < from: CT Abdomen and Pelvis w/ IV Cont (11.15.22 @ 10:28) >    ACC: [id number] EXAM:  CT ABDOMEN AND PELVIS IC                          PROCEDURE DATE:  11/15/2022          INTERPRETATION:  CLINICAL INFORMATION: Left lower quadrant abdominal pain   with rebound tenderness.    COMPARISON: 8/20/2021    CONTRAST/COMPLICATIONS:  IV Contrast: Omnipaque 350  90 cc administered   10 cc discarded  Oral Contrast: NONE  Complications: None reported at time of study completion    PROCEDURE:  CT of the Abdomen and Pelvis was performed.  Sagittal and coronal reformats were performed.    FINDINGS:  LOWER CHEST: Small bilateral atelectasis.    LIVER: A few tiny hypodense lesions in the liver, too small to   characterize.  BILE DUCTS: The common bile duct is mildly dilated.  GALLBLADDER: Interval cholecystectomy.  SPLEEN: Within normal limits.  PANCREAS: The pancreatic parenchyma appears unremarkable. The main   pancreatic duct in the pancreatic head is dilated. Abdominal MR without   and with IV contrast including MRCP may be pursued for further evaluation.  ADRENALS: Within normal limits.  KIDNEYS/URETERS: Within normal limits.    BLADDER: Underdistended.  REPRODUCTIVE ORGANS: The prostate and seminal vesicles appear grossly   unremarkable.    BOWEL: No bowel obstruction. Appendix not visualized. No secondary signs   for acute appendicitis. Surgical clips adjacent to the cecum. New mural   thickening of the ascending, transverse, descending, and sigmoid colon as   well as of the rectum, suggestive of a nonspecific proctocolitis.   Clinical correlation is recommended. Follow-up colonoscopy after   treatment/resolution of acute disease may be pursued for additional   evaluation.  PERITONEUM: No free air or ascites.  VESSELS: Calcified atherosclerotic disease.  RETROPERITONEUM/LYMPH NODES: No lymphadenopathy.  ABDOMINAL WALL: Small fat-containing umbilical hernia. Mild left inguinal   hernia which contains fluid, unchanged. Implantable neural stimulator at   the left buttock.  BONES: Mild degenerative spondylosis.    IMPRESSION:  New mural thickening of the ascending, transverse, descending, and   sigmoid colon as well as of the rectum, suggestive of a nonspecific   proctocolitis. Clinical correlation is recommended. Follow-up colonoscopy   after treatment/resolution of acute disease may be pursued for additional   evaluation.    The common bile duct is mildly dilated. The pancreatic parenchyma appears   unremarkable. The main pancreatic duct in the pancreatic head is dilated.   Abdominal MR without and with IV contrast including MRCP may be pursued   for further evaluation.    < end of copied text >      Radiology: all available radiological tests reviewed    Advanced directives addressed: full resuscitation
cc:  diarrhea    67M w/PMH depression, HTN, gastric bypass 4/2021, bicuspid AV s/p AVR, presents today c/o intractable watery diarrhea over past several days, already many episodes today.  He denies associated abd pain, n/v, fever/chills.  Last ABX use was ~3 months ago for UTI.  Patient had CT showing pancolitis.      11/16:  Pt seen.  Notes ongoing diarrhea.  Thinks he saw some blood yesterday.  Not today.    11/17:  Pt seen.  Ongoing diarrhea.  10x/ day.  Some blood at times.      ROS:   All 10 systems reviewed and found to be negative with the exception of what has been described above.    Vital Signs Last 24 Hrs  T(C): 36.4 (17 Nov 2022 15:56), Max: 36.6 (17 Nov 2022 08:00)  T(F): 97.6 (17 Nov 2022 15:56), Max: 97.8 (17 Nov 2022 08:00)  HR: 62 (17 Nov 2022 15:56) (62 - 69)  BP: 138/79 (17 Nov 2022 15:56) (102/56 - 138/79)  BP(mean): 89 (17 Nov 2022 08:00) (89 - 89)  RR: 16 (17 Nov 2022 15:56) (16 - 17)  SpO2: 100% (17 Nov 2022 15:56) (100% - 100%)    Parameters below as of 17 Nov 2022 15:56  Patient On (Oxygen Delivery Method): room air    GEN: lying in bed, NAD  HEENT:   NC/AT, pupils equal and reactive, EOMI  CV:  +S1, +S2, RRR  RESP:   lungs clear to auscultation bilaterally, no wheeze, rales, rhonchi   BREAST:  not examined  GI:  abdomen soft, non-tender, non-distended, normoactive BS  RECTAL:  not examined  :  not examined  MSK:   normal muscle tone  EXT:  no edema  NEURO:  AAOX3, no focal neurological deficits  SKIN:  no rashes  labs:    11-17    141  |  112<H>  |  12  ----------------------------<  118<H>  3.8   |  25  |  0.74    Ca    7.8<L>      17 Nov 2022 06:10  Mg     2.0     11-17                              11.8   4.07  )-----------( 180      ( 17 Nov 2022 06:10 )             33.8   Shigella/ Enteroinvasive E. coli: Detected: Test results will be confirmed by culture. If Shigella is isolated,   susceptibilities will be performed. (11.15.22 @ 20:27)   CT  ct a/p New mural thickening of the ascending, transverse, descending, and sigmoid colon as well as of the rectum, suggestive of a nonspecific proctocolitis. Clinical correlation is recommended. Follow-up colonoscopy after treatment/resolution of acute disease may be pursued for additional evaluation.  The common bile duct is mildly dilated. The pancreatic parenchyma appears  unremarkable. The main pancreatic duct in the pancreatic head is dilated. Abdominal MR without and with IV contrast including MRCP may be pursued  for further evaluation.      MEDICATIONS  (STANDING):  aspirin enteric coated 81 milliGRAM(s) Oral daily  atorvastatin 10 milliGRAM(s) Oral at bedtime  buPROPion XL (24-Hour) . 300 milliGRAM(s) Oral daily  celecoxib 200 milliGRAM(s) Oral daily  ciprofloxacin   IVPB      ciprofloxacin   IVPB 400 milliGRAM(s) IV Intermittent every 12 hours  emtricitabine 200 mG/tenofovir alafenamide 25 mG (DESCOVY) Tablet 1 Tablet(s) Oral daily  enoxaparin Injectable 40 milliGRAM(s) SubCutaneous every 24 hours  metoprolol tartrate 25 milliGRAM(s) Oral daily  sodium chloride 0.9% with potassium chloride 20 mEq/L 1000 milliLiter(s) (75 mL/Hr) IV Continuous <Continuous>  venlafaxine 75 milliGRAM(s) Oral daily    MEDICATIONS  (PRN):  acetaminophen     Tablet .. 650 milliGRAM(s) Oral every 6 hours PRN Temp greater or equal to 38C (100.4F), Mild Pain (1 - 3)  aluminum hydroxide/magnesium hydroxide/simethicone Suspension 30 milliLiter(s) Oral every 4 hours PRN Dyspepsia  melatonin 3 milliGRAM(s) Oral at bedtime PRN Insomnia  morphine  - Injectable 4 milliGRAM(s) IV Push every 6 hours PRN Severe Pain (7 - 10)  morphine  - Injectable 2 milliGRAM(s) IV Push every 6 hours PRN Moderate Pain (4 - 6)  nicotine  Polacrilex Gum 2 milliGRAM(s) Oral every 2 hours PRN breakthrough cravings  ondansetron Injectable 4 milliGRAM(s) IV Push every 8 hours PRN Nausea and/or Vomiting  
    Date of service: 11-18-22 @ 11:05    Patient lying in bed; diarrhea has lessened; has mild abdominal pain, afebrile        ROS unable to obtain secondary to patient medical condition     MEDICATIONS  (STANDING):  aspirin enteric coated 81 milliGRAM(s) Oral daily  atorvastatin 10 milliGRAM(s) Oral at bedtime  buPROPion XL (24-Hour) . 300 milliGRAM(s) Oral daily  celecoxib 200 milliGRAM(s) Oral daily  ciprofloxacin   IVPB      ciprofloxacin   IVPB 400 milliGRAM(s) IV Intermittent every 12 hours  emtricitabine 200 mG/tenofovir alafenamide 25 mG (DESCOVY) Tablet 1 Tablet(s) Oral daily  enoxaparin Injectable 40 milliGRAM(s) SubCutaneous every 24 hours  metoprolol tartrate 25 milliGRAM(s) Oral daily  sodium chloride 0.9% with potassium chloride 20 mEq/L 1000 milliLiter(s) (75 mL/Hr) IV Continuous <Continuous>  venlafaxine 75 milliGRAM(s) Oral daily    MEDICATIONS  (PRN):  acetaminophen     Tablet .. 650 milliGRAM(s) Oral every 6 hours PRN Temp greater or equal to 38C (100.4F), Mild Pain (1 - 3)  aluminum hydroxide/magnesium hydroxide/simethicone Suspension 30 milliLiter(s) Oral every 4 hours PRN Dyspepsia  melatonin 3 milliGRAM(s) Oral at bedtime PRN Insomnia  morphine  - Injectable 4 milliGRAM(s) IV Push every 6 hours PRN Severe Pain (7 - 10)  morphine  - Injectable 2 milliGRAM(s) IV Push every 6 hours PRN Moderate Pain (4 - 6)  nicotine  Polacrilex Gum 2 milliGRAM(s) Oral every 2 hours PRN breakthrough cravings  ondansetron Injectable 4 milliGRAM(s) IV Push every 8 hours PRN Nausea and/or Vomiting      Vital Signs Last 24 Hrs  T(C): 36.8 (18 Nov 2022 06:19), Max: 36.8 (18 Nov 2022 06:19)  T(F): 98.2 (18 Nov 2022 06:19), Max: 98.2 (18 Nov 2022 06:19)  HR: 63 (18 Nov 2022 10:49) (62 - 90)  BP: 126/79 (18 Nov 2022 10:49) (126/79 - 138/79)  BP(mean): --  RR: 16 (18 Nov 2022 10:49) (16 - 20)  SpO2: 97% (18 Nov 2022 10:49) (97% - 100%)    Parameters below as of 18 Nov 2022 10:49  Patient On (Oxygen Delivery Method): room air            Physical Exam:            Physical Exam:        PE:    Constitutional: frail looking  HEENT: NC/AT, EOMI, PERRLA, conjunctivae clear; ears and nose atraumatic; pharynx clear  Neck: supple; thyroid not palpable  Back: no tenderness  Respiratory: respiratory effort normal; clear to auscultation  Cardiovascular: S1S2 regular, no murmurs  Abdomen: soft, not tender, not distended, positive BS; no liver or spleen organomegaly  Genitourinary: no suprapubic tenderness  Musculoskeletal: no muscle tenderness, no joint swelling or tenderness  Neurological/ Psychiatric: AxOx3, judgement and insight normal;  moving all extremities  Skin: no rashes; no palpable lesions; numerous piercings, tattoos    Labs: all available labs reviewed                          Labs:                     Labs:                        11.8   4.07  )-----------( 180      ( 17 Nov 2022 06:10 )             33.8     11-17    141  |  112<H>  |  12  ----------------------------<  118<H>  3.8   |  25  |  0.74    Ca    7.8<L>      17 Nov 2022 06:10  Mg     2.0     11-17             Cultures:       Culture - Reflex Stool (collected 11-15-22 @ 20:27)  Source: .Stool None  Preliminary Report (11-17-22 @ 12:33):    Culture in progress    Culture - Urine (collected 11-15-22 @ 09:36)  Source: Clean Catch Clean Catch (Midstream)  Final Report (11-16-22 @ 14:47):    <10,000 CFU/mL Normal Urogenital Mackenzie                GI PCR Panel Stool (11.15.22 @ 20:27)    GI PCR Panel: Detected: GI Panel PCR evaluates for:  Campylobacter, Plesiomonas shigelloides, Salmonella, Vibrio, Yersinia  enterocolitica, Enteroaggregative Escherichia (EAEC), Enteropathogenic E.  coli (EPEC), Enterotoxigenic E. coli (ETEC), Shiga-like toxin producing  E.coli (STEC), E. coli O157, Shigella/Enteroinvasive E. coli (EIEC),  Adenovirus, Astrovirus, Norovirus, Rotavirus, Sapovirus, Cryptosporidium,  Cyclospora cayetanensis, Entamoeba histolytica, Giardia lamblia.  For culture and susceptibility reports refer to “reflex stool culture”.    Shigella/ Enteroinvasive E. coli: Detected: Test results will be confirmed by culture. If Shigella is isolated,  susceptibilities will be performed.                < from: CT Abdomen and Pelvis w/ IV Cont (11.15.22 @ 10:28) >    ACC: [id number] EXAM:  CT ABDOMEN AND PELVIS IC                          PROCEDURE DATE:  11/15/2022          INTERPRETATION:  CLINICAL INFORMATION: Left lower quadrant abdominal pain   with rebound tenderness.    COMPARISON: 8/20/2021    CONTRAST/COMPLICATIONS:  IV Contrast: Omnipaque 350  90 cc administered   10 cc discarded  Oral Contrast: NONE  Complications: None reported at time of study completion    PROCEDURE:  CT of the Abdomen and Pelvis was performed.  Sagittal and coronal reformats were performed.    FINDINGS:  LOWER CHEST: Small bilateral atelectasis.    LIVER: A few tiny hypodense lesions in the liver, too small to   characterize.  BILE DUCTS: The common bile duct is mildly dilated.  GALLBLADDER: Interval cholecystectomy.  SPLEEN: Within normal limits.  PANCREAS: The pancreatic parenchyma appears unremarkable. The main   pancreatic duct in the pancreatic head is dilated. Abdominal MR without   and with IV contrast including MRCP may be pursued for further evaluation.  ADRENALS: Within normal limits.  KIDNEYS/URETERS: Within normal limits.    BLADDER: Underdistended.  REPRODUCTIVE ORGANS: The prostate and seminal vesicles appear grossly   unremarkable.    BOWEL: No bowel obstruction. Appendix not visualized. No secondary signs   for acute appendicitis. Surgical clips adjacent to the cecum. New mural   thickening of the ascending, transverse, descending, and sigmoid colon as   well as of the rectum, suggestive of a nonspecific proctocolitis.   Clinical correlation is recommended. Follow-up colonoscopy after   treatment/resolution of acute disease may be pursued for additional   evaluation.  PERITONEUM: No free air or ascites.  VESSELS: Calcified atherosclerotic disease.  RETROPERITONEUM/LYMPH NODES: No lymphadenopathy.  ABDOMINAL WALL: Small fat-containing umbilical hernia. Mild left inguinal   hernia which contains fluid, unchanged. Implantable neural stimulator at   the left buttock.  BONES: Mild degenerative spondylosis.    IMPRESSION:  New mural thickening of the ascending, transverse, descending, and   sigmoid colon as well as of the rectum, suggestive of a nonspecific   proctocolitis. Clinical correlation is recommended. Follow-up colonoscopy   after treatment/resolution of acute disease may be pursued for additional   evaluation.    The common bile duct is mildly dilated. The pancreatic parenchyma appears   unremarkable. The main pancreatic duct in the pancreatic head is dilated.   Abdominal MR without and with IV contrast including MRCP may be pursued   for further evaluation.    < end of copied text >      Radiology: all available radiological tests reviewed    Advanced directives addressed: full resuscitation
Patient is a 67y old  Male who presents with a chief complaint of diarrhea (15 Nov 2022 15:48)      Date of service: 11-17-22 @ 10:55      Patient still with copious amounts of diarrhea; found to have GI PCR positive Shigella/Enteroinvasive E coli  Confirmed that the patient takes Descovy as PreP and is not HIV positive, nor was ever HIV positive      ROS: no fever or chills; denies dizziness, no HA, no SOB or cough, no abdominal pain, no  constipation; no dysuria, no urinary frequency, no legs pain, no rashes    MEDICATIONS  (STANDING):  aspirin enteric coated 81 milliGRAM(s) Oral daily  atorvastatin 10 milliGRAM(s) Oral at bedtime  buPROPion XL (24-Hour) . 300 milliGRAM(s) Oral daily  celecoxib 200 milliGRAM(s) Oral daily  ciprofloxacin   IVPB      ciprofloxacin   IVPB 400 milliGRAM(s) IV Intermittent every 12 hours  emtricitabine 200 mG/tenofovir alafenamide 25 mG (DESCOVY) Tablet 1 Tablet(s) Oral daily  enoxaparin Injectable 40 milliGRAM(s) SubCutaneous every 24 hours  metoprolol tartrate 25 milliGRAM(s) Oral daily  sodium chloride 0.9% with potassium chloride 20 mEq/L 1000 milliLiter(s) (75 mL/Hr) IV Continuous <Continuous>  venlafaxine 75 milliGRAM(s) Oral daily    MEDICATIONS  (PRN):  acetaminophen     Tablet .. 650 milliGRAM(s) Oral every 6 hours PRN Temp greater or equal to 38C (100.4F), Mild Pain (1 - 3)  aluminum hydroxide/magnesium hydroxide/simethicone Suspension 30 milliLiter(s) Oral every 4 hours PRN Dyspepsia  melatonin 3 milliGRAM(s) Oral at bedtime PRN Insomnia  nicotine  Polacrilex Gum 2 milliGRAM(s) Oral every 2 hours PRN breakthrough cravings  ondansetron Injectable 4 milliGRAM(s) IV Push every 8 hours PRN Nausea and/or Vomiting      Vital Signs Last 24 Hrs  T(C): 36.6 (17 Nov 2022 08:00), Max: 36.7 (16 Nov 2022 12:39)  T(F): 97.8 (17 Nov 2022 08:00), Max: 98.1 (16 Nov 2022 12:39)  HR: 62 (17 Nov 2022 08:00) (62 - 69)  BP: 127/74 (17 Nov 2022 08:00) (102/56 - 127/74)  BP(mean): 89 (17 Nov 2022 08:00) (80 - 89)  RR: 17 (17 Nov 2022 08:00) (16 - 18)  SpO2: 100% (17 Nov 2022 08:00) (98% - 100%)    Parameters below as of 17 Nov 2022 08:00  Patient On (Oxygen Delivery Method): room air            Physical Exam:        PE:    Constitutional: frail looking  HEENT: NC/AT, EOMI, PERRLA, conjunctivae clear; ears and nose atraumatic; pharynx clear  Neck: supple; thyroid not palpable  Back: no tenderness  Respiratory: respiratory effort normal; clear to auscultation  Cardiovascular: S1S2 regular, no murmurs  Abdomen: soft, not tender, not distended, positive BS; no liver or spleen organomegaly  Genitourinary: no suprapubic tenderness  Musculoskeletal: no muscle tenderness, no joint swelling or tenderness  Neurological/ Psychiatric: AxOx3, judgement and insight normal;  moving all extremities  Skin: no rashes; no palpable lesions; numerous piercings, tattoos    Labs: all available labs reviewed                          Labs:                        11.8   4.07  )-----------( 180      ( 17 Nov 2022 06:10 )             33.8     11-17    141  |  112<H>  |  12  ----------------------------<  118<H>  3.8   |  25  |  0.74    Ca    7.8<L>      17 Nov 2022 06:10  Mg     2.0     11-17             Cultures:       Culture - Urine (collected 11-15-22 @ 09:36)  Source: Clean Catch Clean Catch (Midstream)  Final Report (11-16-22 @ 14:47):    <10,000 CFU/mL Normal Urogenital Mackenzie        GI PCR Panel Stool (11.15.22 @ 20:27)    GI PCR Panel: Detected: GI Panel PCR evaluates for:  Campylobacter, Plesiomonas shigelloides, Salmonella, Vibrio, Yersinia  enterocolitica, Enteroaggregative Escherichia (EAEC), Enteropathogenic E.  coli (EPEC), Enterotoxigenic E. coli (ETEC), Shiga-like toxin producing  E.coli (STEC), E. coli O157, Shigella/Enteroinvasive E. coli (EIEC),  Adenovirus, Astrovirus, Norovirus, Rotavirus, Sapovirus, Cryptosporidium,  Cyclospora cayetanensis, Entamoeba histolytica, Giardia lamblia.  For culture and susceptibility reports refer to “reflex stool culture”.    Shigella/ Enteroinvasive E. coli: Detected: Test results will be confirmed by culture. If Shigella is isolated,  susceptibilities will be performed.                < from: CT Abdomen and Pelvis w/ IV Cont (11.15.22 @ 10:28) >    ACC: [id number] EXAM:  CT ABDOMEN AND PELVIS IC                          PROCEDURE DATE:  11/15/2022          INTERPRETATION:  CLINICAL INFORMATION: Left lower quadrant abdominal pain   with rebound tenderness.    COMPARISON: 8/20/2021    CONTRAST/COMPLICATIONS:  IV Contrast: Omnipaque 350  90 cc administered   10 cc discarded  Oral Contrast: NONE  Complications: None reported at time of study completion    PROCEDURE:  CT of the Abdomen and Pelvis was performed.  Sagittal and coronal reformats were performed.    FINDINGS:  LOWER CHEST: Small bilateral atelectasis.    LIVER: A few tiny hypodense lesions in the liver, too small to   characterize.  BILE DUCTS: The common bile duct is mildly dilated.  GALLBLADDER: Interval cholecystectomy.  SPLEEN: Within normal limits.  PANCREAS: The pancreatic parenchyma appears unremarkable. The main   pancreatic duct in the pancreatic head is dilated. Abdominal MR without   and with IV contrast including MRCP may be pursued for further evaluation.  ADRENALS: Within normal limits.  KIDNEYS/URETERS: Within normal limits.    BLADDER: Underdistended.  REPRODUCTIVE ORGANS: The prostate and seminal vesicles appear grossly   unremarkable.    BOWEL: No bowel obstruction. Appendix not visualized. No secondary signs   for acute appendicitis. Surgical clips adjacent to the cecum. New mural   thickening of the ascending, transverse, descending, and sigmoid colon as   well as of the rectum, suggestive of a nonspecific proctocolitis.   Clinical correlation is recommended. Follow-up colonoscopy after   treatment/resolution of acute disease may be pursued for additional   evaluation.  PERITONEUM: No free air or ascites.  VESSELS: Calcified atherosclerotic disease.  RETROPERITONEUM/LYMPH NODES: No lymphadenopathy.  ABDOMINAL WALL: Small fat-containing umbilical hernia. Mild left inguinal   hernia which contains fluid, unchanged. Implantable neural stimulator at   the left buttock.  BONES: Mild degenerative spondylosis.    IMPRESSION:  New mural thickening of the ascending, transverse, descending, and   sigmoid colon as well as of the rectum, suggestive of a nonspecific   proctocolitis. Clinical correlation is recommended. Follow-up colonoscopy   after treatment/resolution of acute disease may be pursued for additional   evaluation.    The common bile duct is mildly dilated. The pancreatic parenchyma appears   unremarkable. The main pancreatic duct in the pancreatic head is dilated.   Abdominal MR without and with IV contrast including MRCP may be pursued   for further evaluation.    < end of copied text >      Radiology: all available radiological tests reviewed    Advanced directives addressed: full resuscitation
cc:  diarrhea    67M w/PMH depression, HTN, gastric bypass 2021, bicuspid AV s/p AVR, presents today c/o intractable watery diarrhea over past several days, already many episodes today.  He denies associated abd pain, n/v, fever/chills.  Last ABX use was ~3 months ago for UTI.  Patient had CT showing pancolitis.      :  Pt seen.  Notes ongoing diarrhea.  Thinks he saw some blood yesterday.  Not today.      ROS:   All 10 systems reviewed and found to be negative with the exception of what has been described above.    Vital Signs Last 24 Hrs  T(C): 36.7 (2022 18:10), Max: 36.8 (2022 07:46)  T(F): 98.1 (2022 18:10), Max: 98.2 (2022 07:46)  HR: 64 (2022 18:10) (64 - 92)  BP: 110/74 (2022 18:10) (100/58 - 124/69)  BP(mean): 80 (2022 12:39) (70 - 85)  RR: 16 (2022 18:10) (16 - 18)  SpO2: 99% (2022 18:10) (98% - 99%)    Parameters below as of 2022 18:10  Patient On (Oxygen Delivery Method): room air      GEN: lying in bed, NAD  HEENT:   NC/AT, pupils equal and reactive, EOMI  CV:  +S1, +S2, RRR  RESP:   lungs clear to auscultation bilaterally, no wheeze, rales, rhonchi   BREAST:  not examined  GI:  abdomen soft, non-tender, non-distended, normoactive BS  RECTAL:  not examined  :  not examined  MSK:   normal muscle tone  EXT:  no edema  NEURO:  AAOX3, no focal neurological deficits  SKIN:  no rashes    -    137  |  108  |  20  ----------------------------<  107<H>  3.0<L>   |  23  |  0.99    Ca    8.3<L>      2022 06:11    TPro  6.8  /  Alb  3.0<L>  /  TBili  0.4  /  DBili  x   /  AST  34  /  ALT  34  /  AlkPhos  62  11-15                        11.8   4.79  )-----------( 163      ( 2022 06:11 )             34.5     LIVER FUNCTIONS - ( 15 Nov 2022 09:36 )  Alb: 3.0 g/dL / Pro: 6.8 gm/dL / ALK PHOS: 62 U/L / ALT: 34 U/L / AST: 34 U/L / GGT: x           Urinalysis Basic - ( 15 Nov 2022 09:36 )  Color: Yellow / Appearance: Clear / S.015 / pH: x  Gluc: x / Ketone: Negative  / Bili: Negative / Urobili: Negative   Blood: x / Protein: Trace mg/dL / Nitrite: Negative   Leuk Esterase: Trace / RBC: 3-5 /HPF / WBC 3-5   Sq Epi: x / Non Sq Epi: Few / Bacteria: Few    Shigella/ Enteroinvasive E. coli: Detected: Test results will be confirmed by culture. If Shigella is isolated,   susceptibilities will be performed. (11.15.22 @ 20:27)   CT  ct a/p New mural thickening of the ascending, transverse, descending, and sigmoid colon as well as of the rectum, suggestive of a nonspecific proctocolitis. Clinical correlation is recommended. Follow-up colonoscopy after treatment/resolution of acute disease may be pursued for additional evaluation.  The common bile duct is mildly dilated. The pancreatic parenchyma appears  unremarkable. The main pancreatic duct in the pancreatic head is dilated. Abdominal MR without and with IV contrast including MRCP may be pursued  for further evaluation.      MEDICATIONS  (STANDING):  aspirin enteric coated 81 milliGRAM(s) Oral daily  atorvastatin 10 milliGRAM(s) Oral at bedtime  buPROPion XL (24-Hour) . 300 milliGRAM(s) Oral daily  celecoxib 200 milliGRAM(s) Oral daily  ciprofloxacin   IVPB      emtricitabine 200 mG/tenofovir alafenamide 25 mG (DESCOVY) Tablet 1 Tablet(s) Oral daily  enoxaparin Injectable 40 milliGRAM(s) SubCutaneous every 24 hours  metoprolol tartrate 25 milliGRAM(s) Oral daily  potassium chloride    Tablet ER 40 milliEquivalent(s) Oral every 4 hours  sodium chloride 0.9% with potassium chloride 20 mEq/L 1000 milliLiter(s) (75 mL/Hr) IV Continuous <Continuous>  venlafaxine 75 milliGRAM(s) Oral daily    MEDICATIONS  (PRN):  acetaminophen     Tablet .. 650 milliGRAM(s) Oral every 6 hours PRN Temp greater or equal to 38C (100.4F), Mild Pain (1 - 3)  aluminum hydroxide/magnesium hydroxide/simethicone Suspension 30 milliLiter(s) Oral every 4 hours PRN Dyspepsia  melatonin 3 milliGRAM(s) Oral at bedtime PRN Insomnia  nicotine  Polacrilex Gum 2 milliGRAM(s) Oral every 2 hours PRN breakthrough cravings  ondansetron Injectable 4 milliGRAM(s) IV Push every 8 hours PRN Nausea and/or Vomiting      
cc:  diarrhea    67M w/PMH depression, HTN, gastric bypass 4/2021, bicuspid AV s/p AVR, presents today c/o intractable watery diarrhea over past several days, already many episodes today.  He denies associated abd pain, n/v, fever/chills.  Last ABX use was ~3 months ago for UTI.  Patient had CT showing pancolitis.      11/16:  Pt seen.  Notes ongoing diarrhea.  Thinks he saw some blood yesterday.  Not today.    11/17:  Pt seen.  Ongoing diarrhea.  10x/ day.  Some blood at times.    11/18: some improvement in diarrhea and pain. Denie fever, chills, chest pain,     ROS:   All 10 systems reviewed and found to be negative with the exception of what has been described above.    Vital Signs Last 24 Hrs  T(C): 36.8 (11-18-22 @ 06:19), Max: 36.8 (11-18-22 @ 06:19)  HR: 63 (11-18-22 @ 10:49) (62 - 90)  BP: 126/79 (11-18-22 @ 10:49) (126/79 - 138/79)  RR: 16 (11-18-22 @ 10:49) (16 - 20)  SpO2: 97% (11-18-22 @ 10:49) (97% - 100%)      GEN: lying in bed, NAD  HEENT:   NC/AT, pupils equal and reactive, EOMI  CV:  +S1, +S2, RRR  RESP:   lungs clear to auscultation bilaterally, no wheeze, rales, rhonchi   GI:  abdomen soft, mild non-specific tenderness, non-distended, normoactive BS  MSK:   normal muscle tone  EXT:  no edema  NEURO:  AAOX3, no focal neurological deficits  SKIN:  no rashes  labs:    11-17    141  |  112<H>  |  12  ----------------------------<  118<H>  3.8   |  25  |  0.74    Ca    7.8<L>      17 Nov 2022 06:10  Mg     2.0     11-17                              11.8   4.07  )-----------( 180      ( 17 Nov 2022 06:10 )             33.8   Shigella/ Enteroinvasive E. coli: Detected: Test results will be confirmed by culture. If Shigella is isolated,   susceptibilities will be performed. (11.15.22 @ 20:27)   CT  ct a/p New mural thickening of the ascending, transverse, descending, and sigmoid colon as well as of the rectum, suggestive of a nonspecific proctocolitis. Clinical correlation is recommended. Follow-up colonoscopy after treatment/resolution of acute disease may be pursued for additional evaluation.  The common bile duct is mildly dilated. The pancreatic parenchyma appears  unremarkable. The main pancreatic duct in the pancreatic head is dilated. Abdominal MR without and with IV contrast including MRCP may be pursued  for further evaluation.      MEDICATIONS  (STANDING):  aspirin enteric coated 81 milliGRAM(s) Oral daily  atorvastatin 10 milliGRAM(s) Oral at bedtime  buPROPion XL (24-Hour) . 300 milliGRAM(s) Oral daily  celecoxib 200 milliGRAM(s) Oral daily  ciprofloxacin   IVPB      ciprofloxacin   IVPB 400 milliGRAM(s) IV Intermittent every 12 hours  emtricitabine 200 mG/tenofovir alafenamide 25 mG (DESCOVY) Tablet 1 Tablet(s) Oral daily  enoxaparin Injectable 40 milliGRAM(s) SubCutaneous every 24 hours  metoprolol tartrate 25 milliGRAM(s) Oral daily  sodium chloride 0.9% with potassium chloride 20 mEq/L 1000 milliLiter(s) (75 mL/Hr) IV Continuous <Continuous>  venlafaxine 75 milliGRAM(s) Oral daily    MEDICATIONS  (PRN):  acetaminophen     Tablet .. 650 milliGRAM(s) Oral every 6 hours PRN Temp greater or equal to 38C (100.4F), Mild Pain (1 - 3)  aluminum hydroxide/magnesium hydroxide/simethicone Suspension 30 milliLiter(s) Oral every 4 hours PRN Dyspepsia  melatonin 3 milliGRAM(s) Oral at bedtime PRN Insomnia  morphine  - Injectable 4 milliGRAM(s) IV Push every 6 hours PRN Severe Pain (7 - 10)  morphine  - Injectable 2 milliGRAM(s) IV Push every 6 hours PRN Moderate Pain (4 - 6)  nicotine  Polacrilex Gum 2 milliGRAM(s) Oral every 2 hours PRN breakthrough cravings  ondansetron Injectable 4 milliGRAM(s) IV Push every 8 hours PRN Nausea and/or Vomiting

## 2022-11-19 NOTE — PROGRESS NOTE ADULT - ASSESSMENT
67M w/PMH depression, HTN, gastric bypass 4/2021, admitted on 11/15 for evaluation of numerous bowel movements, diarrhea, over preceding few days; has no appetite, no abdominal pain, no sick contacts, no recent travel, but anticipates a [place] to leave in a few days; the patient was on antibiotics a few months ago for a UTI. No uncooked fish, chicken, no sushi use. The patient is on Jocelyn, for PreP.     1. Patient admitted with acute gastroenteritis, pancolitis, seen on imaging; possibly Cdiff versus enteral bacterial or viral infection  - follow up cultures   - serial cbc and monitor temperature   - iv hydration and supportive care   - reviewed prior medical records to evaluate for resistant or atypical pathogens   - diet as tolerated  - patient found to have bacterial enteritis, Shigella/Enteroinvasive E coli; transmissibility from food; had chicken at chinese restaurant, possibly the source of illness  - started on IV ciprofloxacin, which will continue  - patient will cancel his pending trip due to infectivity and persistent diarrhea; he is accepting of this  - reaffirmed that Descovy is for PreP  - continue isolation  2. other issues: per medicine  D/w hospitalist
67M w/PMH depression, HTN, gastric bypass 4/2021, presents today c/o intractable watery diarrhea over past several days, already many episodes today.  Found to have pancolitis.      #Diarrhea/ Pancolitis.  Suspect infectious:    F/u CDIFF testing.    GI PCR with shigella/ EIEC.    D/c PO vanco.    Start cipro 400 IV BID.    Follow sx.    GI Eval.      #Hyponatremia/ Hypokalemia:    Related to diarhea.   KCL replacement.  NSS with KCL @ 75.    Check MAG.      #RAYMUNDO:    Prerenal.  Improving.      #Dilated cbd - suspect d/t prior cholecystectomy, pt denies any RUQ pain, LFTs normal  GI eval.      #PPX- lovenox
67M w/PMH depression, HTN, gastric bypass 4/2021, presents today c/o intractable watery diarrhea over past several days, already many episodes today.  Found to have pancolitis.      #shigella/ EIEC  with Diarrhea/ Pancolitis.   CDIFF negative.    D/c PO vanco.    Start cipro 400 IV BID.  Day #2.    MOrphine PRN. down titrate as tolerated  Follow sx.    GI Eval.      #Hyponatremia/ Hypokalemia. REsolved  Related to diarhea.   IVF a needed for fluid balance  Continue to monitor Cr and electrolytes and correct accordingly.       #RAYMUNDO:    Resolved.  Cont IVF for today until oral intake improves.      #Dilated cbd - suspect d/t prior cholecystectomy, pt denies any RUQ pain, LFTs normal  GI eval.      #PPX- lovenox    DIsposition: IV antibiotics. ID/GI recs. SUpportive care. 
67M w/PMH depression, HTN, gastric bypass 4/2021, admitted on 11/15 for evaluation of numerous bowel movements, diarrhea, over preceding few days; has no appetite, no abdominal pain, no sick contacts, no recent travel, but anticipates a [place] to leave in a few days; the patient was on antibiotics a few months ago for a UTI. No uncooked fish, chicken, no sushi use. The patient is on Jocelyn, for PreP.     1. Patient admitted with acute gastroenteritis, pancolitis, seen on imaging; possibly Cdiff versus enteral bacterial or viral infection  - follow up cultures   - serial cbc and monitor temperature   - iv hydration and supportive care   - reviewed prior medical records to evaluate for resistant or atypical pathogens   - diet as tolerated  - patient found to have bacterial enteritis, Shigella/Enteroinvasive E coli; transmissibility from food; had chicken at chinese restaurant, possibly the source of illness  - started on IV ciprofloxacin, which will continue, with last dose this pm, which can be given early and then okay from id standpoint to discharge home  - tolerating antibiotics without rashes or side effects   - patient will cancel his pending trip due to infectivity and persistent diarrhea; he is accepting of this  - reaffirmed that Descovy is for PreP  - continue isolation  2. other issues: per medicine  
67M w/PMH depression, HTN, gastric bypass 4/2021, presents today c/o intractable watery diarrhea over past several days, already many episodes today.  Found to have pancolitis.      #Diarrhea/ Pancolitis.  Suspect infectious:    GI PCR with shigella/ EIEC.    CDIFF negative.    D/c PO vanco.    Start cipro 400 IV BID.  Day #2.    Add morphine IV PRN pain.    Follow sx.    GI Eval.      #Hyponatremia/ Hypokalemia:    Related to diarhea.   KCL replacement.  NSS with KCL @ 75.    Check MAG.      #RAYMUNDO:    Resolved.  Cont IVF for today until oral intake improves.      #Dilated cbd - suspect d/t prior cholecystectomy, pt denies any RUQ pain, LFTs normal  GI eval.      #PPX- lovenox    GI still hasn't seen patient.  Called on 11/15 and 11/16.  
67M w/PMH depression, HTN, gastric bypass 4/2021, admitted on 11/15 for evaluation of numerous bowel movements, diarrhea, over preceding few days; has no appetite, no abdominal pain, no sick contacts, no recent travel, but anticipates a [place] to leave in a few days; the patient was on antibiotics a few months ago for a UTI. No uncooked fish, chicken, no sushi use. The patient is on Jocelyn, for PreP.     1. Patient admitted with acute gastroenteritis, pancolitis, seen on imaging; possibly Cdiff versus enteral bacterial or viral infection  - follow up cultures   - serial cbc and monitor temperature   - iv hydration and supportive care   - reviewed prior medical records to evaluate for resistant or atypical pathogens   - diet as tolerated  - patient found to have bacterial enteritis, Shigella/Enteroinvasive E coli; transmissibility from food; had chicken at chinese restaurant, possibly the source of illness  - started on IV ciprofloxacin, which will continue  - tolerating antibiotics without rashes or side effects   - patient will cancel his pending trip due to infectivity and persistent diarrhea; he is accepting of this  - reaffirmed that Descovy is for PreP  - continue isolation  2. other issues: per medicine

## 2022-11-19 NOTE — DISCHARGE NOTE NURSING/CASE MANAGEMENT/SOCIAL WORK - PATIENT PORTAL LINK FT
You can access the FollowMyHealth Patient Portal offered by [facility] by registering at the following website: [email or web address]. By joining [facility]’s FollowMyHealth portal, you will also be able to view your health information using other applications (apps) compatible with our system.

## 2022-11-19 NOTE — DISCHARGE NOTE PROVIDER - HOSPITAL COURSE
FROM H&P:    "67M w/PMH depression, HTN, gastric bypass 4/2021, presents today c/o intractable watery diarrhea over past several days, already many episodes today.  He denies associated abd pain, n/v, fever/chills.  He had recent abx use over 1 month ago for UTI.  He denies recent travel or sick contacts.     IN ED, wbc 2.9, Na 133, K 3, Cr 1.3 (prior 1.0), given 2L ivf, KCl, CT a/p New mural thickening of the ascending, transverse, descending, and sigmoid colon as well as of the rectum, suggestive of a nonspecific proctocolitis. The common bile duct is mildly dilated. The pancreatic parenchyma appears  unremarkable. The main pancreatic duct in the pancreatic head is dilated. "    67M w/PMH depression, HTN, gastric bypass 4/2021, presents today c/o intractable watery diarrhea over past several days, already many episodes today.  Found to have pancolitis.      #shigella/ EIEC  with Diarrhea/ Pancolitis.   CDIFF negative.    D/c PO vanco.    Start cipro 400 IV BID.  Day #2.    MOrphine PRN. down titrate as tolerated  Follow sx.    GI Eval.      #Hyponatremia/ Hypokalemia. REsolved  Related to diarhea.   IVF a needed for fluid balance  Continue to monitor Cr and electrolytes and correct accordingly.       #RAYMUNDO:    Resolved.  Cont IVF for today until oral intake improves.      #Dilated cbd - suspect d/t prior cholecystectomy, pt denies any RUQ pain, LFTs normal  GI eval.      #PPX- lovenox    DIsposition: Diarrhea resolved. Pain improved. Evaluated by GI and ID. Completing course of antibiotics evening of 11/19/2022. Cleared by GI/ID. Discharge home in stable condition and close outpatient follow up with PMD and GI.   PHYSICAL EXAM:    T(C): 36.9 (11-19-22 @ 09:16), Max: 37.2 (11-19-22 @ 05:09)  HR: 62 (11-19-22 @ 09:16) (62 - 72)  BP: 127/69 (11-19-22 @ 09:16) (126/78 - 139/73)  RR: 17 (11-19-22 @ 09:16) (16 - 18)  SpO2: 99% (11-19-22 @ 09:16) (97% - 100%)    General: AAOx3; NAD  CVS: RRR, S1&S2, No murmur, No edema  Respiratory: Lungs CTA B/L; Normal Respiratory Effort  Abdomen/GI: Soft, non-tender, non-distended, no guarding, no rebound, normal bowel sounds  Neuro: AAOx3, CNII-XII grossly intact, non-focal  Psych: Appropriate, Cooperative  Skin: Clean, Dry and Intact  Discharge Management: 39 minutes   Date of Discharge/Service: 11/19/2022

## 2022-11-19 NOTE — DISCHARGE NOTE PROVIDER - CARE PROVIDER_API CALL
Iban Romero  [facility]  [address], [address]  [place] [address]  Phone: [phone]  Fax: [phone]  Established Patient  Follow Up Time: 1 week    Socrates Rowe  GASTROENTEROLOGY  [address], [address]  [place] [address]  Phone: [phone]  Fax: [phone]  Established Patient  Follow Up Time: 1 month

## 2022-11-19 NOTE — DISCHARGE NOTE PROVIDER - NSDCCPCAREPLAN_GEN_ALL_CORE_FT
PRINCIPAL DISCHARGE DIAGNOSIS  Diagnosis: Colitis  Assessment and Plan of Treatment: This was secondary to inflammation caused by bacteria from shigella and E. coli. Course of antibiotics completed. Maintain adequate hand hygiene to avoid spread to others/self. Follow up closely with your primary medical doctor and gastroenterologist.

## 2022-11-19 NOTE — PROVIDER CONTACT NOTE (OTHER) - SITUATION
faxed d/c paperwork to Dr. Iban Romero
diarrhea, colitis  Dr Rowe aware
diarrhea, h/o bypass  office edson - Geraldine

## 2022-11-19 NOTE — DISCHARGE NOTE NURSING/CASE MANAGEMENT/SOCIAL WORK - NSDCPEEMAIL_GEN_ALL_CORE
[facility] for Tobacco Control email [email or web address]

## 2022-11-19 NOTE — DISCHARGE NOTE NURSING/CASE MANAGEMENT/SOCIAL WORK - NSDCPEFALRISK_GEN_ALL_CORE
For information on Fall & Injury Prevention, visit: [email or web address] OR  [email or web address] OR  https://www.cdc.gov/steadi/patient.html

## 2022-11-25 DIAGNOSIS — N17.9 ACUTE KIDNEY FAILURE, UNSPECIFIED: ICD-10-CM

## 2022-11-25 DIAGNOSIS — E87.1 HYPO-OSMOLALITY AND HYPONATREMIA: ICD-10-CM

## 2022-11-25 DIAGNOSIS — Z28.310 UNVACCINATED FOR COVID-19: ICD-10-CM

## 2022-11-25 DIAGNOSIS — A03.0 SHIGELLOSIS DUE TO SHIGELLA DYSENTERIAE: ICD-10-CM

## 2022-11-25 DIAGNOSIS — F41.9 ANXIETY DISORDER, UNSPECIFIED: ICD-10-CM

## 2022-11-25 DIAGNOSIS — A04.4 OTHER INTESTINAL ESCHERICHIA COLI INFECTIONS: ICD-10-CM

## 2022-11-25 DIAGNOSIS — F32.A DEPRESSION, UNSPECIFIED: ICD-10-CM

## 2022-11-25 DIAGNOSIS — E87.6 HYPOKALEMIA: ICD-10-CM

## 2022-11-25 DIAGNOSIS — M54.30 SCIATICA, UNSPECIFIED SIDE: ICD-10-CM

## 2022-11-25 DIAGNOSIS — K83.8 OTHER SPECIFIED DISEASES OF BILIARY TRACT: ICD-10-CM

## 2022-11-25 DIAGNOSIS — K21.9 GASTRO-ESOPHAGEAL REFLUX DISEASE WITHOUT ESOPHAGITIS: ICD-10-CM

## 2022-11-25 DIAGNOSIS — I10 ESSENTIAL (PRIMARY) HYPERTENSION: ICD-10-CM

## 2022-12-12 ENCOUNTER — OFFICE (OUTPATIENT)
Dept: URBAN - METROPOLITAN AREA CLINIC 102 | Facility: CLINIC | Age: 67
Setting detail: OPHTHALMOLOGY
End: 2022-12-12
Payer: MEDICARE

## 2022-12-12 DIAGNOSIS — H25.13: ICD-10-CM

## 2022-12-12 PROBLEM — H52.7 REFRACTIVE ERROR: Status: ACTIVE | Noted: 2022-12-12

## 2022-12-12 PROCEDURE — 92020 GONIOSCOPY: CPT | Performed by: OPHTHALMOLOGY

## 2022-12-12 PROCEDURE — 99204 OFFICE O/P NEW MOD 45 MIN: CPT | Performed by: OPHTHALMOLOGY

## 2022-12-12 ASSESSMENT — REFRACTION_MANIFEST
OD_CYLINDER: -0.50
OD_AXIS: 58
OS_SPHERE: +4.75
OS_CYLINDER: -0.50
OS_VA1: 20/20-
OD_SPHERE: +4.25
OS_AXIS: 66
OD_VA1: 20/20-

## 2022-12-12 ASSESSMENT — REFRACTION_CURRENTRX
OD_SPHERE: +3.50
OS_SPHERE: +3.75
OD_ADD: +2.50
OS_ADD: +2.50
OD_CYLINDER: 0.00
OS_CYLINDER: -0.25
OS_VPRISM_DIRECTION: PROGS
OS_OVR_VA: 20/
OD_OVR_VA: 20/
OD_VPRISM_DIRECTION: PROGS
OD_AXIS: 180
OS_AXIS: 083

## 2022-12-12 ASSESSMENT — SPHEQUIV_DERIVED
OS_SPHEQUIV: 4.5
OD_SPHEQUIV: 4
OS_SPHEQUIV: 4.5
OD_SPHEQUIV: 4

## 2022-12-12 ASSESSMENT — KERATOMETRY
OD_AXISANGLE_DEGREES: 90
OD_K1POWER_DIOPTERS: 42.25
OS_AXISANGLE_DEGREES: 159
OS_K2POWER_DIOPTERS: 42.75
OS_K1POWER_DIOPTERS: 42.25
OD_K2POWER_DIOPTERS: 42.25

## 2022-12-12 ASSESSMENT — REFRACTION_AUTOREFRACTION
OS_CYLINDER: -0.50
OD_CYLINDER: -0.50
OD_AXIS: 58
OS_SPHERE: +4.75
OD_SPHERE: +4.25
OS_AXIS: 66

## 2022-12-12 ASSESSMENT — AXIALLENGTH_DERIVED
OS_AL: 22.2829
OD_AL: 22.5416
OS_AL: 22.2829
OD_AL: 22.5416

## 2022-12-12 ASSESSMENT — TONOMETRY
OS_IOP_MMHG: 16
OD_IOP_MMHG: 16

## 2022-12-12 ASSESSMENT — VISUAL ACUITY
OD_BCVA: 20/30-1
OS_BCVA: 20/20-1

## 2022-12-12 ASSESSMENT — CONFRONTATIONAL VISUAL FIELD TEST (CVF)
OD_FINDINGS: FULL
OS_FINDINGS: FULL

## 2022-12-16 NOTE — H&P PST ADULT - NSICDXPASTMEDICALHX_GEN_ALL_CORE_FT
n/a 16-Dec-2022 15:00 PAST MEDICAL HISTORY:  Anxiety     Aortic stenosis Bicuspid Aortic Valve Replacement- 5/29/2018    Carpal tunnel syndrome had surgery    Chronic neck and back pain     Depressive disorder     Gastrointestinal hemorrhage     GERD (gastroesophageal reflux disease)     Helicobacter pylori infection     Herniated lumbar intervertebral disc     History of colonic polyps     HTN (hypertension)     Hyperlipidemia     Internal and external prolapsed hemorrhoids had surgery    Loss of hearing     Morbid obesity     Sciatica     Skin cancer external right ear, unsure of type

## 2023-01-16 ENCOUNTER — APPOINTMENT (OUTPATIENT)
Dept: COLORECTAL SURGERY | Facility: CLINIC | Age: 68
End: 2023-01-16
Payer: MEDICARE

## 2023-01-16 ENCOUNTER — NON-APPOINTMENT (OUTPATIENT)
Age: 68
End: 2023-01-16

## 2023-01-16 PROCEDURE — 99204 OFFICE O/P NEW MOD 45 MIN: CPT

## 2023-01-17 ENCOUNTER — NON-APPOINTMENT (OUTPATIENT)
Age: 68
End: 2023-01-17

## 2023-01-24 ENCOUNTER — OFFICE (OUTPATIENT)
Dept: URBAN - METROPOLITAN AREA CLINIC 102 | Facility: CLINIC | Age: 68
Setting detail: OPHTHALMOLOGY
End: 2023-01-24
Payer: MEDICARE

## 2023-01-24 DIAGNOSIS — H43.393: ICD-10-CM

## 2023-01-24 DIAGNOSIS — H25.13: ICD-10-CM

## 2023-01-24 DIAGNOSIS — H52.4: ICD-10-CM

## 2023-01-24 PROCEDURE — 92015 DETERMINE REFRACTIVE STATE: CPT | Performed by: OPHTHALMOLOGY

## 2023-01-24 PROCEDURE — 99213 OFFICE O/P EST LOW 20 MIN: CPT | Performed by: OPHTHALMOLOGY

## 2023-01-24 ASSESSMENT — REFRACTION_AUTOREFRACTION
OS_AXIS: 80
OS_SPHERE: +4.75
OD_AXIS: 58
OD_CYLINDER: -0.75
OD_SPHERE: +4.75
OS_CYLINDER: -0.50

## 2023-01-24 ASSESSMENT — REFRACTION_MANIFEST
OD_ADD: +2.50
OD_CYLINDER: -0.50
OD_AXIS: 60
OS_ADD: +2.50
OS_SPHERE: +4.00
OS_VA1: 20/25
OD_VA1: 20/25
OS_CYLINDER: -0.50
OD_SPHERE: +4.00
OS_AXIS: 75

## 2023-01-24 ASSESSMENT — REFRACTION_CURRENTRX
OS_AXIS: 083
OS_CYLINDER: -0.25
OD_AXIS: 180
OS_ADD: +2.50
OD_ADD: +2.50
OD_VPRISM_DIRECTION: PROGS
OS_SPHERE: +3.75
OD_CYLINDER: 0.00
OS_OVR_VA: 20/
OS_VPRISM_DIRECTION: PROGS
OD_OVR_VA: 20/
OD_SPHERE: +3.50

## 2023-01-24 ASSESSMENT — KERATOMETRY
OD_AXISANGLE_DEGREES: 153
OS_AXISANGLE_DEGREES: 179
OS_K1POWER_DIOPTERS: 42.25
OD_K2POWER_DIOPTERS: 42.25
OD_K1POWER_DIOPTERS: 41.75
OS_K2POWER_DIOPTERS: 42.75

## 2023-01-24 ASSESSMENT — AXIALLENGTH_DERIVED
OD_AL: 22.4921
OS_AL: 22.2829
OS_AL: 22.5467
OD_AL: 22.7157

## 2023-01-24 ASSESSMENT — SPHEQUIV_DERIVED
OD_SPHEQUIV: 4.375
OS_SPHEQUIV: 3.75
OS_SPHEQUIV: 4.5
OD_SPHEQUIV: 3.75

## 2023-01-24 ASSESSMENT — VISUAL ACUITY
OS_BCVA: 20/25-1
OD_BCVA: 20/30-2

## 2023-01-24 ASSESSMENT — CONFRONTATIONAL VISUAL FIELD TEST (CVF)
OS_FINDINGS: FULL
OD_FINDINGS: FULL

## 2023-01-24 ASSESSMENT — TONOMETRY
OD_IOP_MMHG: 16
OS_IOP_MMHG: 15

## 2023-01-30 ENCOUNTER — EMERGENCY (EMERGENCY)
Facility: HOSPITAL | Age: 68
LOS: 0 days | Discharge: HOME | End: 2023-01-31
Attending: EMERGENCY MEDICINE
Payer: MEDICARE

## 2023-01-30 VITALS — WEIGHT: 149.91 LBS | HEIGHT: 67 IN

## 2023-01-30 DIAGNOSIS — Z98.890 OTHER SPECIFIED POSTPROCEDURAL STATES: Chronic | ICD-10-CM

## 2023-01-30 DIAGNOSIS — Z98.89 OTHER SPECIFIED POSTPROCEDURAL STATES: Chronic | ICD-10-CM

## 2023-01-30 DIAGNOSIS — Z98.61 CORONARY ANGIOPLASTY STATUS: Chronic | ICD-10-CM

## 2023-01-30 DIAGNOSIS — Z96.89 PRESENCE OF OTHER SPECIFIED FUNCTIONAL IMPLANTS: Chronic | ICD-10-CM

## 2023-01-30 DIAGNOSIS — Z87.74 PERSONAL HISTORY OF (CORRECTED) CONGENITAL MALFORMATIONS OF HEART AND CIRCULATORY SYSTEM: Chronic | ICD-10-CM

## 2023-01-30 DIAGNOSIS — Z90.89 ACQUIRED ABSENCE OF OTHER ORGANS: Chronic | ICD-10-CM

## 2023-01-30 PROCEDURE — 83880 ASSAY OF NATRIURETIC PEPTIDE: CPT

## 2023-01-30 PROCEDURE — 96375 TX/PRO/DX INJ NEW DRUG ADDON: CPT

## 2023-01-30 PROCEDURE — 0241U: CPT

## 2023-01-30 PROCEDURE — G1004: CPT

## 2023-01-30 PROCEDURE — 36415 COLL VENOUS BLD VENIPUNCTURE: CPT

## 2023-01-30 PROCEDURE — 99284 EMERGENCY DEPT VISIT MOD MDM: CPT | Mod: 25

## 2023-01-30 PROCEDURE — 83690 ASSAY OF LIPASE: CPT

## 2023-01-30 PROCEDURE — 87186 SC STD MICRODIL/AGAR DIL: CPT

## 2023-01-30 PROCEDURE — 84484 ASSAY OF TROPONIN QUANT: CPT

## 2023-01-30 PROCEDURE — 96365 THER/PROPH/DIAG IV INF INIT: CPT

## 2023-01-30 PROCEDURE — 83605 ASSAY OF LACTIC ACID: CPT

## 2023-01-30 PROCEDURE — 87493 C DIFF AMPLIFIED PROBE: CPT

## 2023-01-30 PROCEDURE — 74177 CT ABD & PELVIS W/CONTRAST: CPT | Mod: MG

## 2023-01-30 PROCEDURE — 85025 COMPLETE CBC W/AUTO DIFF WBC: CPT

## 2023-01-30 PROCEDURE — 87046 STOOL CULTR AEROBIC BACT EA: CPT

## 2023-01-30 PROCEDURE — 80053 COMPREHEN METABOLIC PANEL: CPT

## 2023-01-30 PROCEDURE — 99285 EMERGENCY DEPT VISIT HI MDM: CPT

## 2023-01-30 PROCEDURE — 87045 FECES CULTURE AEROBIC BACT: CPT

## 2023-01-30 PROCEDURE — 71045 X-RAY EXAM CHEST 1 VIEW: CPT

## 2023-01-30 PROCEDURE — 87177 OVA AND PARASITES SMEARS: CPT

## 2023-01-30 PROCEDURE — 87077 CULTURE AEROBIC IDENTIFY: CPT

## 2023-01-30 RX ORDER — ONDANSETRON 8 MG/1
4 TABLET, FILM COATED ORAL ONCE
Refills: 0 | Status: COMPLETED | OUTPATIENT
Start: 2023-01-30 | End: 2023-01-31

## 2023-01-30 RX ORDER — ACETAMINOPHEN 500 MG
1000 TABLET ORAL ONCE
Refills: 0 | Status: COMPLETED | OUTPATIENT
Start: 2023-01-30 | End: 2023-01-30

## 2023-01-30 RX ORDER — MORPHINE SULFATE 50 MG/1
4 CAPSULE, EXTENDED RELEASE ORAL ONCE
Refills: 0 | Status: DISCONTINUED | OUTPATIENT
Start: 2023-01-30 | End: 2023-01-30

## 2023-01-30 RX ORDER — FAMOTIDINE 10 MG/ML
20 INJECTION INTRAVENOUS ONCE
Refills: 0 | Status: COMPLETED | OUTPATIENT
Start: 2023-01-30 | End: 2023-01-30

## 2023-01-30 RX ORDER — SODIUM CHLORIDE 9 MG/ML
1000 INJECTION INTRAMUSCULAR; INTRAVENOUS; SUBCUTANEOUS ONCE
Refills: 0 | Status: COMPLETED | OUTPATIENT
Start: 2023-01-30 | End: 2023-01-30

## 2023-01-30 NOTE — ED ADULT TRIAGE NOTE - CHIEF COMPLAINT QUOTE
Patient presented to the ED c/o multiple medical complaints. Patient reports abdominal pain, shortness of breath, dizziness and diarrhea. Patient reports pmhx of bariatric surgery and stomach ulcer. Patient reports abdominal pain as a 5 out of 10 at this time. Patient denies allergies. Patient denies chest pain, fever, n/v at this time. Patient does not appear to be in any distress at this time. Patient breathing even and unlabored at this time. Patients oxygen saturation 96% on room air in triage.

## 2023-01-31 VITALS
RESPIRATION RATE: 18 BRPM | SYSTOLIC BLOOD PRESSURE: 117 MMHG | OXYGEN SATURATION: 98 % | DIASTOLIC BLOOD PRESSURE: 79 MMHG | HEART RATE: 71 BPM

## 2023-01-31 LAB
ALBUMIN SERPL ELPH-MCNC: 2.4 G/DL — LOW (ref 3.3–5)
ALP SERPL-CCNC: 140 U/L — HIGH (ref 40–120)
ALT FLD-CCNC: 67 U/L — SIGNIFICANT CHANGE UP (ref 12–78)
ANION GAP SERPL CALC-SCNC: 8 MMOL/L — SIGNIFICANT CHANGE UP (ref 5–17)
ANISOCYTOSIS BLD QL: SLIGHT — SIGNIFICANT CHANGE UP
AST SERPL-CCNC: 108 U/L — HIGH (ref 15–37)
BASOPHILS # BLD AUTO: 0 K/UL — SIGNIFICANT CHANGE UP (ref 0–0.2)
BASOPHILS NFR BLD AUTO: 0 % — SIGNIFICANT CHANGE UP (ref 0–2)
BILIRUB SERPL-MCNC: 0.4 MG/DL — SIGNIFICANT CHANGE UP (ref 0.2–1.2)
BUN SERPL-MCNC: 23 MG/DL — SIGNIFICANT CHANGE UP (ref 7–23)
C DIFF BY PCR RESULT: SIGNIFICANT CHANGE UP
CALCIUM SERPL-MCNC: 7.9 MG/DL — LOW (ref 8.5–10.1)
CHLORIDE SERPL-SCNC: 103 MMOL/L — SIGNIFICANT CHANGE UP (ref 96–108)
CO2 SERPL-SCNC: 19 MMOL/L — LOW (ref 22–31)
CREAT SERPL-MCNC: 1.11 MG/DL — SIGNIFICANT CHANGE UP (ref 0.5–1.3)
EGFR: 73 ML/MIN/1.73M2 — SIGNIFICANT CHANGE UP
EOSINOPHIL # BLD AUTO: 0 K/UL — SIGNIFICANT CHANGE UP (ref 0–0.5)
EOSINOPHIL NFR BLD AUTO: 0 % — SIGNIFICANT CHANGE UP (ref 0–6)
FLUAV AG NPH QL: SIGNIFICANT CHANGE UP
FLUBV AG NPH QL: SIGNIFICANT CHANGE UP
GLUCOSE SERPL-MCNC: 98 MG/DL — SIGNIFICANT CHANGE UP (ref 70–99)
HCT VFR BLD CALC: 34.8 % — LOW (ref 39–50)
HGB BLD-MCNC: 11.4 G/DL — LOW (ref 13–17)
LACTATE SERPL-SCNC: 1.5 MMOL/L — SIGNIFICANT CHANGE UP (ref 0.7–2)
LIDOCAIN IGE QN: 173 U/L — SIGNIFICANT CHANGE UP (ref 73–393)
LYMPHOCYTES # BLD AUTO: 47 % — HIGH (ref 13–44)
LYMPHOCYTES # BLD AUTO: 7.55 K/UL — HIGH (ref 1–3.3)
LYMPHOCYTES # SPEC AUTO: 1 % — HIGH (ref 0–0)
MACROCYTES BLD QL: SLIGHT — SIGNIFICANT CHANGE UP
MANUAL SMEAR VERIFICATION: SIGNIFICANT CHANGE UP
MCHC RBC-ENTMCNC: 30.2 PG — SIGNIFICANT CHANGE UP (ref 27–34)
MCHC RBC-ENTMCNC: 32.8 GM/DL — SIGNIFICANT CHANGE UP (ref 32–36)
MCV RBC AUTO: 92.1 FL — SIGNIFICANT CHANGE UP (ref 80–100)
METAMYELOCYTES # FLD: 1 % — HIGH (ref 0–0)
MONOCYTES # BLD AUTO: 1.12 K/UL — HIGH (ref 0–0.9)
MONOCYTES NFR BLD AUTO: 7 % — SIGNIFICANT CHANGE UP (ref 2–14)
MYELOCYTES NFR BLD: 1 % — HIGH (ref 0–0)
NEUTROPHILS # BLD AUTO: 6.43 K/UL — SIGNIFICANT CHANGE UP (ref 1.8–7.4)
NEUTROPHILS NFR BLD AUTO: 40 % — LOW (ref 43–77)
NRBC # BLD: 0 /100 — SIGNIFICANT CHANGE UP (ref 0–0)
NRBC # BLD: SIGNIFICANT CHANGE UP /100 WBCS (ref 0–0)
NT-PROBNP SERPL-SCNC: 752 PG/ML — HIGH (ref 0–125)
PLAT MORPH BLD: NORMAL — SIGNIFICANT CHANGE UP
PLATELET # BLD AUTO: 146 K/UL — LOW (ref 150–400)
POIKILOCYTOSIS BLD QL AUTO: SLIGHT — SIGNIFICANT CHANGE UP
POTASSIUM SERPL-MCNC: 4.3 MMOL/L — SIGNIFICANT CHANGE UP (ref 3.5–5.3)
POTASSIUM SERPL-SCNC: 4.3 MMOL/L — SIGNIFICANT CHANGE UP (ref 3.5–5.3)
PROT SERPL-MCNC: 6.7 GM/DL — SIGNIFICANT CHANGE UP (ref 6–8.3)
RBC # BLD: 3.78 M/UL — LOW (ref 4.2–5.8)
RBC # FLD: 13.5 % — SIGNIFICANT CHANGE UP (ref 10.3–14.5)
RBC BLD AUTO: ABNORMAL
RSV RNA NPH QL NAA+NON-PROBE: SIGNIFICANT CHANGE UP
SARS-COV-2 RNA SPEC QL NAA+PROBE: SIGNIFICANT CHANGE UP
SMUDGE CELLS # BLD: PRESENT — SIGNIFICANT CHANGE UP
SODIUM SERPL-SCNC: 130 MMOL/L — LOW (ref 135–145)
TROPONIN I, HIGH SENSITIVITY RESULT: 16.97 NG/L — SIGNIFICANT CHANGE UP
VARIANT LYMPHS # BLD: 3 % — SIGNIFICANT CHANGE UP (ref 0–6)
WBC # BLD: 16.07 K/UL — HIGH (ref 3.8–10.5)
WBC # FLD AUTO: 16.07 K/UL — HIGH (ref 3.8–10.5)

## 2023-01-31 PROCEDURE — G1004: CPT

## 2023-01-31 PROCEDURE — 71045 X-RAY EXAM CHEST 1 VIEW: CPT | Mod: 26

## 2023-01-31 PROCEDURE — 74177 CT ABD & PELVIS W/CONTRAST: CPT | Mod: 26,MG

## 2023-01-31 RX ORDER — METRONIDAZOLE 500 MG
500 TABLET ORAL ONCE
Refills: 0 | Status: COMPLETED | OUTPATIENT
Start: 2023-01-31 | End: 2023-01-31

## 2023-01-31 RX ORDER — METRONIDAZOLE 500 MG
1 TABLET ORAL
Qty: 21 | Refills: 0
Start: 2023-01-31 | End: 2023-02-06

## 2023-01-31 RX ORDER — CIPROFLOXACIN LACTATE 400MG/40ML
1 VIAL (ML) INTRAVENOUS
Qty: 20 | Refills: 0
Start: 2023-01-31 | End: 2023-02-09

## 2023-01-31 RX ORDER — CIPROFLOXACIN LACTATE 400MG/40ML
500 VIAL (ML) INTRAVENOUS ONCE
Refills: 0 | Status: COMPLETED | OUTPATIENT
Start: 2023-01-31 | End: 2023-01-31

## 2023-01-31 RX ADMIN — SODIUM CHLORIDE 1000 MILLILITER(S): 9 INJECTION INTRAMUSCULAR; INTRAVENOUS; SUBCUTANEOUS at 01:48

## 2023-01-31 RX ADMIN — Medication 1000 MILLIGRAM(S): at 00:45

## 2023-01-31 RX ADMIN — MORPHINE SULFATE 4 MILLIGRAM(S): 50 CAPSULE, EXTENDED RELEASE ORAL at 00:09

## 2023-01-31 RX ADMIN — Medication 500 MILLIGRAM(S): at 03:50

## 2023-01-31 RX ADMIN — FAMOTIDINE 20 MILLIGRAM(S): 10 INJECTION INTRAVENOUS at 00:08

## 2023-01-31 RX ADMIN — MORPHINE SULFATE 4 MILLIGRAM(S): 50 CAPSULE, EXTENDED RELEASE ORAL at 01:00

## 2023-01-31 RX ADMIN — ONDANSETRON 4 MILLIGRAM(S): 8 TABLET, FILM COATED ORAL at 00:07

## 2023-01-31 RX ADMIN — Medication 500 MILLIGRAM(S): at 03:49

## 2023-01-31 RX ADMIN — SODIUM CHLORIDE 1000 MILLILITER(S): 9 INJECTION INTRAMUSCULAR; INTRAVENOUS; SUBCUTANEOUS at 00:07

## 2023-01-31 RX ADMIN — Medication 1000 MILLIGRAM(S): at 01:00

## 2023-01-31 RX ADMIN — Medication 400 MILLIGRAM(S): at 00:07

## 2023-01-31 NOTE — ED PROVIDER NOTE - CLINICAL SUMMARY MEDICAL DECISION MAKING FREE TEXT BOX
67 male with h/o gastric bypass and shigella infection in ED c/o abd pain with diarrhea and weakness x 2 wks.   pt states similar episode in 12/2022.   pt states everything he eats or drinks comes right out as diarrhea.   no sick contacts.   pt denies any fever, HA, cp, sob, n/v.    tolerating PO.   states did not contact his surgeon or GI MD about symptoms.    frail appearing pt will mild distress.   abd soft and NT.   no visible emesis.   tolerating PO.   pt with h/o bypass and colon infection will repeat CT, labs, IVF, meds and consults 67 male with h/o gastric bypass and shigella infection in ED c/o abd pain with diarrhea and weakness x 2 wks.   pt states similar episode in 12/2022.   pt states everything he eats or drinks comes right out as diarrhea.   no sick contacts.   pt denies any fever, HA, cp, sob, n/v.    tolerating PO.   states did not contact his surgeon or GI MD about symptoms.    frail appearing pt will mild distress.   abd soft and NT.   no visible emesis.   tolerating PO.   pt with h/o bypass and colon infection will repeat CT, labs, IVF, meds and consults    results noted.   case d/w surgery resident covering [facility] and [facility] medicine admit if needed and will consult.   pt told of results.   states feels better and prefers to take PO abx and will f/u.   will d/c with f/u

## 2023-01-31 NOTE — ED PROVIDER NOTE - CARE PROVIDER_API CALL
Bruce Amor)  Surgery  [address], [address]  [place] [address]  Phone: [phone]  Fax: [phone]  Follow Up Time:

## 2023-01-31 NOTE — ED ADULT NURSE NOTE - OBJECTIVE STATEMENT
Received 66 y/o m with multiple medical complaints Received 68 y/o m with multiple medical complaints, abd pain and diarrhea x 2 wks, pt with hx of gastric bypass and shigella infection, unable to tolerate anything PO as it "passes right through me." Pt evaluated by provider.

## 2023-01-31 NOTE — ED PROVIDER NOTE - NSFOLLOWUPINSTRUCTIONS_ED_ALL_ED_FT
please follow up with your doctors in 2-3 days.   take medications as prescribed.   drink plenty of fluids.   return to ED for any concerns

## 2023-01-31 NOTE — ED ADULT NURSE NOTE - CARDIO ASSESSMENT
--- Complex Repair And Bilobe Flap Text: The defect edges were debeveled with a #15 scalpel blade.  The primary defect was closed partially with a complex linear closure.  Given the location of the remaining defect, shape of the defect and the proximity to free margins a bilobe flap was deemed most appropriate for complete closure of the defect.  Using a sterile surgical marker, an appropriate advancement flap was drawn incorporating the defect and placing the expected incisions within the relaxed skin tension lines where possible.    The area thus outlined was incised deep to adipose tissue with a #15 scalpel blade.  The skin margins were undermined to an appropriate distance in all directions utilizing iris scissors.

## 2023-01-31 NOTE — ED ADULT NURSE NOTE - EXTENSIONS OF SELF_ADULT
LABS:                        13.9   10.25 )-----------( 254      ( 15 Mar 2021 10:41 )             43.8     03-15    148<H>  |  118<H>  |  33<H>  ----------------------------<  97  4.5   |  20<L>  |  1.61<H>    Ca    9.6      15 Mar 2021 11:58    TPro  7.3  /  Alb  3.2<L>  /  TBili  0.5  /  DBili  x   /  AST  18  /  ALT  12  /  AlkPhos  55  03-15    PT/INR - ( 15 Mar 2021 11:39 )   PT: 17.9 sec;   INR: 1.52          PTT - ( 15 Mar 2021 11:39 )  PTT:25.7 sec  Urinalysis Basic - ( 15 Mar 2021 11:12 )    Color: Yellow / Appearance: Clear / S.025 / pH: x  Gluc: x / Ketone: NEGATIVE  / Bili: Negative / Urobili: 0.2 E.U./dL   Blood: x / Protein: NEGATIVE mg/dL / Nitrite: NEGATIVE   Leuk Esterase: NEGATIVE / RBC: x / WBC x   Sq Epi: x / Non Sq Epi: x / Bacteria: x      CAPILLARY BLOOD GLUCOSE            RADIOLOGY & ADDITIONAL TESTS: Reviewed. None

## 2023-01-31 NOTE — ED PROVIDER NOTE - OBJECTIVE STATEMENT
67 male with h/o gastric bypass and shigella infection in ED c/o abd pain with diarrhea and weakness x 2 wks.   pt states similar episode in 12/2022.   pt states everything he eats or drinks comes right out as diarrhea.   no sick contacts.   pt denies any fever, HA, cp, sob, n/v.    tolerating PO.   states did not contact his surgeon or GI MD about symptoms.

## 2023-01-31 NOTE — ED PROVIDER NOTE - PATIENT PORTAL LINK FT
You can access the FollowMyHealth Patient Portal offered by [facility] by registering at the following website: [email or web address]. By joining [employer]’s FollowMyHealth portal, you will also be able to view your health information using other applications (apps) compatible with our system.

## 2023-02-01 LAB
CULTURE RESULTS: SIGNIFICANT CHANGE UP
SPECIMEN SOURCE: SIGNIFICANT CHANGE UP

## 2023-02-02 ENCOUNTER — INPATIENT (INPATIENT)
Facility: HOSPITAL | Age: 68
LOS: 4 days | Discharge: HOME | DRG: 372 | End: 2023-02-07
Attending: FAMILY MEDICINE | Admitting: INTERNAL MEDICINE
Payer: MEDICARE

## 2023-02-02 VITALS — WEIGHT: 143.96 LBS | HEIGHT: 67 IN

## 2023-02-02 DIAGNOSIS — Z98.890 OTHER SPECIFIED POSTPROCEDURAL STATES: Chronic | ICD-10-CM

## 2023-02-02 DIAGNOSIS — R06.02 SHORTNESS OF BREATH: ICD-10-CM

## 2023-02-02 DIAGNOSIS — Z96.89 PRESENCE OF OTHER SPECIFIED FUNCTIONAL IMPLANTS: Chronic | ICD-10-CM

## 2023-02-02 DIAGNOSIS — Z87.74 PERSONAL HISTORY OF (CORRECTED) CONGENITAL MALFORMATIONS OF HEART AND CIRCULATORY SYSTEM: Chronic | ICD-10-CM

## 2023-02-02 DIAGNOSIS — Z90.89 ACQUIRED ABSENCE OF OTHER ORGANS: Chronic | ICD-10-CM

## 2023-02-02 DIAGNOSIS — Z98.89 OTHER SPECIFIED POSTPROCEDURAL STATES: Chronic | ICD-10-CM

## 2023-02-02 DIAGNOSIS — Z98.61 CORONARY ANGIOPLASTY STATUS: Chronic | ICD-10-CM

## 2023-02-02 LAB
ALBUMIN SERPL ELPH-MCNC: 2.7 G/DL — LOW (ref 3.3–5)
ALP SERPL-CCNC: 411 U/L — HIGH (ref 40–120)
ALT FLD-CCNC: 165 U/L — HIGH (ref 12–78)
ANION GAP SERPL CALC-SCNC: 10 MMOL/L — SIGNIFICANT CHANGE UP (ref 5–17)
AST SERPL-CCNC: 329 U/L — HIGH (ref 15–37)
BASE EXCESS BLDV CALC-SCNC: -5 MMOL/L — SIGNIFICANT CHANGE UP
BASOPHILS # BLD AUTO: 0 K/UL — SIGNIFICANT CHANGE UP (ref 0–0.2)
BASOPHILS NFR BLD AUTO: 0 % — SIGNIFICANT CHANGE UP (ref 0–2)
BILIRUB SERPL-MCNC: 0.6 MG/DL — SIGNIFICANT CHANGE UP (ref 0.2–1.2)
BUN SERPL-MCNC: 21 MG/DL — SIGNIFICANT CHANGE UP (ref 7–23)
CALCIUM SERPL-MCNC: 8.6 MG/DL — SIGNIFICANT CHANGE UP (ref 8.5–10.1)
CHLORIDE SERPL-SCNC: 102 MMOL/L — SIGNIFICANT CHANGE UP (ref 96–108)
CO2 BLDV-SCNC: 24 MMOL/L — SIGNIFICANT CHANGE UP (ref 22–26)
CO2 SERPL-SCNC: 22 MMOL/L — SIGNIFICANT CHANGE UP (ref 22–31)
CREAT SERPL-MCNC: 1.31 MG/DL — HIGH (ref 0.5–1.3)
EGFR: 60 ML/MIN/1.73M2 — SIGNIFICANT CHANGE UP
EOSINOPHIL # BLD AUTO: 0 K/UL — SIGNIFICANT CHANGE UP (ref 0–0.5)
EOSINOPHIL NFR BLD AUTO: 0 % — SIGNIFICANT CHANGE UP (ref 0–6)
GLUCOSE SERPL-MCNC: 104 MG/DL — HIGH (ref 70–99)
HCO3 BLDV-SCNC: 22 MMOL/L — SIGNIFICANT CHANGE UP (ref 22–29)
HCT VFR BLD CALC: 40.3 % — SIGNIFICANT CHANGE UP (ref 39–50)
HGB BLD-MCNC: 13.2 G/DL — SIGNIFICANT CHANGE UP (ref 13–17)
LIDOCAIN IGE QN: 133 U/L — SIGNIFICANT CHANGE UP (ref 73–393)
LYMPHOCYTES # BLD AUTO: 57 % — HIGH (ref 13–44)
LYMPHOCYTES # BLD AUTO: 6.59 K/UL — HIGH (ref 1–3.3)
MAGNESIUM SERPL-MCNC: 2.1 MG/DL — SIGNIFICANT CHANGE UP (ref 1.6–2.6)
MCHC RBC-ENTMCNC: 30.6 PG — SIGNIFICANT CHANGE UP (ref 27–34)
MCHC RBC-ENTMCNC: 32.8 GM/DL — SIGNIFICANT CHANGE UP (ref 32–36)
MCV RBC AUTO: 93.5 FL — SIGNIFICANT CHANGE UP (ref 80–100)
MONOCYTES # BLD AUTO: 0.23 K/UL — SIGNIFICANT CHANGE UP (ref 0–0.9)
MONOCYTES NFR BLD AUTO: 2 % — SIGNIFICANT CHANGE UP (ref 2–14)
NEUTROPHILS # BLD AUTO: 3.93 K/UL — SIGNIFICANT CHANGE UP (ref 1.8–7.4)
NEUTROPHILS NFR BLD AUTO: 34 % — LOW (ref 43–77)
NRBC # BLD: SIGNIFICANT CHANGE UP /100 WBCS (ref 0–0)
NT-PROBNP SERPL-SCNC: 1557 PG/ML — HIGH (ref 0–125)
PCO2 BLDV: 50 MMHG — SIGNIFICANT CHANGE UP (ref 42–55)
PH BLDV: 7.26 — LOW (ref 7.32–7.43)
PLATELET # BLD AUTO: 119 K/UL — LOW (ref 150–400)
PO2 BLDV: 36 MMHG — SIGNIFICANT CHANGE UP
POTASSIUM SERPL-MCNC: 4.3 MMOL/L — SIGNIFICANT CHANGE UP (ref 3.5–5.3)
POTASSIUM SERPL-SCNC: 4.3 MMOL/L — SIGNIFICANT CHANGE UP (ref 3.5–5.3)
PROT SERPL-MCNC: 8 GM/DL — SIGNIFICANT CHANGE UP (ref 6–8.3)
RAPID RVP RESULT: SIGNIFICANT CHANGE UP
RBC # BLD: 4.31 M/UL — SIGNIFICANT CHANGE UP (ref 4.2–5.8)
RBC # FLD: 13.8 % — SIGNIFICANT CHANGE UP (ref 10.3–14.5)
SAO2 % BLDV: 47.7 % — SIGNIFICANT CHANGE UP
SARS-COV-2 RNA SPEC QL NAA+PROBE: SIGNIFICANT CHANGE UP
SODIUM SERPL-SCNC: 134 MMOL/L — LOW (ref 135–145)
TROPONIN I, HIGH SENSITIVITY RESULT: 13.76 NG/L — SIGNIFICANT CHANGE UP
WBC # BLD: 11.57 K/UL — HIGH (ref 3.8–10.5)
WBC # FLD AUTO: 11.57 K/UL — HIGH (ref 3.8–10.5)

## 2023-02-02 PROCEDURE — 82164 ANGIOTENSIN I ENZYME TEST: CPT

## 2023-02-02 PROCEDURE — 87389 HIV-1 AG W/HIV-1&-2 AB AG IA: CPT

## 2023-02-02 PROCEDURE — 76705 ECHO EXAM OF ABDOMEN: CPT

## 2023-02-02 PROCEDURE — 71275 CT ANGIOGRAPHY CHEST: CPT | Mod: 26,MA

## 2023-02-02 PROCEDURE — 36415 COLL VENOUS BLD VENIPUNCTURE: CPT

## 2023-02-02 PROCEDURE — 82306 VITAMIN D 25 HYDROXY: CPT

## 2023-02-02 PROCEDURE — 99285 EMERGENCY DEPT VISIT HI MDM: CPT | Mod: FS,CS

## 2023-02-02 PROCEDURE — 80061 LIPID PANEL: CPT

## 2023-02-02 PROCEDURE — 86381 MITOCHONDRIAL ANTIBODY EACH: CPT

## 2023-02-02 PROCEDURE — 86255 FLUORESCENT ANTIBODY SCREEN: CPT

## 2023-02-02 PROCEDURE — 80053 COMPREHEN METABOLIC PANEL: CPT

## 2023-02-02 PROCEDURE — 85025 COMPLETE CBC W/AUTO DIFF WBC: CPT

## 2023-02-02 PROCEDURE — 80074 ACUTE HEPATITIS PANEL: CPT

## 2023-02-02 PROCEDURE — 93306 TTE W/DOPPLER COMPLETE: CPT

## 2023-02-02 PROCEDURE — 99222 1ST HOSP IP/OBS MODERATE 55: CPT | Mod: GC

## 2023-02-02 PROCEDURE — 93010 ELECTROCARDIOGRAM REPORT: CPT

## 2023-02-02 RX ORDER — LANSOPRAZOLE 15 MG/1
1 CAPSULE, DELAYED RELEASE ORAL
Qty: 0 | Refills: 0 | DISCHARGE

## 2023-02-02 RX ORDER — SODIUM CHLORIDE 9 MG/ML
1000 INJECTION INTRAMUSCULAR; INTRAVENOUS; SUBCUTANEOUS ONCE
Refills: 0 | Status: COMPLETED | OUTPATIENT
Start: 2023-02-02 | End: 2023-02-02

## 2023-02-02 RX ADMIN — SODIUM CHLORIDE 1000 MILLILITER(S): 9 INJECTION INTRAMUSCULAR; INTRAVENOUS; SUBCUTANEOUS at 15:35

## 2023-02-02 NOTE — ED STATDOCS - NS ED ATTENDING STATEMENT MOD
This was a shared visit with the CHELSY. I reviewed and verified the documentation and independently performed the documented:

## 2023-02-02 NOTE — ED STATDOCS - CLINICAL SUMMARY MEDICAL DECISION MAKING FREE TEXT BOX
Adult male presents with DIAL. Pt hypoxic to the 80s otherwise vitals normal. Lungs clear. Will evaluate for PE, PNA, ACS, anticipate to medicine for further workup and cardiac eval.

## 2023-02-02 NOTE — ED ADULT TRIAGE NOTE - CHIEF COMPLAINT QUOTE
patient presenting ambulatory to ED, sent in by dr. berry c/o SOB. HR 86 and oxygen saturation 96% in triage. patient was recently admitted and discharged from , dx with colitis. dr. berry wanted patient to come to ED today to follow up on why patient remains short of breath.

## 2023-02-02 NOTE — ED ADULT NURSE NOTE - OBJECTIVE STATEMENT
patient presenting ambulatory to ED, sent in by dr. berry c/o SOB. HR 86 and oxygen saturation 96% in triage. patient was recently admitted and discharged from , dx with colitis. dr. berry wanted patient to come to ED today to follow up on why patient remains short of breath.
(2) good, crying

## 2023-02-02 NOTE — PATIENT PROFILE ADULT - FALL HARM RISK - UNIVERSAL INTERVENTIONS
Bed in lowest position, wheels locked, appropriate side rails in place/Call bell, personal items and telephone in reach/Instruct patient to call for assistance before getting out of bed or chair/Non-slip footwear when patient is out of bed/[place] to call system/Physically safe environment - no spills, clutter or unnecessary equipment/Purposeful Proactive Rounding/Room/bathroom lighting operational, light cord in reach

## 2023-02-02 NOTE — H&P ADULT - HISTORY OF PRESENT ILLNESS
68 yo male with PMHx  66 yo male with PMHx depression, HTN, gastric bypass 4/21 presents today with SOB.  Patient mentions he has been having SOB for the past 2 weeks but it has been worsening in the past 2-3 days.  He recently came to the ED with abdominal pain and diarrhea on 1/31 and was discharged from the ED with Colitis on PO abx.   68 yo male with PMHx depression, HTN, gastric bypass 4/21 presents today with SOB.  Patient mentions he has been having SOB for the past 2 weeks but it has been worsening in the past 2-3 days.  He feels extremely fatigue just walking from one room to another.  He recently came to the ED with abdominal pain and diarrhea on 1/31 and was discharged from the ED with Colitis on PO abx.  Patient mentions his diarrhea and abodminal pain is chornic and started on 2021 after his gastric bypass.  Patient denies any chest pain, palpitations, dizziness, lightheadedness, nausea, vomiting, diarrhea or consitpaation.  66 yo male with PMHx depression, HTN, gastric bypass 4/21 presents today with SOB.  Patient mentions he has been having SOB for the past 2 weeks but it has been worsening in the past 2-3 days.  He feels extremely fatigue just walking from one room to another.  He recently came to the ED with abdominal pain and diarrhea on 1/31 and was discharged from the ED with Colitis on PO abx.  Patient mentions his diarrhea and abodminal pain is chornic and started on 2021 after his gastric bypass.  Patient denies any chest pain, palpitations, cough dizziness, lightheadedness, nausea, vomiting, diarrhea or consitpation.     In the ED /66, HR 78, RR 16 SpO2 96% on room air, temp 98  Labs: WBC 11.57, Na 134, Cr 1.31, Albumin 2.7, Alk phos 411, , , BNP 1557  CTA showed: No PE.  Symmetric mediastinal and hilar lymphadenopathy, nonspecific however leading differential diagnosis is sarcoidosis.

## 2023-02-02 NOTE — ED ADULT NURSE REASSESSMENT NOTE - NS ED NURSE REASSESS COMMENT FT1
Pt resting comfortably in bed, denies any pain/ symptoms at this time, vital signs stable, awaiting bed, will reassess.

## 2023-02-02 NOTE — ED STATDOCS - OBJECTIVE STATEMENT
68 y/o male with a PMHx of anxiety, aortic stenosis, carpal tunnel, colonic polyp, chronic neck and back pain, depressive disorder, GI hemorrhage, GERD, H pylori, herniated lumbar intervertebral disc, HTN, HLD, internal and external prolapse hemorrhoids, loss of hearing, morbid obesity, sciatica, skin cancer presents to the ED c/o SOB, DIAL and dizziness. Pt seen in ED 2 days ago for diarrhea and weakness. Denies black/bloody stools. Smoker. No other complaints at this time. Pulmonologist: Dr. Condon.

## 2023-02-02 NOTE — H&P ADULT - ATTENDING COMMENTS
Patient is seen and examined with the Resident. Agree with above assessment and plan. Patient presented with SOB with exertion. Will give a does of lasix, Will follow Echo to r/o CHF. Will also follow with Pulmonary for possible Sarcoidosis. Follow LFT and hepatitis panel. follow US liver.

## 2023-02-02 NOTE — ED ADULT NURSE REASSESSMENT NOTE - TEMPERATURE IN CELSIUS (DEGREES C)
Did come for visit.  Addressed at visit.  Ginna Pierce RN    
Notes Recorded by Ginna Pierce RN on 8/17/2017 at 9:08 AM  Appointment tomorrow with Glory.  Will make telephone encounter and make sure comes to appt and call if does not come.  Ginna Pierce RN    ------    Notes Recorded by Ginna Pierce RN on 8/17/2017 at 9:08 AM  See 2nd result note.  Ginna Pierce RN    ------    Notes Recorded by Glory Sorto APRN CNP on 8/16/2017 at 10:28 PM  Addendum to previously sent message:  Please call and verify that she decreased the Methimazole dose from 10 mg bid to 10 mg qd one month ago  If she did decreased as recommended, she needs to decreased further to 5 mg daily (previously sent message said to decreased to 10 mg/day before I realized we already decreased to this dose one month ago).  I would like her to follow up in clinic in one month.  Glory Sorto NP  Endocrinology    ------    Notes Recorded by Glory Sorto APRN CNP on 8/16/2017 at 10:23 PM  Please call -  [place],  Your thyroid levels are a little below normal.  Please decrease methimazole dose to 10 mg ONCE daily.  I would like you to follow up in one month because we are changing your dose of medication  Let me know if you have any questions.  Glory Sorto NP  Endocrinology  
37.4

## 2023-02-02 NOTE — H&P ADULT - NSHPPHYSICALEXAM_GEN_ALL_CORE
Vital Signs Last 24 Hrs  T(C): 36.6 (02 Feb 2023 23:00), Max: 37.4 (02 Feb 2023 21:21)  T(F): 97.9 (02 Feb 2023 23:00), Max: 99.3 (02 Feb 2023 21:21)  HR: 84 (02 Feb 2023 23:00) (75 - 84)  BP: 107/64 (02 Feb 2023 23:00) (88/60 - 121/75)  BP(mean): 69 (02 Feb 2023 14:12) (69 - 69)  RR: 18 (02 Feb 2023 23:00) (16 - 18)  SpO2: 95% (02 Feb 2023 23:00) (95% - 100%)    Parameters below as of 02 Feb 2023 23:00  Patient On (Oxygen Delivery Method): room air

## 2023-02-02 NOTE — H&P ADULT - NSHPREVIEWOFSYSTEMS_GEN_ALL_CORE
CONSTITUTIONAL: No weakness, fevers or chills  EYES/ENT: No visual changes;  No vertigo or throat pain   NECK: No pain or stiffness  RESPIRATORY: No cough, wheezing, hemoptysis; +shortness of breath  CARDIOVASCULAR: No chest pain or palpitations, +DIAL  GASTROINTESTINAL: No abdominal or epigastric pain. No nausea, vomiting, or hematemesis; No diarrhea or constipation. No melena or hematochezia.  GENITOURINARY: No dysuria, frequency or hematuria  NEUROLOGICAL: No numbness or weakness  SKIN: No itching, rashes

## 2023-02-02 NOTE — H&P ADULT - ASSESSMENT
66 yo male with PMHx depression, HTN, gastric bypass 4/21 presents today with SOB.      #Shortness of Breath  #Dyspnea on Exertion  - Admit to Med/Surg  - Differential Dx include CHF vs Sarcoidosis  - VSS  - CTA chest: No PE. Symmetric mediastinal and hilar lymphadenopathy, nonspecific however   leading differential diagnosis is sarcoidosis.  - BNP 1557  - f/u TTE  - f/u Pulm consult    #Colitis  - Abdominal pain resolved  - WBC today 11.57 from 16 on 1/31  - CT abd 1/31: Mild wall thickening of the rectosigmoid junction. This may represent proctocolitis  - c/w outpatient Abx Cipro and Flagyl   - monitor VS  - f/u AM labs    #Transaminitis  - Alk phos 411, ,   - CT Abd: (1/31) normal liver. Mildly dilated Bile duct  - PE wnl  - f/u RUQ US  - Continue to trend in AM  - f/u GI recommendations    #RAYMUNDO  - Cr 1.31 (baseline 1.0)  - s/p 1L IVF in the ED  - Avoid nephrotoxic drugs  - Trend in AM    #Hyponatremia   -Na 134  - Likely 2/2 recent GI losses in the setting of colitis    #Depression  - c/w home meds    #DVT ppx  - Lovenox    #Code status  - Full code    *Case discussed with Dr. Flaherty         68 yo male with PMHx depression, HTN, gastric bypass 4/21 presents today with SOB.      #Shortness of Breath  #Dyspnea on Exertion  - Admit to Med/Surg  - Differential Dx include CHF vs Sarcoidosis  - VSS  - CTA chest: No PE. Symmetric mediastinal and hilar lymphadenopathy, nonspecific however   leading differential diagnosis is sarcoidosis.  - BNP 1557  - f/u TTE  - f/u Pulm consult    #Colitis  - Abdominal pain resolved  - WBC today 11.57 from 16 on 1/31  - CT abd 1/31: Mild wall thickening of the rectosigmoid junction. This may represent proctocolitis  - c/w outpatient Abx Cipro and Flagyl   - monitor VS  - f/u AM labs    #Transaminitis  - Alk phos 411, ,   - CT Abd: (1/31) normal liver. Mildly dilated Bile duct  - PE wnl  - f/u RUQ US  - Continue to trend in AM  - f/u GI recommendations    #RAYMUNDO  #Hyponatremia  - Likely 2/2 recent GI losses in the setting of colitis  -Na 134  - Cr 1.31 (baseline 1.0)  - s/p 1L IVF in the ED  - Avoid nephrotoxic drugs  - Trend in AM    #Depression  - c/w home meds    #DVT ppx  - Lovenox    #Code status  - Full code    *Case discussed with Dr. Flaherty         66 yo male with PMHx depression, HTN, gastric bypass 4/21 presents today with SOB.      #Shortness of Breath  #Dyspnea on Exertion  - Admit to Med/Surg  - Differential Dx include CHF vs Sarcoidosis  - VSS  - CTA chest: No PE. Symmetric mediastinal and hilar lymphadenopathy, nonspecific however   leading differential diagnosis is sarcoidosis.  - BNP 1557  - f/u TTE  -will give a dose of lasix and evaluate for further dosing at am   - f/u Pulm consult    #Colitis  - Abdominal pain resolved  - WBC today 11.57 from 16 on 1/31  - CT abd 1/31: Mild wall thickening of the rectosigmoid junction. This may represent proctocolitis  - c/w outpatient Abx Cipro and Flagyl   - monitor VS  - f/u AM labs    #Transaminitis  - Alk phos 411, ,   - CT Abd: (1/31) normal liver. Mildly dilated Bile duct  - PE wnl  - f/u RUQ US  - Continue to trend in AM  - f/u GI recommendations    #RAYMUNDO  #Hyponatremia  - Likely 2/2 recent GI losses in the setting of colitis  -Na 134  - Cr 1.31 (baseline 1.0)  - s/p 1L IVF in the ED  - Avoid nephrotoxic drugs  - Trend in AM    #Depression  - c/w home meds    #DVT ppx  - Lovenox    #Code status  - Full code    *Case discussed with Dr. Flaherty

## 2023-02-02 NOTE — H&P ADULT - RESPIRATORY
no wheezes/no rales/no rhonchi/no respiratory distress/no use of accessory muscles/good air movement

## 2023-02-02 NOTE — ED STATDOCS - PROGRESS NOTE DETAILS
68 yo male with a PMH of gerd, obesity s/p bariatric surgery, htn, hld presents with SOB and dizziness. Pt states he was seen on 1/30, dx with colitis, and d/c home. Pt states when he walks a few feet he becomes SOB. Pt currently smokes. O2 saturation ranges in the low to 90s and drops to the 80s. Will check labs, CTA chest, and reeval. Likely admission for hypoxia. -Emir Mcmillan PA-C CTA with lymphoadenopathy, nonspecific and unremarkable labs except for slightly elevated LFTs. Discussed admission with pt due to hypoxia and pt is agreeable. Admission also discussed with Dr. Gaitan. -Emir Mcmillan PA-C

## 2023-02-02 NOTE — H&P ADULT - ENMT
Problem: Infant Inpatient Plan of Care  Goal: Plan of Care Review  Outcome: Ongoing (interventions implemented as appropriate)  Pt was received on low flow nasal cannula a 0.5 Lpm at the beginning of the shift.  No changes were made, will continue to monitor patient and wean FiO2 as tolerated.        no gross abnormalities

## 2023-02-02 NOTE — ED STATDOCS - ATTENDING APP SHARED VISIT CONTRIBUTION OF CARE
I, Bruce Murillo, DO personally saw the patient with CHELSY.  I have personally performed a face to face diagnostic evaluation on this patient.  I have reviewed the CHELSY note and agree with the history, exam, and plan of care, except as noted.  I personally saw the patient and performed a substantive portion of the visit including all aspects of the medical decision making.

## 2023-02-02 NOTE — ED STATDOCS - CARE PLAN
Principal Discharge DX:	SOB (shortness of breath)  Secondary Diagnosis:	DIAL (dyspnea on exertion)  Secondary Diagnosis:	Hypoxia   1

## 2023-02-03 ENCOUNTER — APPOINTMENT (OUTPATIENT)
Dept: GASTROENTEROLOGY | Facility: CLINIC | Age: 68
End: 2023-02-03

## 2023-02-03 DIAGNOSIS — H91.90 UNSPECIFIED HEARING LOSS, UNSPECIFIED EAR: ICD-10-CM

## 2023-02-03 DIAGNOSIS — Z87.19 PERSONAL HISTORY OF OTHER DISEASES OF THE DIGESTIVE SYSTEM: ICD-10-CM

## 2023-02-03 DIAGNOSIS — K52.9 NONINFECTIVE GASTROENTERITIS AND COLITIS, UNSPECIFIED: ICD-10-CM

## 2023-02-03 DIAGNOSIS — Z79.82 LONG TERM (CURRENT) USE OF ASPIRIN: ICD-10-CM

## 2023-02-03 DIAGNOSIS — Z90.89 ACQUIRED ABSENCE OF OTHER ORGANS: ICD-10-CM

## 2023-02-03 DIAGNOSIS — F32.A DEPRESSION, UNSPECIFIED: ICD-10-CM

## 2023-02-03 DIAGNOSIS — I35.0 NONRHEUMATIC AORTIC (VALVE) STENOSIS: ICD-10-CM

## 2023-02-03 DIAGNOSIS — I10 ESSENTIAL (PRIMARY) HYPERTENSION: ICD-10-CM

## 2023-02-03 DIAGNOSIS — E78.5 HYPERLIPIDEMIA, UNSPECIFIED: ICD-10-CM

## 2023-02-03 DIAGNOSIS — Z87.39 PERSONAL HISTORY OF OTHER DISEASES OF THE MUSCULOSKELETAL SYSTEM AND CONNECTIVE TISSUE: ICD-10-CM

## 2023-02-03 DIAGNOSIS — Z98.84 BARIATRIC SURGERY STATUS: ICD-10-CM

## 2023-02-03 DIAGNOSIS — R10.9 UNSPECIFIED ABDOMINAL PAIN: ICD-10-CM

## 2023-02-03 DIAGNOSIS — Z95.4 PRESENCE OF OTHER HEART-VALVE REPLACEMENT: ICD-10-CM

## 2023-02-03 DIAGNOSIS — Z20.822 CONTACT WITH AND (SUSPECTED) EXPOSURE TO COVID-19: ICD-10-CM

## 2023-02-03 DIAGNOSIS — R53.1 WEAKNESS: ICD-10-CM

## 2023-02-03 DIAGNOSIS — F41.9 ANXIETY DISORDER, UNSPECIFIED: ICD-10-CM

## 2023-02-03 DIAGNOSIS — R19.7 DIARRHEA, UNSPECIFIED: ICD-10-CM

## 2023-02-03 DIAGNOSIS — K21.9 GASTRO-ESOPHAGEAL REFLUX DISEASE WITHOUT ESOPHAGITIS: ICD-10-CM

## 2023-02-03 LAB
24R-OH-CALCIDIOL SERPL-MCNC: 53.7 NG/ML — SIGNIFICANT CHANGE UP (ref 30–80)
ADD ON TEST-SPECIMEN IN LAB: SIGNIFICANT CHANGE UP
ALBUMIN SERPL ELPH-MCNC: 2.2 G/DL — LOW (ref 3.3–5)
ALP SERPL-CCNC: 370 U/L — HIGH (ref 40–120)
ALT FLD-CCNC: 129 U/L — HIGH (ref 12–78)
ANION GAP SERPL CALC-SCNC: 7 MMOL/L — SIGNIFICANT CHANGE UP (ref 5–17)
AST SERPL-CCNC: 251 U/L — HIGH (ref 15–37)
BASOPHILS # BLD AUTO: 0.09 K/UL — SIGNIFICANT CHANGE UP (ref 0–0.2)
BASOPHILS NFR BLD AUTO: 1.1 % — SIGNIFICANT CHANGE UP (ref 0–2)
BILIRUB SERPL-MCNC: 0.4 MG/DL — SIGNIFICANT CHANGE UP (ref 0.2–1.2)
BUN SERPL-MCNC: 18 MG/DL — SIGNIFICANT CHANGE UP (ref 7–23)
CALCIUM SERPL-MCNC: 7.9 MG/DL — LOW (ref 8.5–10.1)
CHLORIDE SERPL-SCNC: 104 MMOL/L — SIGNIFICANT CHANGE UP (ref 96–108)
CHOLEST SERPL-MCNC: 60 MG/DL — SIGNIFICANT CHANGE UP
CO2 SERPL-SCNC: 23 MMOL/L — SIGNIFICANT CHANGE UP (ref 22–31)
CREAT SERPL-MCNC: 0.96 MG/DL — SIGNIFICANT CHANGE UP (ref 0.5–1.3)
EGFR: 87 ML/MIN/1.73M2 — SIGNIFICANT CHANGE UP
EOSINOPHIL # BLD AUTO: 0 K/UL — SIGNIFICANT CHANGE UP (ref 0–0.5)
EOSINOPHIL NFR BLD AUTO: 0 % — SIGNIFICANT CHANGE UP (ref 0–6)
GLUCOSE SERPL-MCNC: 102 MG/DL — HIGH (ref 70–99)
HAV IGM SER-ACNC: SIGNIFICANT CHANGE UP
HBV CORE IGM SER-ACNC: SIGNIFICANT CHANGE UP
HBV SURFACE AG SER-ACNC: SIGNIFICANT CHANGE UP
HCT VFR BLD CALC: 33.9 % — LOW (ref 39–50)
HCV AB S/CO SERPL IA: 0.16 S/CO — SIGNIFICANT CHANGE UP (ref 0–0.99)
HCV AB SERPL-IMP: SIGNIFICANT CHANGE UP
HDLC SERPL-MCNC: 18 MG/DL — LOW
HGB BLD-MCNC: 11 G/DL — LOW (ref 13–17)
IMM GRANULOCYTES NFR BLD AUTO: 0.6 % — SIGNIFICANT CHANGE UP (ref 0–0.9)
LIPID PNL WITH DIRECT LDL SERPL: 26 MG/DL — SIGNIFICANT CHANGE UP
LYMPHOCYTES # BLD AUTO: 5.48 K/UL — HIGH (ref 1–3.3)
LYMPHOCYTES # BLD AUTO: 67.8 % — HIGH (ref 13–44)
MCHC RBC-ENTMCNC: 29.9 PG — SIGNIFICANT CHANGE UP (ref 27–34)
MCHC RBC-ENTMCNC: 32.4 GM/DL — SIGNIFICANT CHANGE UP (ref 32–36)
MCV RBC AUTO: 92.1 FL — SIGNIFICANT CHANGE UP (ref 80–100)
MONOCYTES # BLD AUTO: 0.3 K/UL — SIGNIFICANT CHANGE UP (ref 0–0.9)
MONOCYTES NFR BLD AUTO: 3.7 % — SIGNIFICANT CHANGE UP (ref 2–14)
NEUTROPHILS # BLD AUTO: 2.16 K/UL — SIGNIFICANT CHANGE UP (ref 1.8–7.4)
NEUTROPHILS NFR BLD AUTO: 26.8 % — LOW (ref 43–77)
NON HDL CHOLESTEROL: 42 MG/DL — SIGNIFICANT CHANGE UP
PLATELET # BLD AUTO: 100 K/UL — LOW (ref 150–400)
POTASSIUM SERPL-MCNC: 3.3 MMOL/L — LOW (ref 3.5–5.3)
POTASSIUM SERPL-SCNC: 3.3 MMOL/L — LOW (ref 3.5–5.3)
PROT SERPL-MCNC: 6.3 GM/DL — SIGNIFICANT CHANGE UP (ref 6–8.3)
RBC # BLD: 3.68 M/UL — LOW (ref 4.2–5.8)
RBC # FLD: 13.8 % — SIGNIFICANT CHANGE UP (ref 10.3–14.5)
SODIUM SERPL-SCNC: 134 MMOL/L — LOW (ref 135–145)
TRIGL SERPL-MCNC: 83 MG/DL — SIGNIFICANT CHANGE UP
WBC # BLD: 8.08 K/UL — SIGNIFICANT CHANGE UP (ref 3.8–10.5)
WBC # FLD AUTO: 8.08 K/UL — SIGNIFICANT CHANGE UP (ref 3.8–10.5)

## 2023-02-03 PROCEDURE — 76705 ECHO EXAM OF ABDOMEN: CPT | Mod: 26

## 2023-02-03 PROCEDURE — 93306 TTE W/DOPPLER COMPLETE: CPT | Mod: 26

## 2023-02-03 RX ORDER — BUPROPION HYDROCHLORIDE 150 MG/1
300 TABLET, EXTENDED RELEASE ORAL DAILY
Refills: 0 | Status: DISCONTINUED | OUTPATIENT
Start: 2023-02-02 | End: 2023-02-07

## 2023-02-03 RX ORDER — VENLAFAXINE HCL 75 MG
75 CAPSULE, EXT RELEASE 24 HR ORAL
Refills: 0 | Status: DISCONTINUED | OUTPATIENT
Start: 2023-02-02 | End: 2023-02-07

## 2023-02-03 RX ORDER — ONDANSETRON 8 MG/1
4 TABLET, FILM COATED ORAL EVERY 8 HOURS
Refills: 0 | Status: DISCONTINUED | OUTPATIENT
Start: 2023-02-03 | End: 2023-02-07

## 2023-02-03 RX ORDER — SODIUM,POTASSIUM PHOSPHATES 278-250MG
1 POWDER IN PACKET (EA) ORAL
Refills: 0 | Status: DISCONTINUED | OUTPATIENT
Start: 2023-02-02 | End: 2023-02-07

## 2023-02-03 RX ORDER — POTASSIUM CHLORIDE 20 MEQ
40 PACKET (EA) ORAL ONCE
Refills: 0 | Status: COMPLETED | OUTPATIENT
Start: 2023-02-03 | End: 2023-02-03

## 2023-02-03 RX ORDER — PANTOPRAZOLE SODIUM 20 MG/1
40 TABLET, DELAYED RELEASE ORAL
Refills: 0 | Status: DISCONTINUED | OUTPATIENT
Start: 2023-02-02 | End: 2023-02-07

## 2023-02-03 RX ORDER — ENOXAPARIN SODIUM 100 MG/ML
40 INJECTION SUBCUTANEOUS EVERY 24 HOURS
Refills: 0 | Status: DISCONTINUED | OUTPATIENT
Start: 2023-02-03 | End: 2023-02-07

## 2023-02-03 RX ORDER — METRONIDAZOLE 500 MG
500 TABLET ORAL EVERY 8 HOURS
Refills: 0 | Status: DISCONTINUED | OUTPATIENT
Start: 2023-02-02 | End: 2023-02-04

## 2023-02-03 RX ORDER — LANOLIN ALCOHOL/MO/W.PET/CERES
3 CREAM (GRAM) TOPICAL AT BEDTIME
Refills: 0 | Status: DISCONTINUED | OUTPATIENT
Start: 2023-02-03 | End: 2023-02-07

## 2023-02-03 RX ORDER — METOPROLOL TARTRATE 50 MG
25 TABLET ORAL DAILY
Refills: 0 | Status: DISCONTINUED | OUTPATIENT
Start: 2023-02-02 | End: 2023-02-07

## 2023-02-03 RX ORDER — FUROSEMIDE 40 MG
20 TABLET ORAL ONCE
Refills: 0 | Status: COMPLETED | OUTPATIENT
Start: 2023-02-03 | End: 2023-02-03

## 2023-02-03 RX ORDER — ACETAMINOPHEN 500 MG
650 TABLET ORAL EVERY 6 HOURS
Refills: 0 | Status: DISCONTINUED | OUTPATIENT
Start: 2023-02-03 | End: 2023-02-07

## 2023-02-03 RX ORDER — CELECOXIB 200 MG/1
200 CAPSULE ORAL DAILY
Refills: 0 | Status: DISCONTINUED | OUTPATIENT
Start: 2023-02-02 | End: 2023-02-07

## 2023-02-03 RX ORDER — ATORVASTATIN CALCIUM 80 MG/1
10 TABLET, FILM COATED ORAL AT BEDTIME
Refills: 0 | Status: DISCONTINUED | OUTPATIENT
Start: 2023-02-02 | End: 2023-02-07

## 2023-02-03 RX ORDER — CIPROFLOXACIN LACTATE 400MG/40ML
500 VIAL (ML) INTRAVENOUS EVERY 12 HOURS
Refills: 0 | Status: DISCONTINUED | OUTPATIENT
Start: 2023-02-02 | End: 2023-02-04

## 2023-02-03 RX ORDER — ASPIRIN/CALCIUM CARB/MAGNESIUM 324 MG
81 TABLET ORAL DAILY
Refills: 0 | Status: DISCONTINUED | OUTPATIENT
Start: 2023-02-02 | End: 2023-02-07

## 2023-02-03 RX ADMIN — Medication 20 MILLIGRAM(S): at 01:12

## 2023-02-03 RX ADMIN — CELECOXIB 200 MILLIGRAM(S): 200 CAPSULE ORAL at 11:25

## 2023-02-03 RX ADMIN — Medication 500 MILLIGRAM(S): at 14:54

## 2023-02-03 RX ADMIN — Medication 75 MILLIGRAM(S): at 19:09

## 2023-02-03 RX ADMIN — Medication 40 MILLIEQUIVALENT(S): at 19:11

## 2023-02-03 RX ADMIN — Medication 500 MILLIGRAM(S): at 21:52

## 2023-02-03 RX ADMIN — PANTOPRAZOLE SODIUM 40 MILLIGRAM(S): 20 TABLET, DELAYED RELEASE ORAL at 05:11

## 2023-02-03 RX ADMIN — Medication 1 TABLET(S): at 11:24

## 2023-02-03 RX ADMIN — Medication 75 MILLIGRAM(S): at 11:24

## 2023-02-03 RX ADMIN — BUPROPION HYDROCHLORIDE 300 MILLIGRAM(S): 150 TABLET, EXTENDED RELEASE ORAL at 11:25

## 2023-02-03 RX ADMIN — Medication 500 MILLIGRAM(S): at 21:53

## 2023-02-03 RX ADMIN — Medication 25 MILLIGRAM(S): at 11:20

## 2023-02-03 RX ADMIN — Medication 500 MILLIGRAM(S): at 05:11

## 2023-02-03 RX ADMIN — ATORVASTATIN CALCIUM 10 MILLIGRAM(S): 80 TABLET, FILM COATED ORAL at 21:52

## 2023-02-03 RX ADMIN — Medication 500 MILLIGRAM(S): at 11:26

## 2023-02-03 RX ADMIN — ENOXAPARIN SODIUM 40 MILLIGRAM(S): 100 INJECTION SUBCUTANEOUS at 11:29

## 2023-02-03 RX ADMIN — Medication 81 MILLIGRAM(S): at 11:20

## 2023-02-03 NOTE — CONSULT NOTE ADULT - ASSESSMENT
66 yo male with PMHx depression, HTN, gastric bypass 4/21 presents today with SOB.      PROBLEMS:    Dyspnea on Qetlfhla-[id number]  CTA chest-No PE-Symmetric mediastinal and hilar lymphadenopathy-nonspecific however leading differential diagnosis is sarcoidosis-FU With repeat CT may need Bx  Colitis  Transaminitis  RAYMUNDO (resovled)  Depression    PLAN:    BNP 1557-s/p one dose of lasix-clear lung/no pedal edema  TTE  Serum ACE, vit D 1,25  Abdominal pain resolved  CT abd 1/31: Mild wall thickening of the rectosigmoid junction. This may represent proctocolitis-c/w outpatient Abx Cipro and Flagyl   RUQ US: mild hepatomegaly, mild prominence of the main pancreatic duct, no sig biliary dilatation   GI FU  d/w staff  DVT ppx-Lovenox

## 2023-02-03 NOTE — PROGRESS NOTE ADULT - ASSESSMENT
68 yo male with PMHx depression, HTN, gastric bypass 4/21 presents today with SOB.      #Shortness of Breath  #Dyspnea on Exertion  - Differential Dx include CHF vs Sarcoidosis  - VSS  - CTA chest: No PE. Symmetric mediastinal and hilar lymphadenopathy, nonspecific however leading differential diagnosis is sarcoidosis.  - BNP 1557  - s/p one dose of lasix  - pt with clear lung, no pedal edema; lasix not given today  - pending TTE  - pending serum ACE, vit D 1,25  - f/u Pulm consult    #Colitis  - Abdominal pain resolved  - Recent visit to ED (1/30) with Colitis, discharged wth abx   - WBC today 8.08; improved from 16 on 1/30  - CT abd 1/31: Mild wall thickening of the rectosigmoid junction. This may represent proctocolitis  - c/w outpatient Abx Cipro and Flagyl   - monitor VS      #Transaminitis  - Alk phos 411, ,   - CT Abd: (1/31) normal liver. Mildly dilated Bile duct  - PE wnl  - f/u RUQ US  - Continue to trend in AM  - f/u GI recommendations    #RAYMUNDO  #Hyponatremia  - Likely 2/2 recent GI losses in the setting of colitis  -Na 134  - Cr 1.31 (baseline 1.0)  - s/p 1L IVF in the ED  - Avoid nephrotoxic drugs  - Trend in AM    #Depression  - c/w home meds    #DVT ppx  - Lovenox    #Code status  - Full code    *Case discussed with Dr. Flaherty 68 yo male with PMHx depression, HTN, gastric bypass 4/21 presents today with SOB.      #Shortness of Breath  #Dyspnea on Exertion  - Differential Dx include CHF vs Sarcoidosis  - VSS  - CTA chest: No PE. Symmetric mediastinal and hilar lymphadenopathy, nonspecific however leading differential diagnosis is sarcoidosis.  - BNP 1557  - s/p one dose of lasix  - pt with clear lung, no pedal edema; lasix not given today  - pending TTE  - pending serum ACE, vit D 1,25  - f/u Pulm consult    #Colitis  - Abdominal pain resolved  - Recent visit to ED (1/30) with Colitis, discharged wth abx   - WBC today 8.08; improved from 16 on 1/30  - CT abd 1/31: Mild wall thickening of the rectosigmoid junction. This may represent proctocolitis  - c/w outpatient Abx Cipro and Flagyl   - monitor VS      #Transaminitis  - Alk phos 411, ,   - CT Abd: (1/31) normal liver. Mildly dilated Bile duct  - PE wnl  - RUQ US: mild hepatomegaly, mild prominence of the main pancreatic duct, no sig biliary dilatation   - consider GI recommendations    #RAYMUNDO (resovled)  #Hyponatremia  - Likely 2/2 recent GI losses in the setting of colitis  -Na 134  - Cr 1.31 (baseline 1.0), Cr 0.96 today  - s/p 1L IVF in the ED  - Avoid nephrotoxic drugs      #Depression  - c/w home meds    #DVT ppx  - Lovenox    #Code status  - Full code    *Case discussed with Dr. Prajapati.  68 yo male with PMHx depression, HTN, gastric bypass 4/21 presents today with SOB.      #Shortness of Breath  #Dyspnea on Exertion  - Differential Dx include CHF vs Sarcoidosis  - VSS  - CTA chest: No PE. Symmetric mediastinal and hilar lymphadenopathy, nonspecific however leading differential diagnosis is sarcoidosis.  - BNP 1557  - s/p one dose of lasix  - pt with clear lung, no pedal edema; lasix not given today  - pending TTE  - pending serum ACE, vit D 1,25  - f/u Pulm consult    #Colitis  - Abdominal pain resolved  - Recent visit to ED (1/30) with Colitis, discharged wth abx   - WBC today 8.08; improved from 16 on 1/30  - CT abd 1/31: Mild wall thickening of the rectosigmoid junction. This may represent proctocolitis  - c/w outpatient Abx Cipro and Flagyl   - monitor VS      #Transaminitis  - Alk phos 411, ,   - CT Abd: (1/31) normal liver. Mildly dilated Bile duct  - PE wnl  - RUQ US: mild hepatomegaly, mild prominence of the main pancreatic duct, no sig biliary dilatation   - f/u GI recommendations    #RAYMUNDO (resovled)  #Hyponatremia  - Likely 2/2 recent GI losses in the setting of colitis  -Na 134  - Cr 1.31 (baseline 1.0), Cr 0.96 today  - s/p 1L IVF in the ED  - Avoid nephrotoxic drugs      #Depression  - c/w home meds    #DVT ppx  - Lovenox    #Code status  - Full code    Dispo: pending Pulm and GI rec    *Case discussed with Dr. Prajapati.

## 2023-02-03 NOTE — PROGRESS NOTE ADULT - SUBJECTIVE AND OBJECTIVE BOX
HPI:  66 yo male with PMHx depression, HTN, gastric bypass 4/21 presents today with SOB.  Patient mentions he has been having SOB for the past 2 weeks but it has been worsening in the past 2-3 days.  He feels extremely fatigue just walking from one room to another.  He recently came to the ED with abdominal pain and diarrhea on 1/31 and was discharged from the ED with Colitis on PO abx.  Patient mentions his diarrhea and abodminal pain is chornic and started on 2021 after his gastric bypass.  Patient denies any chest pain, palpitations, cough dizziness, lightheadedness, nausea, vomiting, diarrhea or consitpation.     In the ED /66, HR 78, RR 16 SpO2 96% on room air, temp 98  Labs: WBC 11.57, Na 134, Cr 1.31, Albumin 2.7, Alk phos 411, , , BNP 1557  CTA showed: No PE.  Symmetric mediastinal and hilar lymphadenopathy, nonspecific however leading differential diagnosis is sarcoidosis.    REVIEW OF SYSTEMS:  CONSTITUTIONAL: No weakness, fevers or chills  EYES/ENT: No visual changes;  No vertigo or throat pain   NECK: No pain or stiffness  RESPIRATORY: No cough, wheezing, hemoptysis; + shortness of breath  CARDIOVASCULAR: No chest pain or palpitations, + DIAL  GASTROINTESTINAL: No abdominal or epigastric pain. No nausea, vomiting; No diarrhea or constipation.   GENITOURINARY: No dysuria, frequency or hematuria  NEUROLOGICAL: No numbness or weakness  SKIN: No itching, rashes    =======================================================  Vitals:  T(F): 97.9 (02 Feb 2023 23:00), Max: 99.3 (02 Feb 2023 21:21)  HR: 84 (02 Feb 2023 23:00)  BP: 107/64 (02 Feb 2023 23:00)  RR: 18 (02 Feb 2023 23:00)  SpO2: 95% (02 Feb 2023 23:00) (95% - 100%)  temp max in last 48H T(F): , Max: 99.3 (02-02-23 @ 21:21)      =======================================================    PHYSICAL EXAM:  Constitutional: Pt lying in bed, awake and alert, NAD  HEENT: EOMI, normal hearing, moist mucous membranes  Neck: Soft and supple, no JVD  Respiratory: CTABL, No wheezing, rales or rhonchi  Cardiovascular: S1S2+, RRR, no M/G/R  Gastrointestinal: BS+, soft, NT/ND, no guarding, no rebound  Extremities: No peripheral edema  Vascular: 2+ peripheral pulses  Neurological: AAOx3, no focal deficits  Musculoskeletal: 5/5 strength b/l upper and lower extremities  Skin: No rashes    =======================================================  Current Antibiotics:  ciprofloxacin     Tablet 500 milliGRAM(s) Oral every 12 hours  metroNIDAZOLE    Tablet 500 milliGRAM(s) Oral every 8 hours    Other medications:  aspirin enteric coated 81 milliGRAM(s) Oral daily  atorvastatin 10 milliGRAM(s) Oral at bedtime  buPROPion XL (24-Hour) . 300 milliGRAM(s) Oral daily  celecoxib 200 milliGRAM(s) Oral daily  enoxaparin Injectable 40 milliGRAM(s) SubCutaneous every 24 hours  metoprolol tartrate 25 milliGRAM(s) Oral daily  pantoprazole    Tablet 40 milliGRAM(s) Oral before breakfast  potassium chloride    Tablet ER 40 milliEquivalent(s) Oral once  potassium phosphate / sodium phosphate Tablet (K-PHOS No. 2) 1 Tablet(s) Oral two times a day with meals  venlafaxine 75 milliGRAM(s) Oral two times a day with meals      =======================================================  Labs:                        11.0   8.08  )-----------( 100      ( 03 Feb 2023 07:11 )             33.9     02-03    134<L>  |  104  |  18  ----------------------------<  102<H>  3.3<L>   |  23  |  0.96    Ca    7.9<L>      03 Feb 2023 07:11  Mg     2.1     02-02    TPro  6.3  /  Alb  2.2<L>  /  TBili  0.4  /  DBili  x   /  AST  251<H>  /  ALT  129<H>  /  AlkPhos  370<H>  02-03      Culture - Stool (collected 01-31-23 @ 02:13)  Source: .Stool None        SARS-CoV-2: NotDetec (02-02-23 @ 15:11)  SARS-CoV-2 Result: NotDetec (01-30-23 @ 23:49)        ========================================================  Imaging: no new imaging        HPI:  66 yo male with PMHx depression, HTN, gastric bypass 4/21 presents today with SOB.  Patient mentions he has been having SOB for the past 2 weeks but it has been worsening in the past 2-3 days.  He feels extremely fatigue just walking from one room to another.  He recently came to the ED with abdominal pain and diarrhea on 1/31 and was discharged from the ED with Colitis on PO abx.  Patient mentions his diarrhea and abodminal pain is chornic and started on 2021 after his gastric bypass.  Patient denies any chest pain, palpitations, cough dizziness, lightheadedness, nausea, vomiting, diarrhea or consitpation.     In the ED /66, HR 78, RR 16 SpO2 96% on room air, temp 98  Labs: WBC 11.57, Na 134, Cr 1.31, Albumin 2.7, Alk phos 411, , , BNP 1557  CTA showed: No PE.  Symmetric mediastinal and hilar lymphadenopathy, nonspecific however leading differential diagnosis is sarcoidosis.    REVIEW OF SYSTEMS:  CONSTITUTIONAL: No weakness, fevers or chills  EYES/ENT: No visual changes;  No vertigo or throat pain   NECK: No pain or stiffness  RESPIRATORY: No cough, wheezing, hemoptysis; + shortness of breath  CARDIOVASCULAR: No chest pain or palpitations, + DIAL  GASTROINTESTINAL: No abdominal or epigastric pain. No nausea, vomiting; No diarrhea or constipation.   GENITOURINARY: No dysuria, frequency or hematuria  NEUROLOGICAL: No numbness or weakness  SKIN: No itching, rashes    =======================================================  Vitals:  T(F): 97.9 (02 Feb 2023 23:00), Max: 99.3 (02 Feb 2023 21:21)  HR: 84 (02 Feb 2023 23:00)  BP: 107/64 (02 Feb 2023 23:00)  RR: 18 (02 Feb 2023 23:00)  SpO2: 95% (02 Feb 2023 23:00) (95% - 100%)  temp max in last 48H T(F): , Max: 99.3 (02-02-23 @ 21:21)      =======================================================    PHYSICAL EXAM:  Constitutional: Pt lying in bed, awake and alert, NAD  HEENT: EOMI, normal hearing, moist mucous membranes  Neck: Soft and supple, no JVD  Respiratory: CTABL, No wheezing, rales or rhonchi  Cardiovascular: S1S2+, RRR, no M/G/R  Gastrointestinal: BS+, soft, NT/ND, no guarding, no rebound  Extremities: No peripheral edema  Vascular: 2+ peripheral pulses  Neurological: AAOx3, no focal deficits  Musculoskeletal: 5/5 strength b/l upper and lower extremities  Skin: No rashes    =======================================================  Current Antibiotics:  ciprofloxacin     Tablet 500 milliGRAM(s) Oral every 12 hours  metroNIDAZOLE    Tablet 500 milliGRAM(s) Oral every 8 hours    Other medications:  aspirin enteric coated 81 milliGRAM(s) Oral daily  atorvastatin 10 milliGRAM(s) Oral at bedtime  buPROPion XL (24-Hour) . 300 milliGRAM(s) Oral daily  celecoxib 200 milliGRAM(s) Oral daily  enoxaparin Injectable 40 milliGRAM(s) SubCutaneous every 24 hours  metoprolol tartrate 25 milliGRAM(s) Oral daily  pantoprazole    Tablet 40 milliGRAM(s) Oral before breakfast  potassium chloride    Tablet ER 40 milliEquivalent(s) Oral once  potassium phosphate / sodium phosphate Tablet (K-PHOS No. 2) 1 Tablet(s) Oral two times a day with meals  venlafaxine 75 milliGRAM(s) Oral two times a day with meals      =======================================================  Labs:                        11.0   8.08  )-----------( 100      ( 03 Feb 2023 07:11 )             33.9     02-03    134<L>  |  104  |  18  ----------------------------<  102<H>  3.3<L>   |  23  |  0.96    Ca    7.9<L>      03 Feb 2023 07:11  Mg     2.1     02-02    TPro  6.3  /  Alb  2.2<L>  /  TBili  0.4  /  DBili  x   /  AST  251<H>  /  ALT  129<H>  /  AlkPhos  370<H>  02-03      Culture - Stool (collected 01-31-23 @ 02:13)  Source: .Stool None        SARS-CoV-2: NotDetec (02-02-23 @ 15:11)  SARS-CoV-2 Result: NotDetec (01-30-23 @ 23:49)        ========================================================  Imaging:  < from: US Abdomen Upper Quadrant Right (02.03.23 @ 08:31) >  IMPRESSION:  Mild hepatomegaly.  Status postcholecystectomy. No significant biliary dilatation.  Mild prominence of the main pancreatic duct. Consider MRCP for additional   evaluation.    < end of copied text >

## 2023-02-03 NOTE — CONSULT NOTE ADULT - SUBJECTIVE AND OBJECTIVE BOX
HPI:  66 yo male with PMHx depression, HTN, gastric bypass 4/21 presents today with SOB.  Patient mentions he has been having SOB for the past 2 weeks but it has been worsening in the past 2-3 days.  He feels extremely fatigue just walking from one room to another.  He recently came to the ED with abdominal pain and diarrhea on 1/31 and was discharged from the ED with Colitis on PO abx.  Patient mentions his diarrhea and abodminal pain is chornic and started on 2021 after his gastric bypass.  Patient denies any chest pain, palpitations, cough dizziness, lightheadedness, nausea, vomiting, diarrhea or consitpation.     In the ED /66, HR 78, RR 16 SpO2 96% on room air, temp 98  Labs: WBC 11.57, Na 134, Cr 1.31, Albumin 2.7, Alk phos 411, , , BNP 1557  CTA showed: No PE.  Symmetric mediastinal and hilar lymphadenopathy, nonspecific however leading differential diagnosis is sarcoidosis.   (02 Feb 2023 22:19)      PAST MEDICAL & SURGICAL HISTORY:  Hyperlipidemia      GERD (gastroesophageal reflux disease)      Anxiety      Skin cancer  external right ear, unsure of type      HTN (hypertension)      Aortic stenosis  Bicuspid Aortic Valve Replacement- 5/29/2018      Chronic neck and back pain      Sciatica      Internal and external prolapsed hemorrhoids  had surgery      Helicobacter pylori infection      History of colonic polyps      Herniated lumbar intervertebral disc      Gastrointestinal hemorrhage      Carpal tunnel syndrome  had surgery      Depressive disorder      Loss of hearing      Morbid obesity      S/P carpal tunnel release  right side, left side- 2014      History of back surgery  laminectomy x2 last 2018      S/P trigger finger release  right thumb and middle finger      H/O external ear surgery  excision mass right ear- cancer 2018      H/O bicuspid aortic valve  replacement- 5/29/18      Status post coronary angioplasty  pt not sure he had this procedure      S/P tonsillectomy      H/O hemorrhoidectomy      Spinal cord stimulator status  10/2020          Home Medications:  Aspirin Enteric Coated 81 mg oral delayed release tablet: 1 tab(s) orally once a day (02 Feb 2023 18:02)  atorvastatin 10 mg oral tablet: 1 tab(s) orally once a day (in the morning) (02 Feb 2023 18:02)  buPROPion 100 mg oral tablet: 1 tab(s) orally 3 times a day (02 Feb 2023 18:02)  Descovy 200 mg-25 mg oral tablet: 1 tab(s) orally once a day (02 Feb 2023 18:02)  esomeprazole 40 mg oral delayed release capsule: 1 cap(s) orally 2 times a day (02 Feb 2023 18:02)  meloxicam 15 mg oral tablet: 1 tab(s) orally once a day (02 Feb 2023 18:02)  metoprolol tartrate 25 mg oral tablet: 1 tab(s) orally once a day (02 Feb 2023 18:02)  tadalafil 20 mg oral tablet: 1 tab(s) orally once a day, As Needed before sex (02 Feb 2023 18:02)  venlafaxine 75 mg oral tablet: 1 tab(s) orally 2 times a day (02 Feb 2023 18:02)      MEDICATIONS  (STANDING):  aspirin enteric coated 81 milliGRAM(s) Oral daily  atorvastatin 10 milliGRAM(s) Oral at bedtime  buPROPion XL (24-Hour) . 300 milliGRAM(s) Oral daily  celecoxib 200 milliGRAM(s) Oral daily  ciprofloxacin     Tablet 500 milliGRAM(s) Oral every 12 hours  enoxaparin Injectable 40 milliGRAM(s) SubCutaneous every 24 hours  metoprolol tartrate 25 milliGRAM(s) Oral daily  metroNIDAZOLE    Tablet 500 milliGRAM(s) Oral every 8 hours  pantoprazole    Tablet 40 milliGRAM(s) Oral before breakfast  potassium phosphate / sodium phosphate Tablet (K-PHOS No. 2) 1 Tablet(s) Oral two times a day with meals  venlafaxine 75 milliGRAM(s) Oral two times a day with meals    MEDICATIONS  (PRN):  acetaminophen     Tablet .. 650 milliGRAM(s) Oral every 6 hours PRN Temp greater or equal to 38C (100.4F), Mild Pain (1 - 3)  aluminum hydroxide/magnesium hydroxide/simethicone Suspension 30 milliLiter(s) Oral every 4 hours PRN Dyspepsia  melatonin 3 milliGRAM(s) Oral at bedtime PRN Insomnia  ondansetron Injectable 4 milliGRAM(s) IV Push every 8 hours PRN Nausea and/or Vomiting      Allergies    No Known Allergies    Intolerances        SOCIAL HISTORY: Denies tobacco, etoh abuse or illicit drug use    FAMILY HISTORY:  FH: HTN (hypertension)  father        Vital Signs Last 24 Hrs  T(C): 36.6 (02 Feb 2023 23:00), Max: 37.4 (02 Feb 2023 21:21)  T(F): 97.9 (02 Feb 2023 23:00), Max: 99.3 (02 Feb 2023 21:21)  HR: 84 (02 Feb 2023 23:00) (75 - 84)  BP: 107/64 (02 Feb 2023 23:00) (88/60 - 121/75)  BP(mean): 69 (02 Feb 2023 14:12) (69 - 69)  RR: 18 (02 Feb 2023 23:00) (16 - 18)  SpO2: 95% (02 Feb 2023 23:00) (95% - 100%)    Parameters below as of 02 Feb 2023 23:00  Patient On (Oxygen Delivery Method): room air            REVIEW OF SYSTEMS:    CONSTITUTIONAL:  As per HPI.  HEENT:  Eyes:  No diplopia or blurred vision. ENT:  No earache, sore throat or runny nose.  CARDIOVASCULAR:  No pressure, squeezing, tightness, heaviness or aching about the chest, neck, axilla or epigastrium.  RESPIRATORY:  No cough, shortness of breath, PND or orthopnea.  GASTROINTESTINAL:  No nausea, vomiting or diarrhea.  GENITOURINARY:  No dysuria, frequency or urgency.  MUSCULOSKELETAL:  As per HPI.  SKIN:  No change in skin, hair or nails.  NEUROLOGIC:  No paresthesias, fasciculations, seizures or weakness.  PSYCHIATRIC:  No disorder of thought or mood.  ENDOCRINE:  No heat or cold intolerance, polyuria or polydipsia.  HEMATOLOGICAL:  No easy bruising or bleedings:  .     PHYSICAL EXAMINATION:    GENERAL APPEARANCE:  Pt. is not currently dyspneic, in no distress. Pt. is alert, oriented, and pleasant.  HEENT:  Pupils are normal and react normally. No icterus. Mucous membranes well colored.  NECK:  Supple. No lymphadenopathy. Jugular venous pressure not elevated. Carotids equal.   HEART:   The cardiac impulse has a normal quality. Regular. Normal S1 and S2. There are no murmurs, rubs or gallops noted  CHEST:  Chest is clear to auscultation. Normal respiratory effort.  ABDOMEN:  Soft and nontender.   EXTREMITIES:  There is no cyanosis, clubbing or edema.   SKIN:  No rash or significant lesions are noted.    LABS:                        11.0   8.08  )-----------( 100      ( 03 Feb 2023 07:11 )             33.9     02-02    134<L>  |  102  |  21  ----------------------------<  104<H>  4.3   |  22  |  1.31<H>    Ca    8.6      02 Feb 2023 15:11  Mg     2.1     02-02    TPro  8.0  /  Alb  2.7<L>  /  TBili  0.6  /  DBili  x   /  AST  329<H>  /  ALT  165<H>  /  AlkPhos  411<H>  02-02    LIVER FUNCTIONS - ( 02 Feb 2023 15:11 )  Alb: 2.7 g/dL / Pro: 8.0 gm/dL / ALK PHOS: 411 U/L / ALT: 165 U/L / AST: 329 U/L / GGT: x                         RADIOLOGY & ADDITIONAL STUDIES:     CT Angio Chest PE Protocol w/ IV Cont (02.02.23 @ 16:13) >  IMPRESSION:    No pulmonary embolism.    Symmetric mediastinal and hilar lymphadenopathy, nonspecific however   leading differential diagnosis is sarcoidosis.       HPI:    68 yo male with PMHx depression, HTN, gastric bypass 4/21 admitted with SOB.  Patient mentions he has been having SOB for the past 2 weeks but it has been worsening in the past 2-3 days.  He feels extremely fatigue just walking from one room to another.  He recently came to the ED with abdominal pain and diarrhea on 1/31 and was discharged from the ED with Colitis on PO abx.  Patient mentions his diarrhea and abodminal pain is chornic and started on 2021 after his gastric bypass.  Patient denies any chest pain, palpitations, cough dizziness, lightheadedness, nausea, vomiting, diarrhea or consitpation. pat was seen in my office & was send to Ed for furher eval. In the ED /66, HR 78, RR 16 SpO2 96% on room air, temp 98,  Labs: WBC 11.57, Na 134, Cr 1.31, Albumin 2.7, Alk phos 411, , , BNP 1557, CTA showed: No PE.  Symmetric mediastinal and hilar lymphadenopathy, nonspecific however leading differential diagnosis is sarcoidosis. pat admitted seen for pulmonary eval.      PAST MEDICAL & SURGICAL HISTORY:  Hyperlipidemia      GERD (gastroesophageal reflux disease)      Anxiety      Skin cancer  external right ear, unsure of type      HTN (hypertension)      Aortic stenosis  Bicuspid Aortic Valve Replacement- 5/29/2018      Chronic neck and back pain      Sciatica      Internal and external prolapsed hemorrhoids  had surgery      Helicobacter pylori infection      History of colonic polyps      Herniated lumbar intervertebral disc      Gastrointestinal hemorrhage      Carpal tunnel syndrome  had surgery      Depressive disorder      Loss of hearing      Morbid obesity      S/P carpal tunnel release  right side, left side- 2014      History of back surgery  laminectomy x2 last 2018      S/P trigger finger release  right thumb and middle finger      H/O external ear surgery  excision mass right ear- cancer 2018      H/O bicuspid aortic valve  replacement- 5/29/18      Status post coronary angioplasty  pt not sure he had this procedure      S/P tonsillectomy      H/O hemorrhoidectomy      Spinal cord stimulator status  10/2020          Home Medications:  Aspirin Enteric Coated 81 mg oral delayed release tablet: 1 tab(s) orally once a day (02 Feb 2023 18:02)  atorvastatin 10 mg oral tablet: 1 tab(s) orally once a day (in the morning) (02 Feb 2023 18:02)  buPROPion 100 mg oral tablet: 1 tab(s) orally 3 times a day (02 Feb 2023 18:02)  Descovy 200 mg-25 mg oral tablet: 1 tab(s) orally once a day (02 Feb 2023 18:02)  esomeprazole 40 mg oral delayed release capsule: 1 cap(s) orally 2 times a day (02 Feb 2023 18:02)  meloxicam 15 mg oral tablet: 1 tab(s) orally once a day (02 Feb 2023 18:02)  metoprolol tartrate 25 mg oral tablet: 1 tab(s) orally once a day (02 Feb 2023 18:02)  tadalafil 20 mg oral tablet: 1 tab(s) orally once a day, As Needed before sex (02 Feb 2023 18:02)  venlafaxine 75 mg oral tablet: 1 tab(s) orally 2 times a day (02 Feb 2023 18:02)      MEDICATIONS  (STANDING):  aspirin enteric coated 81 milliGRAM(s) Oral daily  atorvastatin 10 milliGRAM(s) Oral at bedtime  buPROPion XL (24-Hour) . 300 milliGRAM(s) Oral daily  celecoxib 200 milliGRAM(s) Oral daily  ciprofloxacin     Tablet 500 milliGRAM(s) Oral every 12 hours  enoxaparin Injectable 40 milliGRAM(s) SubCutaneous every 24 hours  metoprolol tartrate 25 milliGRAM(s) Oral daily  metroNIDAZOLE    Tablet 500 milliGRAM(s) Oral every 8 hours  pantoprazole    Tablet 40 milliGRAM(s) Oral before breakfast  potassium phosphate / sodium phosphate Tablet (K-PHOS No. 2) 1 Tablet(s) Oral two times a day with meals  venlafaxine 75 milliGRAM(s) Oral two times a day with meals    MEDICATIONS  (PRN):  acetaminophen     Tablet .. 650 milliGRAM(s) Oral every 6 hours PRN Temp greater or equal to 38C (100.4F), Mild Pain (1 - 3)  aluminum hydroxide/magnesium hydroxide/simethicone Suspension 30 milliLiter(s) Oral every 4 hours PRN Dyspepsia  melatonin 3 milliGRAM(s) Oral at bedtime PRN Insomnia  ondansetron Injectable 4 milliGRAM(s) IV Push every 8 hours PRN Nausea and/or Vomiting      Allergies    No Known Allergies    Intolerances        SOCIAL HISTORY: Denies tobacco, etoh abuse or illicit drug use    FAMILY HISTORY:  FH: HTN (hypertension)  father        Vital Signs Last 24 Hrs  T(C): 36.6 (02 Feb 2023 23:00), Max: 37.4 (02 Feb 2023 21:21)  T(F): 97.9 (02 Feb 2023 23:00), Max: 99.3 (02 Feb 2023 21:21)  HR: 84 (02 Feb 2023 23:00) (75 - 84)  BP: 107/64 (02 Feb 2023 23:00) (88/60 - 121/75)  BP(mean): 69 (02 Feb 2023 14:12) (69 - 69)  RR: 18 (02 Feb 2023 23:00) (16 - 18)  SpO2: 95% (02 Feb 2023 23:00) (95% - 100%)    Parameters below as of 02 Feb 2023 23:00  Patient On (Oxygen Delivery Method): room air            REVIEW OF SYSTEMS:    CONSTITUTIONAL:  As per HPI.  HEENT:  Eyes:  No diplopia or blurred vision. ENT:  No earache, sore throat or runny nose.  CARDIOVASCULAR:  No pressure, squeezing, tightness, heaviness or aching about the chest, neck, axilla or epigastrium.  RESPIRATORY:  No cough, shortness of breath, PND or orthopnea.  GASTROINTESTINAL:  No nausea, vomiting or diarrhea.  GENITOURINARY:  No dysuria, frequency or urgency.  MUSCULOSKELETAL:  As per HPI.  SKIN:  No change in skin, hair or nails.  NEUROLOGIC:  No paresthesias, fasciculations, seizures or weakness.  PSYCHIATRIC:  No disorder of thought or mood.  ENDOCRINE:  No heat or cold intolerance, polyuria or polydipsia.  HEMATOLOGICAL:  No easy bruising or bleedings:  .     PHYSICAL EXAMINATION:    GENERAL APPEARANCE:  Pt. is not currently dyspneic, in no distress. Pt. is alert, oriented, and pleasant.  HEENT:  Pupils are normal and react normally. No icterus. Mucous membranes well colored.  NECK:  Supple. No lymphadenopathy. Jugular venous pressure not elevated. Carotids equal.   HEART:   The cardiac impulse has a normal quality. Regular. Normal S1 and S2. There are no murmurs, rubs or gallops noted  CHEST:  Chest is clear to auscultation. Normal respiratory effort.  ABDOMEN:  Soft and nontender.   EXTREMITIES:  There is no cyanosis, clubbing or edema.   SKIN:  No rash or significant lesions are noted.    LABS:                        11.0   8.08  )-----------( 100      ( 03 Feb 2023 07:11 )             33.9     02-02    134<L>  |  102  |  21  ----------------------------<  104<H>  4.3   |  22  |  1.31<H>    Ca    8.6      02 Feb 2023 15:11  Mg     2.1     02-02    TPro  8.0  /  Alb  2.7<L>  /  TBili  0.6  /  DBili  x   /  AST  329<H>  /  ALT  165<H>  /  AlkPhos  411<H>  02-02    LIVER FUNCTIONS - ( 02 Feb 2023 15:11 )  Alb: 2.7 g/dL / Pro: 8.0 gm/dL / ALK PHOS: 411 U/L / ALT: 165 U/L / AST: 329 U/L / GGT: x             RADIOLOGY & ADDITIONAL STUDIES:     CT Angio Chest PE Protocol w/ IV Cont (02.02.23 @ 16:13) >  IMPRESSION:    No pulmonary embolism.    Symmetric mediastinal and hilar lymphadenopathy, nonspecific however   leading differential diagnosis is sarcoidosis.

## 2023-02-04 LAB
-  AMPICILLIN: SIGNIFICANT CHANGE UP
-  CIPROFLOXACIN: SIGNIFICANT CHANGE UP
-  TRIMETHOPRIM/SULFAMETHOXAZOLE: SIGNIFICANT CHANGE UP
ALBUMIN SERPL ELPH-MCNC: 2.2 G/DL — LOW (ref 3.3–5)
ALP SERPL-CCNC: 448 U/L — HIGH (ref 40–120)
ALT FLD-CCNC: 136 U/L — HIGH (ref 12–78)
ANION GAP SERPL CALC-SCNC: 6 MMOL/L — SIGNIFICANT CHANGE UP (ref 5–17)
AST SERPL-CCNC: 251 U/L — HIGH (ref 15–37)
BASOPHILS # BLD AUTO: 0 K/UL — SIGNIFICANT CHANGE UP (ref 0–0.2)
BASOPHILS NFR BLD AUTO: 0 % — SIGNIFICANT CHANGE UP (ref 0–2)
BILIRUB SERPL-MCNC: 0.4 MG/DL — SIGNIFICANT CHANGE UP (ref 0.2–1.2)
BUN SERPL-MCNC: 12 MG/DL — SIGNIFICANT CHANGE UP (ref 7–23)
CALCIUM SERPL-MCNC: 7.8 MG/DL — LOW (ref 8.5–10.1)
CHLORIDE SERPL-SCNC: 107 MMOL/L — SIGNIFICANT CHANGE UP (ref 96–108)
CO2 SERPL-SCNC: 22 MMOL/L — SIGNIFICANT CHANGE UP (ref 22–31)
CREAT SERPL-MCNC: 0.77 MG/DL — SIGNIFICANT CHANGE UP (ref 0.5–1.3)
CULTURE RESULTS: SIGNIFICANT CHANGE UP
EGFR: 98 ML/MIN/1.73M2 — SIGNIFICANT CHANGE UP
EOSINOPHIL # BLD AUTO: 0 K/UL — SIGNIFICANT CHANGE UP (ref 0–0.5)
EOSINOPHIL NFR BLD AUTO: 0 % — SIGNIFICANT CHANGE UP (ref 0–6)
GLUCOSE SERPL-MCNC: 98 MG/DL — SIGNIFICANT CHANGE UP (ref 70–99)
HCT VFR BLD CALC: 35.4 % — LOW (ref 39–50)
HGB BLD-MCNC: 11.5 G/DL — LOW (ref 13–17)
LYMPHOCYTES # BLD AUTO: 4.21 K/UL — HIGH (ref 1–3.3)
LYMPHOCYTES # BLD AUTO: 56 % — HIGH (ref 13–44)
MCHC RBC-ENTMCNC: 29.9 PG — SIGNIFICANT CHANGE UP (ref 27–34)
MCHC RBC-ENTMCNC: 32.5 GM/DL — SIGNIFICANT CHANGE UP (ref 32–36)
MCV RBC AUTO: 92.2 FL — SIGNIFICANT CHANGE UP (ref 80–100)
METHOD TYPE: SIGNIFICANT CHANGE UP
MONOCYTES # BLD AUTO: 0.3 K/UL — SIGNIFICANT CHANGE UP (ref 0–0.9)
MONOCYTES NFR BLD AUTO: 4 % — SIGNIFICANT CHANGE UP (ref 2–14)
NEUTROPHILS # BLD AUTO: 2.86 K/UL — SIGNIFICANT CHANGE UP (ref 1.8–7.4)
NEUTROPHILS NFR BLD AUTO: 38 % — LOW (ref 43–77)
NRBC # BLD: SIGNIFICANT CHANGE UP /100 WBCS (ref 0–0)
PLATELET # BLD AUTO: 104 K/UL — LOW (ref 150–400)
POTASSIUM SERPL-MCNC: 3.6 MMOL/L — SIGNIFICANT CHANGE UP (ref 3.5–5.3)
POTASSIUM SERPL-SCNC: 3.6 MMOL/L — SIGNIFICANT CHANGE UP (ref 3.5–5.3)
PROT SERPL-MCNC: 6.6 GM/DL — SIGNIFICANT CHANGE UP (ref 6–8.3)
RBC # BLD: 3.84 M/UL — LOW (ref 4.2–5.8)
RBC # FLD: 14 % — SIGNIFICANT CHANGE UP (ref 10.3–14.5)
SODIUM SERPL-SCNC: 135 MMOL/L — SIGNIFICANT CHANGE UP (ref 135–145)
SPECIMEN SOURCE: SIGNIFICANT CHANGE UP
WBC # BLD: 7.52 K/UL — SIGNIFICANT CHANGE UP (ref 3.8–10.5)
WBC # FLD AUTO: 7.52 K/UL — SIGNIFICANT CHANGE UP (ref 3.8–10.5)

## 2023-02-04 PROCEDURE — 99233 SBSQ HOSP IP/OBS HIGH 50: CPT | Mod: GC

## 2023-02-04 RX ORDER — EMTRICITABINE AND TENOFOVIR DISOPROXIL FUMARATE 200; 300 MG/1; MG/1
1 TABLET, FILM COATED ORAL DAILY
Refills: 0 | Status: DISCONTINUED | OUTPATIENT
Start: 2023-02-04 | End: 2023-02-07

## 2023-02-04 RX ADMIN — Medication 75 MILLIGRAM(S): at 16:36

## 2023-02-04 RX ADMIN — CELECOXIB 200 MILLIGRAM(S): 200 CAPSULE ORAL at 10:31

## 2023-02-04 RX ADMIN — Medication 81 MILLIGRAM(S): at 10:29

## 2023-02-04 RX ADMIN — CELECOXIB 200 MILLIGRAM(S): 200 CAPSULE ORAL at 11:14

## 2023-02-04 RX ADMIN — ATORVASTATIN CALCIUM 10 MILLIGRAM(S): 80 TABLET, FILM COATED ORAL at 22:03

## 2023-02-04 RX ADMIN — ENOXAPARIN SODIUM 40 MILLIGRAM(S): 100 INJECTION SUBCUTANEOUS at 13:05

## 2023-02-04 RX ADMIN — Medication 1 TABLET(S): at 10:31

## 2023-02-04 RX ADMIN — PANTOPRAZOLE SODIUM 40 MILLIGRAM(S): 20 TABLET, DELAYED RELEASE ORAL at 06:13

## 2023-02-04 RX ADMIN — Medication 1 TABLET(S): at 22:03

## 2023-02-04 RX ADMIN — Medication 1 TABLET(S): at 13:05

## 2023-02-04 RX ADMIN — Medication 500 MILLIGRAM(S): at 06:13

## 2023-02-04 RX ADMIN — BUPROPION HYDROCHLORIDE 300 MILLIGRAM(S): 150 TABLET, EXTENDED RELEASE ORAL at 10:29

## 2023-02-04 RX ADMIN — Medication 75 MILLIGRAM(S): at 10:32

## 2023-02-04 RX ADMIN — Medication 1 TABLET(S): at 16:36

## 2023-02-04 RX ADMIN — EMTRICITABINE AND TENOFOVIR DISOPROXIL FUMARATE 1 TABLET(S): 200; 300 TABLET, FILM COATED ORAL at 16:36

## 2023-02-04 NOTE — PROGRESS NOTE ADULT - ASSESSMENT
68 yo male with PMHx depression, HTN, gastric bypass 4/21 presents today with SOB.      PROBLEMS:    Dyspnea on [id number]  CTA chest-No PE-Symmetric mediastinal and hilar lymphadenopathy-nonspecific however leading differential diagnosis is sarcoidosis-FU With repeat CT may need Bx  Colitis  Transaminitis  RAYMUNDO (resovled)  Depression    PLAN:    BNP 1557-s/p one dose of lasix-clear lung/no pedal edema  TTE  Serum ACE, vit D 1,25  Abdominal pain resolved  CT abd 1/31: Mild wall thickening of the rectosigmoid junction. This may represent proctocolitis-c/w outpatient Abx Cipro and Flagyl   RUQ US: mild hepatomegaly, mild prominence of the main pancreatic duct, no sig biliary dilatation   GI FU  d/w staff  DVT ppx-Lovenox

## 2023-02-04 NOTE — PROGRESS NOTE ADULT - ASSESSMENT
66 yo male with PMHx depression, HTN, gastric bypass 4/21 presents today with SOB.      #Shortness of Breath  #Dyspnea on Exertion  - Differential Dx include CHF vs Sarcoidosis  - VSS  - CTA chest: No PE. Symmetric mediastinal and hilar lymphadenopathy, nonspecific however leading differential diagnosis is sarcoidosis.  - BNP 1557  - s/p one dose of lasix  - pt with clear lung, no pedal edema  - TTE (2/3) LVEF 50-55, calcified bicuspid AV  - pending serum ACE; vit D 1,25 wnl   - Pulm rec appreciated    #Colitis with shigella   - Abdominal pain resolved  - Recent visit to ED (1/30) with Colitis, discharged wth abx   - WBC today 7.52; improved from 16 on 1/30  - CT abd 1/31: Mild wall thickening of the rectosigmoid junction. This may represent proctocolitis  - stool culture from this admit now + shigella (2/3)  - Abx Cipro and Flagyl d/c on 2/4  - bactrim DS 1 tab PO q12h on (2/4)  - monitor VS      #Transaminitis  - Alk phos 411, ,   - CT Abd: (1/31) normal liver. Mildly dilated Bile duct  - PE wnl  - RUQ US: mild hepatomegaly, mild prominence of the main pancreatic duct, no sig biliary dilatation   - GI rec appreciated      - MRCP     - antismooth muscle and mitochondrial antibodies       #RAYMUNDO (resovled)  #Hyponatremia (resolved)  - Likely 2/2 recent GI losses in the setting of colitis  - Na 134 on admit  - Cr 1.31 on admit (baseline 1.0), Cr 0.77 today  - s/p 1L IVF in the ED  - Avoid nephrotoxic drugs      #Depression  - c/w home meds    #DVT ppx  - Lovenox    #Code status  - Full code    Dispo: pending MRCP, labs     *Case discussed with Dr. Deleon

## 2023-02-04 NOTE — CONSULT NOTE ADULT - SUBJECTIVE AND OBJECTIVE BOX
HPI:  68 yo male with PMHx depression, HTN, gastric bypass 4/21 presents today with SOB.  Patient mentions he has been having SOB for the past 2 weeks but it has been worsening in the past 2-3 days.  He feels extremely fatigue just walking from one room to another.  He recently came to the ED with abdominal pain and diarrhea on 1/31 and was discharged from the ED with Colitis on PO abx.  Patient mentions his diarrhea and abodminal pain is chornic and started on 2021 after his gastric bypass.  Patient denies any chest pain, palpitations, cough dizziness, lightheadedness, nausea, vomiting, diarrhea or consitpation.     In the ED /66, HR 78, RR 16 SpO2 96% on room air, temp 98  Labs: WBC 11.57, Na 134, Cr 1.31, Albumin 2.7, Alk phos 411, , , BNP 1557  CTA showed: No PE.  Symmetric mediastinal and hilar lymphadenopathy, nonspecific however leading differential diagnosis is sarcoidosis.   (02 Feb 2023 22:19)      PAST MEDICAL & SURGICAL HISTORY:  Hyperlipidemia      GERD (gastroesophageal reflux disease)      Anxiety      Skin cancer  external right ear, unsure of type      HTN (hypertension)      Aortic stenosis  Bicuspid Aortic Valve Replacement- 5/29/2018      Chronic neck and back pain      Sciatica      Internal and external prolapsed hemorrhoids  had surgery      Helicobacter pylori infection      History of colonic polyps      Herniated lumbar intervertebral disc      Gastrointestinal hemorrhage      Carpal tunnel syndrome  had surgery      Depressive disorder      Loss of hearing      Morbid obesity      S/P carpal tunnel release  right side, left side- 2014      History of back surgery  laminectomy x2 last 2018      S/P trigger finger release  right thumb and middle finger      H/O external ear surgery  excision mass right ear- cancer 2018      H/O bicuspid aortic valve  replacement- 5/29/18      Status post coronary angioplasty  pt not sure he had this procedure      S/P tonsillectomy      H/O hemorrhoidectomy      Spinal cord stimulator status  10/2020          MEDICATIONS  (STANDING):  aspirin enteric coated 81 milliGRAM(s) Oral daily  atorvastatin 10 milliGRAM(s) Oral at bedtime  buPROPion XL (24-Hour) . 300 milliGRAM(s) Oral daily  celecoxib 200 milliGRAM(s) Oral daily  enoxaparin Injectable 40 milliGRAM(s) SubCutaneous every 24 hours  metoprolol tartrate 25 milliGRAM(s) Oral daily  pantoprazole    Tablet 40 milliGRAM(s) Oral before breakfast  potassium phosphate / sodium phosphate Tablet (K-PHOS No. 2) 1 Tablet(s) Oral two times a day with meals  trimethoprim  160 mG/sulfamethoxazole 800 mG 1 Tablet(s) Oral every 12 hours  venlafaxine 75 milliGRAM(s) Oral two times a day with meals    MEDICATIONS  (PRN):  acetaminophen     Tablet .. 650 milliGRAM(s) Oral every 6 hours PRN Temp greater or equal to 38C (100.4F), Mild Pain (1 - 3)  aluminum hydroxide/magnesium hydroxide/simethicone Suspension 30 milliLiter(s) Oral every 4 hours PRN Dyspepsia  melatonin 3 milliGRAM(s) Oral at bedtime PRN Insomnia  ondansetron Injectable 4 milliGRAM(s) IV Push every 8 hours PRN Nausea and/or Vomiting      Allergies    No Known Allergies    Intolerances        SOCIAL HISTORY:    FAMILY HISTORY:  FH: HTN (hypertension)  father     Non-contributory    REVIEW OF SYSTEMS      General:	    Respiratory and Thorax:  	  Cardiovascular:	    Gastrointestinal:	    Musculoskeletal:	   Vital Signs Last 24 Hrs  T(C): 36.6 (04 Feb 2023 07:43), Max: 36.7 (03 Feb 2023 23:56)  T(F): 97.9 (04 Feb 2023 07:43), Max: 98.1 (03 Feb 2023 23:56)  HR: 74 (04 Feb 2023 07:43) (64 - 74)  BP: 101/70 (04 Feb 2023 07:43) (96/60 - 103/68)  BP(mean): --  RR: 18 (04 Feb 2023 07:43) (18 - 18)  SpO2: 95% (04 Feb 2023 07:43) (94% - 96%)    Parameters below as of 04 Feb 2023 07:43  Patient On (Oxygen Delivery Method): room air        HEENT :No Pallor.No icterus. EOMI,PERLAA  Chest : Clear to Auscultation  CVS : S1S2 Normal.No murmurs.  Abdomen: Soft.Non tender   CNS: Alert.Oriented to Time,Place and Person.No focal deficit.  EXT: Normal Range of motion.No pitting edema.    LABS:                        11.5   7.52  )-----------( 104      ( 04 Feb 2023 06:37 )             35.4     02-04    135  |  107  |  12  ----------------------------<  98  3.6   |  22  |  0.77    Ca    7.8<L>      04 Feb 2023 06:37  Mg     2.1     02-02    TPro  6.6  /  Alb  2.2<L>  /  TBili  0.4  /  DBili  x   /  AST  251<H>  /  ALT  136<H>  /  AlkPhos  448<H>  02-04      LIVER FUNCTIONS - ( 04 Feb 2023 06:37 )  Alb: 2.2 g/dL / Pro: 6.6 gm/dL / ALK PHOS: 448 U/L / ALT: 136 U/L / AST: 251 U/L / GGT: x             RADIOLOGY & ADDITIONAL STUDIES:

## 2023-02-04 NOTE — CONSULT NOTE ADULT - ASSESSMENT
68 y/o male with h/o depression, HTN, obesity s/p gastric bypass 4/21 was admitted on 2/2 with SOB and diarrhea. Patient mentions he has been having SOB for the past 2 weeks but it has been worsening in the past 2-3 days. He feels extremely fatigue just walking from one room to another. He recently came to the ED with abdominal pain and diarrhea on 1/31, diagnosed with colitis and was discharged from the ED on PO abx. Patient mentions his diarrhea and abdominal pain is chronic and started on 2021 after his gastric bypass. In ER he received cipro and metronidazole.    1. Acute enterocolitis with shigella. Mediastinal and claudia lymphadenopathy ?cause ?sarcoidosis. Obesity s/p gastric bypass.  -stool testing reviewed       68 y/o male with h/o depression, HTN, obesity s/p gastric bypass 4/21 was admitted on 2/2 with SOB and diarrhea. Patient mentions he has been having SOB for the past 2 weeks but it has been worsening in the past 2-3 days. He feels extremely fatigue just walking from one room to another. He recently came to the ED with abdominal pain and diarrhea on 1/31, diagnosed with colitis and was discharged from the ED on PO abx. Patient mentions his diarrhea and abdominal pain is chronic and started on 2021 after his gastric bypass. In ER he received cipro and metronidazole.    1. Acute enterocolitis with shigella. Mediastinal and hilar lymphadenopathy ?cause ?sarcoidosis. Obesity s/p gastric bypass.  -stool testing reviewed  -start bactrim DS 1 tab PO q12h  -reason for abx use and side effects reviewed with patient; monitor BMP   -contact isolation  -old chart reviewed to assess prior cultures  -consider pulmonary evaluation  -monitor temps  -f/u CBC  -supportive care  2. Other issues:   -care per medicine

## 2023-02-04 NOTE — PROGRESS NOTE ADULT - SUBJECTIVE AND OBJECTIVE BOX
HPI:  66 yo male with PMHx depression, HTN, gastric bypass 4/21 presents today with SOB.  Patient mentions he has been having SOB for the past 2 weeks but it has been worsening in the past 2-3 days.  He feels extremely fatigue just walking from one room to another.  He recently came to the ED with abdominal pain and diarrhea on 1/31 and was discharged from the ED with Colitis on PO abx.  Patient mentions his diarrhea and abodminal pain is chornic and started on 2021 after his gastric bypass.  Patient denies any chest pain, palpitations, cough dizziness, lightheadedness, nausea, vomiting, diarrhea or consitpation.     In the ED /66, HR 78, RR 16 SpO2 96% on room air, temp 98  Labs: WBC 11.57, Na 134, Cr 1.31, Albumin 2.7, Alk phos 411, , , BNP 1557, CTA showed: No PE.  Symmetric mediastinal and hilar lymphadenopathy, nonspecific however leading differential diagnosis is sarcoidosis.    Subjective: pt seen and evaluated by bedside. Pt resting comfortably. No complaints.     REVIEW OF SYSTEMS:  CONSTITUTIONAL: No weakness, fevers or chills  EYES/ENT: No visual changes;  No vertigo or throat pain   NECK: No pain or stiffness  RESPIRATORY: No cough, wheezing, hemoptysis; + shortness of breath  CARDIOVASCULAR: No chest pain or palpitations, + DIAL  GASTROINTESTINAL: No abdominal or epigastric pain. No nausea, vomiting; No diarrhea or constipation.   GENITOURINARY: No dysuria, frequency or hematuria  NEUROLOGICAL: No numbness or weakness  SKIN: No itching, rashes    =======================================================  Vitals:  T(F): 98.2 (04 Feb 2023 15:55), Max: 98.2 (04 Feb 2023 15:55)  HR: 84 (04 Feb 2023 15:55)  BP: 90/66 (04 Feb 2023 15:55)  RR: 18 (04 Feb 2023 15:55)  SpO2: 95% (04 Feb 2023 15:55) (95% - 96%)  temp max in last 48H T(F): , Max: 99.3 (02-02-23 @ 21:21)      =======================================================    PHYSICAL EXAM:  Constitutional: Pt lying in bed, awake and alert, NAD  HEENT: EOMI, normal hearing, moist mucous membranes  Neck: Soft and supple, no JVD  Respiratory: CTABL, No wheezing, rales or rhonchi  Cardiovascular: S1S2+, RRR, no M/G/R  Gastrointestinal: BS+, soft, NT/ND, no guarding, no rebound  Extremities: No peripheral edema  Vascular: 2+ peripheral pulses  Neurological: AAOx3, no focal deficits  Musculoskeletal: 5/5 strength b/l upper and lower extremities  Skin: No rashes  =======================================================  Current Antibiotics:  emtricitabine 200 mG/tenofovir 300 mG (TRUVADA) 1 Tablet(s) Oral daily  trimethoprim  160 mG/sulfamethoxazole 800 mG 1 Tablet(s) Oral every 12 hours    Other medications:  aspirin enteric coated 81 milliGRAM(s) Oral daily  atorvastatin 10 milliGRAM(s) Oral at bedtime  buPROPion XL (24-Hour) . 300 milliGRAM(s) Oral daily  celecoxib 200 milliGRAM(s) Oral daily  enoxaparin Injectable 40 milliGRAM(s) SubCutaneous every 24 hours  metoprolol tartrate 25 milliGRAM(s) Oral daily  pantoprazole    Tablet 40 milliGRAM(s) Oral before breakfast  potassium phosphate / sodium phosphate Tablet (K-PHOS No. 2) 1 Tablet(s) Oral two times a day with meals  venlafaxine 75 milliGRAM(s) Oral two times a day with meals      =======================================================  Labs:                        11.5   7.52  )-----------( 104      ( 04 Feb 2023 06:37 )             35.4     02-04    135  |  107  |  12  ----------------------------<  98  3.6   |  22  |  0.77    Ca    7.8<L>      04 Feb 2023 06:37    TPro  6.6  /  Alb  2.2<L>  /  TBili  0.4  /  DBili  x   /  AST  251<H>  /  ALT  136<H>  /  AlkPhos  448<H>  02-04      Culture - Stool (collected 01-31-23 @ 02:13)  Source: .Stool None  Final Report (02-04-23 @ 09:43):    Moderate Shigella species isolated    (Stool culture examined for Salmonella,    Shigella, Campylobacter, Aeromonas, Plesiomonas,    Vibrio, E.coli O157 and Yersinia)  Organism: Shigella species (02-04-23 @ 09:41)  Organism: Shigella species (02-04-23 @ 09:41)    Sensitivities:      -  Ampicillin: R >16 These ampicillin results predict results for amoxicillin      -  Ciprofloxacin: R >2      -  Trimethoprim/Sulfamethoxazole: S <=0.5/9.5      Method Type: KWAN        SARS-CoV-2: NotDetec (02-02-23 @ 15:11)  SARS-CoV-2 Result: NotDetec (01-30-23 @ 23:49)        ========================================================  Imaging: no new imaging

## 2023-02-04 NOTE — PROGRESS NOTE ADULT - SUBJECTIVE AND OBJECTIVE BOX
Subjective:    pat better, sitting in chair, no respiratory distress.    Home Medications:  Aspirin Enteric Coated 81 mg oral delayed release tablet: 1 tab(s) orally once a day (02 Feb 2023 18:02)  atorvastatin 10 mg oral tablet: 1 tab(s) orally once a day (in the morning) (02 Feb 2023 18:02)  buPROPion 100 mg oral tablet: 1 tab(s) orally 3 times a day (02 Feb 2023 18:02)  Descovy 200 mg-25 mg oral tablet: 1 tab(s) orally once a day (02 Feb 2023 18:02)  esomeprazole 40 mg oral delayed release capsule: 1 cap(s) orally 2 times a day (02 Feb 2023 18:02)  meloxicam 15 mg oral tablet: 1 tab(s) orally once a day (02 Feb 2023 18:02)  metoprolol tartrate 25 mg oral tablet: 1 tab(s) orally once a day (02 Feb 2023 18:02)  tadalafil 20 mg oral tablet: 1 tab(s) orally once a day, As Needed before sex (02 Feb 2023 18:02)  venlafaxine 75 mg oral tablet: 1 tab(s) orally 2 times a day (02 Feb 2023 18:02)    MEDICATIONS  (STANDING):  aspirin enteric coated 81 milliGRAM(s) Oral daily  atorvastatin 10 milliGRAM(s) Oral at bedtime  buPROPion XL (24-Hour) . 300 milliGRAM(s) Oral daily  celecoxib 200 milliGRAM(s) Oral daily  enoxaparin Injectable 40 milliGRAM(s) SubCutaneous every 24 hours  metoprolol tartrate 25 milliGRAM(s) Oral daily  pantoprazole    Tablet 40 milliGRAM(s) Oral before breakfast  potassium phosphate / sodium phosphate Tablet (K-PHOS No. 2) 1 Tablet(s) Oral two times a day with meals  trimethoprim  160 mG/sulfamethoxazole 800 mG 1 Tablet(s) Oral every 12 hours  venlafaxine 75 milliGRAM(s) Oral two times a day with meals    MEDICATIONS  (PRN):  acetaminophen     Tablet .. 650 milliGRAM(s) Oral every 6 hours PRN Temp greater or equal to 38C (100.4F), Mild Pain (1 - 3)  aluminum hydroxide/magnesium hydroxide/simethicone Suspension 30 milliLiter(s) Oral every 4 hours PRN Dyspepsia  melatonin 3 milliGRAM(s) Oral at bedtime PRN Insomnia  ondansetron Injectable 4 milliGRAM(s) IV Push every 8 hours PRN Nausea and/or Vomiting      Allergies    No Known Allergies    Intolerances        Vital Signs Last 24 Hrs  T(C): 36.6 (04 Feb 2023 07:43), Max: 36.7 (03 Feb 2023 23:56)  T(F): 97.9 (04 Feb 2023 07:43), Max: 98.1 (03 Feb 2023 23:56)  HR: 74 (04 Feb 2023 07:43) (64 - 74)  BP: 101/70 (04 Feb 2023 07:43) (96/60 - 103/68)  BP(mean): --  RR: 18 (04 Feb 2023 07:43) (18 - 18)  SpO2: 95% (04 Feb 2023 07:43) (94% - 96%)    Parameters below as of 04 Feb 2023 07:43  Patient On (Oxygen Delivery Method): room air          PHYSICAL EXAMINATION:    NECK:  Supple. No lymphadenopathy. Jugular venous pressure not elevated. Carotids equal.   HEART:   The cardiac impulse has a normal quality. Reg., Nl S1 and S2.  There are no murmurs, rubs or gallops noted  CHEST:  Chest is clear to auscultation. Normal respiratory effort.  ABDOMEN:  Soft and nontender.   EXTREMITIES:  There is no edema.       LABS:                        11.5   7.52  )-----------( 104      ( 04 Feb 2023 06:37 )             35.4     02-04    135  |  107  |  12  ----------------------------<  98  3.6   |  22  |  0.77    Ca    7.8<L>      04 Feb 2023 06:37  Mg     2.1     02-02    TPro  6.6  /  Alb  2.2<L>  /  TBili  0.4  /  DBili  x   /  AST  251<H>  /  ALT  136<H>  /  AlkPhos  448<H>  02-04

## 2023-02-04 NOTE — CONSULT NOTE ADULT - SUBJECTIVE AND OBJECTIVE BOX
Patient is a 67y old  Male who presents with a chief complaint of SOB     HPI:  68 y/o male with h/o depression, HTN, obesity s/p gastric bypass 4/21 was admitted on 2/2 with SOB and diarrhea. Patient mentions he has been having SOB for the past 2 weeks but it has been worsening in the past 2-3 days. He feels extremely fatigue just walking from one room to another. He recently came to the ED with abdominal pain and diarrhea on 1/31, diagnosed with colitis and was discharged from the ED on PO abx. Patient mentions his diarrhea and abdominal pain is chronic and started on 2021 after his gastric bypass. In ER he received cipro and metronidazole.     PMH: as above  PSH: as above  Meds: per reconciliation sheet, noted below  MEDICATIONS  (STANDING):  aspirin enteric coated 81 milliGRAM(s) Oral daily  atorvastatin 10 milliGRAM(s) Oral at bedtime  buPROPion XL (24-Hour) . 300 milliGRAM(s) Oral daily  celecoxib 200 milliGRAM(s) Oral daily  ciprofloxacin     Tablet 500 milliGRAM(s) Oral every 12 hours  enoxaparin Injectable 40 milliGRAM(s) SubCutaneous every 24 hours  metoprolol tartrate 25 milliGRAM(s) Oral daily  metroNIDAZOLE    Tablet 500 milliGRAM(s) Oral every 8 hours  pantoprazole    Tablet 40 milliGRAM(s) Oral before breakfast  potassium phosphate / sodium phosphate Tablet (K-PHOS No. 2) 1 Tablet(s) Oral two times a day with meals  venlafaxine 75 milliGRAM(s) Oral two times a day with meals    MEDICATIONS  (PRN):  acetaminophen     Tablet .. 650 milliGRAM(s) Oral every 6 hours PRN Temp greater or equal to 38C (100.4F), Mild Pain (1 - 3)  aluminum hydroxide/magnesium hydroxide/simethicone Suspension 30 milliLiter(s) Oral every 4 hours PRN Dyspepsia  melatonin 3 milliGRAM(s) Oral at bedtime PRN Insomnia  ondansetron Injectable 4 milliGRAM(s) IV Push every 8 hours PRN Nausea and/or Vomiting    Allergies    No Known Allergies    Intolerances      Social: no smoking, no alcohol, no illegal drugs; no recent travel, no exposure to TB  FAMILY HISTORY:  FH: HTN (hypertension)  father      no history of premature cardiovascular disease in first degree relatives    ROS: the patient denies fever, no chills, no HA, no seizures, no dizziness, no sore throat, no nasal congestion, no blurry vision, no CP, no palpitations, no SOB, no cough, no abdominal pain, has diarrhea, no N/V, no dysuria, no leg pain, no claudication, no rash, no joint aches, no rectal pain or bleeding, no night sweats  All other systems reviewed and are negative    Vital Signs Last 24 Hrs  T(C): 36.6 (04 Feb 2023 07:43), Max: 36.7 (03 Feb 2023 23:56)  T(F): 97.9 (04 Feb 2023 07:43), Max: 98.1 (03 Feb 2023 23:56)  HR: 74 (04 Feb 2023 07:43) (64 - 74)  BP: 101/70 (04 Feb 2023 07:43) (96/60 - 103/68)  BP(mean): --  RR: 18 (04 Feb 2023 07:43) (18 - 18)  SpO2: 95% (04 Feb 2023 07:43) (94% - 96%)    Parameters below as of 04 Feb 2023 07:43  Patient On (Oxygen Delivery Method): room air    PE:    Constitutional:  No acute distress  HEENT: NC/AT, EOMI, PERRLA, conjunctivae clear; ears and nose atraumatic; pharynx benign  Neck: supple; thyroid not palpable  Back: no tenderness  Respiratory: respiratory effort normal; clear to auscultation  Cardiovascular: S1S2 regular, no murmurs  Abdomen: soft, mild tender, not distended, positive BS; no liver or spleen organomegaly  Genitourinary: no suprapubic tenderness  Lymphatic: no LN palpable  Musculoskeletal: no muscle tenderness, no joint swelling or tenderness  Extremities: no pedal edema  Neurological/ Psychiatric: AxOx3, judgement and insight normal; moving all extremities  Skin: no rashes; no palpable lesions    Labs: all available labs reviewed                        11.5   7.52  )-----------( 104      ( 04 Feb 2023 06:37 )             35.4     02-04    135  |  107  |  12  ----------------------------<  98  3.6   |  22  |  0.77    Ca    7.8<L>      04 Feb 2023 06:37  Mg     2.1     02-02    TPro  6.6  /  Alb  2.2<L>  /  TBili  0.4  /  DBili  x   /  AST  251<H>  /  ALT  136<H>  /  AlkPhos  448<H>  02-04     LIVER FUNCTIONS - ( 04 Feb 2023 06:37 )  Alb: 2.2 g/dL / Pro: 6.6 gm/dL / ALK PHOS: 448 U/L / ALT: 136 U/L / AST: 251 U/L / GGT: x           (02-02 @ 15:11)  NotDetec      Culture - Stool (collected 31 Jan 2023 02:13)  Source: .Stool None  Preliminary Report (03 Feb 2023 11:00):    Moderate Shigella species isolated    (Stool culture examined for Salmonella,    Shigella, Campylobacter, Aeromonas, Plesiomonas,    Vibrio, E.coli O157 and Yersinia)      Radiology: all available radiological tests reviewed  C Diff by PCR Result: NotDetec  Shigella/ Enteroinvasive E. coli: Detected    < from: US Abdomen Upper Quadrant Right (02.03.23 @ 08:31) >  Mild hepatomegaly.  Status postcholecystectomy. No significant biliary dilatation.  Mild prominence of the main pancreatic duct. Consider MRCP for additional evaluation.  < end of copied text >    < from: CT Angio Chest PE Protocol w/ IV Cont (02.02.23 @ 16:13) >  No pulmonary embolism.  Symmetric mediastinal and hilar lymphadenopathy, nonspecific however   leading differential diagnosis is sarcoidosis.  < end of copied text >    Advanced directives addressed: full resuscitation Patient is a 67y old  Male who presents with a chief complaint of SOB     HPI:  68 y/o male with h/o depression, HTN, obesity s/p gastric bypass 4/21 was admitted on 2/2 with SOB and diarrhea. Patient mentions he has been having SOB for the past 2 weeks but it has been worsening in the past 2-3 days. He feels extremely fatigue just walking from one room to another. He recently came to the ED with abdominal pain and diarrhea on 1/31, diagnosed with colitis and was discharged from the ED on PO abx. Patient mentions his diarrhea and abdominal pain is chronic and started on 2021 after his gastric bypass. In ER he received cipro and metronidazole.     PMH: as above  PSH: as above  Meds: per reconciliation sheet, noted below  MEDICATIONS  (STANDING):  aspirin enteric coated 81 milliGRAM(s) Oral daily  atorvastatin 10 milliGRAM(s) Oral at bedtime  buPROPion XL (24-Hour) . 300 milliGRAM(s) Oral daily  celecoxib 200 milliGRAM(s) Oral daily  ciprofloxacin     Tablet 500 milliGRAM(s) Oral every 12 hours  enoxaparin Injectable 40 milliGRAM(s) SubCutaneous every 24 hours  metoprolol tartrate 25 milliGRAM(s) Oral daily  metroNIDAZOLE    Tablet 500 milliGRAM(s) Oral every 8 hours  pantoprazole    Tablet 40 milliGRAM(s) Oral before breakfast  potassium phosphate / sodium phosphate Tablet (K-PHOS No. 2) 1 Tablet(s) Oral two times a day with meals  venlafaxine 75 milliGRAM(s) Oral two times a day with meals    MEDICATIONS  (PRN):  acetaminophen     Tablet .. 650 milliGRAM(s) Oral every 6 hours PRN Temp greater or equal to 38C (100.4F), Mild Pain (1 - 3)  aluminum hydroxide/magnesium hydroxide/simethicone Suspension 30 milliLiter(s) Oral every 4 hours PRN Dyspepsia  melatonin 3 milliGRAM(s) Oral at bedtime PRN Insomnia  ondansetron Injectable 4 milliGRAM(s) IV Push every 8 hours PRN Nausea and/or Vomiting    Allergies    No Known Allergies    Intolerances      Social: no smoking, no alcohol, no illegal drugs; no recent travel, no exposure to TB  FAMILY HISTORY:  FH: HTN (hypertension)  father      no history of premature cardiovascular disease in first degree relatives    ROS: the patient denies fever, no chills, no HA, no seizures, no dizziness, no sore throat, no nasal congestion, no blurry vision, no CP, no palpitations, no SOB, no cough, no abdominal pain, has diarrhea, no N/V, no dysuria, no leg pain, no claudication, no rash, no joint aches, no rectal pain or bleeding, no night sweats  All other systems reviewed and are negative    Vital Signs Last 24 Hrs  T(C): 36.6 (04 Feb 2023 07:43), Max: 36.7 (03 Feb 2023 23:56)  T(F): 97.9 (04 Feb 2023 07:43), Max: 98.1 (03 Feb 2023 23:56)  HR: 74 (04 Feb 2023 07:43) (64 - 74)  BP: 101/70 (04 Feb 2023 07:43) (96/60 - 103/68)  BP(mean): --  RR: 18 (04 Feb 2023 07:43) (18 - 18)  SpO2: 95% (04 Feb 2023 07:43) (94% - 96%)    Parameters below as of 04 Feb 2023 07:43  Patient On (Oxygen Delivery Method): room air    PE:    Constitutional:  No acute distress  HEENT: NC/AT, EOMI, PERRLA, conjunctivae clear; ears and nose atraumatic; pharynx benign  Neck: supple; thyroid not palpable  Back: no tenderness  Respiratory: respiratory effort normal; clear to auscultation  Cardiovascular: S1S2 regular, no murmurs  Abdomen: soft, mild tender, not distended, positive BS; no liver or spleen organomegaly  Genitourinary: no suprapubic tenderness  Lymphatic: no LN palpable  Musculoskeletal: no muscle tenderness, no joint swelling or tenderness  Extremities: no pedal edema  Neurological/ Psychiatric: AxOx3, judgement and insight normal; moving all extremities  Skin: no rashes; no palpable lesions    Labs: all available labs reviewed                        11.5   7.52  )-----------( 104      ( 04 Feb 2023 06:37 )             35.4     02-04    135  |  107  |  12  ----------------------------<  98  3.6   |  22  |  0.77    Ca    7.8<L>      04 Feb 2023 06:37  Mg     2.1     02-02    TPro  6.6  /  Alb  2.2<L>  /  TBili  0.4  /  DBili  x   /  AST  251<H>  /  ALT  136<H>  /  AlkPhos  448<H>  02-04     LIVER FUNCTIONS - ( 04 Feb 2023 06:37 )  Alb: 2.2 g/dL / Pro: 6.6 gm/dL / ALK PHOS: 448 U/L / ALT: 136 U/L / AST: 251 U/L / GGT: x           (02-02 @ 15:11)  NotDetec      Culture - Stool (collected 31 Jan 2023 02:13)  Source: .Stool None  Preliminary Report (03 Feb 2023 11:00):    Moderate Shigella species isolated    (Stool culture examined for Salmonella,    Shigella, Campylobacter, Aeromonas, Plesiomonas,    Vibrio, E.coli O157 and Yersinia)    Radiology: all available radiological tests reviewed  C Diff by PCR Result: NotDetec  Shigella/ Enteroinvasive E. coli: Detected  < from: US Abdomen Upper Quadrant Right (02.03.23 @ 08:31) >  Mild hepatomegaly.  Status postcholecystectomy. No significant biliary dilatation.  Mild prominence of the main pancreatic duct. Consider MRCP for additional evaluation.  < end of copied text >    < from: CT Angio Chest PE Protocol w/ IV Cont (02.02.23 @ 16:13) >  No pulmonary embolism.  Symmetric mediastinal and hilar lymphadenopathy, nonspecific however   leading differential diagnosis is sarcoidosis.  < end of copied text >    Advanced directives addressed: full resuscitation

## 2023-02-04 NOTE — CONSULT NOTE ADULT - ASSESSMENT
Abnormal LFTs Transaminitis  R/U Biliary pathology  S/P Gastric Bypass  High Transaminases   and High Alk Phosphatase. Normal Bilirubin.  REC  MRCP  AntiSmooth muscle and Mitochondrial Antibodies  Regular diet

## 2023-02-05 LAB
ALBUMIN SERPL ELPH-MCNC: 2.1 G/DL — LOW (ref 3.3–5)
ALP SERPL-CCNC: 434 U/L — HIGH (ref 40–120)
ALT FLD-CCNC: 114 U/L — HIGH (ref 12–78)
ANION GAP SERPL CALC-SCNC: 7 MMOL/L — SIGNIFICANT CHANGE UP (ref 5–17)
AST SERPL-CCNC: 186 U/L — HIGH (ref 15–37)
BASOPHILS # BLD AUTO: 0 K/UL — SIGNIFICANT CHANGE UP (ref 0–0.2)
BASOPHILS NFR BLD AUTO: 0 % — SIGNIFICANT CHANGE UP (ref 0–2)
BILIRUB SERPL-MCNC: 0.3 MG/DL — SIGNIFICANT CHANGE UP (ref 0.2–1.2)
BUN SERPL-MCNC: 11 MG/DL — SIGNIFICANT CHANGE UP (ref 7–23)
CALCIUM SERPL-MCNC: 8 MG/DL — LOW (ref 8.5–10.1)
CHLORIDE SERPL-SCNC: 108 MMOL/L — SIGNIFICANT CHANGE UP (ref 96–108)
CO2 SERPL-SCNC: 22 MMOL/L — SIGNIFICANT CHANGE UP (ref 22–31)
CREAT SERPL-MCNC: 0.7 MG/DL — SIGNIFICANT CHANGE UP (ref 0.5–1.3)
EGFR: 101 ML/MIN/1.73M2 — SIGNIFICANT CHANGE UP
EOSINOPHIL # BLD AUTO: 0 K/UL — SIGNIFICANT CHANGE UP (ref 0–0.5)
EOSINOPHIL NFR BLD AUTO: 0 % — SIGNIFICANT CHANGE UP (ref 0–6)
GLUCOSE SERPL-MCNC: 95 MG/DL — SIGNIFICANT CHANGE UP (ref 70–99)
HCT VFR BLD CALC: 36.3 % — LOW (ref 39–50)
HGB BLD-MCNC: 12.1 G/DL — LOW (ref 13–17)
HIV 1+2 AB+HIV1 P24 AG SERPL QL IA: SIGNIFICANT CHANGE UP
LYMPHOCYTES # BLD AUTO: 2.36 K/UL — SIGNIFICANT CHANGE UP (ref 1–3.3)
LYMPHOCYTES # BLD AUTO: 40 % — SIGNIFICANT CHANGE UP (ref 13–44)
MCHC RBC-ENTMCNC: 30.6 PG — SIGNIFICANT CHANGE UP (ref 27–34)
MCHC RBC-ENTMCNC: 33.3 GM/DL — SIGNIFICANT CHANGE UP (ref 32–36)
MCV RBC AUTO: 91.7 FL — SIGNIFICANT CHANGE UP (ref 80–100)
MONOCYTES # BLD AUTO: 0.24 K/UL — SIGNIFICANT CHANGE UP (ref 0–0.9)
MONOCYTES NFR BLD AUTO: 4 % — SIGNIFICANT CHANGE UP (ref 2–14)
NEUTROPHILS # BLD AUTO: 2.65 K/UL — SIGNIFICANT CHANGE UP (ref 1.8–7.4)
NEUTROPHILS NFR BLD AUTO: 35 % — LOW (ref 43–77)
NRBC # BLD: SIGNIFICANT CHANGE UP /100 WBCS (ref 0–0)
PLATELET # BLD AUTO: 126 K/UL — LOW (ref 150–400)
POTASSIUM SERPL-MCNC: 3.7 MMOL/L — SIGNIFICANT CHANGE UP (ref 3.5–5.3)
POTASSIUM SERPL-SCNC: 3.7 MMOL/L — SIGNIFICANT CHANGE UP (ref 3.5–5.3)
PROT SERPL-MCNC: 6.2 GM/DL — SIGNIFICANT CHANGE UP (ref 6–8.3)
RBC # BLD: 3.96 M/UL — LOW (ref 4.2–5.8)
RBC # FLD: 14 % — SIGNIFICANT CHANGE UP (ref 10.3–14.5)
SODIUM SERPL-SCNC: 137 MMOL/L — SIGNIFICANT CHANGE UP (ref 135–145)
WBC # BLD: 5.89 K/UL — SIGNIFICANT CHANGE UP (ref 3.8–10.5)
WBC # FLD AUTO: 5.89 K/UL — SIGNIFICANT CHANGE UP (ref 3.8–10.5)

## 2023-02-05 PROCEDURE — 99233 SBSQ HOSP IP/OBS HIGH 50: CPT | Mod: GC

## 2023-02-05 RX ADMIN — CELECOXIB 200 MILLIGRAM(S): 200 CAPSULE ORAL at 09:09

## 2023-02-05 RX ADMIN — ATORVASTATIN CALCIUM 10 MILLIGRAM(S): 80 TABLET, FILM COATED ORAL at 21:05

## 2023-02-05 RX ADMIN — Medication 1 TABLET(S): at 21:05

## 2023-02-05 RX ADMIN — Medication 75 MILLIGRAM(S): at 18:05

## 2023-02-05 RX ADMIN — EMTRICITABINE AND TENOFOVIR DISOPROXIL FUMARATE 1 TABLET(S): 200; 300 TABLET, FILM COATED ORAL at 09:09

## 2023-02-05 RX ADMIN — ENOXAPARIN SODIUM 40 MILLIGRAM(S): 100 INJECTION SUBCUTANEOUS at 12:22

## 2023-02-05 RX ADMIN — PANTOPRAZOLE SODIUM 40 MILLIGRAM(S): 20 TABLET, DELAYED RELEASE ORAL at 09:10

## 2023-02-05 RX ADMIN — Medication 25 MILLIGRAM(S): at 09:09

## 2023-02-05 RX ADMIN — Medication 1 TABLET(S): at 09:09

## 2023-02-05 RX ADMIN — CELECOXIB 200 MILLIGRAM(S): 200 CAPSULE ORAL at 09:33

## 2023-02-05 RX ADMIN — Medication 81 MILLIGRAM(S): at 09:09

## 2023-02-05 RX ADMIN — BUPROPION HYDROCHLORIDE 300 MILLIGRAM(S): 150 TABLET, EXTENDED RELEASE ORAL at 09:10

## 2023-02-05 RX ADMIN — Medication 75 MILLIGRAM(S): at 09:10

## 2023-02-05 RX ADMIN — Medication 1 TABLET(S): at 18:05

## 2023-02-05 NOTE — PROVIDER CONTACT NOTE (CRITICAL VALUE NOTIFICATION) - TEST AND RESULT REPORTED:
stool culture positive for moderate shigella isolated
stool culture 1/31 moderate shigella species isolated

## 2023-02-05 NOTE — PROGRESS NOTE ADULT - SUBJECTIVE AND OBJECTIVE BOX
HPI:  68 yo male with PMHx depression, HTN, gastric bypass 4/21 presents today with SOB.  Patient mentions he has been having SOB for the past 2 weeks but it has been worsening in the past 2-3 days.  He feels extremely fatigue just walking from one room to another.  He recently came to the ED with abdominal pain and diarrhea on 1/31 and was discharged from the ED with Colitis on PO abx.  Patient mentions his diarrhea and abodminal pain is chornic and started on 2021 after his gastric bypass.  Patient denies any chest pain, palpitations, cough dizziness, lightheadedness, nausea, vomiting, diarrhea or consitpation.     In the ED /66, HR 78, RR 16 SpO2 96% on room air, temp 98  Labs: WBC 11.57, Na 134, Cr 1.31, Albumin 2.7, Alk phos 411, , , BNP 1557, CTA showed: No PE.  Symmetric mediastinal and hilar lymphadenopathy, nonspecific however leading differential diagnosis is sarcoidosis.    Subjective: pt seen and evaluated by bedside. Pt resting comfortably. No complaints, continues to have diarrhea    REVIEW OF SYSTEMS:  CONSTITUTIONAL: No weakness, fevers or chills  EYES/ENT: No visual changes;  No vertigo or throat pain   NECK: No pain or stiffness  RESPIRATORY: No cough, wheezing, hemoptysis; + shortness of breath  CARDIOVASCULAR: No chest pain or palpitations, + DIAL  GASTROINTESTINAL: No abdominal or epigastric pain. No nausea, vomiting; No diarrhea or constipation.   GENITOURINARY: No dysuria, frequency or hematuria  NEUROLOGICAL: No numbness or weakness  SKIN: No itching, rashes    =======================================================  Vital Signs Last 24 Hrs  T(C): 36.3 (05 Feb 2023 15:04), Max: 36.8 (04 Feb 2023 23:01)  T(F): 97.3 (05 Feb 2023 15:04), Max: 98.2 (04 Feb 2023 23:01)  HR: 70 (05 Feb 2023 15:04) (70 - 82)  BP: 106/68 (05 Feb 2023 15:04) (106/68 - 114/71)  BP(mean): --  RR: 17 (05 Feb 2023 15:04) (17 - 18)  SpO2: 97% (05 Feb 2023 15:04) (97% - 100%)    Parameters below as of 05 Feb 2023 15:04  Patient On (Oxygen Delivery Method): room air      =======================================================    PHYSICAL EXAM:  Constitutional: Pt lying in bed, awake and alert, NAD  HEENT: EOMI, normal hearing, moist mucous membranes  Neck: Soft and supple, no JVD  Respiratory: CTABL, No wheezing, rales or rhonchi  Cardiovascular: S1S2+, RRR, no M/G/R  Gastrointestinal: BS+, soft, NT/ND, no guarding, no rebound  Extremities: No peripheral edema  Vascular: 2+ peripheral pulses  Neurological: AAOx3, no focal deficits  Musculoskeletal: 5/5 strength b/l upper and lower extremities  Skin: No rashes  =======================================================  Current Antibiotics:  emtricitabine 200 mG/tenofovir 300 mG (TRUVADA) 1 Tablet(s) Oral daily  trimethoprim  160 mG/sulfamethoxazole 800 mG 1 Tablet(s) Oral every 12 hours    Other medications:  aspirin enteric coated 81 milliGRAM(s) Oral daily  atorvastatin 10 milliGRAM(s) Oral at bedtime  buPROPion XL (24-Hour) . 300 milliGRAM(s) Oral daily  celecoxib 200 milliGRAM(s) Oral daily  enoxaparin Injectable 40 milliGRAM(s) SubCutaneous every 24 hours  metoprolol tartrate 25 milliGRAM(s) Oral daily  pantoprazole    Tablet 40 milliGRAM(s) Oral before breakfast  potassium phosphate / sodium phosphate Tablet (K-PHOS No. 2) 1 Tablet(s) Oral two times a day with meals  venlafaxine 75 milliGRAM(s) Oral two times a day with meals      =======================================================  Labs:                                   12.1   5.89  )-----------( 126      ( 05 Feb 2023 06:24 )             36.3   02-05    137  |  108  |  11  ----------------------------<  95  3.7   |  22  |  0.70    Ca    8.0<L>      05 Feb 2023 06:24    TPro  6.2  /  Alb  2.1<L>  /  TBili  0.3  /  DBili  x   /  AST  186<H>  /  ALT  114<H>  /  AlkPhos  434<H>  02-05    Culture - Stool (collected 01-31-23 @ 02:13)  Source: .Stool None  Final Report (02-04-23 @ 09:43):    Moderate Shigella species isolated    (Stool culture examined for Salmonella,    Shigella, Campylobacter, Aeromonas, Plesiomonas,    Vibrio, E.coli O157 and Yersinia)  Organism: Shigella species (02-04-23 @ 09:41)  Organism: Shigella species (02-04-23 @ 09:41)    Sensitivities:      -  Ampicillin: R >16 These ampicillin results predict results for amoxicillin      -  Ciprofloxacin: R >2      -  Trimethoprim/Sulfamethoxazole: S <=0.5/9.5      Method Type: KWAN        SARS-CoV-2: NotDetec (02-02-23 @ 15:11)  SARS-CoV-2 Result: NotDetec (01-30-23 @ 23:49)        ========================================================  Imaging: no new imaging

## 2023-02-05 NOTE — ED POST DISCHARGE NOTE - RESULT SUMMARY
Left VM to call back regarding Stool Culture results.  Possible need to switch abx.  Culture showed Shigella resistant to Cipro that was prescribed.  Pt was also given Flagyl.  Not on sens.  Shigella did show Sens to Bactrim that could be Rx if needed.  KASIA Green PA-C

## 2023-02-05 NOTE — PROGRESS NOTE ADULT - ASSESSMENT
68 y/o male with h/o depression, HTN, obesity s/p gastric bypass 4/21 was admitted on 2/2 with SOB and diarrhea. Patient mentions he has been having SOB for the past 2 weeks but it has been worsening in the past 2-3 days. He feels extremely fatigue just walking from one room to another. He recently came to the ED with abdominal pain and diarrhea on 1/31, diagnosed with colitis and was discharged from the ED on PO abx. Patient mentions his diarrhea and abdominal pain is chronic and started on 2021 after his gastric bypass. In ER he received cipro and metronidazole.    1. Acute enterocolitis with shigella. Mediastinal and hilar lymphadenopathy ?cause ?sarcoidosis. Obesity s/p gastric bypass.  -stool testing reviewed  -shigella is S to bactrim  -on bactrim DS 1 tab PO q12h # 2  -tolerating abx well so far; no side effects noted   -contact isolation  -continue bactrim for 5-10 days  -consider pulmonary evaluation  -monitor temps  -f/u CBC  -supportive care  2. Other issues:   -care per medicine

## 2023-02-05 NOTE — PROGRESS NOTE ADULT - SUBJECTIVE AND OBJECTIVE BOX
Date of service: 02-05-23 @ 08:49    Has loose stools  No abdominal pain  No fever    ROS: no fever or chills; denies dizziness, no HA, no SOB or cough, no abdominal pain, no dysuria, no legs pain, no rashes    MEDICATIONS  (STANDING):  aspirin enteric coated 81 milliGRAM(s) Oral daily  atorvastatin 10 milliGRAM(s) Oral at bedtime  buPROPion XL (24-Hour) . 300 milliGRAM(s) Oral daily  celecoxib 200 milliGRAM(s) Oral daily  emtricitabine 200 mG/tenofovir 300 mG (TRUVADA) 1 Tablet(s) Oral daily  enoxaparin Injectable 40 milliGRAM(s) SubCutaneous every 24 hours  metoprolol tartrate 25 milliGRAM(s) Oral daily  pantoprazole    Tablet 40 milliGRAM(s) Oral before breakfast  potassium phosphate / sodium phosphate Tablet (K-PHOS No. 2) 1 Tablet(s) Oral two times a day with meals  trimethoprim  160 mG/sulfamethoxazole 800 mG 1 Tablet(s) Oral every 12 hours  venlafaxine 75 milliGRAM(s) Oral two times a day with meals    Vital Signs Last 24 Hrs  T(C): 36.8 (04 Feb 2023 23:01), Max: 36.8 (04 Feb 2023 15:55)  T(F): 98.2 (04 Feb 2023 23:01), Max: 98.2 (04 Feb 2023 15:55)  HR: 74 (04 Feb 2023 23:01) (74 - 84)  BP: 114/71 (04 Feb 2023 23:01) (90/66 - 114/71)  BP(mean): --  RR: 18 (04 Feb 2023 23:01) (18 - 18)  SpO2: 98% (04 Feb 2023 23:01) (95% - 98%)    Parameters below as of 04 Feb 2023 23:01  Patient On (Oxygen Delivery Method): room air     Physical exam:    Constitutional:  No acute distress  HEENT: NC/AT, EOMI, PERRLA, conjunctivae clear; ears and nose atraumatic  Neck: supple; thyroid not palpable  Back: no tenderness  Respiratory: respiratory effort normal; clear to auscultation  Cardiovascular: S1S2 regular, no murmurs  Abdomen: soft, mild tender, not distended, positive BS  Genitourinary: no suprapubic tenderness  Lymphatic: no LN palpable  Musculoskeletal: no muscle tenderness, no joint swelling or tenderness  Extremities: no pedal edema  Neurological/ Psychiatric: AxOx3, moving all extremities  Skin: no rashes; no palpable lesions    Labs: all available labs reviewed                        11.5   7.52  )-----------( 104      ( 04 Feb 2023 06:37 )             35.4     02-04    135  |  107  |  12  ----------------------------<  98  3.6   |  22  |  0.77    Ca    7.8<L>      04 Feb 2023 06:37  Mg     2.1     02-02    TPro  6.6  /  Alb  2.2<L>  /  TBili  0.4  /  DBili  x   /  AST  251<H>  /  ALT  136<H>  /  AlkPhos  448<H>  02-04     LIVER FUNCTIONS - ( 04 Feb 2023 06:37 )  Alb: 2.2 g/dL / Pro: 6.6 gm/dL / ALK PHOS: 448 U/L / ALT: 136 U/L / AST: 251 U/L / GGT: x           (02-02 @ 15:11)  NotDetec      Culture - Stool (collected 31 Jan 2023 02:13)  Source: .Stool None  Final Report (04 Feb 2023 09:43):    Moderate Shigella species isolated    (Stool culture examined for Salmonella,    Shigella, Campylobacter, Aeromonas, Plesiomonas,    Vibrio, E.coli O157 and Yersinia)  Organism: Shigella species (04 Feb 2023 09:41)  Organism: Shigella species (04 Feb 2023 09:41)      -  Ampicillin: R >16 These ampicillin results predict results for amoxicillin      -  Ciprofloxacin: R >2      -  Trimethoprim/Sulfamethoxazole: S <=0.5/9.5      Method Type: KWAN    Radiology: all available radiological tests reviewed  C Diff by PCR Result: NotDetec  Shigella/ Enteroinvasive E. coli: Detected  < from: US Abdomen Upper Quadrant Right (02.03.23 @ 08:31) >  Mild hepatomegaly.  Status postcholecystectomy. No significant biliary dilatation.  Mild prominence of the main pancreatic duct. Consider MRCP for additional evaluation.  < end of copied text >    < from: CT Angio Chest PE Protocol w/ IV Cont (02.02.23 @ 16:13) >  No pulmonary embolism.  Symmetric mediastinal and hilar lymphadenopathy, nonspecific however   leading differential diagnosis is sarcoidosis.  < end of copied text >    Advanced directives addressed: full resuscitation

## 2023-02-05 NOTE — ED POST DISCHARGE NOTE - DETAILS
On review of charts, pt is actually currently admitted HH.  Infectious Disease noted (+) shigella on stool Cx and switched pt to Bactrim.  No further intervention needed.  KASIA Green PA-C

## 2023-02-05 NOTE — PROGRESS NOTE ADULT - SUBJECTIVE AND OBJECTIVE BOX
Subjective:    pat better, sitting in bed, Abx changed to bacterim to cover shigelosis.    Home Medications:  Aspirin Enteric Coated 81 mg oral delayed release tablet: 1 tab(s) orally once a day (02 Feb 2023 18:02)  atorvastatin 10 mg oral tablet: 1 tab(s) orally once a day (in the morning) (02 Feb 2023 18:02)  buPROPion 100 mg oral tablet: 1 tab(s) orally 3 times a day (02 Feb 2023 18:02)  Descovy 200 mg-25 mg oral tablet: 1 tab(s) orally once a day (02 Feb 2023 18:02)  esomeprazole 40 mg oral delayed release capsule: 1 cap(s) orally 2 times a day (02 Feb 2023 18:02)  meloxicam 15 mg oral tablet: 1 tab(s) orally once a day (02 Feb 2023 18:02)  metoprolol tartrate 25 mg oral tablet: 1 tab(s) orally once a day (02 Feb 2023 18:02)  tadalafil 20 mg oral tablet: 1 tab(s) orally once a day, As Needed before sex (02 Feb 2023 18:02)  venlafaxine 75 mg oral tablet: 1 tab(s) orally 2 times a day (02 Feb 2023 18:02)    MEDICATIONS  (STANDING):  aspirin enteric coated 81 milliGRAM(s) Oral daily  atorvastatin 10 milliGRAM(s) Oral at bedtime  buPROPion XL (24-Hour) . 300 milliGRAM(s) Oral daily  celecoxib 200 milliGRAM(s) Oral daily  emtricitabine 200 mG/tenofovir 300 mG (TRUVADA) 1 Tablet(s) Oral daily  enoxaparin Injectable 40 milliGRAM(s) SubCutaneous every 24 hours  metoprolol tartrate 25 milliGRAM(s) Oral daily  pantoprazole    Tablet 40 milliGRAM(s) Oral before breakfast  potassium phosphate / sodium phosphate Tablet (K-PHOS No. 2) 1 Tablet(s) Oral two times a day with meals  trimethoprim  160 mG/sulfamethoxazole 800 mG 1 Tablet(s) Oral every 12 hours  venlafaxine 75 milliGRAM(s) Oral two times a day with meals    MEDICATIONS  (PRN):  acetaminophen     Tablet .. 650 milliGRAM(s) Oral every 6 hours PRN Temp greater or equal to 38C (100.4F), Mild Pain (1 - 3)  aluminum hydroxide/magnesium hydroxide/simethicone Suspension 30 milliLiter(s) Oral every 4 hours PRN Dyspepsia  melatonin 3 milliGRAM(s) Oral at bedtime PRN Insomnia  ondansetron Injectable 4 milliGRAM(s) IV Push every 8 hours PRN Nausea and/or Vomiting      Allergies    No Known Allergies    Intolerances        Vital Signs Last 24 Hrs  T(C): 36.6 (05 Feb 2023 09:02), Max: 36.8 (04 Feb 2023 15:55)  T(F): 97.9 (05 Feb 2023 09:02), Max: 98.2 (04 Feb 2023 15:55)  HR: 82 (05 Feb 2023 09:02) (74 - 84)  BP: 107/73 (05 Feb 2023 09:02) (90/66 - 114/71)  BP(mean): --  RR: 18 (05 Feb 2023 09:02) (18 - 18)  SpO2: 100% (05 Feb 2023 09:02) (95% - 100%)    Parameters below as of 05 Feb 2023 09:02  Patient On (Oxygen Delivery Method): room air          PHYSICAL EXAMINATION:    NECK:  Supple. No lymphadenopathy. Jugular venous pressure not elevated. Carotids equal.   HEART:   The cardiac impulse has a normal quality. Reg., Nl S1 and S2.  There are no murmurs, rubs or gallops noted  CHEST:  Chest is clear to auscultation. Normal respiratory effort.  ABDOMEN:  Soft and nontender.   EXTREMITIES:  There is no edema.       LABS:                        12.1   5.89  )-----------( 126      ( 05 Feb 2023 06:24 )             36.3     02-05    137  |  108  |  11  ----------------------------<  95  3.7   |  22  |  0.70    Ca    8.0<L>      05 Feb 2023 06:24    TPro  6.2  /  Alb  2.1<L>  /  TBili  0.3  /  DBili  x   /  AST  186<H>  /  ALT  114<H>  /  AlkPhos  434<H>  02-05

## 2023-02-05 NOTE — PROGRESS NOTE ADULT - ASSESSMENT
66 yo male with PMHx depression, HTN, gastric bypass 4/21 presents today with SOB.      #Shortness of Breath  #Dyspnea on Exertion  - Differential Dx include CHF vs Sarcoidosis  - VSS  - CTA chest: No PE. Symmetric mediastinal and hilar lymphadenopathy, nonspecific however leading differential diagnosis is sarcoidosis.  - BNP 1557  - s/p one dose of lasix  - pt with clear lung, no pedal edema  - TTE (2/3) LVEF 50-55, calcified bicuspid AV  - pending serum ACE; vit D 1,25 wnl   - Pulm rec appreciated, recommend repeat CT chest in 3 months     #Colitis with shigella   - Abdominal pain resolved  - Recent visit to ED (1/30) with Colitis, discharged wth abx   - WBC today 5.89; improved from 16 on 1/30  - CT abd 1/31: Mild wall thickening of the rectosigmoid junction. This may represent proctocolitis  - stool culture from this admit now + shigella (2/3)  - Abx Cipro and Flagyl d/c on 2/4  - bactrim DS 1 tab PO q12h on (2/4)  - monitor VS    #Transaminitis  - Alk phos 411, ,   - CT Abd: (1/31) normal liver. Mildly dilated Bile duct  - PE wnl  - RUQ US: mild hepatomegaly, mild prominence of the main pancreatic duct, no sig biliary dilatation   - GI rec appreciated      - MRCP      - antismooth muscle and mitochondrial antibodies       #RAYMUNDO (resovled)  #Hyponatremia (resolved)  - Likely 2/2 recent GI losses in the setting of colitis  - Na 134 on admit  - Cr 1.31 on admit (baseline 1.0), Cr 0.77 today  - s/p 1L IVF in the ED  - Avoid nephrotoxic drugs    #Depression  - c/w home meds    #DVT ppx  - Lovenox    #Code status  - Full code    Dispo: pending MRCP, labs     *Case discussed with Dr. Deleon

## 2023-02-05 NOTE — PROGRESS NOTE ADULT - ATTENDING COMMENTS
68 yo male with PMHx depression, HTN, gastric bypass 4/21 presents today with SOB.      #Shortness of Breath  #Dyspnea on Exertion  - Differential Dx include CHF vs Sarcoidosis  - VSS  - CTA chest: No PE. Symmetric mediastinal and hilar lymphadenopathy, nonspecific however leading differential diagnosis is sarcoidosis.  - BNP 1557  - s/p one dose of lasix  - pt with clear lung, no pedal edema  - TTE (2/3) LVEF 50-55, calcified bicuspid AV  - pending serum ACE; vit D 1,25 wnl   - Pulm rec appreciated
66 yo male with PMHx depression, HTN, gastric bypass 4/21 presents today with SOB.      #Shortness of Breath  #Dyspnea on Exertion  - Differential Dx include CHF vs Sarcoidosis  - VSS  - CTA chest: No PE. Symmetric mediastinal and hilar lymphadenopathy, nonspecific however leading differential diagnosis is sarcoidosis.  - BNP 1557  - s/p one dose of lasix  - pt with clear lung, no pedal edema  - TTE (2/3) LVEF 50-55, calcified bicuspid AV  - pending serum ACE; vit D 1,25 wnl   - Pulm rec appreciated, recommend repeat CT chest in 3 months

## 2023-02-05 NOTE — PROGRESS NOTE ADULT - ASSESSMENT
68 yo male with PMHx depression, HTN, gastric bypass 4/21 presents today with SOB.      PROBLEMS:    Dyspnea on Rfipdyua-[id number]-ADAJTNQVEYZ  CTA chest-No PE-Symmetric mediastinal and hilar lymphadenopathy-nonspecific however leading dx sarcoidosis vs lymphoma-FU With repeat CT may need Bx-HIV neg on truvada  Colitis  Transaminitis  RAYMUNDO (resovled)  Depression    PLAN:    BNP 1557-s/p one dose of lasix-clear lung/no pedal edema  TTE-RVSP:29 mmHg  Serum ACE/vit D 1,25-53  Abdominal pain resolved  CT abd 1/31: Mild wall thickening of the rectosigmoid pnojabbk-sjpgajcavpgyk-g/o shigelosis abx changed to bacterim-was outpatient on Cipro and Flagyl   RUQ US: mild hepatomegaly, mild prominence of the main pancreatic duct, no sig biliary dilatation   GI FU  d/w staff  DVT ppx-Lovenox

## 2023-02-06 LAB
ACE SERPL-CCNC: 113 U/L — HIGH (ref 14–82)
ALBUMIN SERPL ELPH-MCNC: 2.1 G/DL — LOW (ref 3.3–5)
ALP SERPL-CCNC: 384 U/L — HIGH (ref 40–120)
ALT FLD-CCNC: 101 U/L — HIGH (ref 12–78)
ANION GAP SERPL CALC-SCNC: 7 MMOL/L — SIGNIFICANT CHANGE UP (ref 5–17)
AST SERPL-CCNC: 143 U/L — HIGH (ref 15–37)
BASOPHILS # BLD AUTO: 0 K/UL — SIGNIFICANT CHANGE UP (ref 0–0.2)
BASOPHILS NFR BLD AUTO: 0 % — SIGNIFICANT CHANGE UP (ref 0–2)
BILIRUB SERPL-MCNC: 0.2 MG/DL — SIGNIFICANT CHANGE UP (ref 0.2–1.2)
BUN SERPL-MCNC: 12 MG/DL — SIGNIFICANT CHANGE UP (ref 7–23)
CALCIUM SERPL-MCNC: 8 MG/DL — LOW (ref 8.5–10.1)
CHLORIDE SERPL-SCNC: 108 MMOL/L — SIGNIFICANT CHANGE UP (ref 96–108)
CO2 SERPL-SCNC: 22 MMOL/L — SIGNIFICANT CHANGE UP (ref 22–31)
CREAT SERPL-MCNC: 0.76 MG/DL — SIGNIFICANT CHANGE UP (ref 0.5–1.3)
EGFR: 99 ML/MIN/1.73M2 — SIGNIFICANT CHANGE UP
EOSINOPHIL # BLD AUTO: 0 K/UL — SIGNIFICANT CHANGE UP (ref 0–0.5)
EOSINOPHIL NFR BLD AUTO: 0 % — SIGNIFICANT CHANGE UP (ref 0–6)
GLUCOSE SERPL-MCNC: 96 MG/DL — SIGNIFICANT CHANGE UP (ref 70–99)
HAV IGM SER-ACNC: SIGNIFICANT CHANGE UP
HBV CORE IGM SER-ACNC: SIGNIFICANT CHANGE UP
HBV SURFACE AG SER-ACNC: SIGNIFICANT CHANGE UP
HCT VFR BLD CALC: 37.8 % — LOW (ref 39–50)
HCV AB S/CO SERPL IA: 0.16 S/CO — SIGNIFICANT CHANGE UP (ref 0–0.99)
HCV AB SERPL-IMP: SIGNIFICANT CHANGE UP
HGB BLD-MCNC: 12.1 G/DL — LOW (ref 13–17)
LYMPHOCYTES # BLD AUTO: 2.14 K/UL — SIGNIFICANT CHANGE UP (ref 1–3.3)
LYMPHOCYTES # BLD AUTO: 41 % — SIGNIFICANT CHANGE UP (ref 13–44)
MCHC RBC-ENTMCNC: 29.7 PG — SIGNIFICANT CHANGE UP (ref 27–34)
MCHC RBC-ENTMCNC: 32 GM/DL — SIGNIFICANT CHANGE UP (ref 32–36)
MCV RBC AUTO: 92.9 FL — SIGNIFICANT CHANGE UP (ref 80–100)
MONOCYTES # BLD AUTO: 0.16 K/UL — SIGNIFICANT CHANGE UP (ref 0–0.9)
MONOCYTES NFR BLD AUTO: 3 % — SIGNIFICANT CHANGE UP (ref 2–14)
NEUTROPHILS # BLD AUTO: 2.2 K/UL — SIGNIFICANT CHANGE UP (ref 1.8–7.4)
NEUTROPHILS NFR BLD AUTO: 42 % — LOW (ref 43–77)
NRBC # BLD: SIGNIFICANT CHANGE UP /100 WBCS (ref 0–0)
PLATELET # BLD AUTO: 136 K/UL — LOW (ref 150–400)
POTASSIUM SERPL-MCNC: 4.3 MMOL/L — SIGNIFICANT CHANGE UP (ref 3.5–5.3)
POTASSIUM SERPL-SCNC: 4.3 MMOL/L — SIGNIFICANT CHANGE UP (ref 3.5–5.3)
PROT SERPL-MCNC: 6.3 GM/DL — SIGNIFICANT CHANGE UP (ref 6–8.3)
RBC # BLD: 4.07 M/UL — LOW (ref 4.2–5.8)
RBC # FLD: 14.5 % — SIGNIFICANT CHANGE UP (ref 10.3–14.5)
SODIUM SERPL-SCNC: 137 MMOL/L — SIGNIFICANT CHANGE UP (ref 135–145)
WBC # BLD: 5.23 K/UL — SIGNIFICANT CHANGE UP (ref 3.8–10.5)
WBC # FLD AUTO: 5.23 K/UL — SIGNIFICANT CHANGE UP (ref 3.8–10.5)

## 2023-02-06 PROCEDURE — 99233 SBSQ HOSP IP/OBS HIGH 50: CPT | Mod: GC

## 2023-02-06 RX ADMIN — ATORVASTATIN CALCIUM 10 MILLIGRAM(S): 80 TABLET, FILM COATED ORAL at 21:59

## 2023-02-06 RX ADMIN — Medication 81 MILLIGRAM(S): at 10:43

## 2023-02-06 RX ADMIN — EMTRICITABINE AND TENOFOVIR DISOPROXIL FUMARATE 1 TABLET(S): 200; 300 TABLET, FILM COATED ORAL at 10:44

## 2023-02-06 RX ADMIN — Medication 75 MILLIGRAM(S): at 17:16

## 2023-02-06 RX ADMIN — Medication 1 TABLET(S): at 21:59

## 2023-02-06 RX ADMIN — BUPROPION HYDROCHLORIDE 300 MILLIGRAM(S): 150 TABLET, EXTENDED RELEASE ORAL at 10:43

## 2023-02-06 RX ADMIN — Medication 1 TABLET(S): at 17:16

## 2023-02-06 RX ADMIN — Medication 1 TABLET(S): at 10:42

## 2023-02-06 RX ADMIN — CELECOXIB 200 MILLIGRAM(S): 200 CAPSULE ORAL at 10:43

## 2023-02-06 RX ADMIN — Medication 25 MILLIGRAM(S): at 10:42

## 2023-02-06 RX ADMIN — Medication 1 TABLET(S): at 10:44

## 2023-02-06 RX ADMIN — PANTOPRAZOLE SODIUM 40 MILLIGRAM(S): 20 TABLET, DELAYED RELEASE ORAL at 10:42

## 2023-02-06 NOTE — PROGRESS NOTE ADULT - ASSESSMENT
68 yo male with PMHx depression, HTN, gastric bypass 4/21 presents today with SOB.      PROBLEMS:    Dyspnea on Myoffxlz-[id number]-LCLOYAXBLAT  CTA chest-No PE-Symmetric mediastinal and hilar lymphadenopathy-nonspecific however leading dx sarcoidosis vs lymphoma-FU With repeat CT may need Bx-HIV neg on truvada  Colitis  Transaminitis  RAYMUNDO (resovled)  Depression    PLAN:    PUlmonary stable  BNP 1557-s/p one dose of lasix-clear lung/no pedal edema  TTE-RVSP:29 mmHg  Serum ACE/vit D 1,25-53  Abdominal pain resolved  CT abd 1/31: Mild wall thickening of the rectosigmoid mtsxbhwp-oeopmpwthjhjx-r/o shigelosis abx changed to bacterim-was outpatient on Cipro and Flagyl   RUQ US: mild hepatomegaly, mild prominence of the main pancreatic duct, no sig biliary dilatation   GI FU  d/w staff  DVT ppx-Lovenox

## 2023-02-06 NOTE — PROGRESS NOTE ADULT - SUBJECTIVE AND OBJECTIVE BOX
HPI:  66 yo male with PMHx depression, HTN, gastric bypass 4/21 presents today with SOB.  Patient mentions he has been having SOB for the past 2 weeks but it has been worsening in the past 2-3 days.  He feels extremely fatigue just walking from one room to another.  He recently came to the ED with abdominal pain and diarrhea on 1/31 and was discharged from the ED with Colitis on PO abx.  Patient mentions his diarrhea and abodminal pain is chornic and started on 2021 after his gastric bypass.  Patient denies any chest pain, palpitations, cough dizziness, lightheadedness, nausea, vomiting, diarrhea or consitpation.     In the ED /66, HR 78, RR 16 SpO2 96% on room air, temp 98  Labs: WBC 11.57, Na 134, Cr 1.31, Albumin 2.7, Alk phos 411, , , BNP 1557, CTA showed: No PE.  Symmetric mediastinal and hilar lymphadenopathy, nonspecific however leading differential diagnosis is sarcoidosis.    Subjective: pt seen and evaluated by bedside. Pt resting comfortably. No new complaints.     REVIEW OF SYSTEMS:  CONSTITUTIONAL: No weakness, fevers or chills  EYES/ENT: No visual changes;  No vertigo or throat pain   NECK: No pain or stiffness  RESPIRATORY: No cough, wheezing, hemoptysis; + shortness of breath  CARDIOVASCULAR: No chest pain or palpitations, + DIAL  GASTROINTESTINAL: No abdominal or epigastric pain. No nausea, vomiting; No diarrhea or constipation.   GENITOURINARY: No dysuria, frequency or hematuria  NEUROLOGICAL: No numbness or weakness  SKIN: No itching, rashes      =======================================================  Vitals:  T(F): 98.1 (06 Feb 2023 15:05), Max: 98.1 (05 Feb 2023 23:01)  HR: 60 (06 Feb 2023 15:05)  BP: 100/52 (06 Feb 2023 15:05)  RR: 18 (06 Feb 2023 15:05)  SpO2: 100% (06 Feb 2023 15:05) (95% - 100%)  temp max in last 48H T(F): , Max: 98.2 (02-04-23 @ 23:01)      =======================================================    PHYSICAL EXAM:  Constitutional: Pt lying in bed, awake and alert, NAD  HEENT: EOMI, normal hearing, moist mucous membranes  Neck: Soft and supple, no JVD  Respiratory: CTABL, No wheezing, rales or rhonchi  Cardiovascular: S1S2+, RRR, no M/G/R  Gastrointestinal: BS+, soft, NT/ND, no guarding, no rebound  Extremities: No peripheral edema  Vascular: 2+ peripheral pulses  Neurological: AAOx3, no focal deficits  Musculoskeletal: 5/5 strength b/l upper and lower extremities  Skin: No rash    =======================================================  Current Antibiotics:  emtricitabine 200 mG/tenofovir 300 mG (TRUVADA) 1 Tablet(s) Oral daily  trimethoprim  160 mG/sulfamethoxazole 800 mG 1 Tablet(s) Oral every 12 hours    Other medications:  aspirin enteric coated 81 milliGRAM(s) Oral daily  atorvastatin 10 milliGRAM(s) Oral at bedtime  buPROPion XL (24-Hour) . 300 milliGRAM(s) Oral daily  celecoxib 200 milliGRAM(s) Oral daily  enoxaparin Injectable 40 milliGRAM(s) SubCutaneous every 24 hours  metoprolol tartrate 25 milliGRAM(s) Oral daily  pantoprazole    Tablet 40 milliGRAM(s) Oral before breakfast  potassium phosphate / sodium phosphate Tablet (K-PHOS No. 2) 1 Tablet(s) Oral two times a day with meals  venlafaxine 75 milliGRAM(s) Oral two times a day with meals      =======================================================  Labs:                        12.1   5.23  )-----------( 136      ( 06 Feb 2023 06:29 )             37.8     02-06    137  |  108  |  12  ----------------------------<  96  4.3   |  22  |  0.76    Ca    8.0<L>      06 Feb 2023 06:29    TPro  6.3  /  Alb  2.1<L>  /  TBili  0.2  /  DBili  x   /  AST  143<H>  /  ALT  101<H>  /  AlkPhos  384<H>  02-06      Culture - Stool (collected 01-31-23 @ 02:13)  Source: .Stool None  Final Report (02-04-23 @ 09:43):    Moderate Shigella species isolated    (Stool culture examined for Salmonella,    Shigella, Campylobacter, Aeromonas, Plesiomonas,    Vibrio, E.coli O157 and Yersinia)  Organism: Shigella species (02-04-23 @ 09:41)  Organism: Shigella species (02-04-23 @ 09:41)    Sensitivities:      -  Ampicillin: R >16 These ampicillin results predict results for amoxicillin      -  Ciprofloxacin: R >2      -  Trimethoprim/Sulfamethoxazole: S <=0.5/9.5      Method Type: KWAN        SARS-CoV-2: NotDetec (02-02-23 @ 15:11)  SARS-CoV-2 Result: NotDetec (01-30-23 @ 23:49)        ========================================================  Imaging: no new imaging

## 2023-02-06 NOTE — PROGRESS NOTE ADULT - ASSESSMENT
66 yo male with PMHx depression, HTN, gastric bypass 4/21 presents today with SOB.      #Shortness of Breath  #Dyspnea on Exertion  - Differential Dx include CHF vs Sarcoidosis  - VSS  - CTA chest: No PE. Symmetric mediastinal and hilar lymphadenopathy, nonspecific however leading differential diagnosis is sarcoidosis.  - BNP 1557  - s/p one dose of lasix  - pt with clear lung, no pedal edema  - TTE (2/3) LVEF 50-55, calcified bicuspid AV  - ACE elevated 113; vit D 1,25 wnl   - Pulm rec appreciated, recommend repeat CT chest in 3 months     #Colitis with shigella   - Abdominal pain resolved  - Recent visit to ED (1/30) with Colitis, discharged wth abx   - WBC today 5.89; improved from 16 on 1/30  - CT abd 1/31: Mild wall thickening of the rectosigmoid junction. This may represent proctocolitis  - stool culture from this admit now + shigella (2/3)  - Abx Cipro and Flagyl d/c on 2/4  - bactrim DS 1 tab PO q12h started on (2/4), to be continued 5-10days  - monitor VS    #Transaminitis  - Alk phos 411, ,   - CT Abd: (1/31) normal liver. Mildly dilated Bile duct  - PE wnl  - RUQ US: mild hepatomegaly, mild prominence of the main pancreatic duct, no sig biliary dilatation   - GI rec appreciated      - MRCP (still pending, likely to be done on 2/7)     - antismooth muscle and mitochondrial antibodies       #RAYMUNDO (resovled)  #Hyponatremia (resolved)  - Likely 2/2 recent GI losses in the setting of colitis  - Na 134 on admit  - Cr 1.31 on admit (baseline 1.0), Cr 0.77 today  - s/p 1L IVF in the ED  - Avoid nephrotoxic drugs    #Depression  - c/w home meds    #DVT ppx  - Lovenox    #Code status  - Full code    Dispo: pending MRCP, labs     *Case discussed with Dr. Deleon

## 2023-02-06 NOTE — PROGRESS NOTE ADULT - SUBJECTIVE AND OBJECTIVE BOX
Subjective:    pat better, for MRCP today, HIV non-reactive.    Home Medications:  Aspirin Enteric Coated 81 mg oral delayed release tablet: 1 tab(s) orally once a day (02 Feb 2023 18:02)  atorvastatin 10 mg oral tablet: 1 tab(s) orally once a day (in the morning) (02 Feb 2023 18:02)  buPROPion 100 mg oral tablet: 1 tab(s) orally 3 times a day (02 Feb 2023 18:02)  Descovy 200 mg-25 mg oral tablet: 1 tab(s) orally once a day (02 Feb 2023 18:02)  esomeprazole 40 mg oral delayed release capsule: 1 cap(s) orally 2 times a day (02 Feb 2023 18:02)  meloxicam 15 mg oral tablet: 1 tab(s) orally once a day (02 Feb 2023 18:02)  metoprolol tartrate 25 mg oral tablet: 1 tab(s) orally once a day (02 Feb 2023 18:02)  tadalafil 20 mg oral tablet: 1 tab(s) orally once a day, As Needed before sex (02 Feb 2023 18:02)  venlafaxine 75 mg oral tablet: 1 tab(s) orally 2 times a day (02 Feb 2023 18:02)    MEDICATIONS  (STANDING):  aspirin enteric coated 81 milliGRAM(s) Oral daily  atorvastatin 10 milliGRAM(s) Oral at bedtime  buPROPion XL (24-Hour) . 300 milliGRAM(s) Oral daily  celecoxib 200 milliGRAM(s) Oral daily  emtricitabine 200 mG/tenofovir 300 mG (TRUVADA) 1 Tablet(s) Oral daily  enoxaparin Injectable 40 milliGRAM(s) SubCutaneous every 24 hours  metoprolol tartrate 25 milliGRAM(s) Oral daily  pantoprazole    Tablet 40 milliGRAM(s) Oral before breakfast  potassium phosphate / sodium phosphate Tablet (K-PHOS No. 2) 1 Tablet(s) Oral two times a day with meals  trimethoprim  160 mG/sulfamethoxazole 800 mG 1 Tablet(s) Oral every 12 hours  venlafaxine 75 milliGRAM(s) Oral two times a day with meals    MEDICATIONS  (PRN):  acetaminophen     Tablet .. 650 milliGRAM(s) Oral every 6 hours PRN Temp greater or equal to 38C (100.4F), Mild Pain (1 - 3)  aluminum hydroxide/magnesium hydroxide/simethicone Suspension 30 milliLiter(s) Oral every 4 hours PRN Dyspepsia  melatonin 3 milliGRAM(s) Oral at bedtime PRN Insomnia  ondansetron Injectable 4 milliGRAM(s) IV Push every 8 hours PRN Nausea and/or Vomiting      Allergies    No Known Allergies    Intolerances        Vital Signs Last 24 Hrs  T(C): 36.7 (06 Feb 2023 15:05), Max: 36.7 (05 Feb 2023 23:01)  T(F): 98.1 (06 Feb 2023 15:05), Max: 98.1 (05 Feb 2023 23:01)  HR: 60 (06 Feb 2023 15:05) (60 - 71)  BP: 100/52 (06 Feb 2023 15:05) (100/52 - 116/54)  BP(mean): --  RR: 18 (06 Feb 2023 15:05) (18 - 18)  SpO2: 100% (06 Feb 2023 15:05) (95% - 100%)    Parameters below as of 06 Feb 2023 15:05  Patient On (Oxygen Delivery Method): room air          PHYSICAL EXAMINATION:    NECK:  Supple. No lymphadenopathy. Jugular venous pressure not elevated. Carotids equal.   HEART:   The cardiac impulse has a normal quality. Reg., Nl S1 and S2.  There are no murmurs, rubs or gallops noted  CHEST:  Chest is clear to auscultation. Normal respiratory effort.  ABDOMEN:  Soft and nontender.   EXTREMITIES:  There is no edema.       LABS:                        12.1   5.23  )-----------( 136      ( 06 Feb 2023 06:29 )             37.8     02-06    137  |  108  |  12  ----------------------------<  96  4.3   |  22  |  0.76    Ca    8.0<L>      06 Feb 2023 06:29    TPro  6.3  /  Alb  2.1<L>  /  TBili  0.2  /  DBili  x   /  AST  143<H>  /  ALT  101<H>  /  AlkPhos  384<H>  02-06

## 2023-02-07 ENCOUNTER — TRANSCRIPTION ENCOUNTER (OUTPATIENT)
Age: 68
End: 2023-02-07

## 2023-02-07 VITALS
HEART RATE: 75 BPM | RESPIRATION RATE: 17 BRPM | SYSTOLIC BLOOD PRESSURE: 95 MMHG | OXYGEN SATURATION: 100 % | DIASTOLIC BLOOD PRESSURE: 61 MMHG | TEMPERATURE: 98 F

## 2023-02-07 LAB
ALBUMIN SERPL ELPH-MCNC: 2.2 G/DL — LOW (ref 3.3–5)
ALP SERPL-CCNC: 370 U/L — HIGH (ref 40–120)
ALT FLD-CCNC: 93 U/L — HIGH (ref 12–78)
ANION GAP SERPL CALC-SCNC: 4 MMOL/L — LOW (ref 5–17)
AST SERPL-CCNC: 119 U/L — HIGH (ref 15–37)
BASOPHILS # BLD AUTO: 0.05 K/UL — SIGNIFICANT CHANGE UP (ref 0–0.2)
BASOPHILS NFR BLD AUTO: 1 % — SIGNIFICANT CHANGE UP (ref 0–2)
BILIRUB SERPL-MCNC: 0.3 MG/DL — SIGNIFICANT CHANGE UP (ref 0.2–1.2)
BUN SERPL-MCNC: 15 MG/DL — SIGNIFICANT CHANGE UP (ref 7–23)
CALCIUM SERPL-MCNC: 8.3 MG/DL — LOW (ref 8.5–10.1)
CHLORIDE SERPL-SCNC: 109 MMOL/L — HIGH (ref 96–108)
CO2 SERPL-SCNC: 22 MMOL/L — SIGNIFICANT CHANGE UP (ref 22–31)
CREAT SERPL-MCNC: 0.75 MG/DL — SIGNIFICANT CHANGE UP (ref 0.5–1.3)
EGFR: 99 ML/MIN/1.73M2 — SIGNIFICANT CHANGE UP
EOSINOPHIL # BLD AUTO: 0 K/UL — SIGNIFICANT CHANGE UP (ref 0–0.5)
EOSINOPHIL NFR BLD AUTO: 0 % — SIGNIFICANT CHANGE UP (ref 0–6)
GLUCOSE SERPL-MCNC: 94 MG/DL — SIGNIFICANT CHANGE UP (ref 70–99)
HCT VFR BLD CALC: 38 % — LOW (ref 39–50)
HGB BLD-MCNC: 12.1 G/DL — LOW (ref 13–17)
LYMPHOCYTES # BLD AUTO: 2.8 K/UL — SIGNIFICANT CHANGE UP (ref 1–3.3)
LYMPHOCYTES # BLD AUTO: 55 % — HIGH (ref 13–44)
MCHC RBC-ENTMCNC: 29.9 PG — SIGNIFICANT CHANGE UP (ref 27–34)
MCHC RBC-ENTMCNC: 31.8 GM/DL — LOW (ref 32–36)
MCV RBC AUTO: 93.8 FL — SIGNIFICANT CHANGE UP (ref 80–100)
MITOCHONDRIA AB SER-ACNC: SIGNIFICANT CHANGE UP
MONOCYTES # BLD AUTO: 0.46 K/UL — SIGNIFICANT CHANGE UP (ref 0–0.9)
MONOCYTES NFR BLD AUTO: 9 % — SIGNIFICANT CHANGE UP (ref 2–14)
NEUTROPHILS # BLD AUTO: 1.43 K/UL — LOW (ref 1.8–7.4)
NEUTROPHILS NFR BLD AUTO: 28 % — LOW (ref 43–77)
NRBC # BLD: SIGNIFICANT CHANGE UP /100 WBCS (ref 0–0)
PLATELET # BLD AUTO: 161 K/UL — SIGNIFICANT CHANGE UP (ref 150–400)
POTASSIUM SERPL-MCNC: 4.5 MMOL/L — SIGNIFICANT CHANGE UP (ref 3.5–5.3)
POTASSIUM SERPL-SCNC: 4.5 MMOL/L — SIGNIFICANT CHANGE UP (ref 3.5–5.3)
PROT SERPL-MCNC: 6.6 GM/DL — SIGNIFICANT CHANGE UP (ref 6–8.3)
RBC # BLD: 4.05 M/UL — LOW (ref 4.2–5.8)
RBC # FLD: 14.6 % — HIGH (ref 10.3–14.5)
SMOOTH MUSCLE AB SER-ACNC: SIGNIFICANT CHANGE UP
SODIUM SERPL-SCNC: 135 MMOL/L — SIGNIFICANT CHANGE UP (ref 135–145)
WBC # BLD: 5.09 K/UL — SIGNIFICANT CHANGE UP (ref 3.8–10.5)
WBC # FLD AUTO: 5.09 K/UL — SIGNIFICANT CHANGE UP (ref 3.8–10.5)

## 2023-02-07 PROCEDURE — 99232 SBSQ HOSP IP/OBS MODERATE 35: CPT | Mod: GC

## 2023-02-07 RX ADMIN — Medication 1 TABLET(S): at 09:42

## 2023-02-07 RX ADMIN — ENOXAPARIN SODIUM 40 MILLIGRAM(S): 100 INJECTION SUBCUTANEOUS at 09:53

## 2023-02-07 RX ADMIN — EMTRICITABINE AND TENOFOVIR DISOPROXIL FUMARATE 1 TABLET(S): 200; 300 TABLET, FILM COATED ORAL at 09:42

## 2023-02-07 RX ADMIN — Medication 1 TABLET(S): at 09:43

## 2023-02-07 RX ADMIN — Medication 81 MILLIGRAM(S): at 09:43

## 2023-02-07 RX ADMIN — CELECOXIB 200 MILLIGRAM(S): 200 CAPSULE ORAL at 09:41

## 2023-02-07 RX ADMIN — Medication 75 MILLIGRAM(S): at 09:42

## 2023-02-07 RX ADMIN — BUPROPION HYDROCHLORIDE 300 MILLIGRAM(S): 150 TABLET, EXTENDED RELEASE ORAL at 09:43

## 2023-02-07 NOTE — PROGRESS NOTE ADULT - SUBJECTIVE AND OBJECTIVE BOX
Subjective:    pat much better, sitting in bed, no new complaint.    Home Medications:  Aspirin Enteric Coated 81 mg oral delayed release tablet: 1 tab(s) orally once a day (02 Feb 2023 18:02)  atorvastatin 10 mg oral tablet: 1 tab(s) orally once a day (in the morning) (02 Feb 2023 18:02)  buPROPion 100 mg oral tablet: 1 tab(s) orally 3 times a day (02 Feb 2023 18:02)  Descovy 200 mg-25 mg oral tablet: 1 tab(s) orally once a day (02 Feb 2023 18:02)  esomeprazole 40 mg oral delayed release capsule: 1 cap(s) orally 2 times a day (02 Feb 2023 18:02)  meloxicam 15 mg oral tablet: 1 tab(s) orally once a day (02 Feb 2023 18:02)  metoprolol tartrate 25 mg oral tablet: 1 tab(s) orally once a day (02 Feb 2023 18:02)  tadalafil 20 mg oral tablet: 1 tab(s) orally once a day, As Needed before sex (02 Feb 2023 18:02)  venlafaxine 75 mg oral tablet: 1 tab(s) orally 2 times a day (02 Feb 2023 18:02)    MEDICATIONS  (STANDING):  aspirin enteric coated 81 milliGRAM(s) Oral daily  atorvastatin 10 milliGRAM(s) Oral at bedtime  buPROPion XL (24-Hour) . 300 milliGRAM(s) Oral daily  celecoxib 200 milliGRAM(s) Oral daily  emtricitabine 200 mG/tenofovir 300 mG (TRUVADA) 1 Tablet(s) Oral daily  enoxaparin Injectable 40 milliGRAM(s) SubCutaneous every 24 hours  metoprolol tartrate 25 milliGRAM(s) Oral daily  pantoprazole    Tablet 40 milliGRAM(s) Oral before breakfast  potassium phosphate / sodium phosphate Tablet (K-PHOS No. 2) 1 Tablet(s) Oral two times a day with meals  trimethoprim  160 mG/sulfamethoxazole 800 mG 1 Tablet(s) Oral every 12 hours  venlafaxine 75 milliGRAM(s) Oral two times a day with meals    MEDICATIONS  (PRN):  acetaminophen     Tablet .. 650 milliGRAM(s) Oral every 6 hours PRN Temp greater or equal to 38C (100.4F), Mild Pain (1 - 3)  aluminum hydroxide/magnesium hydroxide/simethicone Suspension 30 milliLiter(s) Oral every 4 hours PRN Dyspepsia  melatonin 3 milliGRAM(s) Oral at bedtime PRN Insomnia  ondansetron Injectable 4 milliGRAM(s) IV Push every 8 hours PRN Nausea and/or Vomiting      Allergies    No Known Allergies    Intolerances        Vital Signs Last 24 Hrs  T(C): 36.8 (07 Feb 2023 08:14), Max: 36.8 (07 Feb 2023 08:14)  T(F): 98.3 (07 Feb 2023 08:14), Max: 98.3 (07 Feb 2023 08:14)  HR: 75 (07 Feb 2023 08:14) (60 - 75)  BP: 95/61 (07 Feb 2023 08:14) (94/71 - 106/73)  BP(mean): --  RR: 17 (07 Feb 2023 08:14) (17 - 18)  SpO2: 100% (07 Feb 2023 08:14) (95% - 100%)    Parameters below as of 07 Feb 2023 08:14  Patient On (Oxygen Delivery Method): room air          PHYSICAL EXAMINATION:    NECK:  Supple. No lymphadenopathy. Jugular venous pressure not elevated. Carotids equal.   HEART:   The cardiac impulse has a normal quality. Reg., Nl S1 and S2.  There are no murmurs, rubs or gallops noted  CHEST:  Chest is clear to auscultation. Normal respiratory effort.  ABDOMEN:  Soft and nontender.   EXTREMITIES:  There is no edema.       LABS:                        12.1   5.09  )-----------( 161      ( 07 Feb 2023 06:47 )             38.0     02-07    135  |  109<H>  |  15  ----------------------------<  94  4.5   |  22  |  0.75    Ca    8.3<L>      07 Feb 2023 06:47    TPro  6.6  /  Alb  2.2<L>  /  TBili  0.3  /  DBili  x   /  AST  119<H>  /  ALT  93<H>  /  AlkPhos  370<H>  02-07

## 2023-02-07 NOTE — DISCHARGE NOTE PROVIDER - PROVIDER TOKENS
PROVIDER:[TOKEN:[[id number]],FOLLOWUP:[1 week],ESTABLISHEDPATIENT:[T]],PROVIDER:[TOKEN:[[id number]],FOLLOWUP:[1 week],ESTABLISHEDPATIENT:[T]],PROVIDER:[TOKEN:[[id number]],FOLLOWUP:[1 week],ESTABLISHEDPATIENT:[T]]

## 2023-02-07 NOTE — PROGRESS NOTE ADULT - PROVIDER SPECIALTY LIST ADULT
Family Medicine
Infectious Disease
Pulmonology
Family Medicine
Pulmonology

## 2023-02-07 NOTE — DISCHARGE NOTE PROVIDER - NSDCCPCAREPLAN_GEN_ALL_CORE_FT
68yoM hx DM, HLD, medication non-compliance presenting with intermittent episodes of mid-sternal, non-radiating chest pain for the past few days being admitted for ischemic evaluation     Chest pain with high risk for ACS  -Seen by cardiology in ED, recommendations reviewed- chest pain concerning for cardiac etiology and tentative plan for ischemic evaluation (?NST vs. cath)   -Admit to telemetry   -ASA, B-blocker and ARB ordered as per cardiology  -Sublingual nitro PRN  -TTE ordered  -Repeat cardiac enzymes and EKG in AM    DM with hyperglycemia  -On oral agents, but non-compliant with meds  -Hyperglycemic on admission with serum glucose in 300s  -Holding PO agents, started on lantus 10U while inpatient  -Check HgbA1c in AM  -Insulin sliding scale    HLD  -Statin and fibrate resumed     Rhinitis  -Pt reporting non-productive cough and post nasal drip  -CXR negative for acute pathology   -Started on Flonase  -Mucinex PRN    Active smoker  -Low dose nicotine patch ordered     Prophylactic measure  -Intermittent prophylactic measure PRINCIPAL DISCHARGE DIAGNOSIS  Diagnosis: SOB (shortness of breath)  Assessment and Plan of Treatment: You were found to have shortness of breath during this hospitalization. The work up was done concerning for sarcoidosis and congestive heart failure. Upon evaluation, congestive heart failure was the unlikely cause of shortness of breath. Sarcoidosis was not ruled out as probable cause. Please follow up with Dr. Condon for further workup of sarcoidosis.      SECONDARY DISCHARGE DIAGNOSES  Diagnosis: DIAL (dyspnea on exertion)  Assessment and Plan of Treatment: Please follwow up with primary care provider for further workup.    Diagnosis: Colitis  Assessment and Plan of Treatment: You were found to have infection in the colon by the bacteria called Shigella, which likely caused you to have colitis. Please finish taking the bactrim for the next 7 days. Please follow up with your primary care provider for further management.    Diagnosis: Transaminitis  Assessment and Plan of Treatment: You were found to have elevated liver enzymes during this hospitalization. And, the imaging done shows mildly dilated bile duct and mild hepatomegaly. Please follow up with Dr. Rowe for the outpatient MRCP.   Continue taking all of your home medication and follow up with your PCP to get better.

## 2023-02-07 NOTE — PROGRESS NOTE ADULT - ASSESSMENT
66 yo male with PMHx depression, HTN, gastric bypass 4/21 presents today with SOB.      PROBLEMS:    Dyspnea on Mmtlalqg-IRIFSYKTHMFRSH-QVZZKTPSIPX  CTA chest-No PE-Symmetric mediastinal and hilar lymphadenopathy-nonspecific however leading dx sarcoidosis vs lymphoma-FU With repeat CT may need Bx-HIV neg on truvada  Colitis  Transaminitis  RAYMUNDO (resovled)  Depression    PLAN:    PUlmonary stable-fu as outpat  BNP 1557-s/p one dose of lasix-clear lung/no pedal edema  TTE-RVSP:29 mmHg  Serum ACE/vit D 1,25-53  Abdominal pain resolved  CT abd 1/31: Mild wall thickening of the rectosigmoid vbmcmqmd-wrtkmkohkacny-l/o shigelosis abx changed to bacterim-was outpatient on Cipro and Flagyl   RUQ US: mild hepatomegaly, mild prominence of the main pancreatic duct, no sig biliary dilatation   GI FU  d/w staff  DVT ppx-Lovenox

## 2023-02-07 NOTE — DISCHARGE NOTE PROVIDER - CARE PROVIDER_API CALL
Rashad Condon)  Critical Care Medicine; Internal Medicine; Pulmonary Disease; Sleep Medicine  [address] [place] [address]  Phone: [phone]  Fax: [phone]  Established Patient  Follow Up Time: 1 week    Socrates Rowe  GASTROENTEROLOGY  [address]  [place] [address]  Phone: [phone]  Fax: [phone]  Established Patient  Follow Up Time: 1 week    Iban Romero  [facility]  [address], [address]  [place] [address]  Phone: [phone]  Fax: [phone]  Established Patient  Follow Up Time: 1 week

## 2023-02-07 NOTE — DISCHARGE NOTE PROVIDER - NSDCMRMEDTOKEN_GEN_ALL_CORE_FT
Aspirin Enteric Coated 81 mg oral delayed release tablet: 1 tab(s) orally once a day  atorvastatin 10 mg oral tablet: 1 tab(s) orally once a day (in the morning)  buPROPion 100 mg oral tablet: 1 tab(s) orally 3 times a day  Cipro 500 mg oral tablet: 1 tab(s) orally every 12 hours  Descovy 200 mg-25 mg oral tablet: 1 tab(s) orally once a day  esomeprazole 40 mg oral delayed release capsule: 1 cap(s) orally 2 times a day  meloxicam 15 mg oral tablet: 1 tab(s) orally once a day  metoprolol tartrate 25 mg oral tablet: 1 tab(s) orally once a day  metroNIDAZOLE 500 mg oral tablet: 1 tab(s) orally 3 times a day   oxycodone-acetaminophen 5 mg-325 mg oral tablet: 1 tab(s) orally every 6 hours, As needed, Severe Pain (7 - 10) MDD:4 tablets  Phospha 250 Neutral oral tablet: 1 tab(s) orally 3 times a day   tadalafil 20 mg oral tablet: 1 tab(s) orally once a day, As Needed before sex  venlafaxine 75 mg oral tablet: 1 tab(s) orally 2 times a day   Aspirin Enteric Coated 81 mg oral delayed release tablet: 1 tab(s) orally once a day  atorvastatin 10 mg oral tablet: 1 tab(s) orally once a day (in the morning)  buPROPion 100 mg oral tablet: 1 tab(s) orally 3 times a day  Descovy 200 mg-25 mg oral tablet: 1 tab(s) orally once a day  esomeprazole 40 mg oral delayed release capsule: 1 cap(s) orally 2 times a day  meloxicam 15 mg oral tablet: 1 tab(s) orally once a day  metoprolol tartrate 25 mg oral tablet: 1 tab(s) orally once a day  Phospha 250 Neutral oral tablet: 1 tab(s) orally 3 times a day   sulfamethoxazole-trimethoprim 800 mg-160 mg oral tablet: 1 tab(s) orally every 12 hours  tadalafil 20 mg oral tablet: 1 tab(s) orally once a day, As Needed before sex  venlafaxine 75 mg oral tablet: 1 tab(s) orally 2 times a day

## 2023-02-07 NOTE — DISCHARGE NOTE NURSING/CASE MANAGEMENT/SOCIAL WORK - NSDCPEWEB_GEN_ALL_CORE
[facility] for Tobacco Control website --- [email or web address] website --- [email or web address] [email or web address]

## 2023-02-07 NOTE — DISCHARGE NOTE NURSING/CASE MANAGEMENT/SOCIAL WORK - PATIENT PORTAL LINK FT
You can access the FollowMyHealth Patient Portal offered by [facility] by registering at the following website: [email or web address]. By joining [employer]’s FollowMyHealth portal, you will also be able to view your health information using other applications (apps) compatible with our system.

## 2023-02-07 NOTE — DISCHARGE NOTE PROVIDER - HOSPITAL COURSE
66 yo male with PMHx depression, HTN, gastric bypass 4/21 presented to  ED on 2/2/23 with SOB.  Patient mentions he has been having SOB for the past 2 weeks but it has been worsening in the past 2-3 days.  He feels extremely fatigue just walking from one room to another.  He recently came to the ED with abdominal pain and diarrhea on 1/31 and was discharged from the ED with Colitis on PO abx.  Patient mentions his diarrhea and abodminal pain is chornic and started on 2021 after his gastric bypass.  Patient denies any chest pain, palpitations, cough dizziness, lightheadedness, nausea, vomiting, diarrhea or consitpation. In the ED /66, HR 78, RR 16 SpO2 96% on room air, temp 98 Labs: WBC 11.57, Na 134, Cr 1.31, Albumin 2.7, Alk phos 411, , , BNP 1557, CTA showed: No PE.  Symmetric mediastinal and hilar lymphadenopathy concerning for sarchoidosis. Pt was admitted for further management of SOB and transaminitis. Pulmonology was consulted. ACE was elevated. Vitamin D 1,25 was ordered which was wnl.  TTE with LVEF 50-55% with calcified bicuspid AV, hence SOB 2/2 HF unlikely. The stool culture was positive for shigella. HIV and hepatitis panel negative. Infectious disease was consulted and pt was given Bactrim. GI was consulted for transaminitis with mild hepatomegaly and mildly dialated bile duct shown on imaging. Antismooth muscle and mitochondria antibodies ordered. Pending results. MRCP was recommended, however it was done during this hospitalization due to disfuntional hardware (back stimulator). Pt to be followed up outpatient for MRCP. On the day of discharge, pt medically stable to be discharged back to home.     Subjective: Pt seen and evaluated by bedside. Pt with no complaints.     Vitals:  T(C): 36.8 (02-07-23 @ 08:14), Max: 36.8 (02-07-23 @ 08:14)  T(F): 98.3 (02-07-23 @ 08:14), Max: 98.3 (02-07-23 @ 08:14)  HR: 75 (02-07-23 @ 08:14) (61 - 75)  BP: 95/61 (02-07-23 @ 08:14) (94/71 - 106/73)  RR: 17 (02-07-23 @ 08:14) (17 - 18)  SpO2: 100% (02-07-23 @ 08:14) (95% - 100%)    PE:  Constitutional: Pt lying in bed, awake and alert, NAD  HEENT: EOMI, normal hearing, moist mucous membranes  Neck: Soft and supple, no JVD  Respiratory: CTABL, No wheezing, rales or rhonchi  Cardiovascular: S1S2+, RRR, no M/G/R  Gastrointestinal: BS+, soft, NT/ND, no guarding, no rebound  Extremities: No peripheral edema  Vascular: 2+ peripheral pulses  Neurological: AAOx3, no focal deficits  Musculoskeletal: 5/5 strength b/l upper and lower extremities  Skin: No rash      Imaging:   US Abdomen Upper Quadrant Right (02.03.23 @ 08:31)     Mild hepatomegaly.  Status postcholecystectomy. No significant biliary dilatation.  Mild prominence of the main pancreatic duct. Consider MRCP for additional   evaluation.          CT Angio Chest PE Protocol w/ IV Cont (02.02.23 @ 16:13)     No pulmonary embolism.    Symmetric mediastinal and hilar lymphadenopathy, nonspecific however   leading differential diagnosis is sarcoidosis.        CT Abdomen and Pelvis w/ Oral Cont and w/ IV Cont (01.31.23 @ 02:10)     Mild wall thickening of the rectosigmoid junction. This may represent   proctocolitis in appropriate clinical setting. Consider nonemergent   colonoscopy evaluation to exclude underlying neoplasm. No bowel   obstruction.    Stable mildly prominent common bile duct and pancreatic duct. Consider   nonemergent abdominal MR without and with IV contrast including MRCP if   not performed previously.    Mild groundglass attenuation in the right middle lobe. Chest radiographic   imaging follow-up is advised.

## 2023-02-07 NOTE — DISCHARGE NOTE PROVIDER - NSDCCAREPROVSEEN_GEN_ALL_CORE_FT
Taurus, Rashad Prajapati, Jass Garza, Diallo Deleon, Santos James, Luigi Rowe, [facility]

## 2023-02-07 NOTE — DISCHARGE NOTE NURSING/CASE MANAGEMENT/SOCIAL WORK - NSDCPEFALRISK_GEN_ALL_CORE
For information on Fall & Injury Prevention, visit: [email or web address] OR  [email or web address] OR  https://www.cdc.gov/steadi/patient.html

## 2023-02-07 NOTE — DISCHARGE NOTE NURSING/CASE MANAGEMENT/SOCIAL WORK - NSDCPEEMAIL_GEN_ALL_CORE
[facility] for Tobacco Control email [email or web address]

## 2023-02-14 ENCOUNTER — OUTPATIENT (OUTPATIENT)
Dept: OUTPATIENT SERVICES | Facility: HOSPITAL | Age: 68
LOS: 1 days | End: 2023-02-14
Payer: MEDICARE

## 2023-02-14 VITALS
SYSTOLIC BLOOD PRESSURE: 95 MMHG | HEIGHT: 67 IN | TEMPERATURE: 98 F | WEIGHT: 141.98 LBS | OXYGEN SATURATION: 100 % | DIASTOLIC BLOOD PRESSURE: 60 MMHG | HEART RATE: 72 BPM | RESPIRATION RATE: 16 BRPM

## 2023-02-14 DIAGNOSIS — Z87.74 PERSONAL HISTORY OF (CORRECTED) CONGENITAL MALFORMATIONS OF HEART AND CIRCULATORY SYSTEM: Chronic | ICD-10-CM

## 2023-02-14 DIAGNOSIS — Z98.890 OTHER SPECIFIED POSTPROCEDURAL STATES: Chronic | ICD-10-CM

## 2023-02-14 DIAGNOSIS — M54.16 RADICULOPATHY, LUMBAR REGION: ICD-10-CM

## 2023-02-14 DIAGNOSIS — Z01.818 ENCOUNTER FOR OTHER PREPROCEDURAL EXAMINATION: ICD-10-CM

## 2023-02-14 DIAGNOSIS — Z98.89 OTHER SPECIFIED POSTPROCEDURAL STATES: Chronic | ICD-10-CM

## 2023-02-14 DIAGNOSIS — Z90.89 ACQUIRED ABSENCE OF OTHER ORGANS: Chronic | ICD-10-CM

## 2023-02-14 DIAGNOSIS — Z98.61 CORONARY ANGIOPLASTY STATUS: Chronic | ICD-10-CM

## 2023-02-14 DIAGNOSIS — Z98.84 BARIATRIC SURGERY STATUS: Chronic | ICD-10-CM

## 2023-02-14 DIAGNOSIS — Z96.89 PRESENCE OF OTHER SPECIFIED FUNCTIONAL IMPLANTS: Chronic | ICD-10-CM

## 2023-02-14 LAB
ANION GAP SERPL CALC-SCNC: 5 MMOL/L — SIGNIFICANT CHANGE UP (ref 5–17)
APPEARANCE UR: CLEAR — SIGNIFICANT CHANGE UP
APTT BLD: 31.7 SEC — SIGNIFICANT CHANGE UP (ref 27.5–35.5)
BASOPHILS # BLD AUTO: 0 K/UL — SIGNIFICANT CHANGE UP (ref 0–0.2)
BASOPHILS NFR BLD AUTO: 0 % — SIGNIFICANT CHANGE UP (ref 0–2)
BILIRUB UR-MCNC: NEGATIVE — SIGNIFICANT CHANGE UP
BUN SERPL-MCNC: 31 MG/DL — HIGH (ref 7–23)
CALCIUM SERPL-MCNC: 8.6 MG/DL — SIGNIFICANT CHANGE UP (ref 8.5–10.1)
CHLORIDE SERPL-SCNC: 104 MMOL/L — SIGNIFICANT CHANGE UP (ref 96–108)
CO2 SERPL-SCNC: 26 MMOL/L — SIGNIFICANT CHANGE UP (ref 22–31)
COLOR SPEC: YELLOW — SIGNIFICANT CHANGE UP
CREAT SERPL-MCNC: 1.1 MG/DL — SIGNIFICANT CHANGE UP (ref 0.5–1.3)
DIFF PNL FLD: NEGATIVE — SIGNIFICANT CHANGE UP
EGFR: 74 ML/MIN/1.73M2 — SIGNIFICANT CHANGE UP
EOSINOPHIL # BLD AUTO: 0 K/UL — SIGNIFICANT CHANGE UP (ref 0–0.5)
EOSINOPHIL NFR BLD AUTO: 0 % — SIGNIFICANT CHANGE UP (ref 0–6)
GLUCOSE SERPL-MCNC: 81 MG/DL — SIGNIFICANT CHANGE UP (ref 70–99)
GLUCOSE UR QL: NEGATIVE — SIGNIFICANT CHANGE UP
HCT VFR BLD CALC: 39.6 % — SIGNIFICANT CHANGE UP (ref 39–50)
HGB BLD-MCNC: 12.6 G/DL — LOW (ref 13–17)
INR BLD: 0.9 RATIO — SIGNIFICANT CHANGE UP (ref 0.88–1.16)
KETONES UR-MCNC: NEGATIVE — SIGNIFICANT CHANGE UP
LEUKOCYTE ESTERASE UR-ACNC: NEGATIVE — SIGNIFICANT CHANGE UP
LYMPHOCYTES # BLD AUTO: 3.36 K/UL — HIGH (ref 1–3.3)
LYMPHOCYTES # BLD AUTO: 44 % — SIGNIFICANT CHANGE UP (ref 13–44)
MANUAL SMEAR VERIFICATION: SIGNIFICANT CHANGE UP
MCHC RBC-ENTMCNC: 30.2 PG — SIGNIFICANT CHANGE UP (ref 27–34)
MCHC RBC-ENTMCNC: 31.8 GM/DL — LOW (ref 32–36)
MCV RBC AUTO: 95 FL — SIGNIFICANT CHANGE UP (ref 80–100)
MONOCYTES # BLD AUTO: 0.76 K/UL — SIGNIFICANT CHANGE UP (ref 0–0.9)
MONOCYTES NFR BLD AUTO: 10 % — SIGNIFICANT CHANGE UP (ref 2–14)
NEUTROPHILS # BLD AUTO: 2.29 K/UL — SIGNIFICANT CHANGE UP (ref 1.8–7.4)
NEUTROPHILS NFR BLD AUTO: 30 % — LOW (ref 43–77)
NITRITE UR-MCNC: NEGATIVE — SIGNIFICANT CHANGE UP
NRBC # BLD: 0 /100 — SIGNIFICANT CHANGE UP (ref 0–0)
NRBC # BLD: SIGNIFICANT CHANGE UP /100 WBCS (ref 0–0)
PH UR: 5 — SIGNIFICANT CHANGE UP (ref 5–8)
PLAT MORPH BLD: NORMAL — SIGNIFICANT CHANGE UP
PLATELET # BLD AUTO: 212 K/UL — SIGNIFICANT CHANGE UP (ref 150–400)
POTASSIUM SERPL-MCNC: 4.7 MMOL/L — SIGNIFICANT CHANGE UP (ref 3.5–5.3)
POTASSIUM SERPL-SCNC: 4.7 MMOL/L — SIGNIFICANT CHANGE UP (ref 3.5–5.3)
PROT UR-MCNC: NEGATIVE — SIGNIFICANT CHANGE UP
PROTHROM AB SERPL-ACNC: 10.4 SEC — LOW (ref 10.5–13.4)
RBC # BLD: 4.17 M/UL — LOW (ref 4.2–5.8)
RBC # FLD: 15.8 % — HIGH (ref 10.3–14.5)
RBC BLD AUTO: NORMAL — SIGNIFICANT CHANGE UP
SARS-COV-2 RNA SPEC QL NAA+PROBE: SIGNIFICANT CHANGE UP
SODIUM SERPL-SCNC: 135 MMOL/L — SIGNIFICANT CHANGE UP (ref 135–145)
SP GR SPEC: 1.02 — SIGNIFICANT CHANGE UP (ref 1.01–1.02)
UROBILINOGEN FLD QL: 4
VARIANT LYMPHS # BLD: 16 % — HIGH (ref 0–6)
WBC # BLD: 7.63 K/UL — SIGNIFICANT CHANGE UP (ref 3.8–10.5)
WBC # FLD AUTO: 7.63 K/UL — SIGNIFICANT CHANGE UP (ref 3.8–10.5)

## 2023-02-14 PROCEDURE — 87640 STAPH A DNA AMP PROBE: CPT

## 2023-02-14 PROCEDURE — U0005: CPT

## 2023-02-14 PROCEDURE — 36415 COLL VENOUS BLD VENIPUNCTURE: CPT

## 2023-02-14 PROCEDURE — 85025 COMPLETE CBC W/AUTO DIFF WBC: CPT

## 2023-02-14 PROCEDURE — 85610 PROTHROMBIN TIME: CPT

## 2023-02-14 PROCEDURE — 99214 OFFICE O/P EST MOD 30 MIN: CPT | Mod: 25

## 2023-02-14 PROCEDURE — 87641 MR-STAPH DNA AMP PROBE: CPT

## 2023-02-14 PROCEDURE — 80048 BASIC METABOLIC PNL TOTAL CA: CPT

## 2023-02-14 PROCEDURE — 81003 URINALYSIS AUTO W/O SCOPE: CPT

## 2023-02-14 PROCEDURE — U0003: CPT

## 2023-02-14 PROCEDURE — 85730 THROMBOPLASTIN TIME PARTIAL: CPT

## 2023-02-14 RX ORDER — METOPROLOL TARTRATE 50 MG
1 TABLET ORAL
Qty: 0 | Refills: 0 | DISCHARGE

## 2023-02-14 RX ORDER — MELOXICAM 15 MG/1
1 TABLET ORAL
Qty: 0 | Refills: 0 | DISCHARGE

## 2023-02-14 NOTE — H&P PST ADULT - GENITOURINARY
Alert and oriented to person, place and time, memory intact, behavior appropriate to situation, PERRL. negative

## 2023-02-14 NOTE — H&P PST ADULT - NSICDXPASTMEDICALHX_GEN_ALL_CORE_FT
PAST MEDICAL HISTORY:  Anxiety     Aortic stenosis Bicuspid Aortic Valve Replacement- 5/29/2018    Carpal tunnel syndrome had surgery    Chronic neck and back pain     Depressive disorder     Erectile dysfunction     Gastrointestinal hemorrhage     GERD (gastroesophageal reflux disease)     Hearing loss     Helicobacter pylori infection     Herniated lumbar intervertebral disc     History of colonic polyps     HTN (hypertension)     Hyperlipidemia     Internal and external prolapsed hemorrhoids had surgery    Loss of hearing     Lumbar radiculopathy     Morbid obesity     Sciatica     Shigella infection     Skin cancer external right ear, unsure of type

## 2023-02-14 NOTE — H&P PST ADULT - NSICDXPASTSURGICALHX_GEN_ALL_CORE_FT
PAST SURGICAL HISTORY:  H/O bariatric surgery     H/O bicuspid aortic valve replacement- 5/29/18    H/O external ear surgery excision mass right ear- cancer 2018    H/O hemorrhoidectomy     History of back surgery laminectomy x2 last 2018    S/P carpal tunnel release right side, left side- 2014    S/P tonsillectomy     S/P trigger finger release right thumb and middle finger    Spinal cord stimulator status 10/2020    Status post coronary angioplasty pt not sure he had this procedure

## 2023-02-14 NOTE — H&P PST ADULT - NSICDXFAMILYHX_GEN_ALL_CORE_FT
FAMILY HISTORY:  FH: HTN (hypertension), father    Father  Still living? No  Family history of dementia, Age at diagnosis: Age Unknown

## 2023-02-14 NOTE — H&P PST ADULT - HISTORY OF PRESENT ILLNESS
Preoperative Assessment Center Medication History Note    Medication history completed on November 21, 2022 by this writer. See Epic admission navigator for prior to admission medications. Operating room staff will still need to confirm medications and last dose information on day of surgery.     Medication history interview sources  Patient interview: Yes  Care Everywhere records: Yes  Surescripts pharmacy refill records: Yes  Other (if applicable): med list from [facility] who manages and sets up all of his medications. Spoke with TATYANA Au     Changes made to PTA medication list  Added: victoza, emla, lidocaine, narcan, spironolactone, albuterol nebs, albuterol HFA,   Deleted: wrong dose lisinopril, ozempic (on victoza now), duplicate aspirin, duplicate amlodipine, duplicate atorvastatin, capsaicin, MS Contin (stopped by his pain provider w/in the past month or two).   Changed: gabapentin dose/sig, lantus dose/sig,     Additional medication history information (including reliability of information, actions taken by pharmacist):    -- No recent (within 30 days) course of antibiotics  -- No recent (within 30 days) course of systemic steroids  -- Reports being on blood thinning medications xarelto &aspirin (see other note).    -- Declines being on any other prescription or over-the-counter medications    Prior to Admission medications    Medication Sig Last Dose Taking? Auth Provider Long Term End Date   acetaminophen (TYLENOL) 325 MG tablet Take 1 tablet by mouth every 4 hours as needed. Taking Yes Faviola Gusman MD     albuterol (PROAIR HFA/PROVENTIL HFA/VENTOLIN HFA) 108 (90 Base) MCG/ACT inhaler Inhale 2 puffs into the lungs every 4 hours as needed for shortness of breath / dyspnea or wheezing Taking Yes Unknown, Entered By History No    albuterol (PROVENTIL) (2.5 MG/3ML) 0.083% neb solution Take 2.5 mg by nebulization every 4 hours as needed for shortness of breath / dyspnea or wheezing Taking  Yes Unknown, Entered By History No    amLODIPine (NORVASC) 2.5 MG tablet Take 1 tablet (2.5 mg) by mouth 2 times daily Taking Yes Giovani Olson MD Yes    ammonium lactate (AMLACTIN) 12 % external cream Apply to affected area on both legs twice daily as needed for dry skin Taking Yes Reported, Patient     atorvastatin (LIPITOR) 80 MG tablet Take 1 tablet (80 mg) by mouth At Bedtime Taking Yes Giovani Olson MD Yes    calcitonin, salmon, (MIACALCIN) 200 UNIT/ACT nasal spray Spray 1 spray into one nostril alternating nostrils daily Unknown Yes Reported, Patient Yes    gabapentin (NEURONTIN) 300 MG capsule Take 600 mg in AM   Take 300 mg in afternoon   Take 600 mg at bedtime  May also take an additional 300 mg if needed at bedtime for pain Taking Yes Reported, Patient Yes    hydrocortisone 1 % cream Apply topically 2 times daily PRN Taking Yes Reported, Patient No    insulin aspart (NOVOLOG FLEXPEN) 100 UNIT/ML pen Inject 19 Units Subcutaneous 2 times daily (with meals) AM and 4 PM Taking Yes Reported, Patient No    insulin glargine (LANTUS PEN) 100 UNIT/ML pen Inject 32 Units Subcutaneous 2 times daily  Patient taking differently: Inject 32 Units Subcutaneous At Bedtime Taking Yes Giovani Olson MD Yes    JARDIANCE 25 MG TABS tablet TAKE 1 TABLET (25 MG) BY MOUTH DAILY Taking Yes Giovani Olson MD     lidocaine (XYLOCAINE) 5 % external ointment Apply 1-3 g topically 2 times daily as needed (RLE residual limb pain) Taking Yes Unknown, Entered By History     lidocaine-prilocaine (EMLA) 2.5-2.5 % external cream Apply 1-2 g topically 2 times daily as needed for moderate pain (4-6) (RLE residual limb) Taking Yes Unknown, Entered By History Yes    liraglutide (VICTOZA) 18 MG/3ML solution Inject 1.8 mg Subcutaneous daily Taking Yes Unknown, Entered By History No    lisinopril (ZESTRIL) 2.5 MG tablet TAKE 1 TABLET (2.5 MG) BY MOUTH 2 TIMES DAILY Taking Yes Giovani Olson  MD Jermain Yes    metoprolol succinate ER (TOPROL XL) 50 MG 24 hr tablet Take 1.5 tablets (75 mg) by mouth daily Taking Yes Giovani Olson MD Yes    naloxone (NARCAN) 4 MG/0.1ML nasal spray Spray 4 mg into one nostril alternating nostrils as needed for opioid reversal every 2-3 minutes until assistance arrives Taking Yes Unknown, Entered By History     rivaroxaban ANTICOAGULANT (XARELTO ANTICOAGULANT) 2.5 MG TABS tablet Take 1 tablet (2.5 mg) by mouth 2 times daily Taking Yes Giovani Olson MD Yes    senna-docusate (SENOKOT-S/PERICOLACE) 8.6-50 MG tablet Take 1 tablet by mouth At Bedtime Taking Yes Unknown, Entered By History     spironolactone (ALDACTONE) 25 MG tablet Take 12.5 mg by mouth daily Taking Yes Unknown, Entered By History No    ASPIRIN EC PO Take 81 mg by mouth daily  Patient not taking: Reported on 11/21/2022 Not Taking  Unknown, Entered By History     blood glucose (NO BRAND SPECIFIED) test strip Use  to test 4 times a day.   Reported, Patient     gabapentin (NEURONTIN) 100 MG capsule Take 1 capsule (100 mg) by mouth 3 times daily  Patient not taking: Reported on 11/21/2022 Not Taking  Vikas Pop MD Yes    GLOBAL EASY GLIDE PEN NEEDLES 32G X 4 MM miscellaneous USE 4X DAILY OR AS DIRECTED.   Giovani Olson MD No    insulin lispro (HUMALOG KWIKPEN) 100 UNIT/ML (1 unit dial) KWIKPEN Inject 18 Units Subcutaneous 3 times daily (before meals)  Patient not taking: Reported on 11/21/2022 Not Taking  Giovani Olson MD Yes    order for DME Equipment being ordered: FlexiTouch pneumatic compression device.  2-3 times per day to left lower extremity.  Current strength - L4   Omar Pereyra MD            Medication history completed by: Isma Simms, [facility]       Repair Type: Intermediate Layered Repair 67 years old male with a history of lumbar radiculopathy. Spinal stimulator implanted "about 4 years ago".  Technician attempted to turn off device "when I was in the hospital". "They said there was  broken lead." Patient with a history of Morbid obesity with bariatric surgery 4/ 2021, HTN, HLD,  Valvular heart surgery, Anxiety and Depression. He was admitted in the hospital 2/2/2023 after coming to the hospital with SOB. While in the hospital he was diagnosed with Shigella in stool. Patient was seen by Dr. Swanson. Planned removal of stimulator.
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
## 2023-02-15 DIAGNOSIS — M54.16 RADICULOPATHY, LUMBAR REGION: ICD-10-CM

## 2023-02-15 DIAGNOSIS — Z20.822 CONTACT WITH AND (SUSPECTED) EXPOSURE TO COVID-19: ICD-10-CM

## 2023-02-15 DIAGNOSIS — A03.9 SHIGELLOSIS, UNSPECIFIED: ICD-10-CM

## 2023-02-15 DIAGNOSIS — E87.1 HYPO-OSMOLALITY AND HYPONATREMIA: ICD-10-CM

## 2023-02-15 DIAGNOSIS — I35.0 NONRHEUMATIC AORTIC (VALVE) STENOSIS: ICD-10-CM

## 2023-02-15 DIAGNOSIS — F32.A DEPRESSION, UNSPECIFIED: ICD-10-CM

## 2023-02-15 DIAGNOSIS — I10 ESSENTIAL (PRIMARY) HYPERTENSION: ICD-10-CM

## 2023-02-15 DIAGNOSIS — Z01.818 ENCOUNTER FOR OTHER PREPROCEDURAL EXAMINATION: ICD-10-CM

## 2023-02-15 DIAGNOSIS — N17.9 ACUTE KIDNEY FAILURE, UNSPECIFIED: ICD-10-CM

## 2023-02-15 DIAGNOSIS — Z85.828 PERSONAL HISTORY OF OTHER MALIGNANT NEOPLASM OF SKIN: ICD-10-CM

## 2023-02-15 DIAGNOSIS — Z98.84 BARIATRIC SURGERY STATUS: ICD-10-CM

## 2023-02-15 DIAGNOSIS — K21.9 GASTRO-ESOPHAGEAL REFLUX DISEASE WITHOUT ESOPHAGITIS: ICD-10-CM

## 2023-02-15 LAB
MRSA PCR RESULT.: SIGNIFICANT CHANGE UP
S AUREUS DNA NOSE QL NAA+PROBE: SIGNIFICANT CHANGE UP

## 2023-02-17 ENCOUNTER — TRANSCRIPTION ENCOUNTER (OUTPATIENT)
Age: 68
End: 2023-02-17

## 2023-02-17 ENCOUNTER — OUTPATIENT (OUTPATIENT)
Dept: INPATIENT UNIT | Facility: HOSPITAL | Age: 68
LOS: 1 days | Discharge: HOME | End: 2023-02-17
Payer: MEDICARE

## 2023-02-17 VITALS
WEIGHT: 134.04 LBS | DIASTOLIC BLOOD PRESSURE: 69 MMHG | SYSTOLIC BLOOD PRESSURE: 102 MMHG | OXYGEN SATURATION: 98 % | HEIGHT: 67 IN | TEMPERATURE: 98 F | RESPIRATION RATE: 14 BRPM | HEART RATE: 66 BPM

## 2023-02-17 VITALS
DIASTOLIC BLOOD PRESSURE: 58 MMHG | TEMPERATURE: 98 F | OXYGEN SATURATION: 100 % | RESPIRATION RATE: 14 BRPM | HEART RATE: 62 BPM | SYSTOLIC BLOOD PRESSURE: 98 MMHG

## 2023-02-17 DIAGNOSIS — Z98.61 CORONARY ANGIOPLASTY STATUS: Chronic | ICD-10-CM

## 2023-02-17 DIAGNOSIS — Z87.74 PERSONAL HISTORY OF (CORRECTED) CONGENITAL MALFORMATIONS OF HEART AND CIRCULATORY SYSTEM: Chronic | ICD-10-CM

## 2023-02-17 DIAGNOSIS — M54.16 RADICULOPATHY, LUMBAR REGION: ICD-10-CM

## 2023-02-17 DIAGNOSIS — Z98.890 OTHER SPECIFIED POSTPROCEDURAL STATES: Chronic | ICD-10-CM

## 2023-02-17 DIAGNOSIS — Z90.89 ACQUIRED ABSENCE OF OTHER ORGANS: Chronic | ICD-10-CM

## 2023-02-17 DIAGNOSIS — Z98.89 OTHER SPECIFIED POSTPROCEDURAL STATES: Chronic | ICD-10-CM

## 2023-02-17 DIAGNOSIS — Z98.84 BARIATRIC SURGERY STATUS: Chronic | ICD-10-CM

## 2023-02-17 DIAGNOSIS — Z96.89 PRESENCE OF OTHER SPECIFIED FUNCTIONAL IMPLANTS: Chronic | ICD-10-CM

## 2023-02-17 PROCEDURE — 76000 FLUOROSCOPY <1 HR PHYS/QHP: CPT

## 2023-02-17 RX ORDER — SODIUM CHLORIDE 9 MG/ML
1000 INJECTION, SOLUTION INTRAVENOUS
Refills: 0 | Status: DISCONTINUED | OUTPATIENT
Start: 2023-02-17 | End: 2023-02-17

## 2023-02-17 RX ORDER — FENTANYL CITRATE 50 UG/ML
50 INJECTION INTRAVENOUS
Refills: 0 | Status: DISCONTINUED | OUTPATIENT
Start: 2023-02-17 | End: 2023-02-17

## 2023-02-17 RX ORDER — OXYCODONE HYDROCHLORIDE 5 MG/1
10 TABLET ORAL ONCE
Refills: 0 | Status: DISCONTINUED | OUTPATIENT
Start: 2023-02-17 | End: 2023-02-17

## 2023-02-17 RX ORDER — ONDANSETRON 8 MG/1
4 TABLET, FILM COATED ORAL ONCE
Refills: 0 | Status: DISCONTINUED | OUTPATIENT
Start: 2023-02-17 | End: 2023-02-17

## 2023-02-17 NOTE — ASU DISCHARGE PLAN (ADULT/PEDIATRIC) - NS MD DC FALL RISK RISK
For information on Fall & Injury Prevention, visit: [email or web address] OR  [email or web address] OR  https://www.cdc.gov/steadi/patient.html

## 2023-02-17 NOTE — ASU PATIENT PROFILE, ADULT - FALL HARM RISK - UNIVERSAL INTERVENTIONS
Bed in lowest position, wheels locked, appropriate side rails in place/Call bell, personal items and telephone in reach/Instruct patient to call for assistance before getting out of bed or chair/Non-slip footwear when patient is out of bed/[place] to call system/Physically safe environment - no spills, clutter or unnecessary equipment/Purposeful Proactive Rounding/Room/bathroom lighting operational, light cord in reach

## 2023-02-17 NOTE — ASU DISCHARGE PLAN (ADULT/PEDIATRIC) - CARE PROVIDER_API CALL
Anthony Guillermo)  Pain Medicine; PhysicalRehab Medicine  [address] [place] [id number]  Phone: [phone]  Fax: [phone]  Established Patient  Follow Up Time: 1 week

## 2023-02-24 DIAGNOSIS — F41.9 ANXIETY DISORDER, UNSPECIFIED: ICD-10-CM

## 2023-02-24 DIAGNOSIS — Z95.2 PRESENCE OF PROSTHETIC HEART VALVE: ICD-10-CM

## 2023-02-24 DIAGNOSIS — M54.16 RADICULOPATHY, LUMBAR REGION: ICD-10-CM

## 2023-02-24 DIAGNOSIS — Z95.5 PRESENCE OF CORONARY ANGIOPLASTY IMPLANT AND GRAFT: ICD-10-CM

## 2023-02-24 DIAGNOSIS — I10 ESSENTIAL (PRIMARY) HYPERTENSION: ICD-10-CM

## 2023-02-24 DIAGNOSIS — E66.01 MORBID (SEVERE) OBESITY DUE TO EXCESS CALORIES: ICD-10-CM

## 2023-02-24 DIAGNOSIS — F17.210 NICOTINE DEPENDENCE, CIGARETTES, UNCOMPLICATED: ICD-10-CM

## 2023-02-24 DIAGNOSIS — Z79.82 LONG TERM (CURRENT) USE OF ASPIRIN: ICD-10-CM

## 2023-02-24 DIAGNOSIS — F32.A DEPRESSION, UNSPECIFIED: ICD-10-CM

## 2023-02-24 DIAGNOSIS — E78.5 HYPERLIPIDEMIA, UNSPECIFIED: ICD-10-CM

## 2023-02-26 NOTE — HISTORY OF PRESENT ILLNESS
[FreeTextEntry1] : initial office visit for this 67-year-old male with complaint of prolapsing internal/external hemorrhoids and history of colon polyps.

## 2023-02-26 NOTE — ASSESSMENT
[FreeTextEntry1] : 67 year-old male with extensive internal/external hemorrhoids and colon polyps. Recommend colonoscopy with peg solution prep. risk and benefits explained including bleeding, perforation, missing a polyp or cancer in 5%. .

## 2023-02-26 NOTE — REVIEW OF SYSTEMS
[As Noted in HPI] : as noted in HPI [Negative] : Heme/Lymph [FreeTextEntry7] : Extensive hemorrhoids

## 2023-02-26 NOTE — PHYSICAL EXAM
[Abdomen Masses] : No abdominal masses [Abdomen Tenderness] : ~T No ~M abdominal tenderness [Tender] : nontender [Excoriation] : excoriations [Manually Reducible] : a manually reducible (grade III) [Tender, Swollen] : tender, swollen [Thrombosed] : that was thrombosed [Skin Tags] : residual hemorrhoidal skin tags were noted [Normal] : was normal [None] : there was no rectal mass  [Gross Blood] : no gross blood [JVD] : no jugular venous distention  [Normal Breath Sounds] : Normal breath sounds [Normal Heart Sounds] : normal heart sounds [Normal Rate and Rhythm] : normal rate and rhythm [No Rash or Lesion] : No rash or lesion [Alert] : alert [Oriented to Person] : oriented to person [Oriented to Place] : oriented to place [Oriented to Time] : oriented to time [Calm] : calm [de-identified] : Looks well in no distress, of stated age. [de-identified] : pupils equal reactive to light normocephalic atraumatic. [de-identified] : moves all 4 extremities appropriately with 5 over 5 strength

## 2023-03-06 PROBLEM — M54.16 RADICULOPATHY, LUMBAR REGION: Chronic | Status: ACTIVE | Noted: 2023-02-14

## 2023-03-06 PROBLEM — A03.9: Chronic | Status: ACTIVE | Noted: 2023-02-14

## 2023-03-06 PROBLEM — N52.9 MALE ERECTILE DYSFUNCTION, UNSPECIFIED: Chronic | Status: ACTIVE | Noted: 2023-02-14

## 2023-03-06 PROBLEM — H91.90 UNSPECIFIED HEARING LOSS, UNSPECIFIED EAR: Chronic | Status: ACTIVE | Noted: 2023-02-14

## 2023-04-18 ENCOUNTER — EMERGENCY (EMERGENCY)
Facility: HOSPITAL | Age: 68
LOS: 0 days | Discharge: HOME | End: 2023-04-18
Attending: EMERGENCY MEDICINE
Payer: MEDICARE

## 2023-04-18 VITALS
TEMPERATURE: 98 F | SYSTOLIC BLOOD PRESSURE: 124 MMHG | HEART RATE: 76 BPM | RESPIRATION RATE: 18 BRPM | WEIGHT: 141.98 LBS | DIASTOLIC BLOOD PRESSURE: 75 MMHG | OXYGEN SATURATION: 100 % | HEIGHT: 67 IN

## 2023-04-18 VITALS
RESPIRATION RATE: 18 BRPM | HEART RATE: 80 BPM | SYSTOLIC BLOOD PRESSURE: 114 MMHG | DIASTOLIC BLOOD PRESSURE: 72 MMHG | TEMPERATURE: 98 F | OXYGEN SATURATION: 98 %

## 2023-04-18 DIAGNOSIS — Z87.74 PERSONAL HISTORY OF (CORRECTED) CONGENITAL MALFORMATIONS OF HEART AND CIRCULATORY SYSTEM: Chronic | ICD-10-CM

## 2023-04-18 DIAGNOSIS — Z98.89 OTHER SPECIFIED POSTPROCEDURAL STATES: Chronic | ICD-10-CM

## 2023-04-18 DIAGNOSIS — M54.2 CERVICALGIA: ICD-10-CM

## 2023-04-18 DIAGNOSIS — F41.9 ANXIETY DISORDER, UNSPECIFIED: ICD-10-CM

## 2023-04-18 DIAGNOSIS — Z90.89 ACQUIRED ABSENCE OF OTHER ORGANS: ICD-10-CM

## 2023-04-18 DIAGNOSIS — Z98.890 OTHER SPECIFIED POSTPROCEDURAL STATES: Chronic | ICD-10-CM

## 2023-04-18 DIAGNOSIS — Z98.61 CORONARY ANGIOPLASTY STATUS: Chronic | ICD-10-CM

## 2023-04-18 DIAGNOSIS — Z79.82 LONG TERM (CURRENT) USE OF ASPIRIN: ICD-10-CM

## 2023-04-18 DIAGNOSIS — I10 ESSENTIAL (PRIMARY) HYPERTENSION: ICD-10-CM

## 2023-04-18 DIAGNOSIS — S62.324A DISPLACED FRACTURE OF SHAFT OF FOURTH METACARPAL BONE, RIGHT HAND, INITIAL ENCOUNTER FOR CLOSED FRACTURE: ICD-10-CM

## 2023-04-18 DIAGNOSIS — S60.511A ABRASION OF RIGHT HAND, INITIAL ENCOUNTER: ICD-10-CM
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
## 2023-04-18 DIAGNOSIS — S81.811A LACERATION WITHOUT FOREIGN BODY, RIGHT LOWER LEG, INITIAL ENCOUNTER: ICD-10-CM

## 2023-04-18 DIAGNOSIS — K21.9 GASTRO-ESOPHAGEAL REFLUX DISEASE WITHOUT ESOPHAGITIS: ICD-10-CM

## 2023-04-18 DIAGNOSIS — Z98.84 BARIATRIC SURGERY STATUS: ICD-10-CM

## 2023-04-18 DIAGNOSIS — Z90.89 ACQUIRED ABSENCE OF OTHER ORGANS: Chronic | ICD-10-CM

## 2023-04-18 DIAGNOSIS — Z98.84 BARIATRIC SURGERY STATUS: Chronic | ICD-10-CM

## 2023-04-18 DIAGNOSIS — Z96.89 PRESENCE OF OTHER SPECIFIED FUNCTIONAL IMPLANTS: Chronic | ICD-10-CM

## 2023-04-18 DIAGNOSIS — N52.9 MALE ERECTILE DYSFUNCTION, UNSPECIFIED: ICD-10-CM

## 2023-04-18 DIAGNOSIS — H91.90 UNSPECIFIED HEARING LOSS, UNSPECIFIED EAR: ICD-10-CM

## 2023-04-18 DIAGNOSIS — F32.A DEPRESSION, UNSPECIFIED: ICD-10-CM

## 2023-04-18 DIAGNOSIS — Z85.828 PERSONAL HISTORY OF OTHER MALIGNANT NEOPLASM OF SKIN: ICD-10-CM

## 2023-04-18 DIAGNOSIS — E78.5 HYPERLIPIDEMIA, UNSPECIFIED: ICD-10-CM

## 2023-04-18 DIAGNOSIS — Z95.2 PRESENCE OF PROSTHETIC HEART VALVE: ICD-10-CM
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
## 2023-04-18 PROCEDURE — 99285 EMERGENCY DEPT VISIT HI MDM: CPT

## 2023-04-18 PROCEDURE — 12032 INTMD RPR S/A/T/EXT 2.6-7.5: CPT | Mod: XU

## 2023-04-18 PROCEDURE — 70450 CT HEAD/BRAIN W/O DYE: CPT | Mod: 26,MA

## 2023-04-18 PROCEDURE — 73590 X-RAY EXAM OF LOWER LEG: CPT | Mod: 26,RT

## 2023-04-18 PROCEDURE — 73590 X-RAY EXAM OF LOWER LEG: CPT | Mod: RT

## 2023-04-18 PROCEDURE — 73120 X-RAY EXAM OF HAND: CPT | Mod: RT

## 2023-04-18 PROCEDURE — 73130 X-RAY EXAM OF HAND: CPT | Mod: 26,76,RT

## 2023-04-18 PROCEDURE — 99285 EMERGENCY DEPT VISIT HI MDM: CPT | Mod: 25

## 2023-04-18 PROCEDURE — 72125 CT NECK SPINE W/O DYE: CPT | Mod: 26,MA

## 2023-04-18 PROCEDURE — 96372 THER/PROPH/DIAG INJ SC/IM: CPT | Mod: XU

## 2023-04-18 PROCEDURE — 70450 CT HEAD/BRAIN W/O DYE: CPT | Mod: MA

## 2023-04-18 PROCEDURE — 72125 CT NECK SPINE W/O DYE: CPT | Mod: MA

## 2023-04-18 PROCEDURE — 73130 X-RAY EXAM OF HAND: CPT | Mod: RT

## 2023-04-18 PROCEDURE — 96374 THER/PROPH/DIAG INJ IV PUSH: CPT | Mod: XU

## 2023-04-18 PROCEDURE — 90715 TDAP VACCINE 7 YRS/> IM: CPT

## 2023-04-18 PROCEDURE — 26605 TREAT METACARPAL FRACTURE: CPT | Mod: F8

## 2023-04-18 RX ORDER — CEPHALEXIN 500 MG
1 CAPSULE ORAL
Qty: 15 | Refills: 0
Start: 2023-04-18 | End: 2023-04-22

## 2023-04-18 RX ORDER — CEFAZOLIN SODIUM 1 G
2000 VIAL (EA) INJECTION ONCE
Refills: 0 | Status: COMPLETED | OUTPATIENT
Start: 2023-04-18 | End: 2023-04-18

## 2023-04-18 RX ORDER — TETANUS TOXOID, REDUCED DIPHTHERIA TOXOID AND ACELLULAR PERTUSSIS VACCINE, ADSORBED 5; 2.5; 8; 8; 2.5 [IU]/.5ML; [IU]/.5ML; UG/.5ML; UG/.5ML; UG/.5ML
0.5 SUSPENSION INTRAMUSCULAR ONCE
Refills: 0 | Status: COMPLETED | OUTPATIENT
Start: 2023-04-18 | End: 2023-04-18

## 2023-04-18 RX ADMIN — TETANUS TOXOID, REDUCED DIPHTHERIA TOXOID AND ACELLULAR PERTUSSIS VACCINE, ADSORBED 0.5 MILLILITER(S): 5; 2.5; 8; 8; 2.5 SUSPENSION INTRAMUSCULAR at 18:27

## 2023-04-18 RX ADMIN — Medication 100 MILLIGRAM(S): at 18:27

## 2023-04-18 NOTE — ED PROVIDER NOTE - CLINICAL SUMMARY MEDICAL DECISION MAKING FREE TEXT BOX
Neuro Alert activated. Plan: CT head and c-spine, x-ray right tib-fib, update Tetanus, laceration repair, and reassess.
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
## 2023-04-18 NOTE — ED PROVIDER NOTE - CARE PROVIDER_API CALL
Emir Forbes)  Orthopaedic Surgery; Surgery of the Hand  [address], [address]  [place] [address]  Phone: [phone]  Fax: [phone]  Follow Up Time:
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
## 2023-04-18 NOTE — ED PROVIDER NOTE - NSFOLLOWUPINSTRUCTIONS_ED_ALL_ED_FT
Wear splint and keep wound clean and dry until you see Dr. Forbes.     Take keflex as prescribed.     Call his office in the morning to arrange follow up.     Return to ER for fever, any concerns.

## 2023-04-18 NOTE — ED PROVIDER NOTE - NS ED ROS FT
Constitutional: No fever or chills  Eyes: No visual changes  HEENT: No throat pain  CV: No chest pain  Resp: No SOB no cough  GI: No abd pain, nausea or vomiting  : No dysuria  MSK: + neck pain  Skin: + RLE laceration   Neuro: No headache

## 2023-04-18 NOTE — ED ADULT TRIAGE NOTE - HEART RATE (BEATS/MIN)
76 Hypothyroidism Pulmonary embolism without acute cor pulmonale, unspecified chronicity, unspecified pulmonary embolism type

## 2023-04-18 NOTE — ED PROVIDER NOTE - PHYSICAL EXAMINATION
Constitutional: NAD AOx3  Eyes: PERRL EOMI  Head: Normocephalic atraumatic  Neck: C-spine diffusely TTP  Mouth: MMM  Cardiac: regular rate and rhythm  Resp: Lungs CTAB  GI: Abd s/nd/nt  Neuro: CN2-12 grossly intact, DIAS x 4  Skin: Laceration to right lower shin, scattered abrasions to right hand Constitutional: NAD AOx3  Eyes: PERRL EOMI  Head: Normocephalic atraumatic  Neck: C-spine diffusely TTP  Mouth: MMM  Cardiac: regular rate and rhythm  Resp: Lungs CTAB  GI: Abd s/nd/nt  Neuro: CN2-12 grossly intact, DIAS x 4  Skin: Vertical laceration on lower right shin, deep to bone, approximately 6-7cm long. Scattered abrasions to right hand. Constitutional: NAD AOx3  Eyes: PERRL EOMI  Head: Normocephalic atraumatic  Neck: C-spine diffusely TTP  Mouth: MMM  Cardiac: regular rate and rhythm  Resp: Lungs CTAB  GI: Abd s/nd/nt  MSK: Right 4th metacarpal TTP. TTP right shin surrounding laceration.   Neuro: CN2-12 grossly intact, DIAS x 4  Skin: Vertical laceration on lower right shin, deep to bone, approximately 6-7cm long. Scattered abrasions to right hand.

## 2023-04-18 NOTE — ED ADULT NURSE NOTE - NS ED NURSE LEVEL OF CONSCIOUSNESS AFFECT
Date of Service: 10/16/2019    Aleksandr was seen in a psychiatric followup visit.  Interval history was reviewed, case was discussed in detail.    Aleksandr's mood and affect are relatively euthymic.  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  This is underachieving but providing her some escape from her financial discord.  These are very long hours.  She does not have much time for socialization and is really not drinking because she does not have \"the time.\"  Kids are doing well in school and she is feeling somewhat gratified by them.  Also, is sharing living expense with her mother, which helps.    MENTAL STATUS EXAMINATION:  She is alert and oriented.  She is pleasant, cooperative, displays good eye contact.  Speech is of normal rate and tone.  Thought processes are logical and goal directed.  No major neurovegetative symptoms of depression.  No SI, HI or AVHs.  She is less tearful when she has been in the past.  She has had no slips or relapses to opioids or other drugs of abuse.    DIAGNOSES:  Opioid dependence, on agonist therapy.  Attention deficit hyperactivity disorder.  Depression.    PLAN:  Present medication regimen is updated in med reconciliation log.    Time spent in overall evaluation and management visit 35 minutes, 20 of which was spent counseling.  Followup appointment is 2-3 months.  Recommend individual therapy, but she does not have time.  Also recommend 12-step meetings.      Dictated By: Octavio Perkins MD  Signing Provider: MD DESI Duffy/humble ([id number])  DD: 10/17/2019 09:44:51 TD: 10/17/2019 22:51:21    Copy Sent To:   
This note has been dictated.  
Calm

## 2023-04-18 NOTE — ED PROVIDER NOTE - PROGRESS NOTE DETAILS
Lacey Van for Dr. Melara: + Right 4th metacarpal fracture with overlying abrasion proximally. Will give Ancef for possible open fracture and discuss with Dr. Forbes, Ortho attending on-call, for hand fracture and large open laceration on right shin.
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
## 2023-04-18 NOTE — ED PROVIDER NOTE - PATIENT PORTAL LINK FT
You can access the FollowMyHealth Patient Portal offered by [facility] by registering at the following website: [email or web address]. By joining [facility]’s FollowMyHealth portal, you will also be able to view your health information using other applications (apps) compatible with our system.

## 2023-05-01 ENCOUNTER — NON-APPOINTMENT (OUTPATIENT)
Age: 68
End: 2023-05-01

## 2023-05-16 ENCOUNTER — APPOINTMENT (OUTPATIENT)
Dept: CT IMAGING | Facility: CLINIC | Age: 68
End: 2023-05-16
Payer: MEDICARE

## 2023-05-16 ENCOUNTER — OUTPATIENT (OUTPATIENT)
Dept: OUTPATIENT SERVICES | Facility: HOSPITAL | Age: 68
LOS: 1 days | End: 2023-05-16
Payer: MEDICARE

## 2023-05-16 DIAGNOSIS — R59.0 LOCALIZED ENLARGED LYMPH NODES: ICD-10-CM

## 2023-05-16 DIAGNOSIS — R06.02 SHORTNESS OF BREATH: ICD-10-CM

## 2023-05-16 DIAGNOSIS — Z98.84 BARIATRIC SURGERY STATUS: Chronic | ICD-10-CM

## 2023-05-16 DIAGNOSIS — Z98.61 CORONARY ANGIOPLASTY STATUS: Chronic | ICD-10-CM

## 2023-05-16 DIAGNOSIS — Z87.74 PERSONAL HISTORY OF (CORRECTED) CONGENITAL MALFORMATIONS OF HEART AND CIRCULATORY SYSTEM: Chronic | ICD-10-CM

## 2023-05-16 DIAGNOSIS — Z98.890 OTHER SPECIFIED POSTPROCEDURAL STATES: Chronic | ICD-10-CM

## 2023-05-16 DIAGNOSIS — Z98.89 OTHER SPECIFIED POSTPROCEDURAL STATES: Chronic | ICD-10-CM

## 2023-05-16 DIAGNOSIS — Z96.89 PRESENCE OF OTHER SPECIFIED FUNCTIONAL IMPLANTS: Chronic | ICD-10-CM

## 2023-05-16 DIAGNOSIS — Z90.89 ACQUIRED ABSENCE OF OTHER ORGANS: Chronic | ICD-10-CM

## 2023-05-16 PROCEDURE — 71260 CT THORAX DX C+: CPT | Mod: 26,MH

## 2023-05-16 PROCEDURE — 71260 CT THORAX DX C+: CPT | Mod: MH

## 2023-05-24 ENCOUNTER — APPOINTMENT (OUTPATIENT)
Dept: GASTROENTEROLOGY | Facility: CLINIC | Age: 68
End: 2023-05-24
Payer: MEDICARE

## 2023-05-24 VITALS
DIASTOLIC BLOOD PRESSURE: 69 MMHG | SYSTOLIC BLOOD PRESSURE: 108 MMHG | HEIGHT: 69 IN | WEIGHT: 142 LBS | BODY MASS INDEX: 21.03 KG/M2 | HEART RATE: 60 BPM

## 2023-05-24 DIAGNOSIS — M54.2 CERVICALGIA: ICD-10-CM

## 2023-05-24 DIAGNOSIS — Z86.010 PERSONAL HISTORY OF COLONIC POLYPS: ICD-10-CM

## 2023-05-24 DIAGNOSIS — M51.26 OTHER INTERVERTEBRAL DISC DISPLACEMENT, LUMBAR REGION: ICD-10-CM

## 2023-05-24 DIAGNOSIS — R07.9 CHEST PAIN, UNSPECIFIED: ICD-10-CM

## 2023-05-24 DIAGNOSIS — M54.9 CERVICALGIA: ICD-10-CM

## 2023-05-24 DIAGNOSIS — K64.8 OTHER HEMORRHOIDS: ICD-10-CM

## 2023-05-24 DIAGNOSIS — Z86.19 PERSONAL HISTORY OF OTHER INFECTIOUS AND PARASITIC DISEASES: ICD-10-CM

## 2023-05-24 DIAGNOSIS — K21.00 GASTRO-ESOPHAGEAL REFLUX DISEASE WITH ESOPHAGITIS, WITHOUT BLEEDING: ICD-10-CM

## 2023-05-24 DIAGNOSIS — G89.29 CERVICALGIA: ICD-10-CM

## 2023-05-24 DIAGNOSIS — A03.9 SHIGELLOSIS, UNSPECIFIED: ICD-10-CM

## 2023-05-24 DIAGNOSIS — R10.13 EPIGASTRIC PAIN: ICD-10-CM

## 2023-05-24 DIAGNOSIS — G89.29 EPIGASTRIC PAIN: ICD-10-CM

## 2023-05-24 DIAGNOSIS — Z87.11 PERSONAL HISTORY OF PEPTIC ULCER DISEASE: ICD-10-CM

## 2023-05-24 DIAGNOSIS — A04.8 OTHER SPECIFIED BACTERIAL INTESTINAL INFECTIONS: ICD-10-CM

## 2023-05-24 PROCEDURE — 99204 OFFICE O/P NEW MOD 45 MIN: CPT

## 2023-05-24 NOTE — REASON FOR VISIT
[Initial Evaluation] : an initial evaluation [FreeTextEntry1] : Patient presents with epigastric abdominal pain.

## 2023-05-24 NOTE — HISTORY OF PRESENT ILLNESS
[FreeTextEntry1] : Reports colonoscopies every 3 years. Reports next colonoscopy next week with Dr. Rowe.
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
## 2023-05-24 NOTE — ASSESSMENT
[FreeTextEntry1] : Pleasant 67 year old man presents with chronic epigastric abdominal pain.  H/o GERD, gastric ulcer, Shigella infection, prolapsed internal and external hemorrhoids (and hemorrhoidectomy), and colon polyps. Recommended EGD to look for scar tissue and breakdown of anastomosis.\par \par The patient will proceed with EGD. I explained to the patient the risks, alternatives and benefits to an EGD. Risk including but not limited to bleeding, perforation, infection adverse medication reaction. Questions were answered. agreeable to proceed with the planned procedures. \par \par The patient will hold NSAIDs for 5 days prior. Otherwise continue medications as prescribed. The patient is not on diabetes medications or anticoagulation. \par \par Patient seen and examined, overseeing documentation by Lacey WEEMS Will proceed with EGD to look for scar tissue and breakdown of anastomosis. Medications as usual. Reviewed and reconciled medications, allergies, PMHx, PSHx, SocHx, FMHx. Reviewed imaging, blood work, diagnostic testing, discussed with patient. Further recommendations pending results of above work-up and evaluation. \par \par All questions answered\par Call with any questions or concerns\par Time spent before and after visit reviewing patient's chart

## 2023-05-24 NOTE — CONSULT LETTER
[Dear  ___] : Dear  [unfilled], [Consult Letter:] : I had the pleasure of evaluating your patient, [unfilled]. [Consult Closing:] : Thank you very much for allowing me to participate in the care of this patient.  If you have any questions, please do not hesitate to contact me. [Sincerely,] : Sincerely, [FreeTextEntry1] : Malick is a pleasant 67 year old man presents with chronic epigastric abdominal pain.  H/o GERD, gastric ulcer, Shigella infection, prolapsed internal and external hemorrhoids (and hemorrhoidectomy), and colon polyps. Recommended EGD to look for scar tissue and breakdown of anastomosis.\par \par The patient will proceed with EGD. I explained to the patient the risks, alternatives and benefits to an EGD. Risk including but not limited to bleeding, perforation, infection adverse medication reaction. Questions were answered. agreeable to proceed with the planned procedures. \par \par The patient will hold NSAIDs for 5 days prior. Otherwise continue medications as prescribed. The patient is not on diabetes medications or anticoagulation. \par \par Patient seen and examined, overseeing documentation by Lacey WEEMS Will proceed with EGD to look for scar tissue and breakdown of anastomosis. Medications as usual. Reviewed and reconciled medications, allergies, PMHx, PSHx, SocHx, FMHx. Reviewed imaging, blood work, diagnostic testing, discussed with patient. Further recommendations pending results of above work-up and evaluation. \par \par All questions answered\par Call with any questions or concerns\par Time spent before and after visit reviewing suzanna [FreeTextEntry3] : Isa Story MD\par Gastroenterology, Hepatology and Motility\par

## 2023-06-06 ENCOUNTER — APPOINTMENT (OUTPATIENT)
Dept: GASTROENTEROLOGY | Facility: CLINIC | Age: 68
End: 2023-06-06

## 2023-06-06 ENCOUNTER — APPOINTMENT (OUTPATIENT)
Dept: NEUROLOGY | Facility: CLINIC | Age: 68
End: 2023-06-06

## 2023-06-28 NOTE — ED ADULT TRIAGE NOTE - HISTORY OF COVID-19 VACCINATION
RN Diabetes Education Progress Note      Reason for Visit: Post Program Diabetes Education .      Highlights of today's visit:  Patient present for follow up with RN. Patient states overall things are going well. He has been watching portions and is happy to see his blood sugar in better control. He admits he had one higher reading this morning and states he had popcorn and a glass of wine last night- he states the portion of popcorn was likely too big. Patient states he tried lowering the Levemir to 16 units nightly for several nights and it \"didn't seem to make much difference\" so he has continued with 18 units nightly. Recommend continuing current doses for now. Patient states he may be interested in the Freestyle Yodit. He is agreeable to discuss this further with his PCP at upcoming visit in August. Would recommend starting Freestyle Yodit with MD approval. Patient will follow up with RN 9/2023 as scheduled.    Current Medication and Medication Update/Changes:  No changes today-    Continue Levemir 18 units nightly, Humalog 1 unit: 5 grams carb with correction 2 units: 50 for blood sugar above 150    Assessment of Blood Sugars:   Patient is having episodes of hyperglycemia.    Patient has One Touch Verio meter     Fasting blood sugar ranges: 117- 141, today was high at 228- patient attributes this to eating too much popcorn last night  Other blood sugar ranges:   Blood sugar at office visit: 231    The patient was seen for Diabetes Self-Management Education in an individual setting for diagnosis of  use of insulin (CMD) [E11.42, Z79.4]   Poorly controlled type 2 diabetes mellitus (CMD) [E11.65].  The patient was seen from 1141 to 1219 and billed for 30 minutes. Relevant medical history reviewed.  The instruction was given to the patient.    Total Time today: 38 minutes  Carryover time from previous visit: 15 minutes  Total minutes billed today: 30 minutes  Carryover time for next visit: 23  minutes    Material was presented using verbal, written, demonstration and return demonstration.    Learning needs were assessed at initial visit and the patient requires education in medical nutrition therapy, physical activity, blood glucose monitoring, diabetes medications, acute complications, chronic complications and goal setting.     Labs:  Hemoglobin A1C: target value and patient current value reviewed   Hemoglobin A1C, POC (%)   Date Value   02/01/2021 7.7 (A)     Hemoglobin A1C (%)   Date Value   05/18/2023 10.1 (H)       Lipid panel:  target value and patient current value reviewed   Cholesterol (mg/dL)   Date Value   05/18/2023 149     LDL (mg/dL)   Date Value   05/18/2023 62     HDL (mg/dL)   Date Value   05/18/2023 45     Triglycerides (mg/dL)   Date Value   05/18/2023 210 (H)       Microalbumin:  target value and patient current value reviewed   Microalbumin, Urine (mg/dL)   Date Value   05/18/2023 183.00       Creatinine:  target value and patient current value reviewed   Creatinine (mg/dL)   Date Value   05/18/2023 1.05       Patient is up to date with labs    Anthropometric Measurements:  Estimated body mass index is 31.83 kg/m² as calculated from the following:    Height as of 5/31/23: 5' 4\" (1.626 m).    Weight as of 5/31/23: 84.1 kg (185 lb 6.5 oz).   Wt Readings from Last 2 Encounters:   05/31/23 84.1 kg (185 lb 6.5 oz)   05/23/23 85 kg (187 lb 6.3 oz)     patient states he is frustrated he is not losing weight- is watching food portions and is interested in increasing physical activity    Physical Activity - Incorporating Activity into Lifestyle :  Pt belongs to health club and does use at this time.  Pt currently does walking, elliptical, bike, resistance as physical activity  3 times per week at the gym for 30 minutes.    Print/Written Resources Provided:   After Visit Summary (AVS)    Plan:  Chart routed to referring Provider  Goal Setting to Promote Health & Problem Solving for Daily  Living was discussed.  See DM Care Plan for goals and topics covered.  See Patient Instructions for further information.     Order:  DSMT Order Expires: 5/22/24  Order located in EMR.  Billing Provider Dr Durán  Referring Provider: Dr D'Amico  Service Provider: [facility]    Recommended Follow-up:  Pt to follow up with DM education in a one on one visit.  The patient was encouraged to call back with any questions or concerns.    Assessments :  Verify assessment form is up to date: Current 5/31/23          Yes

## 2023-06-30 ENCOUNTER — APPOINTMENT (OUTPATIENT)
Dept: GASTROENTEROLOGY | Facility: AMBULATORY MEDICAL SERVICES | Age: 68
End: 2023-06-30

## 2023-07-07 ENCOUNTER — INPATIENT (INPATIENT)
Facility: HOSPITAL | Age: 68
LOS: 1 days | Discharge: HOME | DRG: 392 | End: 2023-07-09
Attending: INTERNAL MEDICINE | Admitting: FAMILY MEDICINE
Payer: MEDICARE

## 2023-07-07 VITALS — WEIGHT: 139.99 LBS | HEIGHT: 66 IN

## 2023-07-07 DIAGNOSIS — Z98.89 OTHER SPECIFIED POSTPROCEDURAL STATES: Chronic | ICD-10-CM

## 2023-07-07 DIAGNOSIS — Z96.89 PRESENCE OF OTHER SPECIFIED FUNCTIONAL IMPLANTS: Chronic | ICD-10-CM

## 2023-07-07 DIAGNOSIS — Z98.890 OTHER SPECIFIED POSTPROCEDURAL STATES: Chronic | ICD-10-CM

## 2023-07-07 DIAGNOSIS — K85.90 ACUTE PANCREATITIS WITHOUT NECROSIS OR INFECTION, UNSPECIFIED: ICD-10-CM

## 2023-07-07 DIAGNOSIS — Z87.74 PERSONAL HISTORY OF (CORRECTED) CONGENITAL MALFORMATIONS OF HEART AND CIRCULATORY SYSTEM: Chronic | ICD-10-CM

## 2023-07-07 DIAGNOSIS — Z98.84 BARIATRIC SURGERY STATUS: Chronic | ICD-10-CM

## 2023-07-07 DIAGNOSIS — Z90.89 ACQUIRED ABSENCE OF OTHER ORGANS: Chronic | ICD-10-CM

## 2023-07-07 DIAGNOSIS — Z98.61 CORONARY ANGIOPLASTY STATUS: Chronic | ICD-10-CM

## 2023-07-07 LAB
ADD ON TEST-SPECIMEN IN LAB: SIGNIFICANT CHANGE UP
ALBUMIN SERPL ELPH-MCNC: 2.8 G/DL — LOW (ref 3.3–5)
ALP SERPL-CCNC: 110 U/L — SIGNIFICANT CHANGE UP (ref 40–120)
ALT FLD-CCNC: 39 U/L — SIGNIFICANT CHANGE UP (ref 12–78)
ANION GAP SERPL CALC-SCNC: 2 MMOL/L — LOW (ref 5–17)
AST SERPL-CCNC: 39 U/L — HIGH (ref 15–37)
BASOPHILS # BLD AUTO: 0.05 K/UL — SIGNIFICANT CHANGE UP (ref 0–0.2)
BASOPHILS NFR BLD AUTO: 0.6 % — SIGNIFICANT CHANGE UP (ref 0–2)
BILIRUB SERPL-MCNC: 0.3 MG/DL — SIGNIFICANT CHANGE UP (ref 0.2–1.2)
BUN SERPL-MCNC: 21 MG/DL — SIGNIFICANT CHANGE UP (ref 7–23)
CALCIUM SERPL-MCNC: 8 MG/DL — LOW (ref 8.5–10.1)
CAMPYLOBACTER DNA SPEC NAA+PROBE: DETECTED
CHLORIDE SERPL-SCNC: 118 MMOL/L — HIGH (ref 96–108)
CO2 SERPL-SCNC: 22 MMOL/L — SIGNIFICANT CHANGE UP (ref 22–31)
CREAT SERPL-MCNC: 0.95 MG/DL — SIGNIFICANT CHANGE UP (ref 0.5–1.3)
CRP SERPL-MCNC: <3 MG/L — SIGNIFICANT CHANGE UP
EGFR: 87 ML/MIN/1.73M2 — SIGNIFICANT CHANGE UP
EOSINOPHIL # BLD AUTO: 0.02 K/UL — SIGNIFICANT CHANGE UP (ref 0–0.5)
EOSINOPHIL NFR BLD AUTO: 0.2 % — SIGNIFICANT CHANGE UP (ref 0–6)
ERYTHROCYTE [SEDIMENTATION RATE] IN BLOOD: 16 MM/HR — SIGNIFICANT CHANGE UP (ref 0–20)
GI PCR PANEL: DETECTED
GLUCOSE BLDC GLUCOMTR-MCNC: 114 MG/DL — HIGH (ref 70–99)
GLUCOSE SERPL-MCNC: 69 MG/DL — LOW (ref 70–99)
HCT VFR BLD CALC: 45.2 % — SIGNIFICANT CHANGE UP (ref 39–50)
HGB BLD-MCNC: 14.7 G/DL — SIGNIFICANT CHANGE UP (ref 13–17)
IMM GRANULOCYTES NFR BLD AUTO: 0.2 % — SIGNIFICANT CHANGE UP (ref 0–0.9)
LIDOCAIN IGE QN: 915 U/L — HIGH (ref 73–393)
LYMPHOCYTES # BLD AUTO: 2.83 K/UL — SIGNIFICANT CHANGE UP (ref 1–3.3)
LYMPHOCYTES # BLD AUTO: 32.2 % — SIGNIFICANT CHANGE UP (ref 13–44)
MCHC RBC-ENTMCNC: 29.7 PG — SIGNIFICANT CHANGE UP (ref 27–34)
MCHC RBC-ENTMCNC: 32.5 GM/DL — SIGNIFICANT CHANGE UP (ref 32–36)
MCV RBC AUTO: 91.3 FL — SIGNIFICANT CHANGE UP (ref 80–100)
MONOCYTES # BLD AUTO: 0.6 K/UL — SIGNIFICANT CHANGE UP (ref 0–0.9)
MONOCYTES NFR BLD AUTO: 6.8 % — SIGNIFICANT CHANGE UP (ref 2–14)
NEUTROPHILS # BLD AUTO: 5.26 K/UL — SIGNIFICANT CHANGE UP (ref 1.8–7.4)
NEUTROPHILS NFR BLD AUTO: 60 % — SIGNIFICANT CHANGE UP (ref 43–77)
PLATELET # BLD AUTO: 242 K/UL — SIGNIFICANT CHANGE UP (ref 150–400)
POTASSIUM SERPL-MCNC: 4 MMOL/L — SIGNIFICANT CHANGE UP (ref 3.5–5.3)
POTASSIUM SERPL-SCNC: 4 MMOL/L — SIGNIFICANT CHANGE UP (ref 3.5–5.3)
PROT SERPL-MCNC: 6.4 GM/DL — SIGNIFICANT CHANGE UP (ref 6–8.3)
RBC # BLD: 4.95 M/UL — SIGNIFICANT CHANGE UP (ref 4.2–5.8)
RBC # FLD: 15.4 % — HIGH (ref 10.3–14.5)
SODIUM SERPL-SCNC: 142 MMOL/L — SIGNIFICANT CHANGE UP (ref 135–145)
WBC # BLD: 8.78 K/UL — SIGNIFICANT CHANGE UP (ref 3.8–10.5)
WBC # FLD AUTO: 8.78 K/UL — SIGNIFICANT CHANGE UP (ref 3.8–10.5)

## 2023-07-07 PROCEDURE — 83993 ASSAY FOR CALPROTECTIN FECAL: CPT

## 2023-07-07 PROCEDURE — 74177 CT ABD & PELVIS W/CONTRAST: CPT | Mod: 26,MA

## 2023-07-07 PROCEDURE — 85025 COMPLETE CBC W/AUTO DIFF WBC: CPT

## 2023-07-07 PROCEDURE — 87507 IADNA-DNA/RNA PROBE TQ 12-25: CPT

## 2023-07-07 PROCEDURE — A9579: CPT

## 2023-07-07 PROCEDURE — 85652 RBC SED RATE AUTOMATED: CPT

## 2023-07-07 PROCEDURE — 36415 COLL VENOUS BLD VENIPUNCTURE: CPT

## 2023-07-07 PROCEDURE — 87493 C DIFF AMPLIFIED PROBE: CPT

## 2023-07-07 PROCEDURE — 99285 EMERGENCY DEPT VISIT HI MDM: CPT

## 2023-07-07 PROCEDURE — G1004: CPT

## 2023-07-07 PROCEDURE — 99222 1ST HOSP IP/OBS MODERATE 55: CPT

## 2023-07-07 PROCEDURE — 87040 BLOOD CULTURE FOR BACTERIA: CPT

## 2023-07-07 PROCEDURE — 86140 C-REACTIVE PROTEIN: CPT

## 2023-07-07 PROCEDURE — 82962 GLUCOSE BLOOD TEST: CPT

## 2023-07-07 PROCEDURE — 80053 COMPREHEN METABOLIC PANEL: CPT

## 2023-07-07 PROCEDURE — 99223 1ST HOSP IP/OBS HIGH 75: CPT

## 2023-07-07 PROCEDURE — 74183 MRI ABD W/O CNTR FLWD CNTR: CPT | Mod: ME

## 2023-07-07 RX ORDER — SODIUM CHLORIDE 9 MG/ML
1000 INJECTION, SOLUTION INTRAVENOUS
Refills: 0 | Status: COMPLETED | OUTPATIENT
Start: 2023-07-07 | End: 2023-07-07

## 2023-07-07 RX ORDER — MORPHINE SULFATE 50 MG/1
4 CAPSULE, EXTENDED RELEASE ORAL ONCE
Refills: 0 | Status: DISCONTINUED | OUTPATIENT
Start: 2023-07-07 | End: 2023-07-07

## 2023-07-07 RX ORDER — SODIUM CHLORIDE 9 MG/ML
1000 INJECTION INTRAMUSCULAR; INTRAVENOUS; SUBCUTANEOUS ONCE
Refills: 0 | Status: COMPLETED | OUTPATIENT
Start: 2023-07-07 | End: 2023-07-07

## 2023-07-07 RX ORDER — ONDANSETRON 8 MG/1
4 TABLET, FILM COATED ORAL EVERY 8 HOURS
Refills: 0 | Status: DISCONTINUED | OUTPATIENT
Start: 2023-07-07 | End: 2023-07-09

## 2023-07-07 RX ORDER — ACETAMINOPHEN 500 MG
650 TABLET ORAL EVERY 6 HOURS
Refills: 0 | Status: DISCONTINUED | OUTPATIENT
Start: 2023-07-07 | End: 2023-07-08

## 2023-07-07 RX ORDER — LANOLIN ALCOHOL/MO/W.PET/CERES
3 CREAM (GRAM) TOPICAL AT BEDTIME
Refills: 0 | Status: DISCONTINUED | OUTPATIENT
Start: 2023-07-07 | End: 2023-07-09

## 2023-07-07 RX ADMIN — SODIUM CHLORIDE 70 MILLILITER(S): 9 INJECTION, SOLUTION INTRAVENOUS at 19:23

## 2023-07-07 RX ADMIN — MORPHINE SULFATE 4 MILLIGRAM(S): 50 CAPSULE, EXTENDED RELEASE ORAL at 15:12

## 2023-07-07 RX ADMIN — SODIUM CHLORIDE 1000 MILLILITER(S): 9 INJECTION INTRAMUSCULAR; INTRAVENOUS; SUBCUTANEOUS at 10:28
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
## 2023-07-07 NOTE — ED ADULT NURSE NOTE - NSFALLUNIVINTERV_ED_ALL_ED
Bed/Stretcher in lowest position, wheels locked, appropriate side rails in place/Call bell, personal items and telephone in reach/Instruct patient to call for assistance before getting out of bed/chair/stretcher/Non-slip footwear applied when patient is off stretcher/[place] to call system/Physically safe environment - no spills, clutter or unnecessary equipment/Purposeful proactive rounding/Room/bathroom lighting operational, light cord in reach

## 2023-07-07 NOTE — ED PROVIDER NOTE - NS ED ROS FT
Constitutional: No fever or chills  Eyes: No visual changes  HEENT: No throat pain  CV: No chest pain  Resp: No SOB no cough  GI: No nausea or vomiting. +abd pain, +diarrhea  : No dysuria  MSK: No musculoskeletal pain  Skin: No rash  Neuro: No headache

## 2023-07-07 NOTE — ED PROVIDER NOTE - PHYSICAL EXAMINATION
Constitutional: Middle aged male laying in bed appears younger than stated age NAD   Eyes: PERRLA  Head: Normocephalic   Mouth: MMM  Cardiac: regular rate   Resp: Lungs CTAB  GI: Abd s/nd, no rebound or guarding. Epigastric tenderness.   Neuro: awake, alert, moving all extremities  Skin: No rashes

## 2023-07-07 NOTE — ED PROVIDER NOTE - CLINICAL SUMMARY MEDICAL DECISION MAKING FREE TEXT BOX
69 y/o male with chronic intermittent abd pain and diarrhea. Old notes reviewed. Will discuss with GI, obtain labs, CT, reassess.

## 2023-07-07 NOTE — CONSULT NOTE ADULT - ASSESSMENT
68 year old male with history GERD, hiatal hernia, shigella/ ecoli infection 11/2022 with recurrence 2/2023 presenting with persistent nonbloody diarrhea x 6+ weeks.    Imp: Proctitis, diarrhea, r/o infectious versus inflammatory source  Lipase elevated with PD prominence in past studies, will get MRCP, no indication for ERCP currently    Rec:  ::Stools for cdiff/pcr/fecal calpro  ::CRP/ESR  ::IVF  ::Clear liquids, adv as pamella  ::No emergent necessity for endoscopic evaluation  ::If stool studies negative, consider anti-diarrheals  ::Followed by Dr. Rowe-->will ultimately resume care 7/10 and followup outpatient      68 year old male with history GERD, hiatal hernia, shigella/ ecoli infection 11/2022 with recurrence 2/2023 presenting with persistent nonbloody diarrhea x 6+ weeks.    Imp: Proctitis, diarrhea, r/o infectious versus inflammatory source  Lipase elevated with PD prominence in past studies, will get MRCP, no indication for ERCP/EUS currently    Rec:  ::Stools for cdiff/pcr/fecal calpro  ::CRP/ESR  ::IVF  ::Clear liquids, adv as pamella  ::No emergent necessity for repeat endoscopic evaluation  ::If stool studies negative, consider anti-diarrheals  ::Followed by Dr. Rowe-->will ultimately resume care 7/10 and followup outpatient

## 2023-07-07 NOTE — H&P ADULT - ASSESSMENT
68 year old man with significant past medical history significant for aortic stenosis s/p AV replacement 5/2018, Hypertension, Hyperlipidemia, Depression, Anxiety Disorder, chronic neck and back pain with herniated lumbar disc complicated by lumbar radiculopathy, GERD, shigella/ ecoli infection 11/2022 with recurrence 2/2023 presenting with 6+ weeks loose non bloody stools/diarrhea with associated nausea and intermittent epigastric/abdominal pain. Of note the patient. The patient denies ay associated subjective fevers, chills, recent sick contacts, or travel. The patient further describes foul smelling loose stools. Labs => Cl 118, Glu 69, Alb 2.8, Lipase 915, GI PCR (+)C. jejuni. CT ABD/Pelvis => Concern for proctitis. Correlate clinically. Prior left mid abdominal small bowel surgery and an adjacent loop of small bowel demonstrating mural thickening. In the ED the patient was given Morphine 4mg IVP x 1, and NS x 1L.      68 year old man with significant past medical history significant for aortic stenosis s/p AV replacement 5/2018, Hypertension, Hyperlipidemia, Depression, Anxiety Disorder, chronic neck and back pain with herniated lumbar disc complicated by lumbar radiculopathy, GERD, shigella/ ecoli infection 11/2022 with recurrence 2/2023 presenting with 6+ weeks loose non bloody stools/diarrhea with associated nausea and intermittent epigastric/abdominal pain. Of note the patient. The patient denies ay associated subjective fevers, chills, recent sick contacts, or travel. The patient further describes foul smelling loose stools. Labs => Cl 118, Glu 69, Alb 2.8, Lipase 915, GI PCR (+)C. jejuni. CT ABD/Pelvis => Concern for proctitis. Correlate clinically. Prior left mid abdominal small bowel surgery and an adjacent loop of small bowel demonstrating mural thickening. In the ED the patient was given Morphine 4mg IVP x 1, and NS x 1L.     #Severe Abdominal Pain complicated by Proctitis and Intractable Diarrhea possibly Infectious  ~admit to Medicine  ~f/u w/ GI consultation in the am  ~f/u w/ ID consultation in the am  ~f/u stool studies for cdiff/pcr/fecal calpro  ~f/u CRP/ESR  ~cont. IV hydration  ~cont. to advance diet as tolerated     #Hyperlipidemia  ~cont. Atorvastatin 10mg po qhs     #Depression/Anxiety  ~cont. Bupropion 100mg po tid  ~cont. Venlafaxine 75mg po bid    #HIV  ~cont. Descovy 200-25mg po daily    #CAD/Hypertension  ~cont. ASA 81mg po daily  ~cont. Metoprolol 12.5mg po daily    #GERD  ~cont. Esomeprazole 40mg po bid    #Vte ppx  ~cont. SCDs for now  68 year old man with significant past medical history significant for aortic stenosis s/p AV replacement 5/2018, Hypertension, Hyperlipidemia, Depression, Anxiety Disorder, chronic neck and back pain with herniated lumbar disc complicated by lumbar radiculopathy, GERD, shigella/ ecoli infection 11/2022 with recurrence 2/2023 presenting with 6+ weeks loose non bloody stools/diarrhea with associated nausea and intermittent epigastric/abdominal pain. Of note the patient. The patient denies ay associated subjective fevers, chills, recent sick contacts, or travel. The patient further describes foul smelling loose stools. Labs => Cl 118, Glu 69, Alb 2.8, Lipase 915, GI PCR (+)C. jejuni. CT ABD/Pelvis => Concern for proctitis. Correlate clinically. Prior left mid abdominal small bowel surgery and an adjacent loop of small bowel demonstrating mural thickening. In the ED the patient was given Morphine 4mg IVP x 1, and NS x 1L.     #Severe Abdominal Pain complicated by Proctitis and Intractable Diarrhea possibly Infectious  ~admit to Medicine  ~f/u w/ GI consultation in the am  ~f/u w/ ID consultation in the am  ~f/u stool studies for cdiff/pcr/fecal calpro  ~f/u CRP/ESR  ~cont. IV hydration  ~cont. to advance diet as tolerated     #Hyperlipidemia  ~cont. Atorvastatin 10mg po qhs     #Depression/Anxiety  ~cont. Bupropion 100mg po tid  ~cont. Venlafaxine 75mg po bid    #CAD/Hypertension  ~cont. ASA 81mg po daily  ~cont. Metoprolol 12.5mg po daily    #GERD  ~cont. Esomeprazole 40mg po bid    #Vte ppx  ~cont. SCDs for now

## 2023-07-07 NOTE — CONSULT NOTE ADULT - SUBJECTIVE AND OBJECTIVE BOX
Patient is a 68y old  Male who presents with a chief complaint of diarrhea    HPI: This is a 68 year old male with significant past medical history of HLD, GERD, anxiety, AS with valve replacement 2018, shigella/ ecoli infection 11/2022 with recurrence 2/2023 presenting with 6+ weeks loose nonbloody stools with associated nausea, intermittent abdominal pain. Per patient had colonoscopy several months ago with primary GI, Dr. Rowe, unremarkable. Was told he needed "a test that is only done by GI specialist with tiny tools," and was seen in [place], evaluated, had EGD with +hiatal hernia prescribed twice daily PPI, now only taking once daily. Regarding loose stools, described as foul smelling mixed brown/green nonbloody without identified aggravating or alleviating factors. Denies taking anti-diarrheals. Denies fever, chills, weight loss. Lipase 900. CT with likely proctitis.       PAST MEDICAL & SURGICAL HISTORY:  Hyperlipidemia      GERD (gastroesophageal reflux disease)      Anxiety      Skin cancer  external right ear, unsure of type      HTN (hypertension)      Aortic stenosis  Bicuspid Aortic Valve Replacement- 5/29/2018      Chronic neck and back pain      Sciatica      Internal and external prolapsed hemorrhoids  had surgery      Helicobacter pylori infection      History of colonic polyps      Herniated lumbar intervertebral disc      Gastrointestinal hemorrhage      Carpal tunnel syndrome  had surgery      Depressive disorder      Loss of hearing      Morbid obesity      Lumbar radiculopathy      Hearing loss      Erectile dysfunction      Shigella infection      S/P carpal tunnel release  right side, left side- 2014      History of back surgery  laminectomy x2 last 2018      S/P trigger finger release  right thumb and middle finger      H/O external ear surgery  excision mass right ear- cancer 2018      H/O bicuspid aortic valve  replacement- 5/29/18      Status post coronary angioplasty  pt not sure he had this procedure      S/P tonsillectomy      H/O hemorrhoidectomy      Spinal cord stimulator status  10/2020      H/O bariatric surgery      MEDICATIONS  (STANDING):  morphine  - Injectable 4 milliGRAM(s) IV Push Once    MEDICATIONS  (PRN):      Allergies    No Known Allergies    Intolerances        SOCIAL HISTORY:    FAMILY HISTORY:  FH: HTN (hypertension)  father    Family history of dementia (Father)    REVIEW OF SYSTEMS:    CONSTITUTIONAL: No weakness, fevers or chills  EYES/ENT: No visual changes;  No vertigo or throat pain   NECK: No pain or stiffness  RESPIRATORY: No cough, wheezing, hemoptysis; No shortness of breath  CARDIOVASCULAR: No chest pain or palpitations  GASTROINTESTINAL: See HPI  GENITOURINARY: No dysuria, frequency or hematuria  NEUROLOGICAL: No numbness or weakness  SKIN: No itching, burning, rashes, or lesions   PSYCH: Normal mood and affect  All other review of systems is negative unless indicated above.    Vital Signs Last 24 Hrs  T(C): 36.4 (07 Jul 2023 09:29), Max: 36.4 (07 Jul 2023 09:29)  T(F): 97.6 (07 Jul 2023 09:29), Max: 97.6 (07 Jul 2023 09:29)  HR: 69 (07 Jul 2023 09:29) (69 - 69)  BP: 114/76 (07 Jul 2023 09:29) (114/76 - 114/76)  BP(mean): 88 (07 Jul 2023 09:29) (88 - 88)  RR: 18 (07 Jul 2023 09:29) (18 - 18)  SpO2: 100% (07 Jul 2023 09:29) (100% - 100%)    Parameters below as of 07 Jul 2023 09:29  Patient On (Oxygen Delivery Method): room air        PHYSICAL EXAM:    Constitutional: No acute distress, well-developed, non-toxic appearing  HEENT: masked, good phonation, not icteric  Neck: supple, no lymphadenopathy  Respiratory: clear to ascultation bilaterally, no wheezing  Cardiovascular: S1 and S2, regular rate and rhythm, no murmurs rubs or gallops  Gastrointestinal: soft, non-tender, non-distended, +bowel sounds, no rebound or guarding, no surgical scars, no drains  Extremities: No peripheral edema, no cyanosis or clubbing  Vascular: 2+ peripheral pulses, no venous stasis  Neurological: A/O x 3, no focal deficits, no asterixis  Psychiatric: Normal mood, normal affect  Skin: No rashes, not jaundiced    LABS:                        14.7   8.78  )-----------( 242      ( 07 Jul 2023 10:22 )             45.2     07-07    142  |  118<H>  |  21  ----------------------------<  69<L>  4.0   |  22  |  0.95    Ca    8.0<L>      07 Jul 2023 11:18    TPro  6.4  /  Alb  2.8<L>  /  TBili  0.3  /  DBili  x   /  AST  39<H>  /  ALT  39  /  AlkPhos  110  07-07      LIVER FUNCTIONS - ( 07 Jul 2023 11:18 )  Alb: 2.8 g/dL / Pro: 6.4 gm/dL / ALK PHOS: 110 U/L / ALT: 39 U/L / AST: 39 U/L / GGT: x             RADIOLOGY & ADDITIONAL STUDIES:  CONTRAST/COMPLICATIONS:  IV Contrast: Omnipaque 350  90 cc administered   10 cc discarded  Oral Contrast: NONE  Complications: None reported at time of study completion    PROCEDURE:  CT of the Abdomen and Pelvis was performed.  Sagittal and coronal reformats were performed.    FINDINGS:  LOWER CHEST: Within normal limits.    LIVER: A few stable small hypodense foci that cannot be characterized.  BILE DUCTS: Normal caliber.  GALLBLADDER: Cholecystectomy.  SPLEEN: Within normal limits.  PANCREAS: Within normal limits.  ADRENALS: Within normal limits.  KIDNEYS/URETERS: Within normal limits.    BLADDER: Under distended, unremarkable.  REPRODUCTIVE ORGANS: Prostate within normal limits.    BOWEL: No bowel obstruction. Appendix is not visualized. No evidence of   inflammation in the pericecal region. Probable mural thickening involving   the rectum. Correlate for proctitis. Evidence of a suture line involving   the small bowel in the left mid abdomen. An adjacent loop of small bowel   suggests mural thickening. Correlate clinically. If needed CT or MR   enterography may be helpful. Prior gastric surgery possibly sleeve   gastrectomy.  PERITONEUM: No ascites.  VESSELS: Atherosclerotic changes.  RETROPERITONEUM/LYMPH NODES: No lymphadenopathy.  ABDOMINAL WALL: Small left inguinal hernia containing fluid, similar to   the prior study.  BONES: Degenerative changes.    IMPRESSION:  Concern for proctitis. Correlate clinically.    Prior left mid abdominal small bowel surgery and an adjacent loop of   small bowel demonstrating mural thickening. Patient is a 68y old  Male who presents with a chief complaint of diarrhea    68 year old man with significant past medical history of HLD, GERD,, anxiety, AS with valve replacement 2018, shigella/ ecoli infection 11/2022 with recurrence 2/2023 presenting with 6+ weeks loose non bloody stools with associated nausea    Patient states that he had normal return to his BM's after last discharge. Now once again orver the last 6 weeks has had loose stools a few times a day. No nighttime awakening. Not clearly related to meals, random/sporadic. No fevers or chills.No sick contacts or travel history. No eating raw or undercooked meats. Per patient had colonoscopy several months ago with primary GI, Dr. Rowe, unremarkable. Patient  and was seen in [place] for second opinion, evaluated, had EGD with +hiatal hernia prescribed twice daily PPI, now only taking once daily. Regarding loose stools, described as foul smelling mixed brown/green nonbloody without identified aggravating or alleviating factors. Denies taking anti-diarrheals. Denies fever, chills, weight loss. Lipase 900. CT with likely proctitis.       PAST MEDICAL & SURGICAL HISTORY:  Hyperlipidemia      GERD (gastroesophageal reflux disease)      Anxiety      Skin cancer  external right ear, unsure of type      HTN (hypertension)      Aortic stenosis  Bicuspid Aortic Valve Replacement- 5/29/2018      Chronic neck and back pain      Sciatica      Internal and external prolapsed hemorrhoids  had surgery      Helicobacter pylori infection      History of colonic polyps      Herniated lumbar intervertebral disc      Gastrointestinal hemorrhage      Carpal tunnel syndrome  had surgery      Depressive disorder      Loss of hearing      Morbid obesity      Lumbar radiculopathy      Hearing loss      Erectile dysfunction      Shigella infection      S/P carpal tunnel release  right side, left side- 2014      History of back surgery  laminectomy x2 last 2018      S/P trigger finger release  right thumb and middle finger      H/O external ear surgery  excision mass right ear- cancer 2018      H/O bicuspid aortic valve  replacement- 5/29/18      Status post coronary angioplasty  pt not sure he had this procedure      S/P tonsillectomy      H/O hemorrhoidectomy      Spinal cord stimulator status  10/2020      H/O bariatric surgery      MEDICATIONS  (STANDING):  morphine  - Injectable 4 milliGRAM(s) IV Push Once    MEDICATIONS  (PRN):      Allergies    No Known Allergies    Intolerances    SOCIAL HISTORY:  no smoking,drinking or drugs    FAMILY HISTORY:   HTN (father)\   dementia (Father)    REVIEW OF SYSTEMS:    CONSTITUTIONAL: No weakness, fevers or chills  EYES/ENT: No visual changes;  No vertigo or throat pain   NECK: No pain or stiffness  RESPIRATORY: No cough, wheezing, hemoptysis; No shortness of breath  CARDIOVASCULAR: No chest pain or palpitations  GASTROINTESTINAL: See HPI  GENITOURINARY: No dysuria, frequency or hematuria  NEUROLOGICAL: No numbness or weakness  SKIN: No itching, burning, rashes, or lesions   PSYCH: Normal mood and affect  All other review of systems is negative unless indicated above.    Vital Signs Last 24 Hrs  T(C): 36.4 (07 Jul 2023 09:29), Max: 36.4 (07 Jul 2023 09:29)  T(F): 97.6 (07 Jul 2023 09:29), Max: 97.6 (07 Jul 2023 09:29)  HR: 69 (07 Jul 2023 09:29) (69 - 69)  BP: 114/76 (07 Jul 2023 09:29) (114/76 - 114/76)  BP(mean): 88 (07 Jul 2023 09:29) (88 - 88)  RR: 18 (07 Jul 2023 09:29) (18 - 18)  SpO2: 100% (07 Jul 2023 09:29) (100% - 100%)    Parameters below as of 07 Jul 2023 09:29  Patient On (Oxygen Delivery Method): room air        PHYSICAL EXAM:    Constitutional: No acute distress, well-developed, non-toxic appearing  HEENT: immasked, good phonation, not icteric  Neck: supple, no lymphadenopathy  Respiratory: clear to ascultation bilaterally, no wheezing  Cardiovascular: S1 and S2, regular rate and rhythm, no murmurs rubs or gallops  Gastrointestinal: soft, non-tender, non-distended, +bowel sounds, no rebound or guarding, + surgical scars, no drains  Extremities: No peripheral edema, no cyanosis or clubbing  Vascular: 2+ peripheral pulses, no venous stasis  Neurological: A/O x 3, no focal deficits, no asterixis  Psychiatric: Normal mood, normal affect  Skin: No rashes, not jaundiced    LABS:                        14.7   8.78  )-----------( 242      ( 07 Jul 2023 10:22 )             45.2     07-07    142  |  118<H>  |  21  ----------------------------<  69<L>  4.0   |  22  |  0.95    Ca    8.0<L>      07 Jul 2023 11:18    TPro  6.4  /  Alb  2.8<L>  /  TBili  0.3  /  DBili  x   /  AST  39<H>  /  ALT  39  /  AlkPhos  110  07-07      LIVER FUNCTIONS - ( 07 Jul 2023 11:18 )  Alb: 2.8 g/dL / Pro: 6.4 gm/dL / ALK PHOS: 110 U/L / ALT: 39 U/L / AST: 39 U/L / GGT: x             RADIOLOGY & ADDITIONAL STUDIES:  CONTRAST/COMPLICATIONS:  IV Contrast: Omnipaque 350  90 cc administered   10 cc discarded  Oral Contrast: NONE  Complications: None reported at time of study completion    PROCEDURE:  CT of the Abdomen and Pelvis was performed.  Sagittal and coronal reformats were performed.    FINDINGS:  LOWER CHEST: Within normal limits.    LIVER: A few stable small hypodense foci that cannot be characterized.  BILE DUCTS: Normal caliber.  GALLBLADDER: Cholecystectomy.  SPLEEN: Within normal limits.  PANCREAS: Within normal limits.  ADRENALS: Within normal limits.  KIDNEYS/URETERS: Within normal limits.    BLADDER: Under distended, unremarkable.  REPRODUCTIVE ORGANS: Prostate within normal limits.    BOWEL: No bowel obstruction. Appendix is not visualized. No evidence of   inflammation in the pericecal region. Probable mural thickening involving   the rectum. Correlate for proctitis. Evidence of a suture line involving   the small bowel in the left mid abdomen. An adjacent loop of small bowel   suggests mural thickening. Correlate clinically. If needed CT or MR   enterography may be helpful. Prior gastric surgery possibly sleeve   gastrectomy.  PERITONEUM: No ascites.  VESSELS: Atherosclerotic changes.  RETROPERITONEUM/LYMPH NODES: No lymphadenopathy.  ABDOMINAL WALL: Small left inguinal hernia containing fluid, similar to   the prior study.  BONES: Degenerative changes.    IMPRESSION:  Concern for proctitis. Correlate clinically.    Prior left mid abdominal small bowel surgery and an adjacent loop of   small bowel demonstrating mural thickening.

## 2023-07-07 NOTE — H&P ADULT - HISTORY OF PRESENT ILLNESS
68 year old man with significant past medical history significant for aortic stenosis s/p AV replacement 5/2018, Hypertension, Hyperlipidemia, Depression, Anxiety Disorder, chronic neck and back pain with herniated lumbar disc complicated by lumbar radiculopathy, GERD, shigella/ ecoli infection 11/2022 with recurrence 2/2023 presenting with 6+ weeks loose non bloody stools/diarrhea with associated nausea and intermittent epigastric/abdominal pain. Of note the patient. The patient denies ay associated subjective fevers, chills, recent sick contacts, or travel. The patient further describes foul smelling loose stools. Labs => Cl 118, Glu 69, Alb 2.8, Lipase 915, GI PCR (+)C. jejuni. CT ABD/Pelvis => Concern for proctitis. Correlate clinically. Prior left mid abdominal small bowel surgery and an adjacent loop of small bowel demonstrating mural thickening. In the ED the patient was given Morphine 4mg IVP x 1, and NS x 1L.

## 2023-07-07 NOTE — ED PROVIDER NOTE - OBJECTIVE STATEMENT
67 y/o male with a PMHx of anxiety, aortic stenosis, carpal tunnel, chronic neck and back pain, colon polyps, depressive disorder, erectile dysfunction, GI hemorrhage, GERD, hearing loss, H pylori, herniated lumbar disc, HTN, HLD, lumbar radiculopathy, morbid obesity, sciatica, shigella, spinal cord stimulator presents to the ED c/o intermittent upper abd pain and diarrhea. Pt was on Protonix BID and now takes once daily.  Pt follows with GI at [place] but no longer wants to go there. Pt states he was sent here for "specialized testing with specialized tools that can only be done in the hospital." Denies vomiting. No other complaints at this time.

## 2023-07-07 NOTE — PATIENT PROFILE ADULT - FALL HARM RISK - UNIVERSAL INTERVENTIONS
Bed in lowest position, wheels locked, appropriate side rails in place/Call bell, personal items and telephone in reach/Instruct patient to call for assistance before getting out of bed or chair/Non-slip footwear when patient is out of bed/[place] to call system/Physically safe environment - no spills, clutter or unnecessary equipment/Purposeful Proactive Rounding/Room/bathroom lighting operational, light cord in reach

## 2023-07-07 NOTE — H&P ADULT - NSHPPHYSICALEXAM_GEN_ALL_CORE
Vital Signs Last 24 Hrs  T(C): 36.7 (07 Jul 2023 19:13), Max: 36.7 (07 Jul 2023 19:13)  T(F): 98.1 (07 Jul 2023 19:13), Max: 98.1 (07 Jul 2023 19:13)  HR: 78 (07 Jul 2023 19:13) (56 - 78)  BP: 141/76 (07 Jul 2023 19:13) (111/61 - 141/76)  BP(mean): 88 (07 Jul 2023 09:29) (88 - 88)  RR: 18 (07 Jul 2023 19:13) (16 - 18)  SpO2: 100% (07 Jul 2023 19:13) (96% - 100%)    Parameters below as of 07 Jul 2023 19:13  Patient On (Oxygen Delivery Method): room air

## 2023-07-07 NOTE — ED PROVIDER NOTE - PROGRESS NOTE DETAILS
Lacey Miller for attending Dr. Martinez: Discussed with GI NP Rae who is covering Dr. Rowe. requests CT abd pelvis, evaluate liver function. Will see pt. Likely follow up for ERCP with Dr. Silver if needed.

## 2023-07-08 LAB
ALBUMIN SERPL ELPH-MCNC: 3.3 G/DL — SIGNIFICANT CHANGE UP (ref 3.3–5)
ALP SERPL-CCNC: 119 U/L — SIGNIFICANT CHANGE UP (ref 40–120)
ALT FLD-CCNC: 49 U/L — SIGNIFICANT CHANGE UP (ref 12–78)
ANION GAP SERPL CALC-SCNC: 3 MMOL/L — LOW (ref 5–17)
AST SERPL-CCNC: 49 U/L — HIGH (ref 15–37)
BASOPHILS # BLD AUTO: 0.04 K/UL — SIGNIFICANT CHANGE UP (ref 0–0.2)
BASOPHILS NFR BLD AUTO: 0.4 % — SIGNIFICANT CHANGE UP (ref 0–2)
BILIRUB SERPL-MCNC: 0.4 MG/DL — SIGNIFICANT CHANGE UP (ref 0.2–1.2)
BUN SERPL-MCNC: 15 MG/DL — SIGNIFICANT CHANGE UP (ref 7–23)
C DIFF BY PCR RESULT: SIGNIFICANT CHANGE UP
CALCIUM SERPL-MCNC: 8.7 MG/DL — SIGNIFICANT CHANGE UP (ref 8.5–10.1)
CHLORIDE SERPL-SCNC: 110 MMOL/L — HIGH (ref 96–108)
CO2 SERPL-SCNC: 23 MMOL/L — SIGNIFICANT CHANGE UP (ref 22–31)
CREAT SERPL-MCNC: 0.96 MG/DL — SIGNIFICANT CHANGE UP (ref 0.5–1.3)
EGFR: 86 ML/MIN/1.73M2 — SIGNIFICANT CHANGE UP
EOSINOPHIL # BLD AUTO: 0.03 K/UL — SIGNIFICANT CHANGE UP (ref 0–0.5)
EOSINOPHIL NFR BLD AUTO: 0.3 % — SIGNIFICANT CHANGE UP (ref 0–6)
GLUCOSE BLDC GLUCOMTR-MCNC: 74 MG/DL — SIGNIFICANT CHANGE UP (ref 70–99)
GLUCOSE BLDC GLUCOMTR-MCNC: 99 MG/DL — SIGNIFICANT CHANGE UP (ref 70–99)
GLUCOSE SERPL-MCNC: 99 MG/DL — SIGNIFICANT CHANGE UP (ref 70–99)
HCT VFR BLD CALC: 43.6 % — SIGNIFICANT CHANGE UP (ref 39–50)
HGB BLD-MCNC: 13.9 G/DL — SIGNIFICANT CHANGE UP (ref 13–17)
IMM GRANULOCYTES NFR BLD AUTO: 0.3 % — SIGNIFICANT CHANGE UP (ref 0–0.9)
LYMPHOCYTES # BLD AUTO: 3.49 K/UL — HIGH (ref 1–3.3)
LYMPHOCYTES # BLD AUTO: 39.1 % — SIGNIFICANT CHANGE UP (ref 13–44)
MCHC RBC-ENTMCNC: 29 PG — SIGNIFICANT CHANGE UP (ref 27–34)
MCHC RBC-ENTMCNC: 31.9 GM/DL — LOW (ref 32–36)
MCV RBC AUTO: 90.8 FL — SIGNIFICANT CHANGE UP (ref 80–100)
MONOCYTES # BLD AUTO: 0.51 K/UL — SIGNIFICANT CHANGE UP (ref 0–0.9)
MONOCYTES NFR BLD AUTO: 5.7 % — SIGNIFICANT CHANGE UP (ref 2–14)
NEUTROPHILS # BLD AUTO: 4.83 K/UL — SIGNIFICANT CHANGE UP (ref 1.8–7.4)
NEUTROPHILS NFR BLD AUTO: 54.2 % — SIGNIFICANT CHANGE UP (ref 43–77)
PLATELET # BLD AUTO: 215 K/UL — SIGNIFICANT CHANGE UP (ref 150–400)
POTASSIUM SERPL-MCNC: 4 MMOL/L — SIGNIFICANT CHANGE UP (ref 3.5–5.3)
POTASSIUM SERPL-SCNC: 4 MMOL/L — SIGNIFICANT CHANGE UP (ref 3.5–5.3)
PROT SERPL-MCNC: 7.2 GM/DL — SIGNIFICANT CHANGE UP (ref 6–8.3)
RBC # BLD: 4.8 M/UL — SIGNIFICANT CHANGE UP (ref 4.2–5.8)
RBC # FLD: 15 % — HIGH (ref 10.3–14.5)
SODIUM SERPL-SCNC: 136 MMOL/L — SIGNIFICANT CHANGE UP (ref 135–145)
WBC # BLD: 8.93 K/UL — SIGNIFICANT CHANGE UP (ref 3.8–10.5)
WBC # FLD AUTO: 8.93 K/UL — SIGNIFICANT CHANGE UP (ref 3.8–10.5)

## 2023-07-08 PROCEDURE — 99233 SBSQ HOSP IP/OBS HIGH 50: CPT

## 2023-07-08 PROCEDURE — 74183 MRI ABD W/O CNTR FLWD CNTR: CPT | Mod: 26

## 2023-07-08 RX ORDER — MORPHINE SULFATE 50 MG/1
2 CAPSULE, EXTENDED RELEASE ORAL EVERY 4 HOURS
Refills: 0 | Status: DISCONTINUED | OUTPATIENT
Start: 2023-07-08 | End: 2023-07-09

## 2023-07-08 RX ORDER — NALOXONE HYDROCHLORIDE 4 MG/.1ML
0.4 SPRAY NASAL ONCE
Refills: 0 | Status: DISCONTINUED | OUTPATIENT
Start: 2023-07-08 | End: 2023-07-09

## 2023-07-08 RX ORDER — MORPHINE SULFATE 50 MG/1
4 CAPSULE, EXTENDED RELEASE ORAL EVERY 4 HOURS
Refills: 0 | Status: DISCONTINUED | OUTPATIENT
Start: 2023-07-08 | End: 2023-07-09

## 2023-07-08 RX ORDER — ATORVASTATIN CALCIUM 80 MG/1
10 TABLET, FILM COATED ORAL AT BEDTIME
Refills: 0 | Status: DISCONTINUED | OUTPATIENT
Start: 2023-07-08 | End: 2023-07-09

## 2023-07-08 RX ORDER — VENLAFAXINE HCL 75 MG
75 CAPSULE, EXT RELEASE 24 HR ORAL
Refills: 0 | Status: DISCONTINUED | OUTPATIENT
Start: 2023-07-08 | End: 2023-07-09

## 2023-07-08 RX ORDER — AZITHROMYCIN 500 MG/1
500 TABLET, FILM COATED ORAL EVERY 24 HOURS
Refills: 0 | Status: DISCONTINUED | OUTPATIENT
Start: 2023-07-08 | End: 2023-07-09

## 2023-07-08 RX ORDER — PANTOPRAZOLE SODIUM 20 MG/1
40 TABLET, DELAYED RELEASE ORAL
Refills: 0 | Status: DISCONTINUED | OUTPATIENT
Start: 2023-07-08 | End: 2023-07-09

## 2023-07-08 RX ORDER — SENNA PLUS 8.6 MG/1
2 TABLET ORAL AT BEDTIME
Refills: 0 | Status: DISCONTINUED | OUTPATIENT
Start: 2023-07-08 | End: 2023-07-09

## 2023-07-08 RX ORDER — ASPIRIN/CALCIUM CARB/MAGNESIUM 324 MG
81 TABLET ORAL DAILY
Refills: 0 | Status: DISCONTINUED | OUTPATIENT
Start: 2023-07-08 | End: 2023-07-09

## 2023-07-08 RX ORDER — METOPROLOL TARTRATE 50 MG
12.5 TABLET ORAL DAILY
Refills: 0 | Status: DISCONTINUED | OUTPATIENT
Start: 2023-07-08 | End: 2023-07-09

## 2023-07-08 RX ORDER — BUPROPION HYDROCHLORIDE 150 MG/1
100 TABLET, EXTENDED RELEASE ORAL THREE TIMES A DAY
Refills: 0 | Status: DISCONTINUED | OUTPATIENT
Start: 2023-07-08 | End: 2023-07-09

## 2023-07-08 RX ORDER — GABAPENTIN 400 MG/1
100 CAPSULE ORAL DAILY
Refills: 0 | Status: DISCONTINUED | OUTPATIENT
Start: 2023-07-08 | End: 2023-07-09

## 2023-07-08 RX ORDER — EMTRICITABINE AND TENOFOVIR DISOPROXIL FUMARATE 200; 300 MG/1; MG/1
1 TABLET, FILM COATED ORAL DAILY
Refills: 0 | Status: DISCONTINUED | OUTPATIENT
Start: 2023-07-08 | End: 2023-07-09

## 2023-07-08 RX ORDER — ACETAMINOPHEN 500 MG
1000 TABLET ORAL EVERY 8 HOURS
Refills: 0 | Status: DISCONTINUED | OUTPATIENT
Start: 2023-07-08 | End: 2023-07-09

## 2023-07-08 RX ORDER — POLYETHYLENE GLYCOL 3350 17 G/17G
17 POWDER, FOR SOLUTION ORAL DAILY
Refills: 0 | Status: DISCONTINUED | OUTPATIENT
Start: 2023-07-08 | End: 2023-07-09

## 2023-07-08 RX ADMIN — BUPROPION HYDROCHLORIDE 100 MILLIGRAM(S): 150 TABLET, EXTENDED RELEASE ORAL at 11:19

## 2023-07-08 RX ADMIN — Medication 1000 MILLIGRAM(S): at 22:24

## 2023-07-08 RX ADMIN — EMTRICITABINE AND TENOFOVIR DISOPROXIL FUMARATE 1 TABLET(S): 200; 300 TABLET, FILM COATED ORAL at 11:18

## 2023-07-08 RX ADMIN — BUPROPION HYDROCHLORIDE 100 MILLIGRAM(S): 150 TABLET, EXTENDED RELEASE ORAL at 21:24

## 2023-07-08 RX ADMIN — AZITHROMYCIN 255 MILLIGRAM(S): 500 TABLET, FILM COATED ORAL at 01:52

## 2023-07-08 RX ADMIN — MORPHINE SULFATE 4 MILLIGRAM(S): 50 CAPSULE, EXTENDED RELEASE ORAL at 04:57

## 2023-07-08 RX ADMIN — ATORVASTATIN CALCIUM 10 MILLIGRAM(S): 80 TABLET, FILM COATED ORAL at 21:24

## 2023-07-08 RX ADMIN — MORPHINE SULFATE 4 MILLIGRAM(S): 50 CAPSULE, EXTENDED RELEASE ORAL at 04:12

## 2023-07-08 RX ADMIN — Medication 3 MILLIGRAM(S): at 01:54

## 2023-07-08 RX ADMIN — PANTOPRAZOLE SODIUM 40 MILLIGRAM(S): 20 TABLET, DELAYED RELEASE ORAL at 14:32

## 2023-07-08 RX ADMIN — GABAPENTIN 100 MILLIGRAM(S): 400 CAPSULE ORAL at 11:18

## 2023-07-08 RX ADMIN — AZITHROMYCIN 255 MILLIGRAM(S): 500 TABLET, FILM COATED ORAL at 23:00

## 2023-07-08 RX ADMIN — Medication 12.5 MILLIGRAM(S): at 11:18

## 2023-07-08 RX ADMIN — Medication 75 MILLIGRAM(S): at 11:18

## 2023-07-08 RX ADMIN — Medication 81 MILLIGRAM(S): at 11:18

## 2023-07-08 RX ADMIN — Medication 75 MILLIGRAM(S): at 17:48

## 2023-07-08 RX ADMIN — Medication 1000 MILLIGRAM(S): at 21:24

## 2023-07-08 RX ADMIN — BUPROPION HYDROCHLORIDE 100 MILLIGRAM(S): 150 TABLET, EXTENDED RELEASE ORAL at 14:31

## 2023-07-08 NOTE — CONSULT NOTE ADULT - SUBJECTIVE AND OBJECTIVE BOX
Patient is a 68y old  Male who presents with a chief complaint of Abdominal Pain, Diarrhea (07 Jul 2023 22:53)    HPI:  68 year old man with significant past medical history significant for aortic stenosis s/p AV replacement 5/2018, Hypertension, Hyperlipidemia, Depression, Anxiety Disorder, chronic neck and back pain with herniated lumbar disc complicated by lumbar radiculopathy, GERD, shigella/ ecoli infection 11/2022 with recurrence 2/2023 presenting with 6+ weeks loose non bloody stools/diarrhea with associated nausea and intermittent epigastric/abdominal pain. Of note the patient. The patient denies ay associated subjective fevers, chills, recent sick contacts, or travel. The patient further describes foul smelling loose stools. Labs => Cl 118, Glu 69, Alb 2.8, Lipase 915, GI PCR (+)C. jejuni. CT ABD/Pelvis => Concern for proctitis. Correlate clinically. Prior left mid abdominal small bowel surgery and an adjacent loop of small bowel demonstrating mural thickening. In the ED the patient was given Morphine 4mg IVP x 1, and NS x 1L. Was given azithromycin.         PMH: as above  PSH: as above  Meds: per reconciliation sheet, noted below  MEDICATIONS  (STANDING):  acetaminophen     Tablet .. 1000 milliGRAM(s) Oral every 8 hours  aspirin enteric coated 81 milliGRAM(s) Oral daily  atorvastatin 10 milliGRAM(s) Oral at bedtime  azithromycin  IVPB 500 milliGRAM(s) IV Intermittent every 24 hours  buPROPion . 100 milliGRAM(s) Oral three times a day  emtricitabine 200 mG/tenofovir alafenamide 25 mG (DESCOVY) Tablet 1 Tablet(s) Oral daily  gabapentin 100 milliGRAM(s) Oral daily  metoprolol tartrate 12.5 milliGRAM(s) Oral daily  naloxone Injectable 0.4 milliGRAM(s) IV Push once  pantoprazole    Tablet 40 milliGRAM(s) Oral before breakfast  polyethylene glycol 3350 17 Gram(s) Oral daily  senna 2 Tablet(s) Oral at bedtime  venlafaxine 75 milliGRAM(s) Oral two times a day with meals    Allergies    No Known Allergies    Intolerances      Social: no smoking, no alcohol, no illegal drugs; no recent travel, no exposure to TB  FAMILY HISTORY:  FH: HTN (hypertension)  father    Family history of dementia (Father)       no history of premature cardiovascular disease in first degree relatives    ROS: the patient denies fever, no chills, no HA, no dizziness, no sore throat, no blurry vision, no CP, no palpitations, no SOB, no cough, + abdominal pain, + diarrhea, no N/V, no dysuria, no leg pain, no claudication, no rash, no joint aches, no rectal pain or bleeding, no night sweats    All other systems reviewed and are negative    Vital Signs Last 24 Hrs  T(C): 36.8 (08 Jul 2023 08:42), Max: 36.8 (07 Jul 2023 23:23)  T(F): 98.3 (08 Jul 2023 08:42), Max: 98.3 (08 Jul 2023 08:42)  HR: 61 (08 Jul 2023 08:42) (56 - 78)  BP: 125/70 (08 Jul 2023 08:42) (110/88 - 141/76)  BP(mean): --  RR: 18 (08 Jul 2023 08:42) (16 - 18)  SpO2: 97% (08 Jul 2023 08:42) (96% - 100%)    Parameters below as of 08 Jul 2023 08:42  Patient On (Oxygen Delivery Method): room air      Daily     Daily     PE:  Constitutional: NAD   HEENT: NC/AT, EOMI, PERRLA, conjunctivae clear; ears and nose atraumatic; pharynx benign  Neck: supple; thyroid not palpable  Back: no tenderness  Respiratory: respiratory effort normal; clear to auscultation  Cardiovascular: S1S2 regular, no murmurs  Abdomen: soft, not tender, not distended, positive BS; liver and spleen WNL  Genitourinary: no suprapubic tenderness  Lymphatic: no LN palpable  Musculoskeletal: no muscle tenderness, no joint swelling or tenderness  Extremities: no pedal edema  Neurological/ Psychiatric: AxOx3, Judgement and insight normal;  moving all extremities  Skin: no rashes; no palpable lesions    Labs: all available labs reviewed                        13.9   8.93  )-----------( 215      ( 08 Jul 2023 08:33 )             43.6     07-08    136  |  110<H>  |  15  ----------------------------<  99  4.0   |  23  |  0.96    Ca    8.7      08 Jul 2023 08:33    TPro  7.2  /  Alb  3.3  /  TBili  0.4  /  DBili  x   /  AST  49<H>  /  ALT  49  /  AlkPhos  119  07-08     LIVER FUNCTIONS - ( 08 Jul 2023 08:33 )  Alb: 3.3 g/dL / Pro: 7.2 gm/dL / ALK PHOS: 119 U/L / ALT: 49 U/L / AST: 49 U/L / GGT: x           Urinalysis Basic - ( 08 Jul 2023 08:33 )    Color: x / Appearance: x / SG: x / pH: x  Gluc: 99 mg/dL / Ketone: x  / Bili: x / Urobili: x   Blood: x / Protein: x / Nitrite: x   Leuk Esterase: x / RBC: x / WBC x   Sq Epi: x / Non Sq Epi: x / Bacteria: x      gi pcr with campylobacter     Radiology: all available radiological tests reviewed  < from: CT Abdomen and Pelvis w/ IV Cont (07.07.23 @ 12:02) >    ACC: [id number] EXAM:  CT ABDOMEN AND PELVIS IC   ORDERED BY: DONNIE MORTON     PROCEDURE DATE:  07/07/2023          INTERPRETATION:  CLINICAL INFORMATION: Epigastric pain.    COMPARISON: 01/31/2023.    CONTRAST/COMPLICATIONS:  IV Contrast: Omnipaque 350  90 cc administered   10 cc discarded  Oral Contrast: NONE  Complications: None reported at time of study completion    PROCEDURE:  CT of the Abdomen and Pelvis was performed.  Sagittal and coronal reformats were performed.    FINDINGS:  LOWER CHEST: Within normal limits.    LIVER: A few stable small hypodense foci that cannot be characterized.  BILE DUCTS: Normal caliber.  GALLBLADDER: Cholecystectomy.  SPLEEN: Within normal limits.  PANCREAS: Within normal limits.  ADRENALS: Within normal limits.  KIDNEYS/URETERS: Within normal limits.    BLADDER: Under distended, unremarkable.  REPRODUCTIVE ORGANS: Prostate within normal limits.    BOWEL: No bowel obstruction. Appendix is not visualized. No evidence of   inflammation in the pericecal region. Probable mural thickening involving   the rectum. Correlate for proctitis. Evidence of a suture line involving   the small bowel in the left mid abdomen. An adjacent loop of small bowel   suggests mural thickening. Correlate clinically. Ifneeded CT or MR   enterography may be helpful. Prior gastric surgery possibly sleeve   gastrectomy.  PERITONEUM: No ascites.  VESSELS: Atherosclerotic changes.  RETROPERITONEUM/LYMPH NODES: No lymphadenopathy.  ABDOMINAL WALL: Small left inguinal hernia containing fluid, similar to   the prior study.  BONES: Degenerative changes.    IMPRESSION:  Concern for proctitis. Correlate clinically.    Prior left mid abdominal small bowel surgery and an adjacent loop of   small bowel demonstrating mural thickening.    Additional findings as above.          Advanced directives addressed: full resuscitation

## 2023-07-08 NOTE — CONSULT NOTE ADULT - ASSESSMENT
68 year old man with significant past medical history significant for aortic stenosis s/p AV replacement 5/2018, Hypertension, Hyperlipidemia, Depression, Anxiety Disorder, chronic neck and back pain with herniated lumbar disc complicated by lumbar radiculopathy, GERD, shigella/ ecoli infection 11/2022 with recurrence 2/2023 presenting with 6+ weeks loose non bloody stools/diarrhea with associated nausea and intermittent epigastric/abdominal pain. Of note the patient. The patient denies ay associated subjective fevers, chills, recent sick contacts, or travel. The patient further describes foul smelling loose stools. Labs => Cl 118, Glu 69, Alb 2.8, Lipase 915, GI PCR (+)C. jejuni. CT ABD/Pelvis => Concern for proctitis. Correlate clinically. Prior left mid abdominal small bowel surgery and an adjacent loop of small bowel demonstrating mural thickening. In the ED the patient was given Morphine 4mg IVP x 1, and NS x 1L. Was given azithromycin.     1. Proctitis. Abdominal pain. Diarrheal syndrome with Campylobacter  - imaging reviewed  - agree with azithromycin 500mg daily for 3 days or until symptoms improved   - monitor temps  - on descovy for hiv propylaxis  - tolerating abx well so far; no side effects noted  - reason for abx use and side effects reviewed with patient  - bowel rest, hydration  - fu cbc    2. other issues - care per medicine

## 2023-07-08 NOTE — CONSULT NOTE ADULT - SUBJECTIVE AND OBJECTIVE BOX
HPI:  68 year old man with significant past medical history significant for aortic stenosis s/p AV replacement 5/2018, Hypertension, Hyperlipidemia, Depression, Anxiety Disorder, chronic neck and back pain with herniated lumbar disc complicated by lumbar radiculopathy, GERD, shigella/ ecoli infection 11/2022 with recurrence 2/2023 presenting with 6+ weeks loose non bloody stools/diarrhea with associated nausea and intermittent epigastric/abdominal pain. Of note the patient. The patient denies ay associated subjective fevers, chills, recent sick contacts, or travel. The patient further describes foul smelling loose stools. Labs => Cl 118, Glu 69, Alb 2.8, Lipase 915, GI PCR (+)C. jejuni. CT ABD/Pelvis => Concern for proctitis. Correlate clinically. Prior left mid abdominal small bowel surgery and an adjacent loop of small bowel demonstrating mural thickening. In the ED the patient was given Morphine 4mg IVP x 1, and NS x 1L.    (07 Jul 2023 22:53)  Continues with diarrhea today but no bleeding  Cramps resolved  No recent travel, sick contacts, abx or new meds    PAST MEDICAL & SURGICAL HISTORY:  Hyperlipidemia      GERD (gastroesophageal reflux disease)      Anxiety      Skin cancer  external right ear, unsure of type      HTN (hypertension)      Aortic stenosis  Bicuspid Aortic Valve Replacement- 5/29/2018      Chronic neck and back pain      Sciatica      Internal and external prolapsed hemorrhoids  had surgery      Helicobacter pylori infection      History of colonic polyps      Herniated lumbar intervertebral disc      Gastrointestinal hemorrhage      Carpal tunnel syndrome  had surgery      Depressive disorder      Loss of hearing      Morbid obesity      Lumbar radiculopathy      Hearing loss      Erectile dysfunction      Shigella infection      S/P carpal tunnel release  right side, left side- 2014      History of back surgery  laminectomy x2 last 2018      S/P trigger finger release  right thumb and middle finger      H/O external ear surgery  excision mass right ear- cancer 2018      H/O bicuspid aortic valve  replacement- 5/29/18      Status post coronary angioplasty  pt not sure he had this procedure      S/P tonsillectomy      H/O hemorrhoidectomy      Spinal cord stimulator status  10/2020      H/O bariatric surgery          Home Medications:  Aspirin Enteric Coated 81 mg oral delayed release tablet: 1 tab(s) orally once a day (07 Jul 2023 17:42)  atorvastatin 10 mg oral tablet: 1 tab(s) orally once a day (in the morning) (07 Jul 2023 17:42)  buPROPion 100 mg oral tablet: 1 tab(s) orally 3 times a day (07 Jul 2023 17:42)  Descovy 200 mg-25 mg oral tablet: 1 tab(s) orally once a day (07 Jul 2023 17:42)  esomeprazole 40 mg oral delayed release capsule: 1 cap(s) orally 2 times a day (07 Jul 2023 17:42)  gabapentin 100 mg oral capsule: 1 cap(s) orally once a day (07 Jul 2023 17:42)  metoprolol tartrate 25 mg oral tablet: 0.5 tab(s) orally once a day (07 Jul 2023 17:42)  tadalafil 20 mg oral tablet: 1 tab(s) orally once a day, As Needed before sex (07 Jul 2023 17:42)  venlafaxine 75 mg oral tablet: 1 tab(s) orally 2 times a day (07 Jul 2023 17:42)      MEDICATIONS  (STANDING):  acetaminophen     Tablet .. 1000 milliGRAM(s) Oral every 8 hours  aspirin enteric coated 81 milliGRAM(s) Oral daily  atorvastatin 10 milliGRAM(s) Oral at bedtime  azithromycin  IVPB 500 milliGRAM(s) IV Intermittent every 24 hours  buPROPion . 100 milliGRAM(s) Oral three times a day  emtricitabine 200 mG/tenofovir alafenamide 25 mG (DESCOVY) Tablet 1 Tablet(s) Oral daily  gabapentin 100 milliGRAM(s) Oral daily  metoprolol tartrate 12.5 milliGRAM(s) Oral daily  naloxone Injectable 0.4 milliGRAM(s) IV Push once  pantoprazole    Tablet 40 milliGRAM(s) Oral before breakfast  polyethylene glycol 3350 17 Gram(s) Oral daily  senna 2 Tablet(s) Oral at bedtime  venlafaxine 75 milliGRAM(s) Oral two times a day with meals    MEDICATIONS  (PRN):  aluminum hydroxide/magnesium hydroxide/simethicone Suspension 30 milliLiter(s) Oral every 4 hours PRN Dyspepsia  bisacodyl 5 milliGRAM(s) Oral daily PRN Constipation  melatonin 3 milliGRAM(s) Oral at bedtime PRN Insomnia  morphine  - Injectable 4 milliGRAM(s) IV Push every 4 hours PRN Severe Pain (7 - 10)  morphine  - Injectable 2 milliGRAM(s) IV Push every 4 hours PRN Moderate Pain (4 - 6)  ondansetron Injectable 4 milliGRAM(s) IV Push every 8 hours PRN Nausea and/or Vomiting      Allergies    No Known Allergies    Intolerances        SOCIAL HISTORY:    FAMILY HISTORY:  FH: HTN (hypertension)  father    Family history of dementia (Father)        ROS  As above  Otherwise unremarkable    Vital Signs Last 24 Hrs  T(C): 36.8 (08 Jul 2023 08:42), Max: 36.8 (07 Jul 2023 23:23)  T(F): 98.3 (08 Jul 2023 08:42), Max: 98.3 (08 Jul 2023 08:42)  HR: 61 (08 Jul 2023 08:42) (56 - 78)  BP: 125/70 (08 Jul 2023 08:42) (110/88 - 141/76)  BP(mean): --  RR: 18 (08 Jul 2023 08:42) (16 - 18)  SpO2: 97% (08 Jul 2023 08:42) (96% - 100%)    Parameters below as of 08 Jul 2023 08:42  Patient On (Oxygen Delivery Method): room air        Constitutional: NAD, well-developed  Respiratory: CTAB  Cardiovascular: S1 and S2, RRR  Gastrointestinal: BS+, soft, NT/ND  Extremities: No peripheral edema  Psychiatric: Normal mood, normal affect  Skin: No rashes    LABS:                        13.9   8.93  )-----------( 215      ( 08 Jul 2023 08:33 )             43.6     07-08    136  |  110<H>  |  15  ----------------------------<  99  4.0   |  23  |  0.96    Ca    8.7      08 Jul 2023 08:33    TPro  7.2  /  Alb  3.3  /  TBili  0.4  /  DBili  x   /  AST  49<H>  /  ALT  49  /  AlkPhos  119  07-08      LIVER FUNCTIONS - ( 08 Jul 2023 08:33 )  Alb: 3.3 g/dL / Pro: 7.2 gm/dL / ALK PHOS: 119 U/L / ALT: 49 U/L / AST: 49 U/L / GGT: x             RADIOLOGY & ADDITIONAL STUDIES:

## 2023-07-08 NOTE — PROVIDER CONTACT NOTE (CRITICAL VALUE NOTIFICATION) - ASSESSMENT
Pt c diff sample collected and still pending. Pt already on contact precautions and isolated. ID consulted

## 2023-07-09 ENCOUNTER — TRANSCRIPTION ENCOUNTER (OUTPATIENT)
Age: 68
End: 2023-07-09

## 2023-07-09 VITALS
DIASTOLIC BLOOD PRESSURE: 79 MMHG | TEMPERATURE: 98 F | RESPIRATION RATE: 18 BRPM | HEART RATE: 61 BPM | SYSTOLIC BLOOD PRESSURE: 113 MMHG | OXYGEN SATURATION: 100 %

## 2023-07-09 PROCEDURE — 99239 HOSP IP/OBS DSCHRG MGMT >30: CPT

## 2023-07-09 RX ORDER — LEVOTHYROXINE SODIUM 125 MCG
1 TABLET ORAL
Qty: 0 | Refills: 0 | DISCHARGE
Start: 2023-07-09

## 2023-07-09 RX ORDER — AZITHROMYCIN 500 MG/1
1 TABLET, FILM COATED ORAL
Qty: 1 | Refills: 0
Start: 2023-07-09 | End: 2023-07-09

## 2023-07-09 RX ORDER — LEVOTHYROXINE SODIUM 125 MCG
25 TABLET ORAL DAILY
Refills: 0 | Status: DISCONTINUED | OUTPATIENT
Start: 2023-07-09 | End: 2023-07-09

## 2023-07-09 RX ADMIN — Medication 75 MILLIGRAM(S): at 08:39

## 2023-07-09 RX ADMIN — Medication 1000 MILLIGRAM(S): at 05:31

## 2023-07-09 RX ADMIN — Medication 12.5 MILLIGRAM(S): at 10:41

## 2023-07-09 RX ADMIN — PANTOPRAZOLE SODIUM 40 MILLIGRAM(S): 20 TABLET, DELAYED RELEASE ORAL at 05:31

## 2023-07-09 RX ADMIN — Medication 81 MILLIGRAM(S): at 10:41

## 2023-07-09 RX ADMIN — BUPROPION HYDROCHLORIDE 100 MILLIGRAM(S): 150 TABLET, EXTENDED RELEASE ORAL at 05:31

## 2023-07-09 RX ADMIN — Medication 25 MICROGRAM(S): at 09:56

## 2023-07-09 RX ADMIN — Medication 1000 MILLIGRAM(S): at 06:05

## 2023-07-09 RX ADMIN — EMTRICITABINE AND TENOFOVIR DISOPROXIL FUMARATE 1 TABLET(S): 200; 300 TABLET, FILM COATED ORAL at 10:42

## 2023-07-09 RX ADMIN — GABAPENTIN 100 MILLIGRAM(S): 400 CAPSULE ORAL at 10:41

## 2023-07-09 NOTE — DISCHARGE NOTE PROVIDER - CARE PROVIDER_API CALL
Socrates Rowe  Gastroenterology  [address], [facility] [address]  [place] [address]  Phone: [phone]  Fax: [phone]  Follow Up Time:     Iban Romero  [facility]  [address], [address]  [place] [address]  Phone: [phone]  Fax: [phone]  Follow Up Time:

## 2023-07-09 NOTE — PROGRESS NOTE ADULT - SUBJECTIVE AND OBJECTIVE BOX
HPI: 68 year old man with significant past medical history significant for aortic stenosis s/p AV replacement 5/2018, Hypertension, Hyperlipidemia, Depression, Anxiety Disorder, chronic neck and back pain with herniated lumbar disc complicated by lumbar radiculopathy, GERD, shigella/ ecoli infection 11/2022 with recurrence 2/2023 presenting with 6+ weeks loose non bloody stools/diarrhea with associated nausea and intermittent epigastric/abdominal pain. Of note the patient. The patient denies ay associated subjective fevers, chills, recent sick contacts, or travel. The patient further describes foul smelling loose stools. Labs => Cl 118, Glu 69, Alb 2.8, Lipase 915, GI PCR (+)C. jejuni. CT ABD/Pelvis => Concern for proctitis. Correlate clinically. Prior left mid abdominal small bowel surgery and an adjacent loop of small bowel demonstrating mural thickening. In the ED the patient was given Morphine 4mg IVP x 1, and NS x 1L.     7/8: diarrhe improving; 1 episode last night  GI PCR campylobacter; cont azithro  c diff pcr awaited      PHYSICAL EXAM:    Daily     Daily     Vital Signs Last 24 Hrs  T(C): 36.8 (08 Jul 2023 08:42), Max: 36.8 (07 Jul 2023 23:23)  T(F): 98.3 (08 Jul 2023 08:42), Max: 98.3 (08 Jul 2023 08:42)  HR: 61 (08 Jul 2023 08:42) (56 - 78)  BP: 125/70 (08 Jul 2023 08:42) (110/88 - 141/76)  BP(mean): --  RR: 18 (08 Jul 2023 08:42) (16 - 18)  SpO2: 97% (08 Jul 2023 08:42) (96% - 100%)    Constitutional: Weak and ill appearing  HEENT: Atraumatic, MARIA C,   Respiratory: Breath Sounds normal, no rhonchi/wheeze  Cardiovascular: N S1S2;   Gastrointestinal: Abdomen soft, non tender, Bowel Sounds present  Extremities: No edema, peripheral pulses present  Neurological: AAO x 3, no gross focal motor deficits  Skin: Non cellulitic, no rash, ulcers  Lymph Nodes: No lymphadenopathy noted  Back: No CVA tenderness   Musculoskeletal: non tender  Breasts: Deferred  Genitourinary: deferred  Rectal: Deferred    All Labs/EKG/Radiology/Meds reviewed by me                          13.9   8.93  )-----------( 215      ( 08 Jul 2023 08:33 )             43.6       CBC Full  -  ( 08 Jul 2023 08:33 )  WBC Count : 8.93 K/uL  RBC Count : 4.80 M/uL  Hemoglobin : 13.9 g/dL  Hematocrit : 43.6 %  Platelet Count - Automated : 215 K/uL  Mean Cell Volume : 90.8 fl  Mean Cell Hemoglobin : 29.0 pg  Mean Cell Hemoglobin Concentration : 31.9 gm/dL  Auto Neutrophil # : 4.83 K/uL  Auto Lymphocyte # : 3.49 K/uL  Auto Monocyte # : 0.51 K/uL  Auto Eosinophil # : 0.03 K/uL  Auto Basophil # : 0.04 K/uL  Auto Neutrophil % : 54.2 %  Auto Lymphocyte % : 39.1 %  Auto Monocyte % : 5.7 %  Auto Eosinophil % : 0.3 %  Auto Basophil % : 0.4 %      07-08    136  |  110<H>  |  15  ----------------------------<  99  4.0   |  23  |  0.96    Ca    8.7      08 Jul 2023 08:33    TPro  7.2  /  Alb  3.3  /  TBili  0.4  /  DBili  x   /  AST  49<H>  /  ALT  49  /  AlkPhos  119  07-08      LIVER FUNCTIONS - ( 08 Jul 2023 08:33 )  Alb: 3.3 g/dL / Pro: 7.2 gm/dL / ALK PHOS: 119 U/L / ALT: 49 U/L / AST: 49 U/L / GGT: x                       Urinalysis Basic - ( 08 Jul 2023 08:33 )    Color: x / Appearance: x / SG: x / pH: x  Gluc: 99 mg/dL / Ketone: x  / Bili: x / Urobili: x   Blood: x / Protein: x / Nitrite: x   Leuk Esterase: x / RBC: x / WBC x   Sq Epi: x / Non Sq Epi: x / Bacteria: x      < from: CT Abdomen and Pelvis w/ IV Cont (07.07.23 @ 12:02) >  Concern for proctitis. Correlate clinically.    Prior left mid abdominal small bowel surgery and an adjacent loop of   small bowel demonstrating mural thickening.    Additional findings as above.    < end of copied text >  < from: MR MRCP w/wo IV Cont (07.08.23 @ 11:06) >  No MR evidence of acute pancreatitis. Main pancreatic duct is focally   distended up to 8 mm at the head and demonstrates narrowing at the neck,   image 35 series 14. Follow-up as per GI service recommendation. Correlate   with lipase value and clinical exam.    No choledocholithiasis.    Findings of mild colitis better seen on recent CT.    < end of copied text >        MEDICATIONS  (STANDING):  acetaminophen     Tablet .. 1000 milliGRAM(s) Oral every 8 hours  aspirin enteric coated 81 milliGRAM(s) Oral daily  atorvastatin 10 milliGRAM(s) Oral at bedtime  azithromycin  IVPB 500 milliGRAM(s) IV Intermittent every 24 hours  buPROPion . 100 milliGRAM(s) Oral three times a day  emtricitabine 200 mG/tenofovir alafenamide 25 mG (DESCOVY) Tablet 1 Tablet(s) Oral daily  gabapentin 100 milliGRAM(s) Oral daily  metoprolol tartrate 12.5 milliGRAM(s) Oral daily  naloxone Injectable 0.4 milliGRAM(s) IV Push once  pantoprazole    Tablet 40 milliGRAM(s) Oral before breakfast  polyethylene glycol 3350 17 Gram(s) Oral daily  senna 2 Tablet(s) Oral at bedtime  venlafaxine 75 milliGRAM(s) Oral two times a day with meals    MEDICATIONS  (PRN):  aluminum hydroxide/magnesium hydroxide/simethicone Suspension 30 milliLiter(s) Oral every 4 hours PRN Dyspepsia  bisacodyl 5 milliGRAM(s) Oral daily PRN Constipation  melatonin 3 milliGRAM(s) Oral at bedtime PRN Insomnia  morphine  - Injectable 4 milliGRAM(s) IV Push every 4 hours PRN Severe Pain (7 - 10)  morphine  - Injectable 2 milliGRAM(s) IV Push every 4 hours PRN Moderate Pain (4 - 6)  ondansetron Injectable 4 milliGRAM(s) IV Push every 8 hours PRN Nausea and/or Vomiting    
Patient is a 68y old  Male who presents with a chief complaint of Abdominal Pain, Diarrhea (09 Jul 2023 11:56)      Subective:  Feels good, stool formed now    PAST MEDICAL & SURGICAL HISTORY:  Hyperlipidemia      GERD (gastroesophageal reflux disease)      Anxiety      Skin cancer  external right ear, unsure of type      HTN (hypertension)      Aortic stenosis  Bicuspid Aortic Valve Replacement- 5/29/2018      Chronic neck and back pain      Sciatica      Internal and external prolapsed hemorrhoids  had surgery      Helicobacter pylori infection      History of colonic polyps      Herniated lumbar intervertebral disc      Gastrointestinal hemorrhage      Carpal tunnel syndrome  had surgery      Depressive disorder      Loss of hearing      Morbid obesity      Lumbar radiculopathy      Hearing loss      Erectile dysfunction      Shigella infection      S/P carpal tunnel release  right side, left side- 2014      History of back surgery  laminectomy x2 last 2018      S/P trigger finger release  right thumb and middle finger      H/O external ear surgery  excision mass right ear- cancer 2018      H/O bicuspid aortic valve  replacement- 5/29/18      Status post coronary angioplasty  pt not sure he had this procedure      S/P tonsillectomy      H/O hemorrhoidectomy      Spinal cord stimulator status  10/2020      H/O bariatric surgery          MEDICATIONS  (STANDING):  acetaminophen     Tablet .. 1000 milliGRAM(s) Oral every 8 hours  aspirin enteric coated 81 milliGRAM(s) Oral daily  atorvastatin 10 milliGRAM(s) Oral at bedtime  azithromycin  IVPB 500 milliGRAM(s) IV Intermittent every 24 hours  buPROPion . 100 milliGRAM(s) Oral three times a day  emtricitabine 200 mG/tenofovir alafenamide 25 mG (DESCOVY) Tablet 1 Tablet(s) Oral daily  gabapentin 100 milliGRAM(s) Oral daily  levothyroxine 25 MICROGram(s) Oral daily  metoprolol tartrate 12.5 milliGRAM(s) Oral daily  naloxone Injectable 0.4 milliGRAM(s) IV Push once  pantoprazole    Tablet 40 milliGRAM(s) Oral before breakfast  polyethylene glycol 3350 17 Gram(s) Oral daily  senna 2 Tablet(s) Oral at bedtime  venlafaxine 75 milliGRAM(s) Oral two times a day with meals    MEDICATIONS  (PRN):  aluminum hydroxide/magnesium hydroxide/simethicone Suspension 30 milliLiter(s) Oral every 4 hours PRN Dyspepsia  bisacodyl 5 milliGRAM(s) Oral daily PRN Constipation  melatonin 3 milliGRAM(s) Oral at bedtime PRN Insomnia  morphine  - Injectable 4 milliGRAM(s) IV Push every 4 hours PRN Severe Pain (7 - 10)  morphine  - Injectable 2 milliGRAM(s) IV Push every 4 hours PRN Moderate Pain (4 - 6)  ondansetron Injectable 4 milliGRAM(s) IV Push every 8 hours PRN Nausea and/or Vomiting      REVIEW OF SYSTEMS:    RESPIRATORY: No shortness of breath  CARDIOVASCULAR: No chest pain  All other review of systems is negative unless indicated above.    Vital Signs Last 24 Hrs  T(C): 36.5 (09 Jul 2023 08:22), Max: 36.7 (08 Jul 2023 16:27)  T(F): 97.7 (09 Jul 2023 08:22), Max: 98.1 (08 Jul 2023 16:27)  HR: 61 (09 Jul 2023 08:22) (58 - 63)  BP: 113/79 (09 Jul 2023 08:22) (110/65 - 113/79)  BP(mean): --  RR: 18 (09 Jul 2023 08:22) (18 - 18)  SpO2: 100% (09 Jul 2023 08:22) (99% - 100%)    Parameters below as of 09 Jul 2023 08:22  Patient On (Oxygen Delivery Method): room air        PHYSICAL EXAM:    Constitutional: NAD, well-developed  Respiratory: CTAB  Cardiovascular: S1 and S2, RRR  Gastrointestinal: BS+, soft, NT/ND  Extremities: No peripheral edema  Psychiatric: Normal mood, normal affect    LABS:                        13.9   8.93  )-----------( 215      ( 08 Jul 2023 08:33 )             43.6     07-08    136  |  110<H>  |  15  ----------------------------<  99  4.0   |  23  |  0.96    Ca    8.7      08 Jul 2023 08:33    TPro  7.2  /  Alb  3.3  /  TBili  0.4  /  DBili  x   /  AST  49<H>  /  ALT  49  /  AlkPhos  119  07-08      LIVER FUNCTIONS - ( 08 Jul 2023 08:33 )  Alb: 3.3 g/dL / Pro: 7.2 gm/dL / ALK PHOS: 119 U/L / ALT: 49 U/L / AST: 49 U/L / GGT: x             RADIOLOGY & ADDITIONAL STUDIES:

## 2023-07-09 NOTE — DISCHARGE NOTE NURSING/CASE MANAGEMENT/SOCIAL WORK - NSDCPETBCESMAN_GEN_ALL_CORE
Left message with last attempt to schedule open access colonoscopy   If you are a smoker, it is important for your health to stop smoking. Please be aware that second hand smoke is also harmful.

## 2023-07-09 NOTE — DISCHARGE NOTE NURSING/CASE MANAGEMENT/SOCIAL WORK - NSDCPEFALRISK_GEN_ALL_CORE
For information on Fall & Injury Prevention, visit: [email or web address] OR  [email or web address] OR  https://www.cdc.gov/steadi/patient.html

## 2023-07-09 NOTE — DISCHARGE NOTE NURSING/CASE MANAGEMENT/SOCIAL WORK - PATIENT PORTAL LINK FT
You can access the FollowMyHealth Patient Portal offered by [facility] by registering at the following website: [email or web address]. By joining [employer]’s FollowMyHealth portal, you will also be able to view your health information using other applications (apps) compatible with our system.

## 2023-07-09 NOTE — PROGRESS NOTE ADULT - ASSESSMENT
Imp:  Campylobacter  dilated panc. duct -- this is apparently not new and he's had EUS before at [facility] [place] arranged by Dr. Rowe    Rec:  Encouraged f/u with Dr. Rowe for above and I communicated issues with Dr. Rowe directly so that he can arrange follow up as well.
{\rtf1\hrjurx12521\ansi\bqydbdz2040\ftnbj\uc1\deff0  {\fonttbl{\f0 \fnil Segoe UI;}{\f1 \fnil \fcharset0 Segoe UI;}{\f2 \fnil Times New Aryan;}}  {\colortbl ;\wtp952\ylyla931\vrlv026 ;\red0\green0\blue0 ;\red0\green0\oeno791 ;\red0\green0\blue0 ;}  {\stylesheet{\f0\fs20 Normal;}{\cs1 Default Paragraph Font;}{\cs2\f0\fs16 Line Number;}{\cs3\f2\fs24\ul\cf3 Hyperlink;}}  {\*\revtbl{Unknown;}}  \aiaias62871\vilbvi76087\rtnyo7537\bqgda2867\kxmcp9833\bdvii1153\tqyiamp536\reaqfwh151\nogrowautofit\qqqddx035\formshade\nofeaturethrottle1\dntblnsbdb\fet4\aendnotes\aftnnrlc\pgbrdrhead\pgbrdrfoot  \sectd\vrwfmb91121\swttxj97410\guttersxn0\bypdyrnt7270\slwpaexn8723\yyjkpolh6071\trfbpzaf5840\wemincd912\fyjogqv679\sbkpage\pgncont\pgndec  \plain\plain\f0\fs24\ql\plain\f0\fs24\plain\f0\fs20\igmw1550\hich\f0\dbch\f0\loch\f0\fs20 68 year old man with significant past medical history significant for aortic stenosis s/p AV replacement 5/2018, Hypertension, Hyperlipidemia, Depression, Anxiety   Disorder, chronic neck and back pain with herniated lumbar disc complicated by lumbar radiculopathy, GERD, shigella/ ecoli infection 11/2022 with recurrence 2/2023 presenting with 6+ weeks loose non bloody stools/diarrhea with associated nausea and intermittent   epigastric/abdominal pain. Of note the patient. The patient denies ay associated subjective fevers, chills, recent sick contacts, or travel. The patient further describes foul smelling loose stools. Labs => Cl 118, Glu 69, Alb 2.8, Lipase 915, GI PCR   (+)C. jejuni. CT ABD/Pelvis => Concern for proctitis. Correlate clinically. Prior left mid abdominal small bowel surgery and an adjacent loop of small bowel demonstrating mural thickening. In the ED the patient was given Morphine 4mg IVP x 1, and NS x   1L. \par  \par  #Severe Abdominal Pain complicated by Proctitis and Intractable Diarrhea possibly Infectious: campylobacter diarrhea\par  ~admit to Medicine\par  stool studies for cdiff/pcr/fecal calpro\par  CRP/ESR\par  ~cont. IV hydration\par  ~cont. to advance diet as tolerated \par  await c diff pcr\par  \par  #Hyperlipidemia\par  ~cont. Atorvastatin 10mg po qhs \par  \par  #Depression/Anxiety\par  ~cont. Bupropion 100mg po tid\par  ~cont. Venlafaxine 75mg po bid\par  \par  #CAD/Hypertension\par  ~cont. ASA 81mg po daily\par  ~cont. Metoprolol 12.5mg po daily\par  \par  #GERD\par  ~cont. Esomeprazole 40mg po bid\par  \par  #Vte ppx\par  ~cont. SCDs for now \par  \plain\f1\fs16\qiqo5621\hich\f1\dbch\f1\loch\f1\cf2\fs16\par  \plain\f1\fs16\lscz0134\hich\f1\dbch\f1\loch\f1\cf2\fs16\strike\plain\f1\fs16\lvvv7371\hich\f1\dbch\f1\loch\f1\cf2\fs16\plain\f0\fs20\pcrg1332\hich\f0\dbch\f0\loch\f0\fs20\par  }

## 2023-07-09 NOTE — DISCHARGE NOTE PROVIDER - NSDCCPCAREPLAN_GEN_ALL_CORE_FT
PRINCIPAL DISCHARGE DIAGNOSIS  Diagnosis: Campylobacter diarrhea  Assessment and Plan of Treatment: resolved  complete azithro today  f/u with PCP

## 2023-07-09 NOTE — DISCHARGE NOTE NURSING/CASE MANAGEMENT/SOCIAL WORK - NSDCVIVACCINE_GEN_ALL_CORE_FT
Tdap; 18-Apr-2023 18:27; Tanya Jaime (ALEX); [employer]; [id number] (Exp. Date: 15-Feb-2025); IntraMuscular; Deltoid Left.; 0.5 milliLiter(s); VIS (VIS Published: 09-May-2013, VIS Presented: 18-Apr-2023);

## 2023-07-09 NOTE — DISCHARGE NOTE PROVIDER - HOSPITAL COURSE
PHYSICAL EXAM:    Daily     Daily     Vital Signs Last 24 Hrs  T(C): 36.5 (09 Jul 2023 08:22), Max: 36.7 (08 Jul 2023 16:27)  T(F): 97.7 (09 Jul 2023 08:22), Max: 98.1 (08 Jul 2023 16:27)  HR: 61 (09 Jul 2023 08:22) (58 - 63)  BP: 113/79 (09 Jul 2023 08:22) (110/65 - 113/79)  BP(mean): --  RR: 18 (09 Jul 2023 08:22) (18 - 18)  SpO2: 100% (09 Jul 2023 08:22) (99% - 100%)    Constitutional: Well  appearing  HEENT: Atraumatic, MARIA C, Normal, No congestion  Respiratory: Breath Sounds normal, no rhonchi/wheeze  Cardiovascular: N S1S2;   Gastrointestinal: Abdomen soft, non tender, Bowel Sounds present  Extremities: No edema, peripheral pulses present  Neurological: AAO x 3, no gross focal motor deficits  Skin: Non cellulitic, no rash, ulcers  Lymph Nodes: No lymphadenopathy noted  Back: No CVA tenderness   Musculoskeletal: non tender  Breasts: Deferred  Genitourinary: deferred  Rectal: Deferred    68 year old man with significant past medical history significant for aortic stenosis s/p AV replacement 5/2018, Hypertension, Hyperlipidemia, Depression, Anxiety Disorder, chronic neck and back pain with herniated lumbar disc complicated by lumbar radiculopathy, GERD, shigella/ ecoli infection 11/2022 with recurrence 2/2023 presenting with 6+ weeks loose non bloody stools/diarrhea with associated nausea and intermittent epigastric/abdominal pain. Of note the patient. The patient denies ay associated subjective fevers, chills, recent sick contacts, or travel. The patient further describes foul smelling loose stools. Labs => Cl 118, Glu 69, Alb 2.8, Lipase 915, GI PCR (+)C. jejuni. CT ABD/Pelvis => Concern for proctitis. Correlate clinically. Prior left mid abdominal small bowel surgery and an adjacent loop of small bowel demonstrating mural thickening. In the ED the patient was given Morphine 4mg IVP x 1, and NS x 1L.     #Severe Abdominal Pain complicated by Proctitis and Intractable Diarrhea possibly Infectious: campylobacter diarrhea  ~admit to Medicine  CRP/ESR  got IV hydration  neg c diff pcr  diarrhea resolved; formed stools  azithro last dose tonight    #Hyperlipidemia  ~cont. Atorvastatin 10mg po qhs     #Depression/Anxiety  ~cont. Bupropion 100mg po tid  ~cont. Venlafaxine 75mg po bid    #CAD/Hypertension  ~cont. ASA 81mg po daily  ~cont. Metoprolol 12.5mg po daily    #GERD  ~cont. Esomeprazole 40mg po bid    d/c home    lilia spent 40 min

## 2023-07-09 NOTE — DISCHARGE NOTE PROVIDER - NSDCMRMEDTOKEN_GEN_ALL_CORE_FT
Aspirin Enteric Coated 81 mg oral delayed release tablet: 1 tab(s) orally once a day  atorvastatin 10 mg oral tablet: 1 tab(s) orally once a day (in the morning)  buPROPion 100 mg oral tablet: 1 tab(s) orally 3 times a day  Descovy 200 mg-25 mg oral tablet: 1 tab(s) orally once a day  esomeprazole 40 mg oral delayed release capsule: 1 cap(s) orally 2 times a day  gabapentin 100 mg oral capsule: 1 cap(s) orally once a day  levothyroxine 25 mcg (0.025 mg) oral tablet: 1 tab(s) orally once a day  metoprolol tartrate 25 mg oral tablet: 0.5 tab(s) orally once a day  tadalafil 20 mg oral tablet: 1 tab(s) orally once a day, As Needed before sex  venlafaxine 75 mg oral tablet: 1 tab(s) orally 2 times a day  Zithromax 500 mg oral tablet: 1 tab(s) orally once a day

## 2023-07-10 LAB — CALPROTECTIN STL-MCNT: 195 UG/G — HIGH (ref 0–120)

## 2023-07-13 LAB
CULTURE RESULTS: SIGNIFICANT CHANGE UP
CULTURE RESULTS: SIGNIFICANT CHANGE UP
SPECIMEN SOURCE: SIGNIFICANT CHANGE UP
SPECIMEN SOURCE: SIGNIFICANT CHANGE UP

## 2023-07-15 DIAGNOSIS — K62.89 OTHER SPECIFIED DISEASES OF ANUS AND RECTUM: ICD-10-CM

## 2023-07-15 DIAGNOSIS — G89.29 OTHER CHRONIC PAIN: ICD-10-CM

## 2023-07-15 DIAGNOSIS — F32.A DEPRESSION, UNSPECIFIED: ICD-10-CM

## 2023-07-15 DIAGNOSIS — H91.93 UNSPECIFIED HEARING LOSS, BILATERAL: ICD-10-CM

## 2023-07-15 DIAGNOSIS — E78.5 HYPERLIPIDEMIA, UNSPECIFIED: ICD-10-CM

## 2023-07-15 DIAGNOSIS — F41.9 ANXIETY DISORDER, UNSPECIFIED: ICD-10-CM

## 2023-07-15 DIAGNOSIS — I10 ESSENTIAL (PRIMARY) HYPERTENSION: ICD-10-CM

## 2023-07-15 DIAGNOSIS — A09 INFECTIOUS GASTROENTERITIS AND COLITIS, UNSPECIFIED: ICD-10-CM

## 2023-07-15 DIAGNOSIS — Z85.828 PERSONAL HISTORY OF OTHER MALIGNANT NEOPLASM OF SKIN: ICD-10-CM

## 2023-07-15 DIAGNOSIS — Z79.899 OTHER LONG TERM (CURRENT) DRUG THERAPY: ICD-10-CM

## 2023-07-15 DIAGNOSIS — I25.10 ATHEROSCLEROTIC HEART DISEASE OF NATIVE CORONARY ARTERY WITHOUT ANGINA PECTORIS: ICD-10-CM

## 2023-07-15 DIAGNOSIS — Z97.4 PRESENCE OF EXTERNAL HEARING-AID: ICD-10-CM

## 2023-07-15 DIAGNOSIS — Z79.2 LONG TERM (CURRENT) USE OF ANTIBIOTICS: ICD-10-CM

## 2023-07-15 DIAGNOSIS — Z79.82 LONG TERM (CURRENT) USE OF ASPIRIN: ICD-10-CM

## 2023-07-15 DIAGNOSIS — K21.9 GASTRO-ESOPHAGEAL REFLUX DISEASE WITHOUT ESOPHAGITIS: ICD-10-CM

## 2023-07-15 DIAGNOSIS — Z95.2 PRESENCE OF PROSTHETIC HEART VALVE: ICD-10-CM

## 2023-07-18 ENCOUNTER — INPATIENT (INPATIENT)
Facility: HOSPITAL | Age: 68
LOS: 4 days | Discharge: HOME | DRG: 373 | End: 2023-07-23
Attending: STUDENT IN AN ORGANIZED HEALTH CARE EDUCATION/TRAINING PROGRAM | Admitting: INTERNAL MEDICINE
Payer: MEDICARE

## 2023-07-18 VITALS — WEIGHT: 139.99 LBS | HEIGHT: 66 IN

## 2023-07-18 DIAGNOSIS — Z98.890 OTHER SPECIFIED POSTPROCEDURAL STATES: Chronic | ICD-10-CM

## 2023-07-18 DIAGNOSIS — R19.7 DIARRHEA, UNSPECIFIED: ICD-10-CM

## 2023-07-18 DIAGNOSIS — Z90.89 ACQUIRED ABSENCE OF OTHER ORGANS: Chronic | ICD-10-CM

## 2023-07-18 DIAGNOSIS — Z98.89 OTHER SPECIFIED POSTPROCEDURAL STATES: Chronic | ICD-10-CM

## 2023-07-18 DIAGNOSIS — Z96.89 PRESENCE OF OTHER SPECIFIED FUNCTIONAL IMPLANTS: Chronic | ICD-10-CM

## 2023-07-18 DIAGNOSIS — Z98.84 BARIATRIC SURGERY STATUS: Chronic | ICD-10-CM

## 2023-07-18 DIAGNOSIS — Z98.61 CORONARY ANGIOPLASTY STATUS: Chronic | ICD-10-CM

## 2023-07-18 DIAGNOSIS — Z87.74 PERSONAL HISTORY OF (CORRECTED) CONGENITAL MALFORMATIONS OF HEART AND CIRCULATORY SYSTEM: Chronic | ICD-10-CM

## 2023-07-18 LAB
ALBUMIN SERPL ELPH-MCNC: 3.8 G/DL — SIGNIFICANT CHANGE UP (ref 3.3–5)
ALP SERPL-CCNC: 123 U/L — HIGH (ref 40–120)
ALT FLD-CCNC: 110 U/L — HIGH (ref 12–78)
ANION GAP SERPL CALC-SCNC: 3 MMOL/L — LOW (ref 5–17)
APPEARANCE UR: CLEAR — SIGNIFICANT CHANGE UP
AST SERPL-CCNC: 108 U/L — HIGH (ref 15–37)
BASOPHILS # BLD AUTO: 0.01 K/UL — SIGNIFICANT CHANGE UP (ref 0–0.2)
BASOPHILS NFR BLD AUTO: 0.1 % — SIGNIFICANT CHANGE UP (ref 0–2)
BILIRUB SERPL-MCNC: 0.5 MG/DL — SIGNIFICANT CHANGE UP (ref 0.2–1.2)
BILIRUB UR-MCNC: NEGATIVE — SIGNIFICANT CHANGE UP
BUN SERPL-MCNC: 21 MG/DL — SIGNIFICANT CHANGE UP (ref 7–23)
CALCIUM SERPL-MCNC: 9 MG/DL — SIGNIFICANT CHANGE UP (ref 8.5–10.1)
CHLORIDE SERPL-SCNC: 105 MMOL/L — SIGNIFICANT CHANGE UP (ref 96–108)
CO2 SERPL-SCNC: 28 MMOL/L — SIGNIFICANT CHANGE UP (ref 22–31)
COLOR SPEC: YELLOW — SIGNIFICANT CHANGE UP
CREAT SERPL-MCNC: 1 MG/DL — SIGNIFICANT CHANGE UP (ref 0.5–1.3)
DIFF PNL FLD: NEGATIVE — SIGNIFICANT CHANGE UP
EGFR: 82 ML/MIN/1.73M2 — SIGNIFICANT CHANGE UP
EOSINOPHIL # BLD AUTO: 0.01 K/UL — SIGNIFICANT CHANGE UP (ref 0–0.5)
EOSINOPHIL NFR BLD AUTO: 0.1 % — SIGNIFICANT CHANGE UP (ref 0–6)
GLUCOSE SERPL-MCNC: 117 MG/DL — HIGH (ref 70–99)
GLUCOSE UR QL: NEGATIVE — SIGNIFICANT CHANGE UP
HCT VFR BLD CALC: 42.7 % — SIGNIFICANT CHANGE UP (ref 39–50)
HGB BLD-MCNC: 13.8 G/DL — SIGNIFICANT CHANGE UP (ref 13–17)
IMM GRANULOCYTES NFR BLD AUTO: 0.1 % — SIGNIFICANT CHANGE UP (ref 0–0.9)
KETONES UR-MCNC: NEGATIVE — SIGNIFICANT CHANGE UP
LEUKOCYTE ESTERASE UR-ACNC: NEGATIVE — SIGNIFICANT CHANGE UP
LIDOCAIN IGE QN: 82 U/L — SIGNIFICANT CHANGE UP (ref 73–393)
LYMPHOCYTES # BLD AUTO: 1.39 K/UL — SIGNIFICANT CHANGE UP (ref 1–3.3)
LYMPHOCYTES # BLD AUTO: 20.8 % — SIGNIFICANT CHANGE UP (ref 13–44)
MCHC RBC-ENTMCNC: 29.6 PG — SIGNIFICANT CHANGE UP (ref 27–34)
MCHC RBC-ENTMCNC: 32.3 GM/DL — SIGNIFICANT CHANGE UP (ref 32–36)
MCV RBC AUTO: 91.6 FL — SIGNIFICANT CHANGE UP (ref 80–100)
MONOCYTES # BLD AUTO: 0.21 K/UL — SIGNIFICANT CHANGE UP (ref 0–0.9)
MONOCYTES NFR BLD AUTO: 3.1 % — SIGNIFICANT CHANGE UP (ref 2–14)
NEUTROPHILS # BLD AUTO: 5.06 K/UL — SIGNIFICANT CHANGE UP (ref 1.8–7.4)
NEUTROPHILS NFR BLD AUTO: 75.8 % — SIGNIFICANT CHANGE UP (ref 43–77)
NITRITE UR-MCNC: NEGATIVE — SIGNIFICANT CHANGE UP
PH UR: 6 — SIGNIFICANT CHANGE UP (ref 5–8)
PLATELET # BLD AUTO: 203 K/UL — SIGNIFICANT CHANGE UP (ref 150–400)
POTASSIUM SERPL-MCNC: 4.5 MMOL/L — SIGNIFICANT CHANGE UP (ref 3.5–5.3)
POTASSIUM SERPL-SCNC: 4.5 MMOL/L — SIGNIFICANT CHANGE UP (ref 3.5–5.3)
PROT SERPL-MCNC: 7.7 GM/DL — SIGNIFICANT CHANGE UP (ref 6–8.3)
PROT UR-MCNC: NEGATIVE — SIGNIFICANT CHANGE UP
RBC # BLD: 4.66 M/UL — SIGNIFICANT CHANGE UP (ref 4.2–5.8)
RBC # FLD: 15.1 % — HIGH (ref 10.3–14.5)
SODIUM SERPL-SCNC: 136 MMOL/L — SIGNIFICANT CHANGE UP (ref 135–145)
SP GR SPEC: 1.02 — SIGNIFICANT CHANGE UP (ref 1.01–1.02)
TROPONIN I, HIGH SENSITIVITY RESULT: 7.34 NG/L — SIGNIFICANT CHANGE UP
UROBILINOGEN FLD QL: NEGATIVE — SIGNIFICANT CHANGE UP
WBC # BLD: 6.69 K/UL — SIGNIFICANT CHANGE UP (ref 3.8–10.5)
WBC # FLD AUTO: 6.69 K/UL — SIGNIFICANT CHANGE UP (ref 3.8–10.5)

## 2023-07-18 PROCEDURE — 80053 COMPREHEN METABOLIC PANEL: CPT

## 2023-07-18 PROCEDURE — 83540 ASSAY OF IRON: CPT

## 2023-07-18 PROCEDURE — 84443 ASSAY THYROID STIM HORMONE: CPT

## 2023-07-18 PROCEDURE — 85025 COMPLETE CBC W/AUTO DIFF WBC: CPT

## 2023-07-18 PROCEDURE — 85027 COMPLETE CBC AUTOMATED: CPT

## 2023-07-18 PROCEDURE — 97161 PT EVAL LOW COMPLEX 20 MIN: CPT | Mod: GP

## 2023-07-18 PROCEDURE — 97116 GAIT TRAINING THERAPY: CPT | Mod: GP

## 2023-07-18 PROCEDURE — 36415 COLL VENOUS BLD VENIPUNCTURE: CPT

## 2023-07-18 PROCEDURE — 99285 EMERGENCY DEPT VISIT HI MDM: CPT

## 2023-07-18 PROCEDURE — 71045 X-RAY EXAM CHEST 1 VIEW: CPT | Mod: 26

## 2023-07-18 PROCEDURE — 82728 ASSAY OF FERRITIN: CPT

## 2023-07-18 PROCEDURE — 83550 IRON BINDING TEST: CPT

## 2023-07-18 RX ORDER — CYCLOBENZAPRINE HYDROCHLORIDE 10 MG/1
5 TABLET, FILM COATED ORAL ONCE
Refills: 0 | Status: COMPLETED | OUTPATIENT
Start: 2023-07-18 | End: 2023-07-18

## 2023-07-18 RX ORDER — MORPHINE SULFATE 50 MG/1
4 CAPSULE, EXTENDED RELEASE ORAL ONCE
Refills: 0 | Status: DISCONTINUED | OUTPATIENT
Start: 2023-07-18 | End: 2023-07-18

## 2023-07-18 RX ORDER — ONDANSETRON 8 MG/1
4 TABLET, FILM COATED ORAL ONCE
Refills: 0 | Status: COMPLETED | OUTPATIENT
Start: 2023-07-18 | End: 2023-07-18

## 2023-07-18 RX ORDER — TADALAFIL 10 MG/1
1 TABLET, FILM COATED ORAL
Qty: 0 | Refills: 0 | DISCHARGE

## 2023-07-18 RX ORDER — FAMOTIDINE 10 MG/ML
20 INJECTION INTRAVENOUS ONCE
Refills: 0 | Status: COMPLETED | OUTPATIENT
Start: 2023-07-18 | End: 2023-07-18

## 2023-07-18 RX ORDER — SODIUM CHLORIDE 9 MG/ML
1000 INJECTION INTRAMUSCULAR; INTRAVENOUS; SUBCUTANEOUS ONCE
Refills: 0 | Status: COMPLETED | OUTPATIENT
Start: 2023-07-18 | End: 2023-07-18

## 2023-07-18 RX ORDER — GABAPENTIN 400 MG/1
1 CAPSULE ORAL
Refills: 0 | DISCHARGE

## 2023-07-18 RX ADMIN — ONDANSETRON 4 MILLIGRAM(S): 8 TABLET, FILM COATED ORAL at 16:40

## 2023-07-18 RX ADMIN — CYCLOBENZAPRINE HYDROCHLORIDE 5 MILLIGRAM(S): 10 TABLET, FILM COATED ORAL at 16:40

## 2023-07-18 RX ADMIN — MORPHINE SULFATE 4 MILLIGRAM(S): 50 CAPSULE, EXTENDED RELEASE ORAL at 16:40

## 2023-07-18 RX ADMIN — SODIUM CHLORIDE 1000 MILLILITER(S): 9 INJECTION INTRAMUSCULAR; INTRAVENOUS; SUBCUTANEOUS at 16:40

## 2023-07-18 RX ADMIN — MORPHINE SULFATE 4 MILLIGRAM(S): 50 CAPSULE, EXTENDED RELEASE ORAL at 17:10

## 2023-07-18 RX ADMIN — FAMOTIDINE 20 MILLIGRAM(S): 10 INJECTION INTRAVENOUS at 16:40

## 2023-07-18 NOTE — ED PROVIDER NOTE - PROGRESS NOTE DETAILS
I Ainsley Navarro attest that this documentation has been prepared under the direction and in the presence of Dr. Watkins . Lacey Watkins : labs result mild pre renal azotemia and mild elevated lfts. trop, lipase normal. ua normal. cxr official negative. hospitalist called for admission, phone message left. Lacey Navarro for Dr. Watkins : labs result mild pre-renal azotemia and mild elevated LFTs. Troponin & lipase normal. U/A normal. CXR official negative. Hospitalist called for admission, phone message left.

## 2023-07-18 NOTE — ED PROVIDER NOTE - SKIN, MLM
Skin normal color for race, warm, dry and intact. No evidence of rash. +Tattoos. Skin normal color for race, warm, dry and intact. No evidence of rash. +Tattoos.  No tactile warmth.

## 2023-07-18 NOTE — PHARMACOTHERAPY INTERVENTION NOTE - COMMENTS
Medication reconciliation completed.  Reviewed Medication list and confirmed med allergies with patient; confirmed with Dr. First Medmonse.

## 2023-07-18 NOTE — ED ADULT TRIAGE NOTE - CHIEF COMPLAINT QUOTE
pt presernt t Ed with complaints of diarrhea. pt endorses being seen in  last week and being admitted for same issue. pt was diagnosed with camplobacter at that time. pt reports diarrhea went away and returned 3 days ago. pt also endorses intermittent abdominal cramping.

## 2023-07-18 NOTE — ED PROVIDER NOTE - CONSTITUTIONAL, MLM
Older white male. Well appearing, awake, alert, oriented to person, place, time/situation. normal... Older white male, awake, alert, oriented to person, place, time/situation.  Mildly ill-appearing, no respiratory discomfort, non-toxic.

## 2023-07-18 NOTE — ED PROVIDER NOTE - PRO INTERPRETER NEED 2
English
Quality 111:Pneumonia Vaccination Status For Older Adults: Pneumococcal Vaccination Previously Received
Quality 431: Preventive Care And Screening: Unhealthy Alcohol Use - Screening: Patient not identified as an unhealthy alcohol user when screened for unhealthy alcohol use using a systematic screening method
Quality 130: Documentation Of Current Medications In The Medical Record: Current Medications Documented
Detail Level: Detailed
Quality 226: Preventive Care And Screening: Tobacco Use: Screening And Cessation Intervention: Patient screened for tobacco use and is an ex/non-smoker

## 2023-07-18 NOTE — ED ADULT NURSE NOTE - NSFALLRISKINTERV_ED_ALL_ED
Assistance OOB with selected safe patient handling equipment if applicable/Communicate fall risk and risk factors to all staff, patient, and family/Provide visual cue: yellow wristband, yellow gown, etc/Reinforce activity limits and safety measures with patient and family/Toileting schedule using arm’s reach rule for commode and bathroom/Call bell, personal items and telephone in reach/Instruct patient to call for assistance before getting out of bed/chair/stretcher/Non-slip footwear applied when patient is off stretcher/[place] to call system/Physically safe environment - no spills, clutter or unnecessary equipment/Purposeful Proactive Rounding/Room/bathroom lighting operational, light cord in reach

## 2023-07-18 NOTE — ED PROVIDER NOTE - OBJECTIVE STATEMENT
69 y/o male with a PMHx of anxiety, aortic stenosis, carpal tunnel, chronic neck and back pain, colon polyps, depressive disorder, erectile dysfunction, GI hemorrhage, GERD, hearing loss, H pylori, herniated lumbar disc, HTN, HLD, lumbar radiculopathy, morbid obesity, sciatica, shigella, spinal cord stimulator presents to the ED c/o diarrhea. Was hospitalized 7/7 to 7/9 for campylobacter and C. jejuni, treated with Zithromax. Got better, but symptoms came back three days ago. Stools became loose, then watery, 6-8 bowel movement a day. Transient cramping resolved with stool. Also saw Dr. Staples for sciatica, gradually worsening over the last week. Started steroids on Sunday PO for sciatica by PA daughter.  No abd pain, fever, bloody stools, CP, SOB. Feels faint/lightheaded, no LOC. 69 y/o male with a PMHx of anxiety, aortic stenosis, carpal tunnel, chronic neck and back pain, colon polyps, depressive disorder, erectile dysfunction, GI hemorrhage, GERD, hearing loss, H pylori, herniated lumbar disc, HTN, HLD, lumbar radiculopathy, morbid obesity tx'ed with bariatric sx, sciatica, shigella, spinal cord stimulator presents ambulatory to the ED c/o severe diarrhea. Was hospitalized 7/7 to 7/9 for campylobacter jejuni D, treated with Zithromax: got better & DC'ed home, but symptoms came back three days ago. Stools became loose, then watery, 6-8 bowel movements a day. Transient cramping resolved with stool. Also saw Dr. Staples for R sciatica flare-up, gradually worsening over the last week. Started po steroids on Sunday by PA daughter for the sciatica.  No abd pain, fever, bloody stools, CP, SOB. Feels faint/lightheaded, no LOC.

## 2023-07-18 NOTE — ED PROVIDER NOTE - CLINICAL SUMMARY MEDICAL DECISION MAKING FREE TEXT BOX
69 yo wm, multiple pmhx incl htn, hld, chronic back pain w/ herniated lumbar disks/ rt sciatica, worsening past 1 week on po steroids x3-4 days, recent  hospitalization for campylobacter, diarrhea treated with Zithromax, ambulatory to ed c/o 3 days recurrence severe watery diarrhea w/ associated generalized weakness, lightheadedness. no blood in diarrhea. abd benign. plan cxr, ekg, labs incl stool for gi pcr and cdiff studies, ua, lipase, blood cultures, iv fluids, iv morphine/pepcid/zofran, po flexeril, monitor, observe, reassess. likely expect med admission. 69 yo wm, multiple pmhx incl htn, hld, chronic back pain w/ herniated lumbar disks/ rt sciatica, worsening past 1 week on po steroids x3-4 days, recent  hospitalization for campylobacter, diarrhea treated with Zithromax, ambulatory to ed c/o 3 days recurrence severe watery diarrhea w/ associated generalized weakness, lightheadedness. no blood in diarrhea. abd benign.   Plan: cxr, ekg, labs incl stool for gi pcr and cdiff studies, ua, lipase, blood cultures, iv fluids, iv morphine/pepcid/zofran, po flexeril, monitor, observe, reassess. likely expect med admission.    18:30, Bonifacioibe Ainsley Navarro for Dr. Watkins : labs result mild pre renal azotemia and mild elevated lfts. trop, lipase normal. ua normal. cxr official negative. hospitalist called for admission, phone message left.    18:55, STELLA Watkins MD:  Hospitalist Dr. Bird aware of Med admission, + Contact isolation. 69 yo wm, multiple pmhx incl htn, hld, chronic back pain w/ herniated lumbar disks/ R sciatica, worsening past 1 week on po steroids x3-4 days, recent  hospitalization for campylobacter diarrhea treated with Zithromax, ambulatory to ED c/o 3 days recurrence severe watery diarrhea w/ associated generalized weakness, lightheadedness. no blood in diarrhea. Abd benign on exam.  Plan: cxr, ekg, labs incl stool for gi pcr and cdiff studies, u/a, lipase, blood cultures, iv fluids, iv morphine/pepcid/zofran, po flexeril, monitor, observe, reassess. likely expect med admission.    18:30, Lacey Navarro for Dr. Watkins : labs result mild pre-renal azotemia and mild elevated LFTs. Troponin & lipase normal. U/A normal. CXR official negative. hospitalist called for admission, phone message left.    18:55, STELLA Watkins MD:  Hospitalist Dr. Bird aware of Med admission, + Contact isolation.

## 2023-07-18 NOTE — ED ADULT NURSE NOTE - OBJECTIVE STATEMENT
pt presents c/o diarrhea and intermittent abdominal cramping.  pt was here 8 days ago and admitted for same problem. pt was diagnosed with camplobacter at that time. pt reports diarrhea went away and returned 3 days ago. Pt has green liquid stool. No rectal bleeding. Pt denies nausea and vomiting. Pt pt presents c/o diarrhea and intermittent abdominal cramping.  pt was here 8 days ago and admitted for same problem. pt was diagnosed with camplobacter at that time. pt reports diarrhea went away and returned 3 days ago. Pt has green liquid stool. No rectal bleeding. Pt denies nausea and vomiting. Pt reports past gastric bypass surgery. Pt reports that eating certain foods has made patient's stool soft since then. Pt reports secondary complaint of right leg sciatica pain rated at 8/10.

## 2023-07-19 LAB
ALBUMIN SERPL ELPH-MCNC: 3 G/DL — LOW (ref 3.3–5)
ALP SERPL-CCNC: 107 U/L — SIGNIFICANT CHANGE UP (ref 40–120)
ALT FLD-CCNC: 79 U/L — HIGH (ref 12–78)
ANION GAP SERPL CALC-SCNC: 4 MMOL/L — LOW (ref 5–17)
AST SERPL-CCNC: 64 U/L — HIGH (ref 15–37)
BASOPHILS # BLD AUTO: 0.03 K/UL — SIGNIFICANT CHANGE UP (ref 0–0.2)
BASOPHILS NFR BLD AUTO: 0.5 % — SIGNIFICANT CHANGE UP (ref 0–2)
BILIRUB SERPL-MCNC: 0.3 MG/DL — SIGNIFICANT CHANGE UP (ref 0.2–1.2)
BUN SERPL-MCNC: 22 MG/DL — SIGNIFICANT CHANGE UP (ref 7–23)
C DIFF BY PCR RESULT: SIGNIFICANT CHANGE UP
CALCIUM SERPL-MCNC: 8.2 MG/DL — LOW (ref 8.5–10.1)
CAMPYLOBACTER DNA SPEC NAA+PROBE: DETECTED
CHLORIDE SERPL-SCNC: 109 MMOL/L — HIGH (ref 96–108)
CO2 SERPL-SCNC: 24 MMOL/L — SIGNIFICANT CHANGE UP (ref 22–31)
CREAT SERPL-MCNC: 1.2 MG/DL — SIGNIFICANT CHANGE UP (ref 0.5–1.3)
CULTURE RESULTS: SIGNIFICANT CHANGE UP
EGFR: 66 ML/MIN/1.73M2 — SIGNIFICANT CHANGE UP
EOSINOPHIL # BLD AUTO: 0.02 K/UL — SIGNIFICANT CHANGE UP (ref 0–0.5)
EOSINOPHIL NFR BLD AUTO: 0.3 % — SIGNIFICANT CHANGE UP (ref 0–6)
EPEC DNA STL QL NAA+PROBE: DETECTED
GI PCR PANEL: DETECTED
GLUCOSE SERPL-MCNC: 140 MG/DL — HIGH (ref 70–99)
HCT VFR BLD CALC: 38.4 % — LOW (ref 39–50)
HGB BLD-MCNC: 12.5 G/DL — LOW (ref 13–17)
IMM GRANULOCYTES NFR BLD AUTO: 0.3 % — SIGNIFICANT CHANGE UP (ref 0–0.9)
LYMPHOCYTES # BLD AUTO: 2.28 K/UL — SIGNIFICANT CHANGE UP (ref 1–3.3)
LYMPHOCYTES # BLD AUTO: 37.8 % — SIGNIFICANT CHANGE UP (ref 13–44)
MCHC RBC-ENTMCNC: 30 PG — SIGNIFICANT CHANGE UP (ref 27–34)
MCHC RBC-ENTMCNC: 32.6 GM/DL — SIGNIFICANT CHANGE UP (ref 32–36)
MCV RBC AUTO: 92.1 FL — SIGNIFICANT CHANGE UP (ref 80–100)
MONOCYTES # BLD AUTO: 0.38 K/UL — SIGNIFICANT CHANGE UP (ref 0–0.9)
MONOCYTES NFR BLD AUTO: 6.3 % — SIGNIFICANT CHANGE UP (ref 2–14)
NEUTROPHILS # BLD AUTO: 3.3 K/UL — SIGNIFICANT CHANGE UP (ref 1.8–7.4)
NEUTROPHILS NFR BLD AUTO: 54.8 % — SIGNIFICANT CHANGE UP (ref 43–77)
PLATELET # BLD AUTO: 186 K/UL — SIGNIFICANT CHANGE UP (ref 150–400)
POTASSIUM SERPL-MCNC: 4.3 MMOL/L — SIGNIFICANT CHANGE UP (ref 3.5–5.3)
POTASSIUM SERPL-SCNC: 4.3 MMOL/L — SIGNIFICANT CHANGE UP (ref 3.5–5.3)
PROT SERPL-MCNC: 6.5 GM/DL — SIGNIFICANT CHANGE UP (ref 6–8.3)
RBC # BLD: 4.17 M/UL — LOW (ref 4.2–5.8)
RBC # FLD: 15 % — HIGH (ref 10.3–14.5)
SODIUM SERPL-SCNC: 137 MMOL/L — SIGNIFICANT CHANGE UP (ref 135–145)
SPECIMEN SOURCE: SIGNIFICANT CHANGE UP
TSH SERPL-MCNC: 6.13 UU/ML — HIGH (ref 0.34–4.82)
WBC # BLD: 6.03 K/UL — SIGNIFICANT CHANGE UP (ref 3.8–10.5)
WBC # FLD AUTO: 6.03 K/UL — SIGNIFICANT CHANGE UP (ref 3.8–10.5)

## 2023-07-19 PROCEDURE — 12345: CPT | Mod: NC

## 2023-07-19 PROCEDURE — 99223 1ST HOSP IP/OBS HIGH 75: CPT

## 2023-07-19 PROCEDURE — 99497 ADVNCD CARE PLAN 30 MIN: CPT | Mod: 25

## 2023-07-19 RX ORDER — LEVOTHYROXINE SODIUM 125 MCG
25 TABLET ORAL DAILY
Refills: 0 | Status: DISCONTINUED | OUTPATIENT
Start: 2023-07-19 | End: 2023-07-23

## 2023-07-19 RX ORDER — KETOROLAC TROMETHAMINE 30 MG/ML
30 SYRINGE (ML) INJECTION ONCE
Refills: 0 | Status: DISCONTINUED | OUTPATIENT
Start: 2023-07-19 | End: 2023-07-19

## 2023-07-19 RX ORDER — LACTOBACILLUS ACIDOPHILUS 100MM CELL
1 CAPSULE ORAL
Refills: 0 | Status: DISCONTINUED | OUTPATIENT
Start: 2023-07-19 | End: 2023-07-23

## 2023-07-19 RX ORDER — NALOXONE HYDROCHLORIDE 4 MG/.1ML
0.4 SPRAY NASAL ONCE
Refills: 0 | Status: DISCONTINUED | OUTPATIENT
Start: 2023-07-19 | End: 2023-07-23

## 2023-07-19 RX ORDER — CHOLECALCIFEROL (VITAMIN D3) 125 MCG
1000 CAPSULE ORAL DAILY
Refills: 0 | Status: DISCONTINUED | OUTPATIENT
Start: 2023-07-19 | End: 2023-07-23

## 2023-07-19 RX ORDER — ONDANSETRON 8 MG/1
4 TABLET, FILM COATED ORAL EVERY 8 HOURS
Refills: 0 | Status: DISCONTINUED | OUTPATIENT
Start: 2023-07-19 | End: 2023-07-23

## 2023-07-19 RX ORDER — LANOLIN ALCOHOL/MO/W.PET/CERES
3 CREAM (GRAM) TOPICAL AT BEDTIME
Refills: 0 | Status: DISCONTINUED | OUTPATIENT
Start: 2023-07-19 | End: 2023-07-23

## 2023-07-19 RX ORDER — MORPHINE SULFATE 50 MG/1
2 CAPSULE, EXTENDED RELEASE ORAL EVERY 4 HOURS
Refills: 0 | Status: DISCONTINUED | OUTPATIENT
Start: 2023-07-19 | End: 2023-07-20

## 2023-07-19 RX ORDER — GABAPENTIN 400 MG/1
300 CAPSULE ORAL
Refills: 0 | Status: DISCONTINUED | OUTPATIENT
Start: 2023-07-19 | End: 2023-07-23

## 2023-07-19 RX ORDER — PREGABALIN 225 MG/1
1000 CAPSULE ORAL DAILY
Refills: 0 | Status: DISCONTINUED | OUTPATIENT
Start: 2023-07-19 | End: 2023-07-23

## 2023-07-19 RX ORDER — MORPHINE SULFATE 50 MG/1
4 CAPSULE, EXTENDED RELEASE ORAL EVERY 4 HOURS
Refills: 0 | Status: DISCONTINUED | OUTPATIENT
Start: 2023-07-19 | End: 2023-07-20

## 2023-07-19 RX ORDER — ASPIRIN/CALCIUM CARB/MAGNESIUM 324 MG
81 TABLET ORAL DAILY
Refills: 0 | Status: DISCONTINUED | OUTPATIENT
Start: 2023-07-19 | End: 2023-07-23

## 2023-07-19 RX ORDER — AZITHROMYCIN 500 MG/1
500 TABLET, FILM COATED ORAL EVERY 24 HOURS
Refills: 0 | Status: COMPLETED | OUTPATIENT
Start: 2023-07-19 | End: 2023-07-23

## 2023-07-19 RX ORDER — ENOXAPARIN SODIUM 100 MG/ML
40 INJECTION SUBCUTANEOUS EVERY 24 HOURS
Refills: 0 | Status: DISCONTINUED | OUTPATIENT
Start: 2023-07-19 | End: 2023-07-23

## 2023-07-19 RX ORDER — LIDOCAINE 4 G/100G
1 CREAM TOPICAL DAILY
Refills: 0 | Status: DISCONTINUED | OUTPATIENT
Start: 2023-07-19 | End: 2023-07-23

## 2023-07-19 RX ORDER — BUPROPION HYDROCHLORIDE 150 MG/1
300 TABLET, EXTENDED RELEASE ORAL DAILY
Refills: 0 | Status: DISCONTINUED | OUTPATIENT
Start: 2023-07-19 | End: 2023-07-23

## 2023-07-19 RX ORDER — LOPERAMIDE HCL 2 MG
2 TABLET ORAL EVERY 8 HOURS
Refills: 0 | Status: DISCONTINUED | OUTPATIENT
Start: 2023-07-19 | End: 2023-07-23

## 2023-07-19 RX ORDER — SODIUM CHLORIDE 9 MG/ML
1000 INJECTION INTRAMUSCULAR; INTRAVENOUS; SUBCUTANEOUS
Refills: 0 | Status: DISCONTINUED | OUTPATIENT
Start: 2023-07-19 | End: 2023-07-23

## 2023-07-19 RX ORDER — METOPROLOL TARTRATE 50 MG
12.5 TABLET ORAL DAILY
Refills: 0 | Status: DISCONTINUED | OUTPATIENT
Start: 2023-07-19 | End: 2023-07-23

## 2023-07-19 RX ORDER — PANTOPRAZOLE SODIUM 20 MG/1
40 TABLET, DELAYED RELEASE ORAL
Refills: 0 | Status: DISCONTINUED | OUTPATIENT
Start: 2023-07-19 | End: 2023-07-23

## 2023-07-19 RX ORDER — ATORVASTATIN CALCIUM 80 MG/1
10 TABLET, FILM COATED ORAL AT BEDTIME
Refills: 0 | Status: DISCONTINUED | OUTPATIENT
Start: 2023-07-19 | End: 2023-07-23

## 2023-07-19 RX ORDER — OMEGA-3 ACID ETHYL ESTERS 1 G
4 CAPSULE ORAL DAILY
Refills: 0 | Status: DISCONTINUED | OUTPATIENT
Start: 2023-07-19 | End: 2023-07-23

## 2023-07-19 RX ORDER — EMTRICITABINE AND TENOFOVIR DISOPROXIL FUMARATE 200; 300 MG/1; MG/1
1 TABLET, FILM COATED ORAL DAILY
Refills: 0 | Status: DISCONTINUED | OUTPATIENT
Start: 2023-07-19 | End: 2023-07-23

## 2023-07-19 RX ORDER — VENLAFAXINE HCL 75 MG
75 CAPSULE, EXT RELEASE 24 HR ORAL EVERY 12 HOURS
Refills: 0 | Status: DISCONTINUED | OUTPATIENT
Start: 2023-07-19 | End: 2023-07-23

## 2023-07-19 RX ORDER — SENNA PLUS 8.6 MG/1
2 TABLET ORAL AT BEDTIME
Refills: 0 | Status: DISCONTINUED | OUTPATIENT
Start: 2023-07-19 | End: 2023-07-19

## 2023-07-19 RX ORDER — CYCLOBENZAPRINE HYDROCHLORIDE 10 MG/1
5 TABLET, FILM COATED ORAL EVERY 8 HOURS
Refills: 0 | Status: DISCONTINUED | OUTPATIENT
Start: 2023-07-19 | End: 2023-07-23

## 2023-07-19 RX ORDER — POLYETHYLENE GLYCOL 3350 17 G/17G
17 POWDER, FOR SOLUTION ORAL DAILY
Refills: 0 | Status: DISCONTINUED | OUTPATIENT
Start: 2023-07-19 | End: 2023-07-19

## 2023-07-19 RX ADMIN — Medication 12.5 MILLIGRAM(S): at 11:37

## 2023-07-19 RX ADMIN — Medication 1 TABLET(S): at 09:27

## 2023-07-19 RX ADMIN — ENOXAPARIN SODIUM 40 MILLIGRAM(S): 100 INJECTION SUBCUTANEOUS at 06:27

## 2023-07-19 RX ADMIN — Medication 25 MICROGRAM(S): at 06:27

## 2023-07-19 RX ADMIN — Medication 30 MILLIGRAM(S): at 03:16

## 2023-07-19 RX ADMIN — PANTOPRAZOLE SODIUM 40 MILLIGRAM(S): 20 TABLET, DELAYED RELEASE ORAL at 09:27

## 2023-07-19 RX ADMIN — AZITHROMYCIN 500 MILLIGRAM(S): 500 TABLET, FILM COATED ORAL at 06:27

## 2023-07-19 RX ADMIN — Medication 2 MILLIGRAM(S): at 22:17

## 2023-07-19 RX ADMIN — LIDOCAINE 1 PATCH: 4 CREAM TOPICAL at 21:07

## 2023-07-19 RX ADMIN — LIDOCAINE 1 PATCH: 4 CREAM TOPICAL at 09:28

## 2023-07-19 RX ADMIN — SODIUM CHLORIDE 100 MILLILITER(S): 9 INJECTION INTRAMUSCULAR; INTRAVENOUS; SUBCUTANEOUS at 06:00

## 2023-07-19 RX ADMIN — PANTOPRAZOLE SODIUM 40 MILLIGRAM(S): 20 TABLET, DELAYED RELEASE ORAL at 22:16

## 2023-07-19 RX ADMIN — ATORVASTATIN CALCIUM 10 MILLIGRAM(S): 80 TABLET, FILM COATED ORAL at 22:16

## 2023-07-19 RX ADMIN — Medication 1 TABLET(S): at 09:26

## 2023-07-19 RX ADMIN — Medication 75 MILLIGRAM(S): at 22:16

## 2023-07-19 RX ADMIN — LIDOCAINE 1 PATCH: 4 CREAM TOPICAL at 20:00

## 2023-07-19 RX ADMIN — Medication 81 MILLIGRAM(S): at 09:27

## 2023-07-19 RX ADMIN — BUPROPION HYDROCHLORIDE 300 MILLIGRAM(S): 150 TABLET, EXTENDED RELEASE ORAL at 12:11

## 2023-07-19 RX ADMIN — GABAPENTIN 300 MILLIGRAM(S): 400 CAPSULE ORAL at 20:03

## 2023-07-19 RX ADMIN — GABAPENTIN 300 MILLIGRAM(S): 400 CAPSULE ORAL at 09:27

## 2023-07-19 RX ADMIN — Medication 1 TABLET(S): at 16:42

## 2023-07-19 RX ADMIN — PREGABALIN 1000 MICROGRAM(S): 225 CAPSULE ORAL at 09:27

## 2023-07-19 RX ADMIN — Medication 4 GRAM(S): at 16:25

## 2023-07-19 RX ADMIN — MORPHINE SULFATE 2 MILLIGRAM(S): 50 CAPSULE, EXTENDED RELEASE ORAL at 16:49

## 2023-07-19 RX ADMIN — EMTRICITABINE AND TENOFOVIR DISOPROXIL FUMARATE 1 TABLET(S): 200; 300 TABLET, FILM COATED ORAL at 16:24

## 2023-07-19 RX ADMIN — Medication 1000 UNIT(S): at 09:27

## 2023-07-19 RX ADMIN — Medication 75 MILLIGRAM(S): at 13:13

## 2023-07-19 NOTE — PHYSICAL THERAPY INITIAL EVALUATION ADULT - PREDICTED DURATION OF THERAPY (DAYS/WKS), PT EVAL
DC PT in this acute setting. pt is able to mobilize indep. Pain relieving modalities other than single use HP/CP (on the unit) such as MH, ES, US not available and even if they were would not be able to utilize as pt on contact prec. Pt on medications for pain, pt indicates being on steroid, sees pain  DC PT in this acute setting. pt is able to mobilize indep. Pain relieving modalities, other than single use HP/CP (avail on the unit), such as MH, ES, US (US not approp if prior sx involve HW) not available and even if they were would not be able to utilize as pt on contact prec. Pt on medications for pain, pt indicates being on steroid, sees pain , goes to OPPT re: recent hand fx and recently re: back

## 2023-07-19 NOTE — H&P ADULT - HISTORY OF PRESENT ILLNESS
69 y/o M with PMH aortic stenosis s/p AV replacement 5/2018, Hypertension, Hyperlipidemia, Depression, Anxiety Disorder, chronic neck and back pain with herniated lumbar disc complicated by lumbar radiculopathy, GERD, shigella/ ecoli infection 11/2022 with recurrence 2/2023, diarrhea secondary to campylobacter presents with diarrhea. He was recently d/c from  for diarrhea secondary to campylobacter His abx course was finished while he was in the hospital. Pt states his stools started to become more formed and then over the last 3 days turned loose and watery again. He reports diffuse abdominal pain. No other current symptoms. Denies fevers, chills, chest pain, SOB, abdominal pain, N/V, diarrhea/constipation.    Prior admission:   - 7/9/23: proctitis, campylobacter diarrhea     ER course: VSS. Labs: alkaline phosphatase 123, , . UA: negative. CXR: no consolidation, no effusion, no pneumothorax (personally reviewed).     Pt was given 1L of NS, toradol, flexeril, pepcid, morphine, zofran. He is placed on med/surg observation for further management.

## 2023-07-19 NOTE — PATIENT PROFILE ADULT - FALL HARM RISK - UNIVERSAL INTERVENTIONS
Bed in lowest position, wheels locked, appropriate side rails in place/Call bell, personal items and telephone in reach/Instruct patient to call for assistance before getting out of bed or chair/Non-slip footwear when patient is out of bed/[place] to call system/Physically safe environment - no spills, clutter or unnecessary equipment/Purposeful Proactive Rounding/Room/bathroom lighting operational, light cord in reach

## 2023-07-19 NOTE — PHYSICAL THERAPY INITIAL EVALUATION ADULT - PERTINENT HX OF CURRENT PROBLEM, REHAB EVAL
69 y/o M with PMH aortic stenosis s/p AV replacement 5/2018, Hypertension, Hyperlipidemia, Depression, Anxiety Disorder, chronic neck and back pain with herniated lumbar disc complicated by lumbar radiculopathy, GERD, shigella/ ecoli infection 11/2022 with recurrence 2/2023, diarrhea secondary to campylobacter presents with diarrhea. He was recently d/c from  for diarrhea secondary to campylobacter His abx course was finished while he was in the hospital. Pt states his stools started to become more formed and then over the last 3 days turned loose and watery again. He reports diffuse abdominal pain

## 2023-07-19 NOTE — H&P ADULT - ASSESSMENT
67 y/o M presented with diarrhea     1. Persistent diarrhea secondary to Campylobacter infection   - Med/surg observation   - Completed course of Azithromycin last hospitalization   - s/p 1L of NS, c/w with IVF at 100 ml/hr   - Trend WBC, monitor for temperatures   - Tylenol for temperatures PRN   - Zofran for nausea   - NPO; advance diet as tolerated   - Pain control: toradol, tylenol PRN   - ID consult - Dr. James     2. Elevated transaminases   - Alkaline phosphatase 123, , , will trend   - If persistent elevation in transaminases, will order a RUQ US    3. Lumbar radiculopathy, chronic back pain   - Pain management: Tylenol, Toradol, Oxycodone PRN   - Flexeril for muscle spasm night   - Pt to complete prednisone taper (was prescribed outpt)   - Lidocaine patch   - PT evaluation     4. History of aortic stenosis s/p AV replacement 5/2018, Hypertension, Hyperlipidemia, Depression, Anxiety Disorder, chronic neck and back pain with herniated lumbar disc complicated by lumbar radiculopathy, GERD, shigella/ ecoli infection 11/2022 with recurrence 2/2023, diarrhea secondary to campylobacter  - c/w home medications; verified with pt at the bedside  - On Descovy for PREP    DVT ppx: Lovenox 40 mg subcutaneous daily   Code status: Full code   Emergency contact: Yadira Garcia (daughter) [phone]     I spent a total of 75 minutes on the date of this encounter coordinating the patient's care. This includes reviewing prior documentation, results and imaging in addition to completing a full history and physical examination on the patient. Further tests, medications, and procedures have been ordered as indicated. Laboratory results and the plan of care were communicated to the patient and/or their family member. Supporting documentation was completed and added to the patient's chart.  69 y/o M presented with diarrhea     1. Persistent diarrhea secondary to Campylobacter infection   - Med/surg observation   - Completed course of Azithromycin last hospitalization   - s/p 1L of NS, c/w with IVF at 100 ml/hr   - Trend WBC, monitor for temperatures   - Tylenol for temperatures PRN   - Zofran for nausea   - DASH diet   - f/u GI PCR and C. diff   - Pain control: toradol, tylenol PRN   - ID consult - Dr. James     2. Elevated transaminases   - Alkaline phosphatase 123, , , will trend   - If persistent elevation in transaminases, will order a RUQ US    3. Lumbar radiculopathy, chronic back pain   - Pain management: Tylenol, Toradol, Oxycodone PRN   - Flexeril for muscle spasm night   - Pt to complete prednisone taper (was prescribed outpt)   - Lidocaine patch   - PT evaluation     4. History of aortic stenosis s/p AV replacement 5/2018, Hypertension, Hyperlipidemia, Depression, Anxiety Disorder, chronic neck and back pain with herniated lumbar disc complicated by lumbar radiculopathy, GERD, shigella/ ecoli infection 11/2022 with recurrence 2/2023, diarrhea secondary to campylobacter  - c/w home medications; verified with pt at the bedside  - On Descovy for PREP    DVT ppx: Lovenox 40 mg subcutaneous daily   Code status: Full code   Emergency contact: Yadira Garcia (daughter) [phone]     I spent a total of 75 minutes on the date of this encounter coordinating the patient's care. This includes reviewing prior documentation, results and imaging in addition to completing a full history and physical examination on the patient. Further tests, medications, and procedures have been ordered as indicated. Laboratory results and the plan of care were communicated to the patient and/or their family member. Supporting documentation was completed and added to the patient's chart.

## 2023-07-19 NOTE — PATIENT PROFILE ADULT - FUNCTIONAL ASSESSMENT - BASIC MOBILITY 6.
4-calculated by average/Not able to assess (calculate score using [facility] averaging method)

## 2023-07-19 NOTE — PHYSICAL THERAPY INITIAL EVALUATION ADULT - MODALITIES TREATMENT COMMENTS
offered CP, pt agreeable, once returned w/ CP pt states "what's this? I thought you were bringing a medication pack. what's this going to do?" offered CP, pt agreeable, once returned w/ CP pt states "what's this? I thought you were bringing a medication pack. what's this going to do?". pt left supine in bed, NICOLASA, IV, CP to R LS

## 2023-07-19 NOTE — H&P ADULT - NSHPOUTPATIENTPROVIDERS_GEN_ALL_CORE
PCP - Dr. Romero   Orthopedics - Dr. Rodriguez   Urology - Dr. Ordonez   GI - Dr. Rowe   Cardiology - Dr. Doll   Pulmonology - Dr. Condon   Pain - Dr. BOOTH

## 2023-07-19 NOTE — H&P ADULT - NSHPSOCIALHISTORY_GEN_ALL_CORE
Lives in the same home as his daughter. Independent with ADLs and IADLs. Smoked less than 1 ppd since he was young, quit on Sunday. Does not want a nicotine patch. No alcohol or drug use.

## 2023-07-19 NOTE — CONSULT NOTE ADULT - ASSESSMENT
67 y/o M with PMH aortic stenosis s/p AV replacement 5/2018, Hypertension, Hyperlipidemia, Depression, Anxiety Disorder, chronic neck and back pain with herniated lumbar disc complicated by lumbar radiculopathy, GERD, shigella/ ecoli infection 11/2022 with recurrence 2/2023, diarrhea secondary to campylobacter presents with diarrhea. He was recently d/c from  for diarrhea secondary to campylobacter His abx course was finished while he was in the hospital. Pt states his stools started to become more formed and then over the last 3 days turned loose and watery again. He reports diffuse abdominal pain. No other current symptoms. Denies fevers, chills, chest pain, SOB, abdominal pain, N/V diarrhea/constipation. Prior admission: 7/9/23: proctitis, campylobacter diarrhea ER course: VSS. Labs: alkaline phosphatase 123, , . UA: negative. Pt was given 1L of NS, toradol, flexeril, pepcid, morphine, zofran. He is placed on med/surg observation for further management.     1. Diarrheal syndrome. EPEC. Campylobacter  - now with epec, had campylobacter  - on azithromycin 500mg daily  - 3-5 day course  - monitor temps  - supportive care  - contact precautions  - on descovy for hiv prophlaxis - continue   - fu cbc    2. other issues - care per medicine

## 2023-07-19 NOTE — CONSULT NOTE ADULT - SUBJECTIVE AND OBJECTIVE BOX
Patient is a 68y old  Male who presents with a chief complaint of Diarrhea (19 Jul 2023 04:20)    HPI:  67 y/o M with PMH aortic stenosis s/p AV replacement 5/2018, Hypertension, Hyperlipidemia, Depression, Anxiety Disorder, chronic neck and back pain with herniated lumbar disc complicated by lumbar radiculopathy, GERD, shigella/ ecoli infection 11/2022 with recurrence 2/2023, diarrhea secondary to campylobacter presents with diarrhea. He was recently d/c from  for diarrhea secondary to campylobacter His abx course was finished while he was in the hospital. Pt states his stools started to become more formed and then over the last 3 days turned loose and watery again. He reports diffuse abdominal pain. No other current symptoms. Denies fevers, chills, chest pain, SOB, abdominal pain, N/V diarrhea/constipation. Prior admission: 7/9/23: proctitis, campylobacter diarrhea ER course: VSS. Labs: alkaline phosphatase 123, , . UA: negative. Pt was given 1L of NS, toradol, flexeril, pepcid, morphine, zofran. He is placed on med/surg observation for further management.       PMH: as above  PSH: as above    Meds: per reconciliation sheet, noted below  MEDICATIONS  (STANDING):  aspirin enteric coated 81 milliGRAM(s) Oral daily  atorvastatin 10 milliGRAM(s) Oral at bedtime  azithromycin   Tablet 500 milliGRAM(s) Oral every 24 hours  buPROPion XL (24-Hour) . 300 milliGRAM(s) Oral daily  cholecalciferol 1000 Unit(s) Oral daily  cyanocobalamin 1000 MICROGram(s) Oral daily  emtricitabine 200 mG/tenofovir alafenamide 25 mG (DESCOVY) Tablet 1 Tablet(s) Oral daily  enoxaparin Injectable 40 milliGRAM(s) SubCutaneous every 24 hours  gabapentin 300 milliGRAM(s) Oral two times a day  lactobacillus acidophilus 1 Tablet(s) Oral two times a day with meals  levothyroxine 25 MICROGram(s) Oral daily  lidocaine   4% Patch 1 Patch Transdermal daily  methylPREDNISolone 12 milliGRAM(s) Oral once  metoprolol tartrate 12.5 milliGRAM(s) Oral daily  multivitamin 1 Tablet(s) Oral daily  naloxone Injectable 0.4 milliGRAM(s) IV Push once  omega-3-Acid Ethyl Esters 4 Gram(s) Oral daily  pantoprazole    Tablet 40 milliGRAM(s) Oral two times a day  sodium chloride 0.9%. 1000 milliLiter(s) (100 mL/Hr) IV Continuous <Continuous>  venlafaxine 75 milliGRAM(s) Oral every 12 hours    Allergies    No Known Allergies    Intolerances      Social: no smoking, no alcohol, no illegal drugs; no recent travel, no exposure to TB  FAMILY HISTORY:  FH: HTN (hypertension)  father    Family history of dementia (Father)       no history of premature cardiovascular disease in first degree relatives    ROS: the patient denies fever, no chills, no HA, no dizziness, no sore throat, no blurry vision, no CP, no palpitations, no SOB, no cough, + abdominal pain, + diarrhea, no N/V, no dysuria, no leg pain, no claudication, no rash, no joint aches, no rectal pain or bleeding, no night sweats    All other systems reviewed and are negative    Vital Signs Last 24 Hrs  T(C): 36.6 (19 Jul 2023 08:22), Max: 36.8 (19 Jul 2023 05:13)  T(F): 97.9 (19 Jul 2023 08:22), Max: 98.2 (19 Jul 2023 05:13)  HR: 67 (19 Jul 2023 08:22) (59 - 67)  BP: 111/54 (19 Jul 2023 08:22) (106/68 - 135/77)  BP(mean): 86 (18 Jul 2023 22:21) (85 - 86)  RR: 18 (19 Jul 2023 08:22) (16 - 19)  SpO2: 97% (19 Jul 2023 08:22) (97% - 100%)    Parameters below as of 19 Jul 2023 08:22  Patient On (Oxygen Delivery Method): room air      Daily Height in cm: 167.64 (18 Jul 2023 13:42)    Daily     PE:  Constitutional: NAD  HEENT: NC/AT, EOMI, PERRLA, conjunctivae clear; ears and nose atraumatic; pharynx benign  Neck: supple; thyroid not palpable  Back: no tenderness  Respiratory: respiratory effort normal; clear to auscultation  Cardiovascular: S1S2 regular, no murmurs  Abdomen: soft, tender, not distended, positive BS; liver and spleen WNL  Genitourinary: no suprapubic tenderness  Lymphatic: no LN palpable  Musculoskeletal: no muscle tenderness, no joint swelling or tenderness  Extremities: no pedal edema  Neurological/ Psychiatric: AxOx3, Judgement and insight normal;  moving all extremities  Skin: no rashes; no palpable lesions    Labs: all available labs reviewed                        12.5   6.03  )-----------( 186      ( 19 Jul 2023 08:45 )             38.4     07-19    137  |  109<H>  |  22  ----------------------------<  140<H>  4.3   |  24  |  1.20    Ca    8.2<L>      19 Jul 2023 08:45    TPro  6.5  /  Alb  3.0<L>  /  TBili  0.3  /  DBili  x   /  AST  64<H>  /  ALT  79<H>  /  AlkPhos  107  07-19     LIVER FUNCTIONS - ( 19 Jul 2023 08:45 )  Alb: 3.0 g/dL / Pro: 6.5 gm/dL / ALK PHOS: 107 U/L / ALT: 79 U/L / AST: 64 U/L / GGT: x           Urinalysis Basic - ( 19 Jul 2023 08:45 )    Color: x / Appearance: x / SG: x / pH: x  Gluc: 140 mg/dL / Ketone: x  / Bili: x / Urobili: x   Blood: x / Protein: x / Nitrite: x   Leuk Esterase: x / RBC: x / WBC x   Sq Epi: x / Non Sq Epi: x / Bacteria: x          Radiology: all available radiological tests reviewed  < from: Xray Chest 1 View- PORTABLE-Urgent (07.18.23 @ 16:28) >    ACC: [id number] EXAM:  XR CHEST PORTABLE URGENT 1V   ORDERED BY:   SHEMAR SAINZ     PROCEDURE DATE:  07/18/2023          INTERPRETATION:  CLINICAL STATEMENT: Chest Pain.    TECHNIQUE: AP view of the chest.    COMPARISON: 1/31/2023    FINDINGS/  IMPRESSION:  No consolidation or pleural effusion    Heart size cannot be accurately assessed in this projection.    --- End of Report ---      < end of copied text >    Advanced directives addressed: full resuscitation

## 2023-07-19 NOTE — H&P ADULT - NSICDXPASTMEDICALHX_GEN_ALL_CORE_FT
PAST MEDICAL HISTORY:  Anxiety     Aortic stenosis Bicuspid Aortic Valve Replacement- 5/29/2018    Campylobacter diarrhea     Carpal tunnel syndrome had surgery    Chronic neck and back pain     Depressive disorder     Erectile dysfunction     Gastrointestinal hemorrhage     GERD (gastroesophageal reflux disease)     Hearing loss     Helicobacter pylori infection     Herniated lumbar intervertebral disc     History of colonic polyps     HTN (hypertension)     Hyperlipidemia     Internal and external prolapsed hemorrhoids had surgery    Loss of hearing     Lumbar radiculopathy     Morbid obesity     Sciatica     Shigella infection     Skin cancer external right ear, unsure of type

## 2023-07-19 NOTE — PHYSICAL THERAPY INITIAL EVALUATION ADULT - GENERAL OBSERVATIONS, REHAB EVAL
pt observed amb indep out of bathrm in room on 5E, IV. when probed about back pain, pt adamently reports "not my back" "it's my sciatica" - pt dismissive re: PT discussion of etiol of sciatica sx. pt reports on steroids PTA and cont on steroids now, sees pain mgmt, may have STEPHANIE in near future and if no relief may be candidate for sx- pt w/ 2 prior LS sx. pt observed amb indep out of bathrm in room on 5E, IV. when probed about back pain, pt adamently reports "not my back" "it's my sciatica" - pt dismissive re: PT discussion of etiol of sciatica sx. pt reports on steroids PTA and cont on steroids now, sees pain mgmt, may have STEPHANIE in near future and if no relief may be candidate for sx- pt w/ 2 prior LS sx. c/o RLE radic pain to level of ankle.

## 2023-07-19 NOTE — PHYSICAL THERAPY INITIAL EVALUATION ADULT - MANUAL MUSCLE TESTING RESULTS, REHAB EVAL
BLE strong and equal except R EHL 4/5 (pt reports recent "foot drop" R)/no strength deficits were identified

## 2023-07-19 NOTE — PROVIDER CONTACT NOTE (OTHER) - SITUATION
left a message on Dr Romero's office of admission please fax over discharge paperwork once patient is discharged to [phone]

## 2023-07-19 NOTE — H&P ADULT - NSHPPHYSICALEXAM_GEN_ALL_CORE
ICU Vital Signs Last 24 Hrs  T(C): 36.7 (18 Jul 2023 22:21), Max: 36.7 (18 Jul 2023 22:21)  T(F): 98 (18 Jul 2023 22:21), Max: 98 (18 Jul 2023 22:21)  HR: 61 (18 Jul 2023 22:21) (59 - 62)  BP: 111/75 (18 Jul 2023 22:21) (106/68 - 111/75)  BP(mean): 86 (18 Jul 2023 22:21) (85 - 86)  RR: 19 (18 Jul 2023 22:21) (16 - 19)  SpO2: 99% (18 Jul 2023 22:21) (97% - 99%)    O2 Parameters below as of 18 Jul 2023 22:21  Patient On (Oxygen Delivery Method): room air    General: Awake and alert, cooperative with exam. No acute distress.   Skin: Warm, dry, and pink.   Eyes: Pupils equal and reactive to light. Extraocular eye movements intact. No conjunctival injection, discharge, or scleral icterus.   HEENT: Atraumatic, normocephalic. Moist mucus membranes.  Cardiology: Normal S1, S2. No murmurs, rubs, or gallops. Regular rate and rhythm.   Respiratory: Lungs clear to ascultation bilaterally. Good air exchange. No wheezes, rales, or rhonchi. Normal chest expansion.   Gastrointestinal: Positive bowel sounds. Soft, non-tender, non-distended. No guarding, rigidity, or rebound tenderness. No hepatosplenomegaly.   Musculoskeletal: 5/5 motor strength in all extremities. Normal range of motion.   Extremities: No peripheral edema bilaterally. Dorsalis pedis pulses 2+ bilaterally.   Neurological: A+Ox3 (person, place, and time). Cranial nerves 2-12 intact. Normal speech. No facial droop. No focal neurological deficits.  Psychiatric: Normal affect. Normal mood.

## 2023-07-19 NOTE — H&P ADULT - NSHPREVIEWOFSYSTEMS_GEN_ALL_CORE
Constitutional: negative for fatigue, negative for fever, negative for chills, negative for decreased appetite.  Skin: negative for rashes, negative for open wounds, negative for jaundice.   Eyes: negative for blurry vision, negative for double vision.   Ears, nose, throat: negative for ear pain, negative for nasal congestion, negative for sore throat, negative for lymph node swelling.   Cardiovascular: negative for chest pain, negative for palpitations, negative for lower extremity swelling.   Respiratory: negative for shortness of breath, negative for wheezing, negative for cough.   Gastrointestinal: negative for abdominal pain, negative for nausea, negative for vomiting, positive for diarrhea, negative for constipation, negative for blood in the stool, negative for black tarry stools.   Genitourinary: negative for burning on urination, negative for urinary urgency or frequency, negative for blood in the urine.   Endocrine: negative for cold intolerance, negative for heat intolerance, negative for increased thirst.   Hematologic: negative for easy bruising or bleeding.   Musculoskeletal: negative for muscle/joint pain, negative for decreased range of motion.   Neurological: negative for dizziness, negative for headaches, negative for loss of consciousness, negative for motor weakness, negative for sensory deficits.   Psychiatric: negative for depression, negative for anxiety.

## 2023-07-20 PROCEDURE — 99232 SBSQ HOSP IP/OBS MODERATE 35: CPT

## 2023-07-20 RX ADMIN — Medication 12.5 MILLIGRAM(S): at 09:06

## 2023-07-20 RX ADMIN — EMTRICITABINE AND TENOFOVIR DISOPROXIL FUMARATE 1 TABLET(S): 200; 300 TABLET, FILM COATED ORAL at 09:05

## 2023-07-20 RX ADMIN — PANTOPRAZOLE SODIUM 40 MILLIGRAM(S): 20 TABLET, DELAYED RELEASE ORAL at 09:05

## 2023-07-20 RX ADMIN — Medication 75 MILLIGRAM(S): at 20:34

## 2023-07-20 RX ADMIN — Medication 2 MILLIGRAM(S): at 09:06

## 2023-07-20 RX ADMIN — LIDOCAINE 1 PATCH: 4 CREAM TOPICAL at 09:06

## 2023-07-20 RX ADMIN — LIDOCAINE 1 PATCH: 4 CREAM TOPICAL at 21:34

## 2023-07-20 RX ADMIN — Medication 25 MICROGRAM(S): at 06:58

## 2023-07-20 RX ADMIN — Medication 81 MILLIGRAM(S): at 09:06

## 2023-07-20 RX ADMIN — Medication 8 MILLIGRAM(S): at 06:59

## 2023-07-20 RX ADMIN — Medication 4 GRAM(S): at 09:05

## 2023-07-20 RX ADMIN — Medication 1000 UNIT(S): at 09:07

## 2023-07-20 RX ADMIN — GABAPENTIN 300 MILLIGRAM(S): 400 CAPSULE ORAL at 09:05

## 2023-07-20 RX ADMIN — Medication 2 MILLIGRAM(S): at 20:34

## 2023-07-20 RX ADMIN — ENOXAPARIN SODIUM 40 MILLIGRAM(S): 100 INJECTION SUBCUTANEOUS at 06:58

## 2023-07-20 RX ADMIN — GABAPENTIN 300 MILLIGRAM(S): 400 CAPSULE ORAL at 20:34

## 2023-07-20 RX ADMIN — Medication 1 TABLET(S): at 09:06

## 2023-07-20 RX ADMIN — BUPROPION HYDROCHLORIDE 300 MILLIGRAM(S): 150 TABLET, EXTENDED RELEASE ORAL at 09:05

## 2023-07-20 RX ADMIN — AZITHROMYCIN 500 MILLIGRAM(S): 500 TABLET, FILM COATED ORAL at 06:59

## 2023-07-20 RX ADMIN — Medication 1 TABLET(S): at 17:45

## 2023-07-20 RX ADMIN — ATORVASTATIN CALCIUM 10 MILLIGRAM(S): 80 TABLET, FILM COATED ORAL at 20:34

## 2023-07-20 RX ADMIN — Medication 1 TABLET(S): at 09:05

## 2023-07-20 RX ADMIN — PANTOPRAZOLE SODIUM 40 MILLIGRAM(S): 20 TABLET, DELAYED RELEASE ORAL at 20:35

## 2023-07-20 RX ADMIN — Medication 75 MILLIGRAM(S): at 09:05

## 2023-07-20 RX ADMIN — MORPHINE SULFATE 4 MILLIGRAM(S): 50 CAPSULE, EXTENDED RELEASE ORAL at 10:45

## 2023-07-20 RX ADMIN — PREGABALIN 1000 MICROGRAM(S): 225 CAPSULE ORAL at 09:06

## 2023-07-20 RX ADMIN — LIDOCAINE 1 PATCH: 4 CREAM TOPICAL at 21:19

## 2023-07-20 RX ADMIN — MORPHINE SULFATE 4 MILLIGRAM(S): 50 CAPSULE, EXTENDED RELEASE ORAL at 11:15

## 2023-07-21 PROCEDURE — 99232 SBSQ HOSP IP/OBS MODERATE 35: CPT

## 2023-07-21 RX ADMIN — CYCLOBENZAPRINE HYDROCHLORIDE 5 MILLIGRAM(S): 10 TABLET, FILM COATED ORAL at 18:06

## 2023-07-21 RX ADMIN — ENOXAPARIN SODIUM 40 MILLIGRAM(S): 100 INJECTION SUBCUTANEOUS at 05:54

## 2023-07-21 RX ADMIN — Medication 75 MILLIGRAM(S): at 09:36

## 2023-07-21 RX ADMIN — Medication 81 MILLIGRAM(S): at 09:37

## 2023-07-21 RX ADMIN — PANTOPRAZOLE SODIUM 40 MILLIGRAM(S): 20 TABLET, DELAYED RELEASE ORAL at 09:37

## 2023-07-21 RX ADMIN — Medication 12.5 MILLIGRAM(S): at 09:37

## 2023-07-21 RX ADMIN — GABAPENTIN 300 MILLIGRAM(S): 400 CAPSULE ORAL at 22:41

## 2023-07-21 RX ADMIN — Medication 2 MILLIGRAM(S): at 13:00

## 2023-07-21 RX ADMIN — PREGABALIN 1000 MICROGRAM(S): 225 CAPSULE ORAL at 09:43

## 2023-07-21 RX ADMIN — Medication 1 TABLET(S): at 09:37

## 2023-07-21 RX ADMIN — Medication 25 MICROGRAM(S): at 05:54

## 2023-07-21 RX ADMIN — Medication 4 MILLIGRAM(S): at 05:54

## 2023-07-21 RX ADMIN — ATORVASTATIN CALCIUM 10 MILLIGRAM(S): 80 TABLET, FILM COATED ORAL at 22:42

## 2023-07-21 RX ADMIN — AZITHROMYCIN 500 MILLIGRAM(S): 500 TABLET, FILM COATED ORAL at 05:54

## 2023-07-21 RX ADMIN — Medication 4 GRAM(S): at 09:36

## 2023-07-21 RX ADMIN — LIDOCAINE 1 PATCH: 4 CREAM TOPICAL at 09:37

## 2023-07-21 RX ADMIN — Medication 2 MILLIGRAM(S): at 04:21

## 2023-07-21 RX ADMIN — Medication 75 MILLIGRAM(S): at 22:40

## 2023-07-21 RX ADMIN — PANTOPRAZOLE SODIUM 40 MILLIGRAM(S): 20 TABLET, DELAYED RELEASE ORAL at 22:42

## 2023-07-21 RX ADMIN — Medication 1 TABLET(S): at 09:36

## 2023-07-21 RX ADMIN — LIDOCAINE 1 PATCH: 4 CREAM TOPICAL at 17:33

## 2023-07-21 RX ADMIN — EMTRICITABINE AND TENOFOVIR DISOPROXIL FUMARATE 1 TABLET(S): 200; 300 TABLET, FILM COATED ORAL at 09:37

## 2023-07-21 RX ADMIN — Medication 1000 UNIT(S): at 09:38

## 2023-07-21 RX ADMIN — GABAPENTIN 300 MILLIGRAM(S): 400 CAPSULE ORAL at 09:36

## 2023-07-21 RX ADMIN — BUPROPION HYDROCHLORIDE 300 MILLIGRAM(S): 150 TABLET, EXTENDED RELEASE ORAL at 09:38

## 2023-07-21 NOTE — CONSULT NOTE ADULT - ASSESSMENT
67 y/o M who follows with me at [facility] for h/o MASTER related to h/o christopher-en-Y bariatric surgery with last iron infusion in 3/2022, shigella/ ecoli infection 11/2022 with recurrence 2/2023, diarrhea secondary to campylobacter presents with diarrhea.     # h/o MASTER  - iron def 2/2 bariatric surgery   ­ pt s/p venofer­ completed treatement 3/4/22  - Today, WBC 6, Hb 12.5, plt 186  - will send repeat iron studies given mild anemia    # diarrhea   - s/p recent shigella, ecoli infection  - GI PCR positive, campylobacter and EPEC  - currently on azithromycin 3-5d course  - ID following   - on descovy for hiv prophlaxis - continue     follow w/ me outpatient    67 y/o M who follows with me at [facility] for h/o MASTER related to h/o christopher-en-Y bariatric surgery with last iron infusion in 3/2022, shigella/ ecoli infection 11/2022 with recurrence 2/2023, diarrhea secondary to campylobacter presents with diarrhea.     # h/o MASTER  - iron def 2/2 bariatric surgery   ­ pt s/p venofer­ completed treatement 3/4/22  - Today, WBC 6, Hb 12.5, plt 186  - will send repeat iron studies given mild anemia    # diarrhea   - s/p recent shigella, ecoli infection  - GI PCR positive, campylobacter and EPEC  - currently on azithromycin 3-5d course  - ID following - pt still waking in middle of night with diarrhea   - on descovy for hiv prophlaxis - continue     follow w/ me outpatient

## 2023-07-21 NOTE — CONSULT NOTE ADULT - SUBJECTIVE AND OBJECTIVE BOX
HPI:  69 y/o M who follows with me at [facility] for h/o MASTER related to h/o christopher-en-Y bariatric surgery with last iron infusion in 3/2022, with PMH aortic stenosis s/p AV replacement 5/2018, Hypertension, Hyperlipidemia, Depression, Anxiety Disorder, chronic neck and back pain with herniated lumbar disc complicated by lumbar radiculopathy, GERD, shigella/ ecoli infection 11/2022 with recurrence 2/2023, diarrhea secondary to campylobacter presents with diarrhea.     He was recently d/c from  for diarrhea secondary to campylobacter His abx course was finished while he was in the hospital. Pt states his stools started to become more formed and then over the last 3 days turned loose and watery again. He reports diffuse abdominal pain. No other current symptoms. Denies fevers, chills, chest pain, SOB, abdominal pain, N/V, diarrhea/constipation.    Prior admission:   - 7/9/23: proctitis, campylobacter diarrhea     ER course: VSS. Labs: alkaline phosphatase 123, , . UA: negative. CXR: no consolidation, no effusion, no pneumothorax (personally reviewed).     Pt was given 1L of NS, toradol, flexeril, pepcid, morphine, zofran. He is placed on med/surg observation for further management.   Currently on azithromycin - seen by ID        PAST MEDICAL & SURGICAL HISTORY:  Hyperlipidemia      GERD (gastroesophageal reflux disease)      Anxiety      Skin cancer  external right ear, unsure of type      HTN (hypertension)      Aortic stenosis  Bicuspid Aortic Valve Replacement- 5/29/2018      Chronic neck and back pain      Sciatica      Internal and external prolapsed hemorrhoids  had surgery      Helicobacter pylori infection      History of colonic polyps      Herniated lumbar intervertebral disc      Gastrointestinal hemorrhage      Carpal tunnel syndrome  had surgery      Depressive disorder      Loss of hearing      Morbid obesity      Lumbar radiculopathy      Hearing loss      Erectile dysfunction      Shigella infection      Campylobacter diarrhea      S/P carpal tunnel release  right side, left side- 2014      History of back surgery  laminectomy x2 last 2018      S/P trigger finger release  right thumb and middle finger      H/O external ear surgery  excision mass right ear- cancer 2018      H/O bicuspid aortic valve  replacement- 5/29/18      Status post coronary angioplasty  pt not sure he had this procedure      S/P tonsillectomy      H/O hemorrhoidectomy      Spinal cord stimulator status  10/2020      H/O bariatric surgery          Allergies    No Known Allergies    Intolerances        MEDICATIONS  (STANDING):  aspirin enteric coated 81 milliGRAM(s) Oral daily  atorvastatin 10 milliGRAM(s) Oral at bedtime  azithromycin   Tablet 500 milliGRAM(s) Oral every 24 hours  buPROPion XL (24-Hour) . 300 milliGRAM(s) Oral daily  cholecalciferol 1000 Unit(s) Oral daily  cyanocobalamin 1000 MICROGram(s) Oral daily  emtricitabine 200 mG/tenofovir alafenamide 25 mG (DESCOVY) Tablet 1 Tablet(s) Oral daily  enoxaparin Injectable 40 milliGRAM(s) SubCutaneous every 24 hours  gabapentin 300 milliGRAM(s) Oral two times a day  lactobacillus acidophilus 1 Tablet(s) Oral two times a day with meals  levothyroxine 25 MICROGram(s) Oral daily  lidocaine   4% Patch 1 Patch Transdermal daily  metoprolol tartrate 12.5 milliGRAM(s) Oral daily  multivitamin 1 Tablet(s) Oral daily  naloxone Injectable 0.4 milliGRAM(s) IV Push once  omega-3-Acid Ethyl Esters 4 Gram(s) Oral daily  pantoprazole    Tablet 40 milliGRAM(s) Oral two times a day  sodium chloride 0.9%. 1000 milliLiter(s) (100 mL/Hr) IV Continuous <Continuous>  venlafaxine 75 milliGRAM(s) Oral every 12 hours    MEDICATIONS  (PRN):  aluminum hydroxide/magnesium hydroxide/simethicone Suspension 30 milliLiter(s) Oral every 4 hours PRN Dyspepsia  cyclobenzaprine 5 milliGRAM(s) Oral every 8 hours PRN Spasm  loperamide 2 milliGRAM(s) Oral every 8 hours PRN Diarrhea  melatonin 3 milliGRAM(s) Oral at bedtime PRN Insomnia  ondansetron Injectable 4 milliGRAM(s) IV Push every 8 hours PRN Nausea and/or Vomiting  oxycodone    5 mG/acetaminophen 325 mG 1 Tablet(s) Oral every 6 hours PRN Severe Pain (7 - 10)      FAMILY HISTORY:  FH: HTN (hypertension)  father    Family history of dementia (Father)        SOCIAL HISTORY: No EtOH, no tobacco    REVIEW OF SYSTEMS:    CONSTITUTIONAL: No weakness, fevers or chills  EYES/ENT: No visual changes;  No vertigo or throat pain   NECK: No pain or stiffness  RESPIRATORY: No cough, wheezing, hemoptysis; No shortness of breath  CARDIOVASCULAR: No chest pain or palpitations  GASTROINTESTINAL: mild abdominal or epigastric pain. No nausea, vomiting, or hematemesis; + diarrhea, no constipation. No melena or hematochezia.  GENITOURINARY: No dysuria, frequency or hematuria  NEUROLOGICAL: No numbness or weakness  SKIN: No itching, burning, rashes, or lesions   All other review of systems is negative unless indicated above.        T(F): 98.1 (07-20-23 @ 23:33), Max: 98.1 (07-20-23 @ 23:33)  HR: 68 (07-20-23 @ 23:33)  BP: 106/63 (07-20-23 @ 23:33)  RR: 17 (07-20-23 @ 23:33)  SpO2: 100% (07-20-23 @ 23:33)  Wt(kg): --    GENERAL: NAD  HEAD:  Atraumatic, Normocephalic  EYES: EOMI  CHEST/LUNG: Clear to auscultation bilaterally; No wheeze  HEART: Regular rate and rhythm;  ABDOMEN: Soft, Nontender, Nondistended; Bowel sounds hyperactive  EXTREMITIES:  no edema  NEUROLOGY: non-focal                          12.5   6.03  )-----------( 186      ( 19 Jul 2023 08:45 )             38.4       07-19    137  |  109<H>  |  22  ----------------------------<  140<H>  4.3   |  24  |  1.20    Ca    8.2<L>      19 Jul 2023 08:45    TPro  6.5  /  Alb  3.0<L>  /  TBili  0.3  /  DBili  x   /  AST  64<H>  /  ALT  79<H>  /  AlkPhos  107  07-19              Clean Catch None  07-18 @ 18:36   <10,000 CFU/mL Normal Urogenital Mackenzie  --  --      .Blood None  07-18 @ 16:19   No growth at 48 Hours  --  --      .Blood Blood-Peripheral  07-18 @ 15:46   No growth at 48 Hours  --  --      .Blood None  07-08 @ 02:04   No growth at 5 days  --  --      .Blood None  07-08 @ 01:53   No growth at 5 days  --  --       HPI:  67 y/o M who follows with me at [facility] for h/o MASTER related to h/o christopher-en-Y bariatric surgery with last iron infusion in 3/2022, with PMH aortic stenosis s/p AV replacement 5/2018, Hypertension, Hyperlipidemia, Depression, Anxiety Disorder, chronic neck and back pain with herniated lumbar disc complicated by lumbar radiculopathy, GERD, shigella/ ecoli infection 11/2022 with recurrence 2/2023, diarrhea secondary to campylobacter presents with diarrhea.     He was recently d/c from  for diarrhea secondary to campylobacter His abx course was finished while he was in the hospital. Pt states his stools started to become more formed and then over the last 3 days turned loose and watery again. He reports diffuse abdominal pain. No other current symptoms. Denies fevers, chills, chest pain, SOB, abdominal pain, N/V, diarrhea/constipation.    Prior admission:   - 7/9/23: proctitis, campylobacter diarrhea     ER course: VSS. Labs: alkaline phosphatase 123, , . UA: negative. CXR: no consolidation, no effusion, no pneumothorax (personally reviewed).     Pt was given 1L of NS, toradol, flexeril, pepcid, morphine, zofran. He is placed on med/surg observation for further management.   Currently on azithromycin - seen by ID        PAST MEDICAL & SURGICAL HISTORY:  Hyperlipidemia      GERD (gastroesophageal reflux disease)      Anxiety      Skin cancer  external right ear, unsure of type      HTN (hypertension)      Aortic stenosis  Bicuspid Aortic Valve Replacement- 5/29/2018      Chronic neck and back pain      Sciatica      Internal and external prolapsed hemorrhoids  had surgery      Helicobacter pylori infection      History of colonic polyps      Herniated lumbar intervertebral disc      Gastrointestinal hemorrhage      Carpal tunnel syndrome  had surgery      Depressive disorder      Loss of hearing      Morbid obesity      Lumbar radiculopathy      Hearing loss      Erectile dysfunction      Shigella infection      Campylobacter diarrhea      S/P carpal tunnel release  right side, left side- 2014      History of back surgery  laminectomy x2 last 2018      S/P trigger finger release  right thumb and middle finger      H/O external ear surgery  excision mass right ear- cancer 2018      H/O bicuspid aortic valve  replacement- 5/29/18      Status post coronary angioplasty  pt not sure he had this procedure      S/P tonsillectomy      H/O hemorrhoidectomy      Spinal cord stimulator status  10/2020      H/O bariatric surgery          Allergies    No Known Allergies    Intolerances        MEDICATIONS  (STANDING):  aspirin enteric coated 81 milliGRAM(s) Oral daily  atorvastatin 10 milliGRAM(s) Oral at bedtime  azithromycin   Tablet 500 milliGRAM(s) Oral every 24 hours  buPROPion XL (24-Hour) . 300 milliGRAM(s) Oral daily  cholecalciferol 1000 Unit(s) Oral daily  cyanocobalamin 1000 MICROGram(s) Oral daily  emtricitabine 200 mG/tenofovir alafenamide 25 mG (DESCOVY) Tablet 1 Tablet(s) Oral daily  enoxaparin Injectable 40 milliGRAM(s) SubCutaneous every 24 hours  gabapentin 300 milliGRAM(s) Oral two times a day  lactobacillus acidophilus 1 Tablet(s) Oral two times a day with meals  levothyroxine 25 MICROGram(s) Oral daily  lidocaine   4% Patch 1 Patch Transdermal daily  metoprolol tartrate 12.5 milliGRAM(s) Oral daily  multivitamin 1 Tablet(s) Oral daily  naloxone Injectable 0.4 milliGRAM(s) IV Push once  omega-3-Acid Ethyl Esters 4 Gram(s) Oral daily  pantoprazole    Tablet 40 milliGRAM(s) Oral two times a day  sodium chloride 0.9%. 1000 milliLiter(s) (100 mL/Hr) IV Continuous <Continuous>  venlafaxine 75 milliGRAM(s) Oral every 12 hours    MEDICATIONS  (PRN):  aluminum hydroxide/magnesium hydroxide/simethicone Suspension 30 milliLiter(s) Oral every 4 hours PRN Dyspepsia  cyclobenzaprine 5 milliGRAM(s) Oral every 8 hours PRN Spasm  loperamide 2 milliGRAM(s) Oral every 8 hours PRN Diarrhea  melatonin 3 milliGRAM(s) Oral at bedtime PRN Insomnia  ondansetron Injectable 4 milliGRAM(s) IV Push every 8 hours PRN Nausea and/or Vomiting  oxycodone    5 mG/acetaminophen 325 mG 1 Tablet(s) Oral every 6 hours PRN Severe Pain (7 - 10)      FAMILY HISTORY:  FH: HTN (hypertension)  father    Family history of dementia (Father)        SOCIAL HISTORY: No EtOH, no tobacco    REVIEW OF SYSTEMS:    CONSTITUTIONAL: No weakness, fevers or chills  EYES/ENT: No visual changes;  No vertigo or throat pain   NECK: No pain or stiffness  RESPIRATORY: No cough, wheezing, hemoptysis; No shortness of breath  CARDIOVASCULAR: No chest pain or palpitations  GASTROINTESTINAL: mild abdominal or epigastric pain. No nausea, vomiting, or hematemesis; + diarrhea, no constipation. No melena or hematochezia.  GENITOURINARY: No dysuria, frequency or hematuria  NEUROLOGICAL: No numbness or weakness, +Back pain   SKIN: No itching, burning, rashes, or lesions   All other review of systems is negative unless indicated above.        T(F): 98.1 (07-20-23 @ 23:33), Max: 98.1 (07-20-23 @ 23:33)  HR: 68 (07-20-23 @ 23:33)  BP: 106/63 (07-20-23 @ 23:33)  RR: 17 (07-20-23 @ 23:33)  SpO2: 100% (07-20-23 @ 23:33)  Wt(kg): --    GENERAL: NAD  HEAD:  Atraumatic, Normocephalic  EYES: EOMI  CHEST/LUNG: Clear to auscultation bilaterally; No wheeze  HEART: Regular rate and rhythm;  ABDOMEN: Soft, Nontender, Nondistended; Bowel sounds hyperactive  EXTREMITIES:  no edema, + back pain  NEUROLOGY: non-focal                          12.5   6.03  )-----------( 186      ( 19 Jul 2023 08:45 )             38.4       07-19    137  |  109<H>  |  22  ----------------------------<  140<H>  4.3   |  24  |  1.20    Ca    8.2<L>      19 Jul 2023 08:45    TPro  6.5  /  Alb  3.0<L>  /  TBili  0.3  /  DBili  x   /  AST  64<H>  /  ALT  79<H>  /  AlkPhos  107  07-19              Clean Catch None  07-18 @ 18:36   <10,000 CFU/mL Normal Urogenital Mackenzie  --  --      .Blood None  07-18 @ 16:19   No growth at 48 Hours  --  --      .Blood Blood-Peripheral  07-18 @ 15:46   No growth at 48 Hours  --  --      .Blood None  07-08 @ 02:04   No growth at 5 days  --  --      .Blood None  07-08 @ 01:53   No growth at 5 days  --  --

## 2023-07-22 LAB
ALBUMIN SERPL ELPH-MCNC: 3 G/DL — LOW (ref 3.3–5)
ALP SERPL-CCNC: 116 U/L — SIGNIFICANT CHANGE UP (ref 40–120)
ALT FLD-CCNC: 63 U/L — SIGNIFICANT CHANGE UP (ref 12–78)
ANION GAP SERPL CALC-SCNC: 4 MMOL/L — LOW (ref 5–17)
AST SERPL-CCNC: 46 U/L — HIGH (ref 15–37)
BILIRUB SERPL-MCNC: 0.1 MG/DL — LOW (ref 0.2–1.2)
BUN SERPL-MCNC: 20 MG/DL — SIGNIFICANT CHANGE UP (ref 7–23)
CALCIUM SERPL-MCNC: 8.6 MG/DL — SIGNIFICANT CHANGE UP (ref 8.5–10.1)
CHLORIDE SERPL-SCNC: 108 MMOL/L — SIGNIFICANT CHANGE UP (ref 96–108)
CO2 SERPL-SCNC: 25 MMOL/L — SIGNIFICANT CHANGE UP (ref 22–31)
CREAT SERPL-MCNC: 0.94 MG/DL — SIGNIFICANT CHANGE UP (ref 0.5–1.3)
EGFR: 88 ML/MIN/1.73M2 — SIGNIFICANT CHANGE UP
FERRITIN SERPL-MCNC: 43 NG/ML — SIGNIFICANT CHANGE UP (ref 30–400)
GLUCOSE SERPL-MCNC: 99 MG/DL — SIGNIFICANT CHANGE UP (ref 70–99)
HCT VFR BLD CALC: 40.5 % — SIGNIFICANT CHANGE UP (ref 39–50)
HGB BLD-MCNC: 13.2 G/DL — SIGNIFICANT CHANGE UP (ref 13–17)
IRON SATN MFR SERPL: 10 % — LOW (ref 16–55)
IRON SATN MFR SERPL: 38 UG/DL — LOW (ref 45–165)
MCHC RBC-ENTMCNC: 29.5 PG — SIGNIFICANT CHANGE UP (ref 27–34)
MCHC RBC-ENTMCNC: 32.6 GM/DL — SIGNIFICANT CHANGE UP (ref 32–36)
MCV RBC AUTO: 90.4 FL — SIGNIFICANT CHANGE UP (ref 80–100)
PLATELET # BLD AUTO: 215 K/UL — SIGNIFICANT CHANGE UP (ref 150–400)
POTASSIUM SERPL-MCNC: 3.9 MMOL/L — SIGNIFICANT CHANGE UP (ref 3.5–5.3)
POTASSIUM SERPL-SCNC: 3.9 MMOL/L — SIGNIFICANT CHANGE UP (ref 3.5–5.3)
PROT SERPL-MCNC: 6.8 GM/DL — SIGNIFICANT CHANGE UP (ref 6–8.3)
RBC # BLD: 4.48 M/UL — SIGNIFICANT CHANGE UP (ref 4.2–5.8)
RBC # FLD: 15.1 % — HIGH (ref 10.3–14.5)
SODIUM SERPL-SCNC: 137 MMOL/L — SIGNIFICANT CHANGE UP (ref 135–145)
TIBC SERPL-MCNC: 394 UG/DL — SIGNIFICANT CHANGE UP (ref 220–430)
UIBC SERPL-MCNC: 357 UG/DL — SIGNIFICANT CHANGE UP (ref 110–370)
WBC # BLD: 6.87 K/UL — SIGNIFICANT CHANGE UP (ref 3.8–10.5)
WBC # FLD AUTO: 6.87 K/UL — SIGNIFICANT CHANGE UP (ref 3.8–10.5)

## 2023-07-22 PROCEDURE — 99232 SBSQ HOSP IP/OBS MODERATE 35: CPT

## 2023-07-22 RX ADMIN — PANTOPRAZOLE SODIUM 40 MILLIGRAM(S): 20 TABLET, DELAYED RELEASE ORAL at 20:26

## 2023-07-22 RX ADMIN — Medication 75 MILLIGRAM(S): at 09:19

## 2023-07-22 RX ADMIN — AZITHROMYCIN 500 MILLIGRAM(S): 500 TABLET, FILM COATED ORAL at 06:09

## 2023-07-22 RX ADMIN — Medication 1 TABLET(S): at 09:18

## 2023-07-22 RX ADMIN — LIDOCAINE 1 PATCH: 4 CREAM TOPICAL at 09:19

## 2023-07-22 RX ADMIN — ENOXAPARIN SODIUM 40 MILLIGRAM(S): 100 INJECTION SUBCUTANEOUS at 06:10

## 2023-07-22 RX ADMIN — Medication 3 MILLIGRAM(S): at 23:33

## 2023-07-22 RX ADMIN — Medication 75 MILLIGRAM(S): at 20:26

## 2023-07-22 RX ADMIN — GABAPENTIN 300 MILLIGRAM(S): 400 CAPSULE ORAL at 09:19

## 2023-07-22 RX ADMIN — PANTOPRAZOLE SODIUM 40 MILLIGRAM(S): 20 TABLET, DELAYED RELEASE ORAL at 09:18

## 2023-07-22 RX ADMIN — Medication 1000 UNIT(S): at 09:18

## 2023-07-22 RX ADMIN — EMTRICITABINE AND TENOFOVIR DISOPROXIL FUMARATE 1 TABLET(S): 200; 300 TABLET, FILM COATED ORAL at 09:19

## 2023-07-22 RX ADMIN — CYCLOBENZAPRINE HYDROCHLORIDE 5 MILLIGRAM(S): 10 TABLET, FILM COATED ORAL at 09:20

## 2023-07-22 RX ADMIN — PREGABALIN 1000 MICROGRAM(S): 225 CAPSULE ORAL at 09:18

## 2023-07-22 RX ADMIN — ATORVASTATIN CALCIUM 10 MILLIGRAM(S): 80 TABLET, FILM COATED ORAL at 20:27

## 2023-07-22 RX ADMIN — Medication 4 GRAM(S): at 09:18

## 2023-07-22 RX ADMIN — Medication 81 MILLIGRAM(S): at 09:19

## 2023-07-22 RX ADMIN — Medication 25 MICROGRAM(S): at 06:09

## 2023-07-22 RX ADMIN — CYCLOBENZAPRINE HYDROCHLORIDE 5 MILLIGRAM(S): 10 TABLET, FILM COATED ORAL at 17:50

## 2023-07-22 RX ADMIN — GABAPENTIN 300 MILLIGRAM(S): 400 CAPSULE ORAL at 20:26

## 2023-07-22 RX ADMIN — Medication 12.5 MILLIGRAM(S): at 09:18

## 2023-07-22 RX ADMIN — BUPROPION HYDROCHLORIDE 300 MILLIGRAM(S): 150 TABLET, EXTENDED RELEASE ORAL at 09:18

## 2023-07-22 RX ADMIN — Medication 1 TABLET(S): at 17:50

## 2023-07-22 RX ADMIN — Medication 2 MILLIGRAM(S): at 06:09

## 2023-07-22 NOTE — PROGRESS NOTE ADULT - ASSESSMENT
67 y/o M presented with diarrhea     1. Persistent diarrhea secondary to Campylobacter infection   - GI PCR positive for enteroinvasive ecoli and campylobacter   - pt on descovy for prophylaxis   - cont w/ azithro   - s/p 1L of NS  - Tylenol for temperatures PRN   - Zofran for nausea   - DASH diet   - Pain control: toradol, tylenol PRN   - supportive care / antidiarrhea  - ID consult      2. Elevated transaminases   -improving     3. Lumbar radiculopathy, chronic back pain   - Pain management: Tylenol, Toradol, Oxycodone PRN   - Flexeril for muscle spasm night   - Pt to complete prednisone taper (was prescribed outpt)   - Lidocaine patch   - PT evaluation     4. History of aortic stenosis s/p AV replacement 5/2018, Hypertension, Hyperlipidemia, Depression, Anxiety Disorder, chronic neck and back pain with herniated lumbar disc complicated by lumbar radiculopathy, GERD, shigella/ ecoli infection 11/2022 with recurrence 2/2023, diarrhea secondary to campylobacter  - c/w home medications; verified with pt at the bedside  - On Descovy for PREP    Dispo  -discharge plan in 1-2 days   
67 y/o M with PMH aortic stenosis s/p AV replacement 5/2018, Hypertension, Hyperlipidemia, Depression, Anxiety Disorder, chronic neck and back pain with herniated lumbar disc complicated by lumbar radiculopathy, GERD, shigella/ ecoli infection 11/2022 with recurrence 2/2023, diarrhea secondary to campylobacter presents with diarrhea. He was recently d/c from  for diarrhea secondary to campylobacter His abx course was finished while he was in the hospital. Pt states his stools started to become more formed and then over the last 3 days turned loose and watery again. He reports diffuse abdominal pain. No other current symptoms. Denies fevers, chills, chest pain, SOB, abdominal pain, N/V diarrhea/constipation. Prior admission: 7/9/23: proctitis, campylobacter diarrhea ER course: VSS. Labs: alkaline phosphatase 123, , . UA: negative. Pt was given 1L of NS, toradol, flexeril, pepcid, morphine, zofran. He is placed on med/surg observation for further management.     1. Diarrheal syndrome. EPEC. Campylobacter  - now with epec, had campylobacter  - on azithromycin 500mg daily #2  - 5 day course  - monitor temps  - supportive care  - contact precautions  - on descovy for hiv prophlaxis - continue   - fu cbc    2. other issues - care per medicine 
67 y/o M presented with diarrhea     1. Persistent diarrhea secondary to Campylobacter infection   - GI PCR positive for enteroinvasive ecoli and campylobacter   - pt on descovy for prophylaxis   - cont w/ azithro   - s/p 1L of NS  - Tylenol for temperatures PRN   - Zofran for nausea   - DASH diet   - Pain control: toradol, tylenol PRN   - supportive care / antidiarrhea  - ID consult      2. Elevated transaminases   - Alkaline phosphatase 123, , , will trend   - If persistent elevation in transaminases, will order a RUQ US    3. Lumbar radiculopathy, chronic back pain   - Pain management: Tylenol, Toradol, Oxycodone PRN   - Flexeril for muscle spasm night   - Pt to complete prednisone taper (was prescribed outpt)   - Lidocaine patch   - PT evaluation     4. History of aortic stenosis s/p AV replacement 5/2018, Hypertension, Hyperlipidemia, Depression, Anxiety Disorder, chronic neck and back pain with herniated lumbar disc complicated by lumbar radiculopathy, GERD, shigella/ ecoli infection 11/2022 with recurrence 2/2023, diarrhea secondary to campylobacter  - c/w home medications; verified with pt at the bedside  - On Descovy for PREP  
69 y/o M presented with diarrhea     1. Persistent diarrhea secondary to Campylobacter infection   - GI PCR positive for enteroinvasive ecoli and campylobacter   - pt on descovy for prophylaxis   - cont w/ azithro   - s/p 1L of NS  - Tylenol for temperatures PRN   - Zofran for nausea   - DASH diet   - Pain control: toradol, tylenol PRN   - ID consult      2. Elevated transaminases   - Alkaline phosphatase 123, , , will trend   - If persistent elevation in transaminases, will order a RUQ US    3. Lumbar radiculopathy, chronic back pain   - Pain management: Tylenol, Toradol, Oxycodone PRN   - Flexeril for muscle spasm night   - Pt to complete prednisone taper (was prescribed outpt)   - Lidocaine patch   - PT evaluation     4. History of aortic stenosis s/p AV replacement 5/2018, Hypertension, Hyperlipidemia, Depression, Anxiety Disorder, chronic neck and back pain with herniated lumbar disc complicated by lumbar radiculopathy, GERD, shigella/ ecoli infection 11/2022 with recurrence 2/2023, diarrhea secondary to campylobacter  - c/w home medications; verified with pt at the bedside  - On Descovy for PREP  
69 y/o M presented with diarrhea     1. Persistent diarrhea secondary to Campylobacter infection   - GI PCR positive for enteroinvasive ecoli and campylobacter   - pt on descovy for prophylaxis   - cont w/ azithro   - s/p 1L of NS  - Tylenol for temperatures PRN   - Zofran for nausea   - DASH diet   - Pain control: toradol, tylenol PRN   - supportive care / antidiarrhea  - ID consult      2. Elevated transaminases   - Alkaline phosphatase 123, , , will trend   - If persistent elevation in transaminases, will order a RUQ US    3. Lumbar radiculopathy, chronic back pain   - Pain management: Tylenol, Toradol, Oxycodone PRN   - Flexeril for muscle spasm night   - Pt to complete prednisone taper (was prescribed outpt)   - Lidocaine patch   - PT evaluation     4. History of aortic stenosis s/p AV replacement 5/2018, Hypertension, Hyperlipidemia, Depression, Anxiety Disorder, chronic neck and back pain with herniated lumbar disc complicated by lumbar radiculopathy, GERD, shigella/ ecoli infection 11/2022 with recurrence 2/2023, diarrhea secondary to campylobacter  - c/w home medications; verified with pt at the bedside  - On Descovy for PREP

## 2023-07-22 NOTE — PROGRESS NOTE ADULT - SUBJECTIVE AND OBJECTIVE BOX
Patient is a 68y old  Male who presents with a chief complaint of Diarrhea (19 Jul 2023 12:07)      SUBJECTIVE:   HPI:  69 y/o M with PMH aortic stenosis s/p AV replacement 5/2018, Hypertension, Hyperlipidemia, Depression, Anxiety Disorder, chronic neck and back pain with herniated lumbar disc complicated by lumbar radiculopathy, GERD, shigella/ ecoli infection 11/2022 with recurrence 2/2023, diarrhea secondary to campylobacter presents with diarrhea. He was recently d/c from  for diarrhea secondary to campylobacter His abx course was finished while he was in the hospital. Pt states his stools started to become more formed and then over the last 3 days turned loose and watery again. He reports diffuse abdominal pain. No other current symptoms. Denies fevers, chills, chest pain, SOB, abdominal pain, N/V, diarrhea/constipation.    Prior admission:   - 7/9/23: proctitis, campylobacter diarrhea     ER course: VSS. Labs: alkaline phosphatase 123, , . UA: negative. CXR: no consolidation, no effusion, no pneumothorax (personally reviewed).     Pt was given 1L of NS, toradol, flexeril, pepcid, morphine, zofran. He is placed on med/surg observation for further management.    (19 Jul 2023 04:20)      7/19: sitting up at edge of bed, still with large amount of BMs and abd pain. discussed results of lab work. tolerating abx   7/20: still with diarrhea. discussed results of positive stool culture and abx plan     7/21: +diarrhea, +watery, NB in nature. Reports of low radicular back pain. No other acute complaints.     REVIEW OF SYSTEMS:    CONSTITUTIONAL: No weakness, fevers or chills  EYES/ENT: No visual changes;  No vertigo or throat pain   NECK: No pain or stiffness  RESPIRATORY: No cough, wheezing, hemoptysis; No shortness of breath  CARDIOVASCULAR: No chest pain or palpitations  GASTROINTESTINAL: + diarrhea No abdominal or epigastric pain. No nausea, vomiting, or hematemesis; no constipation. No melena or hematochezia.  GENITOURINARY: No dysuria, frequency or hematuria  NEUROLOGICAL: No numbness or weakness  SKIN: No itching, burning, rashes, or lesions   All other review of systems is negative unless indicated above        Vital Signs Last 24 Hrs  T(C): 36.7 (21 Jul 2023 15:50), Max: 36.7 (20 Jul 2023 23:33)  T(F): 98.1 (21 Jul 2023 15:50), Max: 98.1 (20 Jul 2023 23:33)  HR: 60 (21 Jul 2023 15:50) (60 - 68)  BP: 100/60 (21 Jul 2023 15:50) (100/60 - 115/70)  RR: 18 (21 Jul 2023 15:50) (17 - 18)  SpO2: 99% (21 Jul 2023 15:50) (97% - 100%)    Parameters below as of 21 Jul 2023 15:50  Patient On (Oxygen Delivery Method): room air            PHYSICAL EXAM:    Constitutional: NAD, awake and alert, well-developed  HEENT: PERR, EOMI, Normal Hearing, MMM  Neck: Soft and supple, No LAD, No JVD  Respiratory: Breath sounds are clear bilaterally, No wheezing, rales or rhonchi  Cardiovascular: S1 and S2, regular rate and rhythm, no Murmurs, gallops or rubs  Gastrointestinal: Bowel Sounds present, soft, nontender, nondistended, no guarding, no rebound  Extremities: No peripheral edema  Vascular: 2+ peripheral pulses  Neurological: A/O x 3, no focal deficits  Musculoskeletal: 5/5 strength b/l upper and lower extremities  Skin: No rashes      MEDICATIONS  (STANDING):  aspirin enteric coated 81 milliGRAM(s) Oral daily  atorvastatin 10 milliGRAM(s) Oral at bedtime  azithromycin   Tablet 500 milliGRAM(s) Oral every 24 hours  buPROPion XL (24-Hour) . 300 milliGRAM(s) Oral daily  cholecalciferol 1000 Unit(s) Oral daily  cyanocobalamin 1000 MICROGram(s) Oral daily  emtricitabine 200 mG/tenofovir alafenamide 25 mG (DESCOVY) Tablet 1 Tablet(s) Oral daily  enoxaparin Injectable 40 milliGRAM(s) SubCutaneous every 24 hours  gabapentin 300 milliGRAM(s) Oral two times a day  lactobacillus acidophilus 1 Tablet(s) Oral two times a day with meals  levothyroxine 25 MICROGram(s) Oral daily  lidocaine   4% Patch 1 Patch Transdermal daily  metoprolol tartrate 12.5 milliGRAM(s) Oral daily  multivitamin 1 Tablet(s) Oral daily  naloxone Injectable 0.4 milliGRAM(s) IV Push once  omega-3-Acid Ethyl Esters 4 Gram(s) Oral daily  pantoprazole    Tablet 40 milliGRAM(s) Oral two times a day  sodium chloride 0.9%. 1000 milliLiter(s) (100 mL/Hr) IV Continuous <Continuous>  venlafaxine 75 milliGRAM(s) Oral every 12 hours    MEDICATIONS  (PRN):  aluminum hydroxide/magnesium hydroxide/simethicone Suspension 30 milliLiter(s) Oral every 4 hours PRN Dyspepsia  cyclobenzaprine 5 milliGRAM(s) Oral every 8 hours PRN Spasm  loperamide 2 milliGRAM(s) Oral every 8 hours PRN Diarrhea  melatonin 3 milliGRAM(s) Oral at bedtime PRN Insomnia  ondansetron Injectable 4 milliGRAM(s) IV Push every 8 hours PRN Nausea and/or Vomiting  oxycodone    5 mG/acetaminophen 325 mG 1 Tablet(s) Oral every 6 hours PRN Severe Pain (7 - 10)    LABS: All Labs Reviewed:        GI PCR Panel Stool (07.07.23 @ 14:54)    Shigella/ Enteroinvasive E. coli: Detected: Test results will be confirmed by culture. If Shigella is isolated,  susceptibilities will be performed. (11.15.22 @ 20:27)    Campylobacter: Detected: Antibiotic treatment for Campylobacter may be indicated for severe  infections and high-risk patients; please contact the lab if antibiotic  testing is needed. (07.07.23 @ 14:54)            
Patient is a 68y old  Male who presents with a chief complaint of Diarrhea (19 Jul 2023 12:07)      SUBJECTIVE:   HPI:  69 y/o M with PMH aortic stenosis s/p AV replacement 5/2018, Hypertension, Hyperlipidemia, Depression, Anxiety Disorder, chronic neck and back pain with herniated lumbar disc complicated by lumbar radiculopathy, GERD, shigella/ ecoli infection 11/2022 with recurrence 2/2023, diarrhea secondary to campylobacter presents with diarrhea. He was recently d/c from  for diarrhea secondary to campylobacter His abx course was finished while he was in the hospital. Pt states his stools started to become more formed and then over the last 3 days turned loose and watery again. He reports diffuse abdominal pain. No other current symptoms. Denies fevers, chills, chest pain, SOB, abdominal pain, N/V, diarrhea/constipation.    Prior admission:   - 7/9/23: proctitis, campylobacter diarrhea     ER course: VSS. Labs: alkaline phosphatase 123, , . UA: negative. CXR: no consolidation, no effusion, no pneumothorax (personally reviewed).     Pt was given 1L of NS, toradol, flexeril, pepcid, morphine, zofran. He is placed on med/surg observation for further management.    (19 Jul 2023 04:20)      7/19: sitting up at edge of bed, still with large amount of BMs and abd pain. discussed results of lab work. tolerating abx   7/20: still with diarrhea. discussed results of positive stool culture and abx plan     REVIEW OF SYSTEMS:    CONSTITUTIONAL: No weakness, fevers or chills  EYES/ENT: No visual changes;  No vertigo or throat pain   NECK: No pain or stiffness  RESPIRATORY: No cough, wheezing, hemoptysis; No shortness of breath  CARDIOVASCULAR: No chest pain or palpitations  GASTROINTESTINAL: + diarrhea No abdominal or epigastric pain. No nausea, vomiting, or hematemesis; no constipation. No melena or hematochezia.  GENITOURINARY: No dysuria, frequency or hematuria  NEUROLOGICAL: No numbness or weakness  SKIN: No itching, burning, rashes, or lesions   All other review of systems is negative unless indicated above        Vital Signs Last 24 Hrs  T(C): 36.4 (20 Jul 2023 07:57), Max: 36.4 (19 Jul 2023 15:15)  T(F): 97.5 (20 Jul 2023 07:57), Max: 97.6 (19 Jul 2023 15:15)  HR: 65 (20 Jul 2023 07:57) (65 - 73)  BP: 107/67 (20 Jul 2023 07:57) (100/60 - 119/68)  BP(mean): --  RR: 18 (20 Jul 2023 07:57) (18 - 19)  SpO2: 99% (20 Jul 2023 07:57) (99% - 99%)    Parameters below as of 20 Jul 2023 07:57  Patient On (Oxygen Delivery Method): room air          PHYSICAL EXAM:    Constitutional: NAD, awake and alert, well-developed  HEENT: PERR, EOMI, Normal Hearing, MMM  Neck: Soft and supple, No LAD, No JVD  Respiratory: Breath sounds are clear bilaterally, No wheezing, rales or rhonchi  Cardiovascular: S1 and S2, regular rate and rhythm, no Murmurs, gallops or rubs  Gastrointestinal: Bowel Sounds present, soft, nontender, nondistended, no guarding, no rebound  Extremities: No peripheral edema  Vascular: 2+ peripheral pulses  Neurological: A/O x 3, no focal deficits  Musculoskeletal: 5/5 strength b/l upper and lower extremities  Skin: No rashes      MEDICATIONS:  MEDICATIONS  (STANDING):  aspirin enteric coated 81 milliGRAM(s) Oral daily  atorvastatin 10 milliGRAM(s) Oral at bedtime  azithromycin   Tablet 500 milliGRAM(s) Oral every 24 hours  buPROPion XL (24-Hour) . 300 milliGRAM(s) Oral daily  cholecalciferol 1000 Unit(s) Oral daily  cyanocobalamin 1000 MICROGram(s) Oral daily  emtricitabine 200 mG/tenofovir alafenamide 25 mG (DESCOVY) Tablet 1 Tablet(s) Oral daily  enoxaparin Injectable 40 milliGRAM(s) SubCutaneous every 24 hours  gabapentin 300 milliGRAM(s) Oral two times a day  lactobacillus acidophilus 1 Tablet(s) Oral two times a day with meals  levothyroxine 25 MICROGram(s) Oral daily  lidocaine   4% Patch 1 Patch Transdermal daily  methylPREDNISolone 12 milliGRAM(s) Oral once  metoprolol tartrate 12.5 milliGRAM(s) Oral daily  multivitamin 1 Tablet(s) Oral daily  naloxone Injectable 0.4 milliGRAM(s) IV Push once  omega-3-Acid Ethyl Esters 4 Gram(s) Oral daily  pantoprazole    Tablet 40 milliGRAM(s) Oral two times a day  sodium chloride 0.9%. 1000 milliLiter(s) (100 mL/Hr) IV Continuous <Continuous>  venlafaxine 75 milliGRAM(s) Oral every 12 hours      LABS: All Labs Reviewed:                        12.5   6.03  )-----------( 186      ( 19 Jul 2023 08:45 )             38.4     07-19    137  |  109<H>  |  22  ----------------------------<  140<H>  4.3   |  24  |  1.20    Ca    8.2<L>      19 Jul 2023 08:45    TPro  6.5  /  Alb  3.0<L>  /  TBili  0.3  /  DBili  x   /  AST  64<H>  /  ALT  79<H>  /  AlkPhos  107  07-19    GI PCR Panel Stool (07.07.23 @ 14:54)    Shigella/ Enteroinvasive E. coli: Detected: Test results will be confirmed by culture. If Shigella is isolated,  susceptibilities will be performed. (11.15.22 @ 20:27)    Campylobacter: Detected: Antibiotic treatment for Campylobacter may be indicated for severe  infections and high-risk patients; please contact the lab if antibiotic  testing is needed. (07.07.23 @ 14:54)            
Date of service: 07-21-23 @ 12:17    pt seen and examined   has diarrhea   no fevers    ROS: no fever or chills; denies dizziness, no HA, no SOB or cough, no abdominal pain, no constipation; no dysuria, no urinary frequency, no legs pain, no rashes    MEDICATIONS  (STANDING):  aspirin enteric coated 81 milliGRAM(s) Oral daily  atorvastatin 10 milliGRAM(s) Oral at bedtime  azithromycin   Tablet 500 milliGRAM(s) Oral every 24 hours  buPROPion XL (24-Hour) . 300 milliGRAM(s) Oral daily  cholecalciferol 1000 Unit(s) Oral daily  cyanocobalamin 1000 MICROGram(s) Oral daily  emtricitabine 200 mG/tenofovir alafenamide 25 mG (DESCOVY) Tablet 1 Tablet(s) Oral daily  enoxaparin Injectable 40 milliGRAM(s) SubCutaneous every 24 hours  gabapentin 300 milliGRAM(s) Oral two times a day  lactobacillus acidophilus 1 Tablet(s) Oral two times a day with meals  levothyroxine 25 MICROGram(s) Oral daily  lidocaine   4% Patch 1 Patch Transdermal daily  metoprolol tartrate 12.5 milliGRAM(s) Oral daily  multivitamin 1 Tablet(s) Oral daily  naloxone Injectable 0.4 milliGRAM(s) IV Push once  omega-3-Acid Ethyl Esters 4 Gram(s) Oral daily  pantoprazole    Tablet 40 milliGRAM(s) Oral two times a day  sodium chloride 0.9%. 1000 milliLiter(s) (100 mL/Hr) IV Continuous <Continuous>  venlafaxine 75 milliGRAM(s) Oral every 12 hours    Vital Signs Last 24 Hrs  T(C): 36.3 (21 Jul 2023 08:12), Max: 36.7 (20 Jul 2023 23:33)  T(F): 97.3 (21 Jul 2023 08:12), Max: 98.1 (20 Jul 2023 23:33)  HR: 68 (21 Jul 2023 08:12) (62 - 68)  BP: 115/70 (21 Jul 2023 08:12) (99/59 - 115/70)  BP(mean): --  RR: 18 (21 Jul 2023 08:12) (17 - 18)  SpO2: 97% (21 Jul 2023 08:12) (97% - 100%)    Parameters below as of 21 Jul 2023 08:12  Patient On (Oxygen Delivery Method): room air    PE:  Constitutional: NAD  HEENT: NC/AT, EOMI, PERRLA, conjunctivae clear; ears and nose atraumatic; pharynx benign  Neck: supple; thyroid not palpable  Back: no tenderness  Respiratory: respiratory effort normal; clear to auscultation  Cardiovascular: S1S2 regular, no murmurs  Abdomen: soft, tender, not distended, positive BS; liver and spleen WNL  Genitourinary: no suprapubic tenderness  Lymphatic: no LN palpable  Musculoskeletal: no muscle tenderness, no joint swelling or tenderness  Extremities: no pedal edema  Neurological/ Psychiatric: AxOx3, Judgement and insight normal;  moving all extremities  Skin: no rashes; no palpable lesions    Labs: all available labs reviewed                   LIVER FUNCTIONS - ( 19 Jul 2023 08:45 )  Alb: 3.0 g/dL / Pro: 6.5 gm/dL / ALK PHOS: 107 U/L / ALT: 79 U/L / AST: 64 U/L / GGT: x           Urinalysis Basic - ( 19 Jul 2023 08:45 )    Color: x / Appearance: x / SG: x / pH: x  Gluc: 140 mg/dL / Ketone: x  / Bili: x / Urobili: x   Blood: x / Protein: x / Nitrite: x   Leuk Esterase: x / RBC: x / WBC x   Sq Epi: x / Non Sq Epi: x / Bacteria: x    Culture - Urine (07.18.23 @ 18:36)   Specimen Source: Clean Catch None  Culture Results:   <10,000 CFU/mL Normal Urogenital Mackenzie  Culture - Blood (07.18.23 @ 16:19)   Specimen Source: .Blood None  Culture Results:   No growth at 48 Hours  Culture - Blood (07.18.23 @ 15:46)   Specimen Source: .Blood Blood-Peripheral  Culture Results:   No growth at 48 Hours    Radiology: all available radiological tests reviewed  < from: Xray Chest 1 View- PORTABLE-Urgent (07.18.23 @ 16:28) >    ACC: [id number] EXAM:  XR CHEST PORTABLE URGENT 1V   ORDERED BY:   SHEMAR SAINZ     PROCEDURE DATE:  07/18/2023          INTERPRETATION:  CLINICAL STATEMENT: Chest Pain.    TECHNIQUE: AP view of the chest.    COMPARISON: 1/31/2023    FINDINGS/  IMPRESSION:  No consolidation or pleural effusion    Heart size cannot be accurately assessed in this projection.    --- End of Report ---      < end of copied text >    Advanced directives addressed: full resuscitation
Patient is a 68y old  Male who presents with a chief complaint of Diarrhea (19 Jul 2023 12:07)      SUBJECTIVE:   HPI:  67 y/o M with PMH aortic stenosis s/p AV replacement 5/2018, Hypertension, Hyperlipidemia, Depression, Anxiety Disorder, chronic neck and back pain with herniated lumbar disc complicated by lumbar radiculopathy, GERD, shigella/ ecoli infection 11/2022 with recurrence 2/2023, diarrhea secondary to campylobacter presents with diarrhea. He was recently d/c from  for diarrhea secondary to campylobacter His abx course was finished while he was in the hospital. Pt states his stools started to become more formed and then over the last 3 days turned loose and watery again. He reports diffuse abdominal pain. No other current symptoms. Denies fevers, chills, chest pain, SOB, abdominal pain, N/V, diarrhea/constipation.    Prior admission:   - 7/9/23: proctitis, campylobacter diarrhea     ER course: VSS. Labs: alkaline phosphatase 123, , . UA: negative. CXR: no consolidation, no effusion, no pneumothorax (personally reviewed).     Pt was given 1L of NS, toradol, flexeril, pepcid, morphine, zofran. He is placed on med/surg observation for further management.    (19 Jul 2023 04:20)      7/19: sitting up at edge of bed, still with large amount of BMs and abd pain. discussed results of lab work. tolerating abx   7/20: still with diarrhea. discussed results of positive stool culture and abx plan     7/21: +diarrhea, +watery, NB in nature. Reports of low radicular back pain. No other acute complaints.     7/22: Still with diarrhea but improving. Back pain controlled with Flexeril     REVIEW OF SYSTEMS:    CONSTITUTIONAL: No weakness, fevers or chills  EYES/ENT: No visual changes;  No vertigo or throat pain   NECK: No pain or stiffness  RESPIRATORY: No cough, wheezing, hemoptysis; No shortness of breath  CARDIOVASCULAR: No chest pain or palpitations  GASTROINTESTINAL: + diarrhea No abdominal or epigastric pain. No nausea, vomiting, or hematemesis; no constipation. No melena or hematochezia.  GENITOURINARY: No dysuria, frequency or hematuria  NEUROLOGICAL: No numbness or weakness  SKIN: No itching, burning, rashes, or lesions   All other review of systems is negative unless indicated above      Vital Signs Last 24 Hrs  T(C): 36.5 (22 Jul 2023 09:15), Max: 36.7 (21 Jul 2023 23:16)  T(F): 97.7 (22 Jul 2023 09:15), Max: 98.1 (21 Jul 2023 23:16)  HR: 79 (22 Jul 2023 09:15) (67 - 79)  BP: 111/69 (22 Jul 2023 09:15) (111/69 - 115/69)  RR: 18 (22 Jul 2023 09:15) (18 - 18)  SpO2: 100% (22 Jul 2023 09:15) (100% - 100%)    Parameters below as of 22 Jul 2023 09:15  Patient On (Oxygen Delivery Method): room air                PHYSICAL EXAM:    Constitutional: NAD, awake and alert, well-developed  HEENT: PERR, EOMI, Normal Hearing, MMM  Neck: Soft and supple, No LAD, No JVD  Respiratory: Breath sounds are clear bilaterally, No wheezing, rales or rhonchi  Cardiovascular: S1 and S2, regular rate and rhythm, no Murmurs, gallops or rubs  Gastrointestinal: Bowel Sounds present, soft, nontender, nondistended, no guarding, no rebound  Extremities: No peripheral edema  Vascular: 2+ peripheral pulses  Neurological: A/O x 3, no focal deficits  Musculoskeletal: 5/5 strength b/l upper and lower extremities  Skin: No rashes      MEDICATIONS  (STANDING):  aspirin enteric coated 81 milliGRAM(s) Oral daily  atorvastatin 10 milliGRAM(s) Oral at bedtime  azithromycin   Tablet 500 milliGRAM(s) Oral every 24 hours  buPROPion XL (24-Hour) . 300 milliGRAM(s) Oral daily  cholecalciferol 1000 Unit(s) Oral daily  cyanocobalamin 1000 MICROGram(s) Oral daily  emtricitabine 200 mG/tenofovir alafenamide 25 mG (DESCOVY) Tablet 1 Tablet(s) Oral daily  enoxaparin Injectable 40 milliGRAM(s) SubCutaneous every 24 hours  gabapentin 300 milliGRAM(s) Oral two times a day  lactobacillus acidophilus 1 Tablet(s) Oral two times a day with meals  levothyroxine 25 MICROGram(s) Oral daily  lidocaine   4% Patch 1 Patch Transdermal daily  metoprolol tartrate 12.5 milliGRAM(s) Oral daily  multivitamin 1 Tablet(s) Oral daily  naloxone Injectable 0.4 milliGRAM(s) IV Push once  omega-3-Acid Ethyl Esters 4 Gram(s) Oral daily  pantoprazole    Tablet 40 milliGRAM(s) Oral two times a day  sodium chloride 0.9%. 1000 milliLiter(s) (100 mL/Hr) IV Continuous <Continuous>  venlafaxine 75 milliGRAM(s) Oral every 12 hours    MEDICATIONS  (PRN):  aluminum hydroxide/magnesium hydroxide/simethicone Suspension 30 milliLiter(s) Oral every 4 hours PRN Dyspepsia  cyclobenzaprine 5 milliGRAM(s) Oral every 8 hours PRN Spasm  loperamide 2 milliGRAM(s) Oral every 8 hours PRN Diarrhea  melatonin 3 milliGRAM(s) Oral at bedtime PRN Insomnia  ondansetron Injectable 4 milliGRAM(s) IV Push every 8 hours PRN Nausea and/or Vomiting  oxycodone    5 mG/acetaminophen 325 mG 1 Tablet(s) Oral every 6 hours PRN Severe Pain (7 - 10)    LABS: All Labs Reviewed:        GI PCR Panel Stool (07.07.23 @ 14:54)    Shigella/ Enteroinvasive E. coli: Detected: Test results will be confirmed by culture. If Shigella is isolated,  susceptibilities will be performed. (11.15.22 @ 20:27)    Campylobacter: Detected: Antibiotic treatment for Campylobacter may be indicated for severe  infections and high-risk patients; please contact the lab if antibiotic  testing is needed. (07.07.23 @ 14:54)            
Patient is a 68y old  Male who presents with a chief complaint of Diarrhea (19 Jul 2023 12:07)      SUBJECTIVE:   HPI:  69 y/o M with PMH aortic stenosis s/p AV replacement 5/2018, Hypertension, Hyperlipidemia, Depression, Anxiety Disorder, chronic neck and back pain with herniated lumbar disc complicated by lumbar radiculopathy, GERD, shigella/ ecoli infection 11/2022 with recurrence 2/2023, diarrhea secondary to campylobacter presents with diarrhea. He was recently d/c from  for diarrhea secondary to campylobacter His abx course was finished while he was in the hospital. Pt states his stools started to become more formed and then over the last 3 days turned loose and watery again. He reports diffuse abdominal pain. No other current symptoms. Denies fevers, chills, chest pain, SOB, abdominal pain, N/V, diarrhea/constipation.    Prior admission:   - 7/9/23: proctitis, campylobacter diarrhea     ER course: VSS. Labs: alkaline phosphatase 123, , . UA: negative. CXR: no consolidation, no effusion, no pneumothorax (personally reviewed).     Pt was given 1L of NS, toradol, flexeril, pepcid, morphine, zofran. He is placed on med/surg observation for further management.    (19 Jul 2023 04:20)      7/19: sitting up at edge of bed, still with large amount of BMs and abd pain. discussed results of lab work. tolerating abx       REVIEW OF SYSTEMS:    CONSTITUTIONAL: No weakness, fevers or chills  EYES/ENT: No visual changes;  No vertigo or throat pain   NECK: No pain or stiffness  RESPIRATORY: No cough, wheezing, hemoptysis; No shortness of breath  CARDIOVASCULAR: No chest pain or palpitations  GASTROINTESTINAL: + diarrhea No abdominal or epigastric pain. No nausea, vomiting, or hematemesis; no constipation. No melena or hematochezia.  GENITOURINARY: No dysuria, frequency or hematuria  NEUROLOGICAL: No numbness or weakness  SKIN: No itching, burning, rashes, or lesions   All other review of systems is negative unless indicated above        Vital Signs Last 24 Hrs  T(C): 36.4 (19 Jul 2023 15:15), Max: 36.8 (19 Jul 2023 05:13)  T(F): 97.6 (19 Jul 2023 15:15), Max: 98.2 (19 Jul 2023 05:13)  HR: 71 (19 Jul 2023 15:15) (59 - 71)  BP: 119/68 (19 Jul 2023 15:15) (106/68 - 135/77)  BP(mean): 86 (18 Jul 2023 22:21) (86 - 86)  RR: 19 (19 Jul 2023 15:15) (16 - 19)  SpO2: 99% (19 Jul 2023 15:15) (97% - 100%)    Parameters below as of 19 Jul 2023 15:15  Patient On (Oxygen Delivery Method): room air          PHYSICAL EXAM:    Constitutional: NAD, awake and alert, well-developed  HEENT: PERR, EOMI, Normal Hearing, MMM  Neck: Soft and supple, No LAD, No JVD  Respiratory: Breath sounds are clear bilaterally, No wheezing, rales or rhonchi  Cardiovascular: S1 and S2, regular rate and rhythm, no Murmurs, gallops or rubs  Gastrointestinal: Bowel Sounds present, soft, nontender, nondistended, no guarding, no rebound  Extremities: No peripheral edema  Vascular: 2+ peripheral pulses  Neurological: A/O x 3, no focal deficits  Musculoskeletal: 5/5 strength b/l upper and lower extremities  Skin: No rashes      MEDICATIONS:  MEDICATIONS  (STANDING):  aspirin enteric coated 81 milliGRAM(s) Oral daily  atorvastatin 10 milliGRAM(s) Oral at bedtime  azithromycin   Tablet 500 milliGRAM(s) Oral every 24 hours  buPROPion XL (24-Hour) . 300 milliGRAM(s) Oral daily  cholecalciferol 1000 Unit(s) Oral daily  cyanocobalamin 1000 MICROGram(s) Oral daily  emtricitabine 200 mG/tenofovir alafenamide 25 mG (DESCOVY) Tablet 1 Tablet(s) Oral daily  enoxaparin Injectable 40 milliGRAM(s) SubCutaneous every 24 hours  gabapentin 300 milliGRAM(s) Oral two times a day  lactobacillus acidophilus 1 Tablet(s) Oral two times a day with meals  levothyroxine 25 MICROGram(s) Oral daily  lidocaine   4% Patch 1 Patch Transdermal daily  methylPREDNISolone 12 milliGRAM(s) Oral once  metoprolol tartrate 12.5 milliGRAM(s) Oral daily  multivitamin 1 Tablet(s) Oral daily  naloxone Injectable 0.4 milliGRAM(s) IV Push once  omega-3-Acid Ethyl Esters 4 Gram(s) Oral daily  pantoprazole    Tablet 40 milliGRAM(s) Oral two times a day  sodium chloride 0.9%. 1000 milliLiter(s) (100 mL/Hr) IV Continuous <Continuous>  venlafaxine 75 milliGRAM(s) Oral every 12 hours      LABS: All Labs Reviewed:                        12.5   6.03  )-----------( 186      ( 19 Jul 2023 08:45 )             38.4     07-19    137  |  109<H>  |  22  ----------------------------<  140<H>  4.3   |  24  |  1.20    Ca    8.2<L>      19 Jul 2023 08:45    TPro  6.5  /  Alb  3.0<L>  /  TBili  0.3  /  DBili  x   /  AST  64<H>  /  ALT  79<H>  /  AlkPhos  107  07-19    GI PCR Panel Stool (07.07.23 @ 14:54)    Shigella/ Enteroinvasive E. coli: Detected: Test results will be confirmed by culture. If Shigella is isolated,  susceptibilities will be performed. (11.15.22 @ 20:27)    Campylobacter: Detected: Antibiotic treatment for Campylobacter may be indicated for severe  infections and high-risk patients; please contact the lab if antibiotic  testing is needed. (07.07.23 @ 14:54)

## 2023-07-23 ENCOUNTER — TRANSCRIPTION ENCOUNTER (OUTPATIENT)
Age: 68
End: 2023-07-23

## 2023-07-23 VITALS
RESPIRATION RATE: 18 BRPM | HEART RATE: 66 BPM | DIASTOLIC BLOOD PRESSURE: 57 MMHG | OXYGEN SATURATION: 96 % | TEMPERATURE: 98 F | SYSTOLIC BLOOD PRESSURE: 96 MMHG

## 2023-07-23 LAB
CULTURE RESULTS: SIGNIFICANT CHANGE UP
SPECIMEN SOURCE: SIGNIFICANT CHANGE UP

## 2023-07-23 PROCEDURE — 99239 HOSP IP/OBS DSCHRG MGMT >30: CPT

## 2023-07-23 RX ORDER — CYCLOBENZAPRINE HYDROCHLORIDE 10 MG/1
1 TABLET, FILM COATED ORAL
Qty: 5 | Refills: 0
Start: 2023-07-23 | End: 2023-07-27

## 2023-07-23 RX ADMIN — Medication 1000 UNIT(S): at 10:05

## 2023-07-23 RX ADMIN — EMTRICITABINE AND TENOFOVIR DISOPROXIL FUMARATE 1 TABLET(S): 200; 300 TABLET, FILM COATED ORAL at 10:06

## 2023-07-23 RX ADMIN — PREGABALIN 1000 MICROGRAM(S): 225 CAPSULE ORAL at 10:06

## 2023-07-23 RX ADMIN — Medication 1 TABLET(S): at 10:04

## 2023-07-23 RX ADMIN — PANTOPRAZOLE SODIUM 40 MILLIGRAM(S): 20 TABLET, DELAYED RELEASE ORAL at 10:04

## 2023-07-23 RX ADMIN — Medication 25 MICROGRAM(S): at 06:32

## 2023-07-23 RX ADMIN — AZITHROMYCIN 500 MILLIGRAM(S): 500 TABLET, FILM COATED ORAL at 06:32

## 2023-07-23 RX ADMIN — Medication 1 TABLET(S): at 10:05

## 2023-07-23 RX ADMIN — Medication 12.5 MILLIGRAM(S): at 10:06

## 2023-07-23 RX ADMIN — CYCLOBENZAPRINE HYDROCHLORIDE 5 MILLIGRAM(S): 10 TABLET, FILM COATED ORAL at 10:04

## 2023-07-23 RX ADMIN — Medication 4 GRAM(S): at 10:05

## 2023-07-23 RX ADMIN — LIDOCAINE 1 PATCH: 4 CREAM TOPICAL at 10:06

## 2023-07-23 RX ADMIN — Medication 81 MILLIGRAM(S): at 10:04

## 2023-07-23 RX ADMIN — GABAPENTIN 300 MILLIGRAM(S): 400 CAPSULE ORAL at 11:21

## 2023-07-23 RX ADMIN — Medication 75 MILLIGRAM(S): at 10:06

## 2023-07-23 RX ADMIN — BUPROPION HYDROCHLORIDE 300 MILLIGRAM(S): 150 TABLET, EXTENDED RELEASE ORAL at 10:06

## 2023-07-23 NOTE — DISCHARGE NOTE PROVIDER - CARE PROVIDER_API CALL
Iban Romreo  [facility]  [address], [address]  [place] [address]  Phone: [phone]  Fax: [phone]  Established Patient  Follow Up Time: 1 week   Iban Romero  [facility]  [address], [address]  [place] [address]  Phone: [phone]  Fax: [phone]  Established Patient  Follow Up Time: 1 week    Harlan Jay  Hematology/Oncology  [address], [address]  [place] [address]  Phone: [phone]  Fax: [phone]  Established Patient  Follow Up Time: 1 week

## 2023-07-23 NOTE — DISCHARGE NOTE NURSING/CASE MANAGEMENT/SOCIAL WORK - NSDCPEFALRISK_GEN_ALL_CORE
For information on Fall & Injury Prevention, visit: [email or web address] OR  [email or web address]/[email or web address] OR  https://www.cdc.gov/steadi/patient.html

## 2023-07-23 NOTE — DISCHARGE NOTE PROVIDER - NSDCCPCAREPLAN_GEN_ALL_CORE_FT
PRINCIPAL DISCHARGE DIAGNOSIS  Diagnosis: Watery diarrhea  Assessment and Plan of Treatment: You were noted to have EPEC and Campylobacter infection. You were treated with antiobioics.   Infection control discussed. Please follow up with PCP as outpatient      SECONDARY DISCHARGE DIAGNOSES  Diagnosis: Transaminitis  Assessment and Plan of Treatment: You were noted to have elevated liver enzymes. Please follow up with your PCP soon after discharge    Diagnosis: Back pain  Assessment and Plan of Treatment: Please take Flexeril at nightime as needed for pain. Please follow up with your pain management doctor soon after discharge.

## 2023-07-23 NOTE — DISCHARGE NOTE PROVIDER - NSDCFUSCHEDAPPT_GEN_ALL_CORE_FT
Isa Story  [facility]  GASTRO  [address]  Scheduled Appointment: 07/25/2023

## 2023-07-23 NOTE — DISCHARGE NOTE PROVIDER - PROVIDER TOKENS
PROVIDER:[TOKEN:[[id number]],FOLLOWUP:[1 week],ESTABLISHEDPATIENT:[T]] PROVIDER:[TOKEN:[[id number]],FOLLOWUP:[1 week],ESTABLISHEDPATIENT:[T]],PROVIDER:[TOKEN:[[id number]],FOLLOWUP:[1 week],ESTABLISHEDPATIENT:[T]]

## 2023-07-23 NOTE — DISCHARGE NOTE NURSING/CASE MANAGEMENT/SOCIAL WORK - NSDCVIVACCINE_GEN_ALL_CORE_FT
Tdap; 18-Apr-2023 18:27; Tanya Jaime (ALEX); [employer]; [id number] (Exp. Date: 15-Feb-2025); IntraMuscular; Deltoid Left.; 0.5 milliLiter(s); VIS (VIS Published: 09-May-2013, VIS Presented: 18-Apr-2023);

## 2023-07-23 NOTE — DISCHARGE NOTE NURSING/CASE MANAGEMENT/SOCIAL WORK - PATIENT PORTAL LINK FT
You can access the FollowMyHealth Patient Portal offered by [facility] by registering at the following website: [email or web address]. By joining [facility]’s FollowMyHealth portal, you will also be able to view your health information using other applications (apps) compatible with our system.

## 2023-07-23 NOTE — DISCHARGE NOTE PROVIDER - NSDCMRMEDTOKEN_GEN_ALL_CORE_FT
Aspirin Enteric Coated 81 mg oral delayed release tablet: 1 tab(s) orally once a day  atorvastatin 10 mg oral tablet: 1 tab(s) orally once a day (in the morning)  buPROPion 100 mg oral tablet: 1 tab(s) orally 3 times a day  cyanocobalamin 1000 mcg oral tablet: 1 tab(s) orally once a day  cyclobenzaprine 5 mg oral tablet: 1 tab(s) orally once a day (at bedtime) as needed for  muscle spasm avoid driving and operating any heavy machinery when taking this medication  Descovy 200 mg-25 mg oral tablet: 1 tab(s) orally once a day  esomeprazole 40 mg oral delayed release capsule: 1 cap(s) orally 2 times a day  gabapentin 100 mg oral capsule: 1 cap(s) orally 3 times a day  gabapentin 300 mg oral capsule: 1 cap(s) orally 2 times a day  levothyroxine 25 mcg (0.025 mg) oral tablet: 1 tab(s) orally once a day  metoprolol tartrate 25 mg oral tablet: 0.5 tab(s) orally once a day  Multiple Vitamins oral tablet: 1 tab(s) orally once a day  Multiple Vitamins oral tablet: 1 tab(s) orally once a day  Omega-3 1000 mg oral capsule: 1 cap(s) orally once a day  oxycodone-acetaminophen 5 mg-325 mg oral tablet: 1 tab(s) orally 4 times a day as needed for pain  Probiotic Formula oral capsule: 1 cap(s) orally once a day  venlafaxine 75 mg oral tablet: 1 tab(s) orally 2 times a day  Vitamin D3 25 mcg (1000 intl units) oral capsule: 1 cap(s) orally once a day

## 2023-07-23 NOTE — DISCHARGE NOTE PROVIDER - HOSPITAL COURSE
67 y/o M presented with diarrhea     #Persistent diarrhea secondary to Campylobacter infection   - GI PCR positive for enteroinvasive ecoli and campylobacter   - pt on descovy for prophylaxis   - finished course of Azithro  -diarrhea resolved       #Elevated transaminases   -improving   -f/u with PCP as outpatient     #Lumbar radiculopathy, chronic back pain   - Pain management: Tylenol, Toradol, Oxycodone PRN   - Flexeril for muscle spasm night, discharged on Flexeril QHS. Pt to f/u with his pain management physician as outpatient   - Pt to complete prednisone taper (was prescribed outpt)   - Lidocaine patch   - PT evaluation and recommendations noted     #History of aortic stenosis s/p AV replacement 5/2018, Hypertension, Hyperlipidemia, Depression, Anxiety Disorder, chronic neck and back pain with herniated lumbar disc complicated by lumbar radiculopathy, GERD, shigella/ ecoli infection 11/2022 with recurrence 2/2023, diarrhea secondary to campylobacter  - c/w home medications; verified with pt at the bedside  - On Descovy for PREP   69 y/o M presented with diarrhea     Vital Signs Last 24 Hrs  T(C): 36.4 (23 Jul 2023 08:49), Max: 36.8 (22 Jul 2023 23:27)  T(F): 97.5 (23 Jul 2023 08:49), Max: 98.2 (22 Jul 2023 23:27)  HR: 70 (23 Jul 2023 08:49) (63 - 97)  BP: 116/66 (23 Jul 2023 08:49) (106/65 - 116/66)  BP(mean): --  RR: 18 (23 Jul 2023 08:49) (16 - 18)  SpO2: 100% (23 Jul 2023 08:49) (97% - 100%)    Parameters below as of 23 Jul 2023 08:49  Patient On (Oxygen Delivery Method): room air    Constitutional: NAD, awake and alert, well-developed  HEENT: PERR, EOMI, Normal Hearing, MMM  Neck: Soft and supple, No LAD, No JVD  Respiratory: Breath sounds are clear bilaterally, No wheezing, rales or rhonchi  Cardiovascular: S1 and S2, regular rate and rhythm, no Murmurs, gallops or rubs  Gastrointestinal: Bowel Sounds present, soft, nontender, nondistended, no guarding, no rebound  Extremities: No peripheral edema  Vascular: 2+ peripheral pulses  Neurological: A/O x 3, no focal deficits  Musculoskeletal: 5/5 strength b/l upper and lower extremities  Skin: No rashes        #Persistent diarrhea secondary to Campylobacter infection   - GI PCR positive for enteroinvasive ecoli and campylobacter   - pt on descovy for prophylaxis   - finished course of Azithro  -diarrhea resolved       #Elevated transaminases   -improving   -f/u with PCP as outpatient     #Lumbar radiculopathy, chronic back pain   - Pain management: Tylenol, Toradol, Oxycodone PRN   - Flexeril for muscle spasm night, discharged on Flexeril QHS. Pt to f/u with his pain management physician as outpatient   - Pt to complete prednisone taper (was prescribed outpt)   - Lidocaine patch   - PT evaluation and recommendations noted     #MASTER  -f/u with Heme/Onc as outpatient     #History of aortic stenosis s/p AV replacement 5/2018, Hypertension, Hyperlipidemia, Depression, Anxiety Disorder, chronic neck and back pain with herniated lumbar disc complicated by lumbar radiculopathy, GERD, shigella/ ecoli infection 11/2022 with recurrence 2/2023, diarrhea secondary to campylobacter  - c/w home medications; verified with pt at the bedside  - On Descovy for PREP

## 2023-07-24 LAB
CULTURE RESULTS: SIGNIFICANT CHANGE UP
SPECIMEN SOURCE: SIGNIFICANT CHANGE UP

## 2023-07-25 ENCOUNTER — RESULT REVIEW (OUTPATIENT)
Age: 68
End: 2023-07-25

## 2023-07-25 ENCOUNTER — APPOINTMENT (OUTPATIENT)
Dept: GASTROENTEROLOGY | Facility: AMBULATORY MEDICAL SERVICES | Age: 68
End: 2023-07-25
Payer: MEDICARE

## 2023-07-25 PROCEDURE — 43239 EGD BIOPSY SINGLE/MULTIPLE: CPT

## 2023-07-27 ENCOUNTER — TRANSCRIPTION ENCOUNTER (OUTPATIENT)
Age: 68
End: 2023-07-27

## 2023-07-27 ENCOUNTER — OFFICE (OUTPATIENT)
Dept: URBAN - METROPOLITAN AREA CLINIC 102 | Facility: CLINIC | Age: 68
Setting detail: OPHTHALMOLOGY
End: 2023-07-27
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
## 2023-07-27 PROCEDURE — NO SHOW FE NO SHOW FEE: Performed by: OPHTHALMOLOGY

## 2023-07-28 ENCOUNTER — TRANSCRIPTION ENCOUNTER (OUTPATIENT)
Age: 68
End: 2023-07-28

## 2023-07-28 ENCOUNTER — INPATIENT (INPATIENT)
Facility: HOSPITAL | Age: 68
LOS: 0 days | Discharge: HOME | DRG: 336 | End: 2023-07-29
Attending: SURGERY | Admitting: SURGERY
Payer: MEDICARE

## 2023-07-28 VITALS — HEIGHT: 66 IN | WEIGHT: 141.98 LBS

## 2023-07-28 DIAGNOSIS — Z98.890 OTHER SPECIFIED POSTPROCEDURAL STATES: Chronic | ICD-10-CM

## 2023-07-28 DIAGNOSIS — Z98.84 BARIATRIC SURGERY STATUS: ICD-10-CM

## 2023-07-28 DIAGNOSIS — Z98.89 OTHER SPECIFIED POSTPROCEDURAL STATES: Chronic | ICD-10-CM

## 2023-07-28 DIAGNOSIS — K56.609 UNSPECIFIED INTESTINAL OBSTRUCTION, UNSPECIFIED AS TO PARTIAL VERSUS COMPLETE OBSTRUCTION: ICD-10-CM

## 2023-07-28 DIAGNOSIS — K21.9 GASTRO-ESOPHAGEAL REFLUX DISEASE WITHOUT ESOPHAGITIS: ICD-10-CM

## 2023-07-28 DIAGNOSIS — Z96.89 PRESENCE OF OTHER SPECIFIED FUNCTIONAL IMPLANTS: Chronic | ICD-10-CM

## 2023-07-28 DIAGNOSIS — I35.0 NONRHEUMATIC AORTIC (VALVE) STENOSIS: ICD-10-CM

## 2023-07-28 DIAGNOSIS — Z87.74 PERSONAL HISTORY OF (CORRECTED) CONGENITAL MALFORMATIONS OF HEART AND CIRCULATORY SYSTEM: Chronic | ICD-10-CM

## 2023-07-28 DIAGNOSIS — R18.8 OTHER ASCITES: ICD-10-CM

## 2023-07-28 DIAGNOSIS — Z79.890 HORMONE REPLACEMENT THERAPY: ICD-10-CM

## 2023-07-28 DIAGNOSIS — Z90.89 ACQUIRED ABSENCE OF OTHER ORGANS: Chronic | ICD-10-CM

## 2023-07-28 DIAGNOSIS — M54.31 SCIATICA, RIGHT SIDE: ICD-10-CM

## 2023-07-28 DIAGNOSIS — Z98.84 BARIATRIC SURGERY STATUS: Chronic | ICD-10-CM

## 2023-07-28 DIAGNOSIS — I10 ESSENTIAL (PRIMARY) HYPERTENSION: ICD-10-CM

## 2023-07-28 DIAGNOSIS — Z98.61 CORONARY ANGIOPLASTY STATUS: Chronic | ICD-10-CM

## 2023-07-28 DIAGNOSIS — Z85.828 PERSONAL HISTORY OF OTHER MALIGNANT NEOPLASM OF SKIN: ICD-10-CM

## 2023-07-28 DIAGNOSIS — K56.50 INTESTINAL ADHESIONS [BANDS], UNSPECIFIED AS TO PARTIAL VERSUS COMPLETE OBSTRUCTION: ICD-10-CM

## 2023-07-28 DIAGNOSIS — E78.5 HYPERLIPIDEMIA, UNSPECIFIED: ICD-10-CM

## 2023-07-28 DIAGNOSIS — Z79.82 LONG TERM (CURRENT) USE OF ASPIRIN: ICD-10-CM

## 2023-07-28 PROBLEM — A04.5 CAMPYLOBACTER ENTERITIS: Chronic | Status: ACTIVE | Noted: 2023-07-19

## 2023-07-28 LAB
ALBUMIN SERPL ELPH-MCNC: 3.5 G/DL — SIGNIFICANT CHANGE UP (ref 3.3–5)
ALP SERPL-CCNC: 130 U/L — HIGH (ref 40–120)
ALT FLD-CCNC: 67 U/L — SIGNIFICANT CHANGE UP (ref 12–78)
ANION GAP SERPL CALC-SCNC: 8 MMOL/L — SIGNIFICANT CHANGE UP (ref 5–17)
APTT BLD: 29.2 SEC — SIGNIFICANT CHANGE UP (ref 24.5–35.6)
AST SERPL-CCNC: 58 U/L — HIGH (ref 15–37)
BASOPHILS # BLD AUTO: 0.07 K/UL — SIGNIFICANT CHANGE UP (ref 0–0.2)
BASOPHILS NFR BLD AUTO: 0.8 % — SIGNIFICANT CHANGE UP (ref 0–2)
BILIRUB SERPL-MCNC: 0.2 MG/DL — SIGNIFICANT CHANGE UP (ref 0.2–1.2)
BLD GP AB SCN SERPL QL: SIGNIFICANT CHANGE UP
BUN SERPL-MCNC: 19 MG/DL — SIGNIFICANT CHANGE UP (ref 7–23)
CALCIUM SERPL-MCNC: 8.8 MG/DL — SIGNIFICANT CHANGE UP (ref 8.5–10.1)
CHLORIDE SERPL-SCNC: 111 MMOL/L — HIGH (ref 96–108)
CO2 SERPL-SCNC: 21 MMOL/L — LOW (ref 22–31)
CREAT SERPL-MCNC: 1.07 MG/DL — SIGNIFICANT CHANGE UP (ref 0.5–1.3)
EGFR: 76 ML/MIN/1.73M2 — SIGNIFICANT CHANGE UP
EOSINOPHIL # BLD AUTO: 0.01 K/UL — SIGNIFICANT CHANGE UP (ref 0–0.5)
EOSINOPHIL NFR BLD AUTO: 0.1 % — SIGNIFICANT CHANGE UP (ref 0–6)
GLUCOSE SERPL-MCNC: 131 MG/DL — HIGH (ref 70–99)
HCT VFR BLD CALC: 40.3 % — SIGNIFICANT CHANGE UP (ref 39–50)
HGB BLD-MCNC: 13.3 G/DL — SIGNIFICANT CHANGE UP (ref 13–17)
IMM GRANULOCYTES NFR BLD AUTO: 0.5 % — SIGNIFICANT CHANGE UP (ref 0–0.9)
INR BLD: 0.86 RATIO — SIGNIFICANT CHANGE UP (ref 0.85–1.18)
LACTATE SERPL-SCNC: 1.1 MMOL/L — SIGNIFICANT CHANGE UP (ref 0.7–2)
LACTATE SERPL-SCNC: 1.5 MMOL/L — SIGNIFICANT CHANGE UP (ref 0.7–2)
LACTATE SERPL-SCNC: 3 MMOL/L — HIGH (ref 0.7–2)
LIDOCAIN IGE QN: 58 U/L — LOW (ref 73–393)
LYMPHOCYTES # BLD AUTO: 1.8 K/UL — SIGNIFICANT CHANGE UP (ref 1–3.3)
LYMPHOCYTES # BLD AUTO: 20.8 % — SIGNIFICANT CHANGE UP (ref 13–44)
MCHC RBC-ENTMCNC: 30.2 PG — SIGNIFICANT CHANGE UP (ref 27–34)
MCHC RBC-ENTMCNC: 33 GM/DL — SIGNIFICANT CHANGE UP (ref 32–36)
MCV RBC AUTO: 91.4 FL — SIGNIFICANT CHANGE UP (ref 80–100)
MONOCYTES # BLD AUTO: 0.58 K/UL — SIGNIFICANT CHANGE UP (ref 0–0.9)
MONOCYTES NFR BLD AUTO: 6.7 % — SIGNIFICANT CHANGE UP (ref 2–14)
NEUTROPHILS # BLD AUTO: 6.16 K/UL — SIGNIFICANT CHANGE UP (ref 1.8–7.4)
NEUTROPHILS NFR BLD AUTO: 71.1 % — SIGNIFICANT CHANGE UP (ref 43–77)
PLATELET # BLD AUTO: 263 K/UL — SIGNIFICANT CHANGE UP (ref 150–400)
POTASSIUM SERPL-MCNC: 3.8 MMOL/L — SIGNIFICANT CHANGE UP (ref 3.5–5.3)
POTASSIUM SERPL-SCNC: 3.8 MMOL/L — SIGNIFICANT CHANGE UP (ref 3.5–5.3)
PROT SERPL-MCNC: 7.6 GM/DL — SIGNIFICANT CHANGE UP (ref 6–8.3)
PROTHROM AB SERPL-ACNC: 9.8 SEC — SIGNIFICANT CHANGE UP (ref 9.5–13)
RBC # BLD: 4.41 M/UL — SIGNIFICANT CHANGE UP (ref 4.2–5.8)
RBC # FLD: 15.1 % — HIGH (ref 10.3–14.5)
SODIUM SERPL-SCNC: 140 MMOL/L — SIGNIFICANT CHANGE UP (ref 135–145)
TROPONIN I, HIGH SENSITIVITY RESULT: 7.97 NG/L — SIGNIFICANT CHANGE UP
WBC # BLD: 8.66 K/UL — SIGNIFICANT CHANGE UP (ref 3.8–10.5)
WBC # FLD AUTO: 8.66 K/UL — SIGNIFICANT CHANGE UP (ref 3.8–10.5)

## 2023-07-28 PROCEDURE — 80048 BASIC METABOLIC PNL TOTAL CA: CPT

## 2023-07-28 PROCEDURE — C9290: CPT

## 2023-07-28 PROCEDURE — 85027 COMPLETE CBC AUTOMATED: CPT

## 2023-07-28 PROCEDURE — 93010 ELECTROCARDIOGRAM REPORT: CPT

## 2023-07-28 PROCEDURE — 87507 IADNA-DNA/RNA PROBE TQ 12-25: CPT

## 2023-07-28 PROCEDURE — C9113: CPT

## 2023-07-28 PROCEDURE — 71045 X-RAY EXAM CHEST 1 VIEW: CPT | Mod: 26

## 2023-07-28 PROCEDURE — 84100 ASSAY OF PHOSPHORUS: CPT

## 2023-07-28 PROCEDURE — 83735 ASSAY OF MAGNESIUM: CPT

## 2023-07-28 PROCEDURE — 74177 CT ABD & PELVIS W/CONTRAST: CPT | Mod: 26,MA

## 2023-07-28 PROCEDURE — 99285 EMERGENCY DEPT VISIT HI MDM: CPT

## 2023-07-28 PROCEDURE — C9399: CPT

## 2023-07-28 PROCEDURE — 93005 ELECTROCARDIOGRAM TRACING: CPT

## 2023-07-28 PROCEDURE — 36415 COLL VENOUS BLD VENIPUNCTURE: CPT

## 2023-07-28 RX ORDER — MORPHINE SULFATE 50 MG/1
4 CAPSULE, EXTENDED RELEASE ORAL ONCE
Refills: 0 | Status: DISCONTINUED | OUTPATIENT
Start: 2023-07-28 | End: 2023-07-28

## 2023-07-28 RX ORDER — LEVOTHYROXINE SODIUM 125 MCG
12.5 TABLET ORAL AT BEDTIME
Refills: 0 | Status: DISCONTINUED | OUTPATIENT
Start: 2023-07-28 | End: 2023-07-28

## 2023-07-28 RX ORDER — FENTANYL CITRATE 50 UG/ML
50 INJECTION INTRAVENOUS
Refills: 0 | Status: DISCONTINUED | OUTPATIENT
Start: 2023-07-28 | End: 2023-07-28

## 2023-07-28 RX ORDER — MORPHINE SULFATE 50 MG/1
2 CAPSULE, EXTENDED RELEASE ORAL EVERY 4 HOURS
Refills: 0 | Status: DISCONTINUED | OUTPATIENT
Start: 2023-07-28 | End: 2023-07-29

## 2023-07-28 RX ORDER — LEVOTHYROXINE SODIUM 125 MCG
12.5 TABLET ORAL AT BEDTIME
Refills: 0 | Status: DISCONTINUED | OUTPATIENT
Start: 2023-07-28 | End: 2023-07-29

## 2023-07-28 RX ORDER — ACETAMINOPHEN 500 MG
1000 TABLET ORAL ONCE
Refills: 0 | Status: DISCONTINUED | OUTPATIENT
Start: 2023-07-28 | End: 2023-07-29

## 2023-07-28 RX ORDER — KETOROLAC TROMETHAMINE 30 MG/ML
15 SYRINGE (ML) INJECTION EVERY 6 HOURS
Refills: 0 | Status: DISCONTINUED | OUTPATIENT
Start: 2023-07-28 | End: 2023-07-29

## 2023-07-28 RX ORDER — SODIUM CHLORIDE 9 MG/ML
1000 INJECTION INTRAMUSCULAR; INTRAVENOUS; SUBCUTANEOUS ONCE
Refills: 0 | Status: COMPLETED | OUTPATIENT
Start: 2023-07-28 | End: 2023-07-28

## 2023-07-28 RX ORDER — HYDROMORPHONE HYDROCHLORIDE 2 MG/ML
0.5 INJECTION INTRAMUSCULAR; INTRAVENOUS; SUBCUTANEOUS
Refills: 0 | Status: DISCONTINUED | OUTPATIENT
Start: 2023-07-28 | End: 2023-07-28

## 2023-07-28 RX ORDER — ONDANSETRON 8 MG/1
4 TABLET, FILM COATED ORAL ONCE
Refills: 0 | Status: DISCONTINUED | OUTPATIENT
Start: 2023-07-28 | End: 2023-07-29

## 2023-07-28 RX ORDER — ONDANSETRON 8 MG/1
4 TABLET, FILM COATED ORAL ONCE
Refills: 0 | Status: COMPLETED | OUTPATIENT
Start: 2023-07-28 | End: 2023-07-28

## 2023-07-28 RX ORDER — PANTOPRAZOLE SODIUM 20 MG/1
40 TABLET, DELAYED RELEASE ORAL DAILY
Refills: 0 | Status: DISCONTINUED | OUTPATIENT
Start: 2023-07-28 | End: 2023-07-29

## 2023-07-28 RX ORDER — FAMOTIDINE 10 MG/ML
20 INJECTION INTRAVENOUS ONCE
Refills: 0 | Status: COMPLETED | OUTPATIENT
Start: 2023-07-28 | End: 2023-07-28

## 2023-07-28 RX ORDER — ONDANSETRON 8 MG/1
4 TABLET, FILM COATED ORAL ONCE
Refills: 0 | Status: DISCONTINUED | OUTPATIENT
Start: 2023-07-28 | End: 2023-07-28

## 2023-07-28 RX ORDER — SODIUM CHLORIDE 9 MG/ML
1000 INJECTION, SOLUTION INTRAVENOUS
Refills: 0 | Status: DISCONTINUED | OUTPATIENT
Start: 2023-07-28 | End: 2023-07-29

## 2023-07-28 RX ORDER — SODIUM CHLORIDE 9 MG/ML
1000 INJECTION, SOLUTION INTRAVENOUS
Refills: 0 | Status: DISCONTINUED | OUTPATIENT
Start: 2023-07-28 | End: 2023-07-28

## 2023-07-28 RX ORDER — ENOXAPARIN SODIUM 100 MG/ML
40 INJECTION SUBCUTANEOUS EVERY 24 HOURS
Refills: 0 | Status: DISCONTINUED | OUTPATIENT
Start: 2023-07-28 | End: 2023-07-29

## 2023-07-28 RX ADMIN — SODIUM CHLORIDE 2000 MILLILITER(S): 9 INJECTION INTRAMUSCULAR; INTRAVENOUS; SUBCUTANEOUS at 13:30

## 2023-07-28 RX ADMIN — SODIUM CHLORIDE 1000 MILLILITER(S): 9 INJECTION INTRAMUSCULAR; INTRAVENOUS; SUBCUTANEOUS at 11:12

## 2023-07-28 RX ADMIN — ONDANSETRON 4 MILLIGRAM(S): 8 TABLET, FILM COATED ORAL at 11:14

## 2023-07-28 RX ADMIN — SODIUM CHLORIDE 120 MILLILITER(S): 9 INJECTION, SOLUTION INTRAVENOUS at 22:57

## 2023-07-28 RX ADMIN — Medication 12.5 MICROGRAM(S): at 22:56

## 2023-07-28 RX ADMIN — MORPHINE SULFATE 4 MILLIGRAM(S): 50 CAPSULE, EXTENDED RELEASE ORAL at 11:11

## 2023-07-28 RX ADMIN — FAMOTIDINE 20 MILLIGRAM(S): 10 INJECTION INTRAVENOUS at 11:10

## 2023-07-28 RX ADMIN — SODIUM CHLORIDE 2000 MILLILITER(S): 9 INJECTION INTRAMUSCULAR; INTRAVENOUS; SUBCUTANEOUS at 12:57

## 2023-07-28 RX ADMIN — MORPHINE SULFATE 4 MILLIGRAM(S): 50 CAPSULE, EXTENDED RELEASE ORAL at 14:13

## 2023-07-28 NOTE — H&P ADULT - HISTORY OF PRESENT ILLNESS
47 yo M presents with diffuse abdominal pain or the past 7 hrs. He states it began suddenly and has gotten worse. He denies nausea or vomiting. He passed gas this morning and had a soft bowel movement. No fever or chills.    ROS neg except as above.

## 2023-07-28 NOTE — ED PROVIDER NOTE - PROGRESS NOTE DETAILS
Lacey Bashir for attending Dr. Hwang   Radiology called and imaging showed closed loop bowel obstruction. Surgery residents called for evaluation. Attending Eri, Remberto snyder and pt to OR with Dr. Amor

## 2023-07-28 NOTE — ED ADULT NURSE NOTE - NSFALLRISKINTERV_ED_ALL_ED

## 2023-07-28 NOTE — ED PROVIDER NOTE - OBJECTIVE STATEMENT
69 yo male w/PMHx of campylobacter diarrhea not on medication, shigella infection, ED, aortic stenosis, HTN, HLD, GERD, skin cancer presents to the ED c/o sudden onset of severe abd pain that began this morning. Reports the pain is diffuse. Pt had bariatric surgery with Dr. Amor 2 years ago and states that he hasn't felt the same since. Also has hx of cholecystectomy, appendectomy. +diarrhea. Pt has been having this diarrhea since February when he was told he had shigella. Pt reports stool is oddly colored. Pt reports when he puts on a binder he gets mild relief from the abd pain. GI: Dr. Bland.

## 2023-07-28 NOTE — ED ADULT NURSE NOTE - NSFALLASSESSNEED_ED_ALL_ED
03/15/2022 called patient mailbox is full, no active patient portal. ng    03/14/2022- NP  called left detailed message for missed    appointment from 03/14/2022- await call back to reschedule.    03/16/2022- letter mailed to patient - please follow up   yes

## 2023-07-28 NOTE — PATIENT PROFILE ADULT - NSPROGENPREVTRANSF_GEN_A_NUR
Reason for Disposition  • Caller has medicine question, adult has minor symptoms, caller declines triage, AND triager answers question    Protocols used: MEDICATION QUESTION CALL-A-    Educated patient on OTC medications that are safe in pregnancy. Instructed her to use saline nasal drops or spray. Educated patient on emergent signs and symptoms of when to present to nearest ER. Patient understands and agrees to plan of care. Will call back with new or worsening symptoms. No further issues, questions or concerns at this time.    no

## 2023-07-28 NOTE — PATIENT PROFILE ADULT - NSPROPASSIVESMOKEEXPOSURE_GEN_A_NUR
8/27/2021         RE: Satnam Gonzales  [address]  [facility] [address]        Dear Colleague,    Thank you for referring your patient, Satnam Gonzales, to the [facility] [place]. Please see a copy of my visit note below.    HPI:   43 yo man with history of eosinophilic esophagitis and autoimmune hepatitis with stage II fibrosis 11/1/2018 on 75 mg azathioprine who presents for evaluation of eosinophilic esophagitis.    No concerns or complaints in regard to swallowing. No dysphagia, odynophagia. No issues with bleeding or bowel movements.     He scheduled this appointment mostly to discuss longterm use of PPI.    A 10 point ROS is otherwise negative.    Past Medical History:   Diagnosis Date     Autoimmune hepatitis (H)      Depression      Eosinophilic esophagitis       Past surgical history:  No abdominal surgeries.      Social history:  No alcohol use.  Non-smoker.  Works as a  for grades 9-12.     Family history:  Father with egg allergy.  Brother with egg and milk allergy.  No esophageal pathology.    Social History     Tobacco Use     Smoking status: Former Smoker     Packs/day: 1.00     Years: 10.00     Pack years: 10.00     Smokeless tobacco: Former User     Types: Chew   Substance Use Topics     Alcohol use: No     Current Outpatient Medications   Medication     azaTHIOprine 75 MG TABS     cetirizine (ZYRTEC) 10 MG CHEW     multivitamin, therapeutic with minerals (MULTI-VITAMIN) TABS tablet     pantoprazole (PROTONIX) 40 MG EC tablet     No current facility-administered medications for this visit.      Physical exam:  GEN: NAD  Eyes: EOMI  Ears: hearing intact  Mouth: MMM  Neck: full ROM  Cardiopulmonary: non labored  Skin: no jaundice  Neuro: awake and oriented  MSK: moves arms equally  Psych: normal affect     Labs:   No peripheral eosinophilia.    Imaging:   No new imaging.    Endoscopy:  EGD 8/19/2017  Mucosal changes including ringed  esophagus, feline appearance, longitudinal furrows and small caliber esophagus were found in the upper third of the esophagus, in the middle third of esophagus and in the lower third of the esophagus.  Biopsies were taken with cold forceps for histology.  One moderate benign-appearing, intrinsic stenosis was found 38 cm from the incisors.  This measured 2 cm in length and was traversed.  A through-the-scope dilator was passed and dilated to 16.5 mm.  LA grade B esophagitis with no bleeding was found 39 cm from the incisors.    Pathology 8/19/2017  Antral biopsies normal without H. pylori.  GE junction demonstrates erosive reflux pattern esophagitis without Perry's.  Mid esophagus demonstrates eosinophilic esophagitis pattern of injury (up to 40 eosinophils per high-power field).    Assessment and recommendations:   43 yo man with history of eosinophilic esophagitis and autoimmune hepatitis with stage II fibrosis 11/1/2018 on 75 mg azathioprine who presents for evaluation of eosinophilic esophagitis.    #Eosinophilic esophagitis  In clinical remission while on pantoprazole.  Given the length of treatment with pantoprazole, it is very likely he is in histologic remission since he is asymptomatic.  It is reasonable to continue pantoprazole 40 mg indefinitely.  We had a long discussion about associations, causations, risks and benefits of these medications.  We also discussed elimination diet and steroid treatment.     It is also possible azathioprine is treating EoE- it is described in case reports. We discussed that it would be reasonable to repeat an EGD off PPI to determine this. He will think about it and get back to us.    - ok to stay on pantoprazole 40mg once per day indefinitely.  - also ok to wean pantoprazole- 20mg once per day x7days, then 20mg every other day x7days, then off.   - if he chooses the latter option, we will schedule an upper endoscopy 8 to 12 weeks after completely stopped panprazole (EGD  under MAC) and repeat biopsies to see if the azathioprine is helping EoE or not.     RTC 12 months    41 minutes spent on the date of the encounter doing chart review, test interpretation, history and exam, documentation and further activities as noted above.    Gavin Silverman MD    [facility]  Division of Gastroenterology  [phone]        Again, thank you for allowing me to participate in the care of your patient.      Sincerely,    Gavin Silverman MD     Unknown

## 2023-07-28 NOTE — ED PROVIDER NOTE - INTERPRETATION
[Awake] : awake [Alert] : alert [Soft] : soft [Oriented x3] : oriented to person, place, and time [Normal] : uterus [Labia Majora] : labia major [Labia Minora] : labia minora [No Bleeding] : there was no active vaginal bleeding [Uterine Adnexae] : were not tender and not enlarged [No Tenderness] : no rectal tenderness [Nl Sphincter Tone] : normal sphincter tone [RRR, No Murmurs] : RRR, no murmurs [CTAB] : CTAB [Normal Position] : in a normal position [Acute Distress] : no acute distress [Thyroid Nodule] : no thyroid nodule [Mass] : no breast mass [Nipple Discharge] : no nipple discharge [Axillary LAD] : no axillary lymphadenopathy [Tender] : non tender [Labia Majora Erythema] : no erythema of the labia majora [Labia Minora Erythema] : no erythema of the labia minora [Discharge] : had no discharge [Tenderness] : nontender [Enlarged ___ wks] : not enlarged [Mass ___ cm] : no uterine mass was palpated [de-identified] : Skin tag noted on Left thigh [FreeTextEntry7] : limited exam 2/2 to obesity   normal sinus rhythm

## 2023-07-28 NOTE — PATIENT PROFILE ADULT - NSPROPOAURINARYCATHETER_GEN_A_NUR
Patient: Jose Boyle    Procedure: Procedure(s):  HERNIORRHAPHY, INGUINAL, OPEN, LEFT       Diagnosis: Inguinal hernia of left side with obstruction [K40.30]  Diagnosis Additional Information: No value filed.    Anesthesia Type:   General     Note:    Oropharynx: oropharynx clear of all foreign objects    Oxygen Supplementation: face mask  Level of Supplemental Oxygen (L/min / FiO2): 8  Independent Airway: airway patency satisfactory and stable  Dentition: dentition unchanged  Vital Signs Stable: post-procedure vital signs reviewed and stable  Report to RN Given: handoff report given  Patient transferred to: PACU    Handoff Report: Identifed the Patient, Identified the Reponsible Provider, Reviewed the pertinent medical history, Discussed the surgical course, Reviewed Intra-OP anesthesia mangement and issues during anesthesia, Set expectations for post-procedure period and Allowed opportunity for questions and acknowledgement of understanding      Vitals:  Vitals Value Taken Time   /86 10/22/21 1720   Temp 36.7  C (98  F) 10/22/21 1719   Pulse 109 10/22/21 1721   Resp 21 10/22/21 1721   SpO2 100 % 10/22/21 1721   Vitals shown include unvalidated device data.    Electronically Signed By: ARAM ALVAREZ CRNA  October 22, 2021  5:21 PM   no

## 2023-07-28 NOTE — ED STATDOCS - PROGRESS NOTE DETAILS
Lacey Daigle for Dr. Terry: 69 y/o male with PMHx of campylobacter diarrhea not on medication, shigella infection, ED, aortic stenosis, HTN, HLD, GERD, skin cancer presents to the ED c/o severe abdominal pain and diarrhea since 8AM this morning. Denies vomiting. NKDA. Patient with campylobacter and abdominal pain; will send to Main for isolation and further evaluation.

## 2023-07-28 NOTE — BRIEF OPERATIVE NOTE - OPERATION/FINDINGS
Fat adhesion at the distal ileum between 2 loops of ileum. Bowel ran up to christopher limb. Common channel and BP limb intact. Turner's space noted to be already closed. Ascites noted as well, irrigation and suction performed. Fat adhesion at the distal ileum between 2 loops of ileum. Bowel ran up to christopher limb, no pathology at christopher limb found. Common channel and BP limb intact with no pathology Turner's space noted to be already closed. Ascites noted as well, irrigation and suction performed.

## 2023-07-28 NOTE — BRIEF OPERATIVE NOTE - NSICDXBRIEFPROCEDURE_GEN_ALL_CORE_FT
PROCEDURES:  Laparoscopy, diagnostic, male 28-Jul-2023 17:34:42  Farrukh Ovalle  Lysis of intestinal adhesions 28-Jul-2023 17:34:49  Farrukh Ovalle

## 2023-07-28 NOTE — ED PROVIDER NOTE - CLINICAL SUMMARY MEDICAL DECISION MAKING FREE TEXT BOX
69 yo pt presents to the ED with abd pain. Pt s/p bariatric surgery 2 years ago and has not been feeling the same since. Plan: check labs. CT scan.

## 2023-07-28 NOTE — H&P ADULT - NSHPPHYSICALEXAM_GEN_ALL_CORE
General: AOx3, Well developed, mild distress  HEENT: NC/AT, normal pinnae and tragi  Chest: Normal respiratory effort, equal chest rise  Heart: RRR  Abdomen: mild distension, TTP RUQ, epigastric and LUQ, no guarding or rebound tenderness  Neuro/Psych: No localized deficits. Normal speech, normal tone, normal affect  Skin: Normal, warm, no rashes, no lesions noted  Extremities: Warm, well perfused, no edema

## 2023-07-28 NOTE — ED ADULT NURSE NOTE - OBJECTIVE STATEMENT
67 yo Male co sudden onset of severe abd pain that began this morning. Pt reports the pain is diffuse. Pt has been having diarrhea since February when he was told he had shigella, with oddly colored stools. Pt has been to [facility] multiple times for same issue. Pt presents with an abdominal binder for mild pain relief. PMHx campylobacter diarrhea not on medication, shigella infection, ED, aortic stenosis, HTN, HLD, GERD, skin cancer. PSHx bariatric surgery with Dr. Amor 2 years ago and hasn't felt the same since, cholecystectomy, appendectomy. EKG done on arrival, Pt put on cardiac monitor

## 2023-07-28 NOTE — ED PROVIDER NOTE - ENMT, MLM
Airway patent, Nasal mucosa clear. dry Mucous membranes Throat has no vesicles, no oropharyngeal exudates and uvula is midline.

## 2023-07-29 ENCOUNTER — TRANSCRIPTION ENCOUNTER (OUTPATIENT)
Age: 68
End: 2023-07-29

## 2023-07-29 VITALS
SYSTOLIC BLOOD PRESSURE: 95 MMHG | HEART RATE: 78 BPM | DIASTOLIC BLOOD PRESSURE: 61 MMHG | TEMPERATURE: 98 F | RESPIRATION RATE: 16 BRPM | OXYGEN SATURATION: 95 %

## 2023-07-29 LAB
ANION GAP SERPL CALC-SCNC: 6 MMOL/L — SIGNIFICANT CHANGE UP (ref 5–17)
BUN SERPL-MCNC: 13 MG/DL — SIGNIFICANT CHANGE UP (ref 7–23)
CALCIUM SERPL-MCNC: 8.1 MG/DL — LOW (ref 8.5–10.1)
CAMPYLOBACTER DNA SPEC NAA+PROBE: DETECTED
CHLORIDE SERPL-SCNC: 112 MMOL/L — HIGH (ref 96–108)
CO2 SERPL-SCNC: 23 MMOL/L — SIGNIFICANT CHANGE UP (ref 22–31)
CREAT SERPL-MCNC: 1.06 MG/DL — SIGNIFICANT CHANGE UP (ref 0.5–1.3)
EGFR: 76 ML/MIN/1.73M2 — SIGNIFICANT CHANGE UP
GI PCR PANEL: DETECTED
GLUCOSE SERPL-MCNC: 112 MG/DL — HIGH (ref 70–99)
HCT VFR BLD CALC: 30.1 % — LOW (ref 39–50)
HCT VFR BLD CALC: 31.3 % — LOW (ref 39–50)
HGB BLD-MCNC: 10.2 G/DL — LOW (ref 13–17)
HGB BLD-MCNC: 9.9 G/DL — LOW (ref 13–17)
MAGNESIUM SERPL-MCNC: 1.9 MG/DL — SIGNIFICANT CHANGE UP (ref 1.6–2.6)
MCHC RBC-ENTMCNC: 30.4 PG — SIGNIFICANT CHANGE UP (ref 27–34)
MCHC RBC-ENTMCNC: 30.4 PG — SIGNIFICANT CHANGE UP (ref 27–34)
MCHC RBC-ENTMCNC: 32.6 GM/DL — SIGNIFICANT CHANGE UP (ref 32–36)
MCHC RBC-ENTMCNC: 32.9 GM/DL — SIGNIFICANT CHANGE UP (ref 32–36)
MCV RBC AUTO: 92.3 FL — SIGNIFICANT CHANGE UP (ref 80–100)
MCV RBC AUTO: 93.2 FL — SIGNIFICANT CHANGE UP (ref 80–100)
PHOSPHATE SERPL-MCNC: 2.7 MG/DL — SIGNIFICANT CHANGE UP (ref 2.5–4.5)
PLATELET # BLD AUTO: 177 K/UL — SIGNIFICANT CHANGE UP (ref 150–400)
PLATELET # BLD AUTO: 188 K/UL — SIGNIFICANT CHANGE UP (ref 150–400)
POTASSIUM SERPL-MCNC: 3.9 MMOL/L — SIGNIFICANT CHANGE UP (ref 3.5–5.3)
POTASSIUM SERPL-SCNC: 3.9 MMOL/L — SIGNIFICANT CHANGE UP (ref 3.5–5.3)
RBC # BLD: 3.26 M/UL — LOW (ref 4.2–5.8)
RBC # BLD: 3.36 M/UL — LOW (ref 4.2–5.8)
RBC # FLD: 15.5 % — HIGH (ref 10.3–14.5)
RBC # FLD: 15.5 % — HIGH (ref 10.3–14.5)
SODIUM SERPL-SCNC: 141 MMOL/L — SIGNIFICANT CHANGE UP (ref 135–145)
WBC # BLD: 6.43 K/UL — SIGNIFICANT CHANGE UP (ref 3.8–10.5)
WBC # BLD: 7.64 K/UL — SIGNIFICANT CHANGE UP (ref 3.8–10.5)
WBC # FLD AUTO: 6.43 K/UL — SIGNIFICANT CHANGE UP (ref 3.8–10.5)
WBC # FLD AUTO: 7.64 K/UL — SIGNIFICANT CHANGE UP (ref 3.8–10.5)

## 2023-07-29 PROCEDURE — 93010 ELECTROCARDIOGRAM REPORT: CPT

## 2023-07-29 RX ORDER — ASPIRIN/CALCIUM CARB/MAGNESIUM 324 MG
81 TABLET ORAL DAILY
Refills: 0 | Status: DISCONTINUED | OUTPATIENT
Start: 2023-07-29 | End: 2023-07-29

## 2023-07-29 RX ORDER — DEXTROSE MONOHYDRATE, SODIUM CHLORIDE, AND POTASSIUM CHLORIDE 50; .745; 4.5 G/1000ML; G/1000ML; G/1000ML
1000 INJECTION, SOLUTION INTRAVENOUS
Refills: 0 | Status: DISCONTINUED | OUTPATIENT
Start: 2023-07-29 | End: 2023-07-29

## 2023-07-29 RX ORDER — METOPROLOL TARTRATE 50 MG
12.5 TABLET ORAL DAILY
Refills: 0 | Status: DISCONTINUED | OUTPATIENT
Start: 2023-07-29 | End: 2023-07-29

## 2023-07-29 RX ORDER — LEVOTHYROXINE SODIUM 125 MCG
25 TABLET ORAL DAILY
Refills: 0 | Status: DISCONTINUED | OUTPATIENT
Start: 2023-07-29 | End: 2023-07-29

## 2023-07-29 RX ORDER — SODIUM CHLORIDE 9 MG/ML
500 INJECTION INTRAMUSCULAR; INTRAVENOUS; SUBCUTANEOUS ONCE
Refills: 0 | Status: COMPLETED | OUTPATIENT
Start: 2023-07-29 | End: 2023-07-29

## 2023-07-29 RX ORDER — GABAPENTIN 400 MG/1
1 CAPSULE ORAL
Refills: 0 | DISCHARGE

## 2023-07-29 RX ORDER — CYCLOBENZAPRINE HYDROCHLORIDE 10 MG/1
5 TABLET, FILM COATED ORAL ONCE
Refills: 0 | Status: COMPLETED | OUTPATIENT
Start: 2023-07-29 | End: 2023-07-29

## 2023-07-29 RX ORDER — BUPROPION HYDROCHLORIDE 150 MG/1
300 TABLET, EXTENDED RELEASE ORAL DAILY
Refills: 0 | Status: DISCONTINUED | OUTPATIENT
Start: 2023-07-29 | End: 2023-07-29

## 2023-07-29 RX ORDER — ATORVASTATIN CALCIUM 80 MG/1
10 TABLET, FILM COATED ORAL AT BEDTIME
Refills: 0 | Status: DISCONTINUED | OUTPATIENT
Start: 2023-07-29 | End: 2023-07-29

## 2023-07-29 RX ORDER — VENLAFAXINE HCL 75 MG
75 CAPSULE, EXT RELEASE 24 HR ORAL EVERY 12 HOURS
Refills: 0 | Status: DISCONTINUED | OUTPATIENT
Start: 2023-07-29 | End: 2023-07-29

## 2023-07-29 RX ORDER — GABAPENTIN 400 MG/1
100 CAPSULE ORAL THREE TIMES A DAY
Refills: 0 | Status: DISCONTINUED | OUTPATIENT
Start: 2023-07-29 | End: 2023-07-29

## 2023-07-29 RX ADMIN — DEXTROSE MONOHYDRATE, SODIUM CHLORIDE, AND POTASSIUM CHLORIDE 100 MILLILITER(S): 50; .745; 4.5 INJECTION, SOLUTION INTRAVENOUS at 06:25

## 2023-07-29 RX ADMIN — Medication 81 MILLIGRAM(S): at 09:46

## 2023-07-29 RX ADMIN — Medication 75 MILLIGRAM(S): at 09:46

## 2023-07-29 RX ADMIN — PANTOPRAZOLE SODIUM 40 MILLIGRAM(S): 20 TABLET, DELAYED RELEASE ORAL at 09:46

## 2023-07-29 RX ADMIN — GABAPENTIN 100 MILLIGRAM(S): 400 CAPSULE ORAL at 14:45

## 2023-07-29 RX ADMIN — ENOXAPARIN SODIUM 40 MILLIGRAM(S): 100 INJECTION SUBCUTANEOUS at 06:25

## 2023-07-29 RX ADMIN — Medication 15 MILLIGRAM(S): at 08:57

## 2023-07-29 RX ADMIN — BUPROPION HYDROCHLORIDE 300 MILLIGRAM(S): 150 TABLET, EXTENDED RELEASE ORAL at 09:46

## 2023-07-29 RX ADMIN — GABAPENTIN 100 MILLIGRAM(S): 400 CAPSULE ORAL at 01:01

## 2023-07-29 RX ADMIN — CYCLOBENZAPRINE HYDROCHLORIDE 5 MILLIGRAM(S): 10 TABLET, FILM COATED ORAL at 01:01

## 2023-07-29 RX ADMIN — SODIUM CHLORIDE 500 MILLILITER(S): 9 INJECTION INTRAMUSCULAR; INTRAVENOUS; SUBCUTANEOUS at 09:46

## 2023-07-29 NOTE — DISCHARGE NOTE NURSING/CASE MANAGEMENT/SOCIAL WORK - NSDCPEFALRISK_GEN_ALL_CORE
For information on Fall & Injury Prevention, visit: [email or web address] OR  [email or web address] OR  https://www.cdc.gov/steadi/patient.html

## 2023-07-29 NOTE — DISCHARGE NOTE NURSING/CASE MANAGEMENT/SOCIAL WORK - PATIENT PORTAL LINK FT
You can access the FollowMyHealth Patient Portal offered by [facility] by registering at the following website: [email or web address]. By joining [facility]’s FollowMyHealth portal, you will also be able to view your health information using other applications (apps) compatible with our system.

## 2023-07-29 NOTE — PROGRESS NOTE ADULT - ASSESSMENT
68 year old male with hx of christopher-en-y gastric bypass presents with a closed loop obstruction\  S/p Dx lap, ISRAEL , POD 1    Plan:  Pain control PRN  Routine vitals  Incentive spirometry  Advance to CLD  Decrease fluids once patient tolerating diet  I/Os q4 hrs  Daily labs; replete electrolytes PRN  DVT ppx      Will discuss plan with Dr. Amor

## 2023-07-29 NOTE — PROGRESS NOTE ADULT - SUBJECTIVE AND OBJECTIVE BOX
SURGERY DAILY PROGRESS NOTE:     Subjective:  Patient seen and examined at bedside during am rounds. Reports having multiple bowel movements yesterday. Voiding freely.   Pain in the RLE from sciatica  AVSS. Denies any fevers, chills, n/v/d, chest pain or shortness of breath    Objective:    MEDICATIONS  (STANDING):  aspirin enteric coated 81 milliGRAM(s) Oral daily  atorvastatin 10 milliGRAM(s) Oral at bedtime  buPROPion XL (24-Hour) . 300 milliGRAM(s) Oral daily  dextrose 5% + sodium chloride 0.45% with potassium chloride 20 mEq/L 1000 milliLiter(s) (100 mL/Hr) IV Continuous <Continuous>  enoxaparin Injectable 40 milliGRAM(s) SubCutaneous every 24 hours  gabapentin 100 milliGRAM(s) Oral three times a day  levothyroxine 25 MICROGram(s) Oral daily  levothyroxine Injectable 12.5 MICROGram(s) IV Push at bedtime  metoprolol tartrate 12.5 milliGRAM(s) Oral daily  ondansetron Injectable 4 milliGRAM(s) IV Push once  pantoprazole  Injectable 40 milliGRAM(s) IV Push daily  venlafaxine 75 milliGRAM(s) Oral every 12 hours    MEDICATIONS  (PRN):  acetaminophen   IVPB .. 1000 milliGRAM(s) IV Intermittent Once PRN Mild Pain (1 - 3)  ketorolac   Injectable 15 milliGRAM(s) IV Push every 6 hours PRN Moderate Pain (4 - 6)  morphine  - Injectable 2 milliGRAM(s) IV Push every 4 hours PRN Severe Pain (7 - 10)      Vital Signs Last 24 Hrs  T(C): 36.7 (28 Jul 2023 22:20), Max: 36.7 (28 Jul 2023 22:20)  T(F): 98.1 (28 Jul 2023 22:20), Max: 98.1 (28 Jul 2023 22:20)  HR: 81 (28 Jul 2023 22:20) (63 - 81)  BP: 100/64 (28 Jul 2023 22:20) (95/63 - 141/78)  BP(mean): 75 (28 Jul 2023 15:29) (70 - 120)  RR: 18 (28 Jul 2023 22:20) (13 - 28)  SpO2: 100% (28 Jul 2023 22:20) (97% - 100%)    Parameters below as of 28 Jul 2023 22:20  Patient On (Oxygen Delivery Method): room air          PHYSICAL EXAM   Gen: well-appearing, in no acute distress  CV: pulse regularly present   Resp: airway patent, non-labored breathing  Abd: soft, NTND; no rebound or guarding. Incision c/d/i      I&O's Detail    28 Jul 2023 07:01  -  29 Jul 2023 04:15  --------------------------------------------------------  IN:    Other (mL): 500 mL  Total IN: 500 mL    OUT:  Total OUT: 0 mL    Total NET: 500 mL          Daily Height in cm: 167.64 (28 Jul 2023 09:13)    Daily     LABS:                        13.3   8.66  )-----------( 263      ( 28 Jul 2023 11:07 )             40.3     07-28    140  |  111<H>  |  19  ----------------------------<  131<H>  3.8   |  21<L>  |  1.07    Ca    8.8      28 Jul 2023 11:07    TPro  7.6  /  Alb  3.5  /  TBili  0.2  /  DBili  x   /  AST  58<H>  /  ALT  67  /  AlkPhos  130<H>  07-28    PT/INR - ( 28 Jul 2023 13:27 )   PT: 9.8 sec;   INR: 0.86 ratio         PTT - ( 28 Jul 2023 13:27 )  PTT:29.2 sec  Urinalysis Basic - ( 28 Jul 2023 11:07 )    Color: x / Appearance: x / SG: x / pH: x  Gluc: 131 mg/dL / Ketone: x  / Bili: x / Urobili: x   Blood: x / Protein: x / Nitrite: x   Leuk Esterase: x / RBC: x / WBC x   Sq Epi: x / Non Sq Epi: x / Bacteria: x

## 2023-07-29 NOTE — DISCHARGE NOTE PROVIDER - NSDCMRMEDTOKEN_GEN_ALL_CORE_FT
Aspirin Enteric Coated 81 mg oral delayed release tablet: 1 tab(s) orally once a day  atorvastatin 10 mg oral tablet: 1 tab(s) orally once a day (in the morning)  buPROPion 100 mg oral tablet: 1 tab(s) orally 3 times a day  cyanocobalamin 1000 mcg oral tablet: 1 tab(s) orally once a day  Descovy 200 mg-25 mg oral tablet: 1 tab(s) orally once a day  esomeprazole 40 mg oral delayed release capsule: 1 cap(s) orally 2 times a day  gabapentin 300 mg oral capsule: 1 cap(s) orally 2 times a day  levothyroxine 25 mcg (0.025 mg) oral tablet: 1 tab(s) orally once a day  metoprolol tartrate 25 mg oral tablet: 0.5 tab(s) orally once a day  Multiple Vitamins oral tablet: 1 tab(s) orally once a day  Multiple Vitamins oral tablet: 1 tab(s) orally once a day  Omega-3 1000 mg oral capsule: 1 cap(s) orally once a day  oxycodone-acetaminophen 5 mg-325 mg oral tablet: 1 tab(s) orally 4 times a day as needed for pain  Probiotic Formula oral capsule: 1 cap(s) orally once a day  venlafaxine 75 mg oral tablet: 1 tab(s) orally 2 times a day  Vitamin D3 25 mcg (1000 intl units) oral capsule: 1 cap(s) orally once a day

## 2023-07-29 NOTE — DISCHARGE NOTE PROVIDER - HOSPITAL COURSE
49 yo M presents with diffuse abdominal pain or the past 7 hrs. He states it began suddenly and has gotten worse. He denies nausea or vomiting. He passed gas this morning and had a soft bowel movement. Underwent diagnostic lap yesterday where he had lysis of adhesion. Pt doing well, pain minimal in the abdomen. Tolerating diet and having diarrhea still.

## 2023-07-29 NOTE — DISCHARGE NOTE PROVIDER - CARE PROVIDER_API CALL
Bruce Amor  Surgery  [address] [place] [address], [address]  [place] [address]  Phone: [phone]  Fax: [phone]  Follow Up Time: 2 weeks

## 2023-07-29 NOTE — DISCHARGE NOTE NURSING/CASE MANAGEMENT/SOCIAL WORK - NSDCVIVACCINE_GEN_ALL_CORE_FT
Tdap; 18-Apr-2023 18:27; Tanya Jaime (ALEX); [employer]; [id number] (Exp. Date: 15-Feb-2025); IntraMuscular; Deltoid Left.; 0.5 milliLiter(s); VIS (VIS Published: 09-May-2013, VIS Presented: 18-Apr-2023);

## 2023-07-31 DIAGNOSIS — R74.01 ELEVATION OF LEVELS OF LIVER TRANSAMINASE LEVELS: ICD-10-CM

## 2023-07-31 DIAGNOSIS — F32.A DEPRESSION, UNSPECIFIED: ICD-10-CM

## 2023-07-31 DIAGNOSIS — A04.0 ENTEROPATHOGENIC ESCHERICHIA COLI INFECTION: ICD-10-CM

## 2023-07-31 DIAGNOSIS — Z95.2 PRESENCE OF PROSTHETIC HEART VALVE: ICD-10-CM

## 2023-07-31 DIAGNOSIS — F41.9 ANXIETY DISORDER, UNSPECIFIED: ICD-10-CM

## 2023-07-31 DIAGNOSIS — K21.9 GASTRO-ESOPHAGEAL REFLUX DISEASE WITHOUT ESOPHAGITIS: ICD-10-CM

## 2023-07-31 DIAGNOSIS — M51.26 OTHER INTERVERTEBRAL DISC DISPLACEMENT, LUMBAR REGION: ICD-10-CM

## 2023-07-31 DIAGNOSIS — D50.9 IRON DEFICIENCY ANEMIA, UNSPECIFIED: ICD-10-CM

## 2023-07-31 DIAGNOSIS — M54.16 RADICULOPATHY, LUMBAR REGION: ICD-10-CM

## 2023-07-31 DIAGNOSIS — Z79.890 HORMONE REPLACEMENT THERAPY: ICD-10-CM

## 2023-07-31 DIAGNOSIS — E78.5 HYPERLIPIDEMIA, UNSPECIFIED: ICD-10-CM

## 2023-07-31 DIAGNOSIS — Z98.61 CORONARY ANGIOPLASTY STATUS: ICD-10-CM

## 2023-07-31 DIAGNOSIS — N52.9 MALE ERECTILE DYSFUNCTION, UNSPECIFIED: ICD-10-CM

## 2023-07-31 DIAGNOSIS — Z98.84 BARIATRIC SURGERY STATUS: ICD-10-CM

## 2023-07-31 DIAGNOSIS — Z79.82 LONG TERM (CURRENT) USE OF ASPIRIN: ICD-10-CM

## 2023-07-31 DIAGNOSIS — G89.29 OTHER CHRONIC PAIN: ICD-10-CM

## 2023-07-31 DIAGNOSIS — A04.5 CAMPYLOBACTER ENTERITIS: ICD-10-CM

## 2023-07-31 DIAGNOSIS — Z96.82 PRESENCE OF NEUROSTIMULATOR: ICD-10-CM

## 2023-07-31 DIAGNOSIS — M62.830 MUSCLE SPASM OF BACK: ICD-10-CM

## 2023-07-31 DIAGNOSIS — H91.90 UNSPECIFIED HEARING LOSS, UNSPECIFIED EAR: ICD-10-CM

## 2023-07-31 DIAGNOSIS — Z85.828 PERSONAL HISTORY OF OTHER MALIGNANT NEOPLASM OF SKIN: ICD-10-CM

## 2023-07-31 DIAGNOSIS — I10 ESSENTIAL (PRIMARY) HYPERTENSION: ICD-10-CM

## 2023-08-04 NOTE — ED PROVIDER NOTE - DISPOSITION TYPE
[EKG obtained to assist in diagnosis and management of assessed problem(s)] : EKG obtained to assist in diagnosis and management of assessed problem(s) [FreeTextEntry4] : Adeline [FreeTextEntry1] : Pt will follow up with GI. ETT was reviewed. Pt will continue to exercise. Pt is unable to afford GI meds.  DISCHARGE

## 2023-08-10 ENCOUNTER — OUTPATIENT (OUTPATIENT)
Dept: OUTPATIENT SERVICES | Facility: HOSPITAL | Age: 68
LOS: 1 days | End: 2023-08-10
Payer: MEDICARE

## 2023-08-10 VITALS
DIASTOLIC BLOOD PRESSURE: 55 MMHG | TEMPERATURE: 98 F | HEIGHT: 66.5 IN | WEIGHT: 145.06 LBS | HEART RATE: 60 BPM | RESPIRATION RATE: 16 BRPM | OXYGEN SATURATION: 100 % | SYSTOLIC BLOOD PRESSURE: 101 MMHG

## 2023-08-10 DIAGNOSIS — Z98.84 BARIATRIC SURGERY STATUS: Chronic | ICD-10-CM

## 2023-08-10 DIAGNOSIS — Z98.89 OTHER SPECIFIED POSTPROCEDURAL STATES: Chronic | ICD-10-CM

## 2023-08-10 DIAGNOSIS — Z98.890 OTHER SPECIFIED POSTPROCEDURAL STATES: Chronic | ICD-10-CM

## 2023-08-10 DIAGNOSIS — Z90.49 ACQUIRED ABSENCE OF OTHER SPECIFIED PARTS OF DIGESTIVE TRACT: Chronic | ICD-10-CM

## 2023-08-10 DIAGNOSIS — Z90.89 ACQUIRED ABSENCE OF OTHER ORGANS: Chronic | ICD-10-CM

## 2023-08-10 DIAGNOSIS — Z98.61 CORONARY ANGIOPLASTY STATUS: Chronic | ICD-10-CM

## 2023-08-10 DIAGNOSIS — Z87.74 PERSONAL HISTORY OF (CORRECTED) CONGENITAL MALFORMATIONS OF HEART AND CIRCULATORY SYSTEM: Chronic | ICD-10-CM

## 2023-08-10 DIAGNOSIS — Z96.89 PRESENCE OF OTHER SPECIFIED FUNCTIONAL IMPLANTS: Chronic | ICD-10-CM

## 2023-08-10 DIAGNOSIS — Z01.818 ENCOUNTER FOR OTHER PREPROCEDURAL EXAMINATION: ICD-10-CM

## 2023-08-10 DIAGNOSIS — M51.26 OTHER INTERVERTEBRAL DISC DISPLACEMENT, LUMBAR REGION: ICD-10-CM

## 2023-08-10 LAB
A1C WITH ESTIMATED AVERAGE GLUCOSE RESULT: 5.8 % — HIGH (ref 4–5.6)
ALBUMIN SERPL ELPH-MCNC: 3.5 G/DL — SIGNIFICANT CHANGE UP (ref 3.3–5)
ALP SERPL-CCNC: 126 U/L — HIGH (ref 40–120)
ALT FLD-CCNC: 64 U/L — SIGNIFICANT CHANGE UP (ref 12–78)
ANION GAP SERPL CALC-SCNC: 4 MMOL/L — LOW (ref 5–17)
APPEARANCE UR: CLEAR — SIGNIFICANT CHANGE UP
APTT BLD: 30.5 SEC — SIGNIFICANT CHANGE UP (ref 24.5–35.6)
AST SERPL-CCNC: 54 U/L — HIGH (ref 15–37)
BACTERIA # UR AUTO: ABNORMAL
BASOPHILS # BLD AUTO: 0.05 K/UL — SIGNIFICANT CHANGE UP (ref 0–0.2)
BASOPHILS NFR BLD AUTO: 0.7 % — SIGNIFICANT CHANGE UP (ref 0–2)
BILIRUB SERPL-MCNC: 0.3 MG/DL — SIGNIFICANT CHANGE UP (ref 0.2–1.2)
BILIRUB UR-MCNC: NEGATIVE — SIGNIFICANT CHANGE UP
BLD GP AB SCN SERPL QL: SIGNIFICANT CHANGE UP
BUN SERPL-MCNC: 25 MG/DL — HIGH (ref 7–23)
CALCIUM SERPL-MCNC: 8.7 MG/DL — SIGNIFICANT CHANGE UP (ref 8.5–10.1)
CHLORIDE SERPL-SCNC: 111 MMOL/L — HIGH (ref 96–108)
CO2 SERPL-SCNC: 24 MMOL/L — SIGNIFICANT CHANGE UP (ref 22–31)
COLOR SPEC: YELLOW — SIGNIFICANT CHANGE UP
COMMENT - URINE: SIGNIFICANT CHANGE UP
CREAT SERPL-MCNC: 1.02 MG/DL — SIGNIFICANT CHANGE UP (ref 0.5–1.3)
DIFF PNL FLD: NEGATIVE — SIGNIFICANT CHANGE UP
EGFR: 80 ML/MIN/1.73M2 — SIGNIFICANT CHANGE UP
EOSINOPHIL # BLD AUTO: 0.01 K/UL — SIGNIFICANT CHANGE UP (ref 0–0.5)
EOSINOPHIL NFR BLD AUTO: 0.1 % — SIGNIFICANT CHANGE UP (ref 0–6)
EPI CELLS # UR: SIGNIFICANT CHANGE UP
ESTIMATED AVERAGE GLUCOSE: 120 MG/DL — HIGH (ref 68–114)
GLUCOSE SERPL-MCNC: 90 MG/DL — SIGNIFICANT CHANGE UP (ref 70–99)
GLUCOSE UR QL: NEGATIVE — SIGNIFICANT CHANGE UP
HCT VFR BLD CALC: 39.9 % — SIGNIFICANT CHANGE UP (ref 39–50)
HGB BLD-MCNC: 12.8 G/DL — LOW (ref 13–17)
IMM GRANULOCYTES NFR BLD AUTO: 0.4 % — SIGNIFICANT CHANGE UP (ref 0–0.9)
INR BLD: 0.84 RATIO — LOW (ref 0.85–1.18)
KETONES UR-MCNC: NEGATIVE — SIGNIFICANT CHANGE UP
LEUKOCYTE ESTERASE UR-ACNC: ABNORMAL
LYMPHOCYTES # BLD AUTO: 2.51 K/UL — SIGNIFICANT CHANGE UP (ref 1–3.3)
LYMPHOCYTES # BLD AUTO: 34.1 % — SIGNIFICANT CHANGE UP (ref 13–44)
MCHC RBC-ENTMCNC: 30.4 PG — SIGNIFICANT CHANGE UP (ref 27–34)
MCHC RBC-ENTMCNC: 32.1 GM/DL — SIGNIFICANT CHANGE UP (ref 32–36)
MCV RBC AUTO: 94.8 FL — SIGNIFICANT CHANGE UP (ref 80–100)
MONOCYTES # BLD AUTO: 0.63 K/UL — SIGNIFICANT CHANGE UP (ref 0–0.9)
MONOCYTES NFR BLD AUTO: 8.6 % — SIGNIFICANT CHANGE UP (ref 2–14)
NEUTROPHILS # BLD AUTO: 4.12 K/UL — SIGNIFICANT CHANGE UP (ref 1.8–7.4)
NEUTROPHILS NFR BLD AUTO: 56.1 % — SIGNIFICANT CHANGE UP (ref 43–77)
NITRITE UR-MCNC: NEGATIVE — SIGNIFICANT CHANGE UP
PH UR: 5 — SIGNIFICANT CHANGE UP (ref 5–8)
PLATELET # BLD AUTO: 355 K/UL — SIGNIFICANT CHANGE UP (ref 150–400)
POTASSIUM SERPL-MCNC: 4.2 MMOL/L — SIGNIFICANT CHANGE UP (ref 3.5–5.3)
POTASSIUM SERPL-SCNC: 4.2 MMOL/L — SIGNIFICANT CHANGE UP (ref 3.5–5.3)
PROT SERPL-MCNC: 7.7 GM/DL — SIGNIFICANT CHANGE UP (ref 6–8.3)
PROT UR-MCNC: NEGATIVE — SIGNIFICANT CHANGE UP
PROTHROM AB SERPL-ACNC: 9.5 SEC — SIGNIFICANT CHANGE UP (ref 9.5–13)
RBC # BLD: 4.21 M/UL — SIGNIFICANT CHANGE UP (ref 4.2–5.8)
RBC # FLD: 15.4 % — HIGH (ref 10.3–14.5)
RBC CASTS # UR COMP ASSIST: NEGATIVE /HPF — SIGNIFICANT CHANGE UP (ref 0–4)
SODIUM SERPL-SCNC: 139 MMOL/L — SIGNIFICANT CHANGE UP (ref 135–145)
SP GR SPEC: 1.02 — SIGNIFICANT CHANGE UP (ref 1.01–1.02)
UROBILINOGEN FLD QL: 1
WBC # BLD: 7.35 K/UL — SIGNIFICANT CHANGE UP (ref 3.8–10.5)
WBC # FLD AUTO: 7.35 K/UL — SIGNIFICANT CHANGE UP (ref 3.8–10.5)
WBC UR QL: SIGNIFICANT CHANGE UP /HPF (ref 0–5)

## 2023-08-10 PROCEDURE — 86900 BLOOD TYPING SEROLOGIC ABO: CPT

## 2023-08-10 PROCEDURE — 86850 RBC ANTIBODY SCREEN: CPT

## 2023-08-10 PROCEDURE — 80053 COMPREHEN METABOLIC PANEL: CPT

## 2023-08-10 PROCEDURE — 87640 STAPH A DNA AMP PROBE: CPT

## 2023-08-10 PROCEDURE — 99214 OFFICE O/P EST MOD 30 MIN: CPT | Mod: 25

## 2023-08-10 PROCEDURE — 71046 X-RAY EXAM CHEST 2 VIEWS: CPT | Mod: 26

## 2023-08-10 PROCEDURE — 81001 URINALYSIS AUTO W/SCOPE: CPT

## 2023-08-10 PROCEDURE — 85025 COMPLETE CBC W/AUTO DIFF WBC: CPT

## 2023-08-10 PROCEDURE — 85610 PROTHROMBIN TIME: CPT

## 2023-08-10 PROCEDURE — 83036 HEMOGLOBIN GLYCOSYLATED A1C: CPT

## 2023-08-10 PROCEDURE — 85730 THROMBOPLASTIN TIME PARTIAL: CPT

## 2023-08-10 PROCEDURE — 86901 BLOOD TYPING SEROLOGIC RH(D): CPT

## 2023-08-10 PROCEDURE — 87641 MR-STAPH DNA AMP PROBE: CPT

## 2023-08-10 PROCEDURE — 36415 COLL VENOUS BLD VENIPUNCTURE: CPT

## 2023-08-10 PROCEDURE — 71046 X-RAY EXAM CHEST 2 VIEWS: CPT

## 2023-08-10 RX ORDER — ESOMEPRAZOLE MAGNESIUM 40 MG/1
1 CAPSULE, DELAYED RELEASE ORAL
Qty: 0 | Refills: 0 | DISCHARGE

## 2023-08-10 NOTE — H&P PST ADULT - NSICDXPASTMEDICALHX_GEN_ALL_CORE_FT
PAST MEDICAL HISTORY:  Anxiety     Aortic stenosis Bicuspid Aortic Valve Replacement- 5/29/2018    Carpal tunnel syndrome had surgery    Chronic neck and back pain     Depressive disorder     Erectile dysfunction     Gastrointestinal hemorrhage     GERD (gastroesophageal reflux disease)     Hearing loss     Helicobacter pylori infection     Herniated lumbar intervertebral disc     History of colonic polyps     HTN (hypertension)     Hyperlipidemia     Internal and external prolapsed hemorrhoids had surgery    Loss of hearing     Lumbar radiculopathy     Morbid obesity     Sciatica     Shigella infection     Skin cancer external right ear, unsure of type     PAST MEDICAL HISTORY:  Anxiety     Aortic stenosis Bicuspid Aortic Valve Replacement- 5/29/2018    Campylobacter diarrhea     Carpal tunnel syndrome had surgery    Chronic neck and back pain     Depressive disorder     Erectile dysfunction     Gastrointestinal hemorrhage     GERD (gastroesophageal reflux disease)     H/O small bowel obstruction     Hearing loss     Helicobacter pylori infection     Herniated lumbar intervertebral disc     History of colonic polyps     HTN (hypertension)     Hyperlipidemia     Internal and external prolapsed hemorrhoids had surgery    Loss of hearing     Lumbar radiculopathy     Morbid obesity     Sciatica     Shigella infection     Skin cancer external right ear, unsure of type     PAST MEDICAL HISTORY:  Anxiety     Aortic stenosis Bicuspid Aortic Valve Replacement- 5/29/2018    Campylobacter diarrhea     Carpal tunnel syndrome had surgery    Chronic neck and back pain     Depressive disorder     Erectile dysfunction     Foot drop     Gastrointestinal hemorrhage     GERD (gastroesophageal reflux disease)     H/O small bowel obstruction     Hand fracture, right     Hearing loss     Helicobacter pylori infection     Herniated lumbar intervertebral disc     History of colonic polyps     HTN (hypertension)     Hyperlipidemia     Internal and external prolapsed hemorrhoids had surgery    Loss of hearing     Lumbar radiculopathy     Morbid obesity     Sciatica     Shigella infection     Skin cancer external right ear, unsure of type

## 2023-08-10 NOTE — H&P PST ADULT - ASSESSMENT
Plan:  1. PST instructions given ; NPO status/  instructions to be given by ASU   2. Pt instructed to take following meds on day of surgery:   3. Pt instructed to take routine evening medications unless indicated   4. Stop NSAIDS ( Aspirin Alev Motrin Mobic Diclofenac), herbal supplements , MVI , Vitamin fish oil 7 days prior to surgery  unless   directed by surgeon or cardiologist;   5. Medical Optimization  with    6. EZ wash instructions given & mupirocin instructions given  7. Labs EKG CXR as per surgeon request   8. Pt denies covid symptoms shortness of breath fever cough   9.  Spine Education Booklet given     CAPRINI SCORE [CLOT]    AGE RELATED RISK FACTORS                                                       MOBILITY RELATED FACTORS  [ ] Age 41-60 years                                            (1 Point)                  [ ] Bed rest                                                        (1 Point)  [ ] Age: 61-74 years                                           (2 Points)                 [ ] Plaster cast                                                   (2 Points)  [ ] Age= 75 years                                              (3 Points)                 [ ] Bed bound for more than 72 hours                 (2 Points)    DISEASE RELATED RISK FACTORS                                               GENDER SPECIFIC FACTORS  [ ] Edema in the lower extremities                       (1 Point)                  [ ] Pregnancy                                                     (1 Point)  [ ] Varicose veins                                               (1 Point)                  [ ] Post-partum < 6 weeks                                   (1 Point)             [ ] BMI > 25 Kg/m2                                            (1 Point)                  [ ] Hormonal therapy  or oral contraception          (1 Point)                 [ ] Sepsis (in the previous month)                        (1 Point)                  [ ] History of pregnancy complications                 (1 point)  [ ] Pneumonia or serious lung disease                                               [ ] Unexplained or recurrent                     (1 Point)           (in the previous month)                               (1 Point)  [ ] Abnormal pulmonary function test                     (1 Point)                 SURGERY RELATED RISK FACTORS  [ ] Acute myocardial infarction                              (1 Point)                 [ ]  Section                                             (1 Point)  [ ] Congestive heart failure (in the previous month)  (1 Point)               [ ] Minor surgery                                                  (1 Point)   [ ] Inflammatory bowel disease                             (1 Point)                 [ ] Arthroscopic surgery                                        (2 Points)  [ ] Central venous access                                      (2 Points)                [ ] General surgery lasting more than 45 minutes   (2 Points)       [ ] Stroke (in the previous month)                          (5 Points)               [ ] Elective arthroplasty                                         (5 Points)            ( ) presents and past malignancy                           (2 points)                                                                                                         HEMATOLOGY RELATED FACTORS                                                 TRAUMA RELATED RISK FACTORS  [ ] Prior episodes of VTE                                     (3 Points)                 [ ] Fracture of the hip, pelvis, or leg                       (5 Points)  [ ] Positive family history for VTE                         (3 Points)                 [ ] Acute spinal cord injury (in the previous month)  (5 Points)  [ ] Prothrombin 58074 A                                     (3 Points)                 [ ] Paralysis  (less than 1 month)                             (5 Points)  [ ] Factor V Leiden                                             (3 Points)                  [ ] Multiple Trauma within 1 month                        (5 Points)  [ ] Lupus anticoagulants                                     (3 Points)                                                           [ ] Anticardiolipin antibodies                               (3 Points)                                                       [ ] High homocysteine in the blood                      (3 Points)                                             [ ] Other congenital or acquired thrombophilia      (3 Points)                                                [ ] Heparin induced thrombocytopenia                  (3 Points)                                          Total Score [          ] 68 years old male with a history of lumbar radiculopathy. Spinal stimulator implanted and s/p removal of spinal cord stimulator; recently hospitalized 2023 for shigella s/p laparoscopy lysis of adhesions; c/o back pain radiating to BLE as as numbness and weakness; he presents to PST for planned revision laminectomy right L4 L5 Excision of herniated disc right L4 L5 , transforaminal lumbar interbody fusion L4 L5        Plan:  1. PST instructions given ; NPO status/  instructions to be given by ASU   2. Pt instructed to take following meds on day of surgery: levothyroxine , venlafaxine , esomeprazole , Descovy ,buproprion, metoprolol   3. Pt instructed to take routine evening medications unless indicated   4. Stop NSAIDS ( Aspirin Alev Motrin Mobic Diclofenac), herbal supplements , MVI , Vitamin fish oil 7 days prior to surgery  unless   directed by surgeon or cardiologist;   5. Medical Optimization  with Dr Romero and Dr Doll   6. EZ wash instructions given & mupirocin instructions given  7. Labs EKG CXR as per surgeon request   8. Pt denies covid symptoms shortness of breath fever cough   9.  Spine Education Booklet given     CAPRINI SCORE [CLOT]    AGE RELATED RISK FACTORS                                                       MOBILITY RELATED FACTORS  [ ] Age 41-60 years                                            (1 Point)                  [ ] Bed rest                                                        (1 Point)  [x ] Age: 61-74 years                                           (2 Points)                 [ ] Plaster cast                                                   (2 Points)  [ ] Age= 75 years                                              (3 Points)                 [ ] Bed bound for more than 72 hours                 (2 Points)    DISEASE RELATED RISK FACTORS                                               GENDER SPECIFIC FACTORS  [ ] Edema in the lower extremities                       (1 Point)                  [ ] Pregnancy                                                     (1 Point)  [ ] Varicose veins                                               (1 Point)                  [ ] Post-partum < 6 weeks                                   (1 Point)             [ ] BMI > 25 Kg/m2                                            (1 Point)                  [ ] Hormonal therapy  or oral contraception          (1 Point)                 [ ] Sepsis (in the previous month)                        (1 Point)                  [ ] History of pregnancy complications                 (1 point)  [ ] Pneumonia or serious lung disease                                               [ ] Unexplained or recurrent                     (1 Point)           (in the previous month)                               (1 Point)  [ ] Abnormal pulmonary function test                     (1 Point)                 SURGERY RELATED RISK FACTORS  [ ] Acute myocardial infarction                              (1 Point)                 [ ]  Section                                             (1 Point)  [ ] Congestive heart failure (in the previous month)  (1 Point)               [ ] Minor surgery                                                  (1 Point)   [ x] Inflammatory bowel disease                             (1 Point)                 [ ] Arthroscopic surgery                                        (2 Points)  [ ] Central venous access                                      (2 Points)                [x ] General surgery lasting more than 45 minutes   (2 Points)       [ ] Stroke (in the previous month)                          (5 Points)               [ ] Elective arthroplasty                                         (5 Points)            ( ) presents and past malignancy                           (2 points)                                                                                                         HEMATOLOGY RELATED FACTORS                                                 TRAUMA RELATED RISK FACTORS  [ ] Prior episodes of VTE                                     (3 Points)                 [ ] Fracture of the hip, pelvis, or leg                       (5 Points)  [ ] Positive family history for VTE                         (3 Points)                 [ ] Acute spinal cord injury (in the previous month)  (5 Points)  [ ] Prothrombin 23905 A                                     (3 Points)                 [ ] Paralysis  (less than 1 month)                             (5 Points)  [ ] Factor V Leiden                                             (3 Points)                  [ ] Multiple Trauma within 1 month                        (5 Points)  [ ] Lupus anticoagulants                                     (3 Points)                                                           [ ] Anticardiolipin antibodies                               (3 Points)                                                       [ ] High homocysteine in the blood                      (3 Points)                                             [ ] Other congenital or acquired thrombophilia      (3 Points)                                                [ ] Heparin induced thrombocytopenia                  (3 Points)                                          Total Score [    5      ]    The Caprini score indicates this patient is at risk for a VTE event (score 3-5).  Most surgical patients in this group would benefit from pharmacologic prophylaxis.  The surgical team will determine the balance between VTE risk and bleeding risk

## 2023-08-10 NOTE — H&P PST ADULT - NEUROLOGICAL
Faxed referral to sport Med in [facility] Fax # [phone] phone # [phone].   negative responds to verbal commands details…

## 2023-08-10 NOTE — H&P PST ADULT - HISTORY OF PRESENT ILLNESS
67 years old male with a history of lumbar radiculopathy. Spinal stimulator implanted "about 4 years ago".  Technician attempted to turn off device "when I was in the hospital". "They said there was  broken lead." Patient with a history of Morbid obesity with bariatric surgery 4/ 2021, HTN, HLD,  Valvular heart surgery, Anxiety and Depression. He was admitted in the hospital 2/2/2023 after coming to the hospital with SOB. While in the hospital he was diagnosed with Shigella in stool. Patient was seen by Dr. Swanson. Planned removal of stimulator.  68 years old male with a history of lumbar radiculopathy. Spinal stimulator implanted and s/p removal of spinal cord stimulator; recently hospitalized 7/2023 for shigella s/p laparoscopy lysis of adhesions; c/o back pain radiating to BLE as as numbness and weakness; he presents to PST for planned revision laminectomy right L4 L5 Excision of herniated disc right L4 L5 , transforaminal lumbar interbody fusion L4 L5

## 2023-08-10 NOTE — H&P PST ADULT - NSICDXPASTSURGICALHX_GEN_ALL_CORE_FT
PAST SURGICAL HISTORY:  H/O bariatric surgery     H/O bicuspid aortic valve replacement- 5/29/18    H/O external ear surgery excision mass right ear- cancer 2018    H/O hemorrhoidectomy     History of back surgery laminectomy x2 last 2018    S/P carpal tunnel release right side, left side- 2014    S/P tonsillectomy     S/P trigger finger release right thumb and middle finger    Spinal cord stimulator status 10/2020    Status post coronary angioplasty pt not sure he had this procedure     PAST SURGICAL HISTORY:  H/O bariatric surgery     H/O bicuspid aortic valve replacement- 5/29/18    H/O external ear surgery excision mass right ear- cancer 2018    H/O hemorrhoidectomy     History of appendectomy     History of back surgery laminectomy x2 last 2018    S/P carpal tunnel release right side, left side- 2014    S/P tonsillectomy     S/P trigger finger release right thumb and middle finger    Spinal cord stimulator status 10/2020    Status post coronary angioplasty pt not sure he had this procedure    Status post laparotomy with lysis of adhesions      PAST SURGICAL HISTORY:  H/O bariatric surgery     H/O bicuspid aortic valve replacement- 5/29/18    H/O external ear surgery excision mass right ear- cancer 2018    H/O hemorrhoidectomy     History of appendectomy     History of back surgery laminectomy x2 last 2018    S/P carpal tunnel release right side, left side- 2014    S/P ORIF (open reduction internal fixation) fracture     S/P tonsillectomy     S/P trigger finger release right thumb and middle finger    Spinal cord stimulator status 10/2020    Status post coronary angioplasty pt not sure he had this procedure    Status post laparotomy with lysis of adhesions

## 2023-08-10 NOTE — H&P PST ADULT - NS MD HP INPLANTS MED DEV
Spinal stimulator bioprosthetic aortic valve hardware  right hand bioprosthetic aortic valve hardware  right hand/Hearing aid

## 2023-08-11 DIAGNOSIS — Z01.818 ENCOUNTER FOR OTHER PREPROCEDURAL EXAMINATION: ICD-10-CM

## 2023-08-11 DIAGNOSIS — M51.26 OTHER INTERVERTEBRAL DISC DISPLACEMENT, LUMBAR REGION: ICD-10-CM

## 2023-08-11 LAB
MRSA PCR RESULT.: SIGNIFICANT CHANGE UP
S AUREUS DNA NOSE QL NAA+PROBE: SIGNIFICANT CHANGE UP

## 2023-08-14 RX ORDER — TRANEXAMIC ACID 100 MG/ML
1000 INJECTION, SOLUTION INTRAVENOUS ONCE
Refills: 0 | Status: DISCONTINUED | OUTPATIENT
Start: 2023-08-15 | End: 2023-08-16

## 2023-08-15 ENCOUNTER — INPATIENT (INPATIENT)
Facility: HOSPITAL | Age: 68
LOS: 4 days | Discharge: HOME | DRG: 453 | End: 2023-08-20
Attending: ORTHOPAEDIC SURGERY | Admitting: ORTHOPAEDIC SURGERY
Payer: MEDICARE

## 2023-08-15 VITALS
DIASTOLIC BLOOD PRESSURE: 81 MMHG | TEMPERATURE: 97 F | OXYGEN SATURATION: 100 % | SYSTOLIC BLOOD PRESSURE: 135 MMHG | RESPIRATION RATE: 16 BRPM | HEIGHT: 66.5 IN | WEIGHT: 145.06 LBS | HEART RATE: 62 BPM

## 2023-08-15 DIAGNOSIS — Z98.890 OTHER SPECIFIED POSTPROCEDURAL STATES: Chronic | ICD-10-CM

## 2023-08-15 DIAGNOSIS — M51.26 OTHER INTERVERTEBRAL DISC DISPLACEMENT, LUMBAR REGION: ICD-10-CM

## 2023-08-15 DIAGNOSIS — Z98.61 CORONARY ANGIOPLASTY STATUS: Chronic | ICD-10-CM

## 2023-08-15 DIAGNOSIS — Z98.84 BARIATRIC SURGERY STATUS: Chronic | ICD-10-CM

## 2023-08-15 DIAGNOSIS — Z87.74 PERSONAL HISTORY OF (CORRECTED) CONGENITAL MALFORMATIONS OF HEART AND CIRCULATORY SYSTEM: Chronic | ICD-10-CM

## 2023-08-15 DIAGNOSIS — Z90.49 ACQUIRED ABSENCE OF OTHER SPECIFIED PARTS OF DIGESTIVE TRACT: Chronic | ICD-10-CM

## 2023-08-15 DIAGNOSIS — Z96.89 PRESENCE OF OTHER SPECIFIED FUNCTIONAL IMPLANTS: Chronic | ICD-10-CM

## 2023-08-15 DIAGNOSIS — Z90.89 ACQUIRED ABSENCE OF OTHER ORGANS: Chronic | ICD-10-CM

## 2023-08-15 DIAGNOSIS — Z98.89 OTHER SPECIFIED POSTPROCEDURAL STATES: Chronic | ICD-10-CM

## 2023-08-15 LAB
ANION GAP SERPL CALC-SCNC: 5 MMOL/L — SIGNIFICANT CHANGE UP (ref 5–17)
BASOPHILS # BLD AUTO: 0.03 K/UL — SIGNIFICANT CHANGE UP (ref 0–0.2)
BASOPHILS NFR BLD AUTO: 0.3 % — SIGNIFICANT CHANGE UP (ref 0–2)
BUN SERPL-MCNC: 22 MG/DL — SIGNIFICANT CHANGE UP (ref 7–23)
CALCIUM SERPL-MCNC: 8.3 MG/DL — LOW (ref 8.5–10.1)
CHLORIDE SERPL-SCNC: 108 MMOL/L — SIGNIFICANT CHANGE UP (ref 96–108)
CO2 SERPL-SCNC: 23 MMOL/L — SIGNIFICANT CHANGE UP (ref 22–31)
CREAT SERPL-MCNC: 1.03 MG/DL — SIGNIFICANT CHANGE UP (ref 0.5–1.3)
EGFR: 79 ML/MIN/1.73M2 — SIGNIFICANT CHANGE UP
EOSINOPHIL # BLD AUTO: 0 K/UL — SIGNIFICANT CHANGE UP (ref 0–0.5)
EOSINOPHIL NFR BLD AUTO: 0 % — SIGNIFICANT CHANGE UP (ref 0–6)
GLUCOSE BLDC GLUCOMTR-MCNC: 117 MG/DL — HIGH (ref 70–99)
GLUCOSE BLDC GLUCOMTR-MCNC: 77 MG/DL — SIGNIFICANT CHANGE UP (ref 70–99)
GLUCOSE SERPL-MCNC: 135 MG/DL — HIGH (ref 70–99)
HCT VFR BLD CALC: 35.2 % — LOW (ref 39–50)
HGB BLD-MCNC: 11.5 G/DL — LOW (ref 13–17)
IMM GRANULOCYTES NFR BLD AUTO: 0.6 % — SIGNIFICANT CHANGE UP (ref 0–0.9)
LYMPHOCYTES # BLD AUTO: 1.09 K/UL — SIGNIFICANT CHANGE UP (ref 1–3.3)
LYMPHOCYTES # BLD AUTO: 12.4 % — LOW (ref 13–44)
MCHC RBC-ENTMCNC: 31.2 PG — SIGNIFICANT CHANGE UP (ref 27–34)
MCHC RBC-ENTMCNC: 32.7 GM/DL — SIGNIFICANT CHANGE UP (ref 32–36)
MCV RBC AUTO: 95.4 FL — SIGNIFICANT CHANGE UP (ref 80–100)
MONOCYTES # BLD AUTO: 0.14 K/UL — SIGNIFICANT CHANGE UP (ref 0–0.9)
MONOCYTES NFR BLD AUTO: 1.6 % — LOW (ref 2–14)
NEUTROPHILS # BLD AUTO: 7.48 K/UL — HIGH (ref 1.8–7.4)
NEUTROPHILS NFR BLD AUTO: 85.1 % — HIGH (ref 43–77)
PLATELET # BLD AUTO: 260 K/UL — SIGNIFICANT CHANGE UP (ref 150–400)
POTASSIUM SERPL-MCNC: 4.3 MMOL/L — SIGNIFICANT CHANGE UP (ref 3.5–5.3)
POTASSIUM SERPL-SCNC: 4.3 MMOL/L — SIGNIFICANT CHANGE UP (ref 3.5–5.3)
RBC # BLD: 3.69 M/UL — LOW (ref 4.2–5.8)
RBC # FLD: 15.7 % — HIGH (ref 10.3–14.5)
SODIUM SERPL-SCNC: 136 MMOL/L — SIGNIFICANT CHANGE UP (ref 135–145)
WBC # BLD: 8.79 K/UL — SIGNIFICANT CHANGE UP (ref 3.8–10.5)
WBC # FLD AUTO: 8.79 K/UL — SIGNIFICANT CHANGE UP (ref 3.8–10.5)

## 2023-08-15 PROCEDURE — 97530 THERAPEUTIC ACTIVITIES: CPT | Mod: GP

## 2023-08-15 PROCEDURE — 99233 SBSQ HOSP IP/OBS HIGH 50: CPT

## 2023-08-15 PROCEDURE — 76000 FLUOROSCOPY <1 HR PHYS/QHP: CPT

## 2023-08-15 PROCEDURE — 88304 TISSUE EXAM BY PATHOLOGIST: CPT | Mod: 26

## 2023-08-15 PROCEDURE — 87493 C DIFF AMPLIFIED PROBE: CPT

## 2023-08-15 PROCEDURE — 36415 COLL VENOUS BLD VENIPUNCTURE: CPT

## 2023-08-15 PROCEDURE — 85025 COMPLETE CBC W/AUTO DIFF WBC: CPT

## 2023-08-15 PROCEDURE — 87507 IADNA-DNA/RNA PROBE TQ 12-25: CPT

## 2023-08-15 PROCEDURE — 97116 GAIT TRAINING THERAPY: CPT | Mod: GP

## 2023-08-15 PROCEDURE — 86891 AUTOLOGOUS BLOOD OP SALVAGE: CPT

## 2023-08-15 PROCEDURE — C1889: CPT

## 2023-08-15 PROCEDURE — C9399: CPT

## 2023-08-15 PROCEDURE — 80048 BASIC METABOLIC PNL TOTAL CA: CPT

## 2023-08-15 PROCEDURE — 88304 TISSUE EXAM BY PATHOLOGIST: CPT

## 2023-08-15 PROCEDURE — 72100 X-RAY EXAM L-S SPINE 2/3 VWS: CPT

## 2023-08-15 PROCEDURE — C1713: CPT

## 2023-08-15 PROCEDURE — 82962 GLUCOSE BLOOD TEST: CPT

## 2023-08-15 PROCEDURE — 97163 PT EVAL HIGH COMPLEX 45 MIN: CPT | Mod: GP

## 2023-08-15 PROCEDURE — 72100 X-RAY EXAM L-S SPINE 2/3 VWS: CPT | Mod: 26

## 2023-08-15 RX ORDER — L.ACIDOPH/B.ANIMALIS/B.LONGUM 15B CELL
1 CAPSULE ORAL
Refills: 0 | DISCHARGE

## 2023-08-15 RX ORDER — SENNA PLUS 8.6 MG/1
2 TABLET ORAL AT BEDTIME
Refills: 0 | Status: DISCONTINUED | OUTPATIENT
Start: 2023-08-15 | End: 2023-08-20

## 2023-08-15 RX ORDER — LEVOTHYROXINE SODIUM 125 MCG
25 TABLET ORAL DAILY
Refills: 0 | Status: DISCONTINUED | OUTPATIENT
Start: 2023-08-15 | End: 2023-08-20

## 2023-08-15 RX ORDER — EMTRICITABINE AND TENOFOVIR DISOPROXIL FUMARATE 200; 300 MG/1; MG/1
1 TABLET, FILM COATED ORAL DAILY
Refills: 0 | Status: DISCONTINUED | OUTPATIENT
Start: 2023-08-15 | End: 2023-08-20

## 2023-08-15 RX ORDER — EMTRICITABINE AND TENOFOVIR DISOPROXIL FUMARATE 200; 300 MG/1; MG/1
1 TABLET, FILM COATED ORAL
Qty: 0 | Refills: 0 | DISCHARGE

## 2023-08-15 RX ORDER — CHOLECALCIFEROL (VITAMIN D3) 125 MCG
1 CAPSULE ORAL
Refills: 0 | DISCHARGE

## 2023-08-15 RX ORDER — CYCLOBENZAPRINE HYDROCHLORIDE 10 MG/1
10 TABLET, FILM COATED ORAL EVERY 8 HOURS
Refills: 0 | Status: DISCONTINUED | OUTPATIENT
Start: 2023-08-15 | End: 2023-08-20

## 2023-08-15 RX ORDER — METOPROLOL TARTRATE 50 MG
0.5 TABLET ORAL
Qty: 0 | Refills: 0 | DISCHARGE

## 2023-08-15 RX ORDER — ATORVASTATIN CALCIUM 80 MG/1
1 TABLET, FILM COATED ORAL
Qty: 0 | Refills: 0 | DISCHARGE

## 2023-08-15 RX ORDER — OXYCODONE HYDROCHLORIDE 5 MG/1
10 TABLET ORAL
Refills: 0 | Status: DISCONTINUED | OUTPATIENT
Start: 2023-08-15 | End: 2023-08-20

## 2023-08-15 RX ORDER — ONDANSETRON 8 MG/1
4 TABLET, FILM COATED ORAL EVERY 6 HOURS
Refills: 0 | Status: DISCONTINUED | OUTPATIENT
Start: 2023-08-15 | End: 2023-08-20

## 2023-08-15 RX ORDER — ASPIRIN/CALCIUM CARB/MAGNESIUM 324 MG
1 TABLET ORAL
Qty: 0 | Refills: 0 | DISCHARGE

## 2023-08-15 RX ORDER — VENLAFAXINE HCL 75 MG
75 CAPSULE, EXT RELEASE 24 HR ORAL
Refills: 0 | Status: DISCONTINUED | OUTPATIENT
Start: 2023-08-15 | End: 2023-08-20

## 2023-08-15 RX ORDER — ESOMEPRAZOLE MAGNESIUM 40 MG/1
1 CAPSULE, DELAYED RELEASE ORAL
Refills: 0 | DISCHARGE

## 2023-08-15 RX ORDER — NALOXONE HYDROCHLORIDE 4 MG/.1ML
0.1 SPRAY NASAL
Refills: 0 | Status: DISCONTINUED | OUTPATIENT
Start: 2023-08-15 | End: 2023-08-20

## 2023-08-15 RX ORDER — OMEGA-3 ACID ETHYL ESTERS 1 G
1 CAPSULE ORAL
Refills: 0 | DISCHARGE

## 2023-08-15 RX ORDER — GABAPENTIN 400 MG/1
1 CAPSULE ORAL
Refills: 0 | DISCHARGE

## 2023-08-15 RX ORDER — GABAPENTIN 400 MG/1
300 CAPSULE ORAL
Refills: 0 | Status: DISCONTINUED | OUTPATIENT
Start: 2023-08-15 | End: 2023-08-20

## 2023-08-15 RX ORDER — VENLAFAXINE HCL 75 MG
1 CAPSULE, EXT RELEASE 24 HR ORAL
Qty: 0 | Refills: 0 | DISCHARGE

## 2023-08-15 RX ORDER — HYDROMORPHONE HYDROCHLORIDE 2 MG/ML
1 INJECTION INTRAMUSCULAR; INTRAVENOUS; SUBCUTANEOUS
Refills: 0 | Status: DISCONTINUED | OUTPATIENT
Start: 2023-08-15 | End: 2023-08-16

## 2023-08-15 RX ORDER — PREGABALIN 225 MG/1
1 CAPSULE ORAL
Refills: 0 | DISCHARGE

## 2023-08-15 RX ORDER — ACETAMINOPHEN 500 MG
1 TABLET ORAL
Refills: 0 | DISCHARGE

## 2023-08-15 RX ORDER — HYDROMORPHONE HYDROCHLORIDE 2 MG/ML
30 INJECTION INTRAMUSCULAR; INTRAVENOUS; SUBCUTANEOUS
Refills: 0 | Status: DISCONTINUED | OUTPATIENT
Start: 2023-08-15 | End: 2023-08-16

## 2023-08-15 RX ORDER — METOPROLOL TARTRATE 50 MG
12.5 TABLET ORAL DAILY
Refills: 0 | Status: DISCONTINUED | OUTPATIENT
Start: 2023-08-15 | End: 2023-08-20

## 2023-08-15 RX ORDER — BUPROPION HYDROCHLORIDE 150 MG/1
300 TABLET, EXTENDED RELEASE ORAL DAILY
Refills: 0 | Status: DISCONTINUED | OUTPATIENT
Start: 2023-08-15 | End: 2023-08-16

## 2023-08-15 RX ORDER — OXYCODONE AND ACETAMINOPHEN 5; 325 MG/1; MG/1
1 TABLET ORAL
Refills: 0 | DISCHARGE

## 2023-08-15 RX ORDER — ATORVASTATIN CALCIUM 80 MG/1
10 TABLET, FILM COATED ORAL AT BEDTIME
Refills: 0 | Status: DISCONTINUED | OUTPATIENT
Start: 2023-08-15 | End: 2023-08-20

## 2023-08-15 RX ORDER — TRANEXAMIC ACID 100 MG/ML
1 INJECTION, SOLUTION INTRAVENOUS
Qty: 1000 | Refills: 0 | Status: DISCONTINUED | OUTPATIENT
Start: 2023-08-15 | End: 2023-08-15

## 2023-08-15 RX ORDER — CEFAZOLIN SODIUM 1 G
2000 VIAL (EA) INJECTION EVERY 8 HOURS
Refills: 0 | Status: COMPLETED | OUTPATIENT
Start: 2023-08-15 | End: 2023-08-16

## 2023-08-15 RX ORDER — PANTOPRAZOLE SODIUM 20 MG/1
40 TABLET, DELAYED RELEASE ORAL
Refills: 0 | Status: DISCONTINUED | OUTPATIENT
Start: 2023-08-15 | End: 2023-08-20

## 2023-08-15 RX ORDER — SODIUM CHLORIDE 9 MG/ML
1000 INJECTION INTRAMUSCULAR; INTRAVENOUS; SUBCUTANEOUS
Refills: 0 | Status: DISCONTINUED | OUTPATIENT
Start: 2023-08-15 | End: 2023-08-20

## 2023-08-15 RX ORDER — PREGABALIN 225 MG/1
1000 CAPSULE ORAL DAILY
Refills: 0 | Status: DISCONTINUED | OUTPATIENT
Start: 2023-08-15 | End: 2023-08-20

## 2023-08-15 RX ORDER — SODIUM CHLORIDE 9 MG/ML
1000 INJECTION, SOLUTION INTRAVENOUS
Refills: 0 | Status: DISCONTINUED | OUTPATIENT
Start: 2023-08-15 | End: 2023-08-15

## 2023-08-15 RX ORDER — ACETAMINOPHEN 500 MG
650 TABLET ORAL EVERY 6 HOURS
Refills: 0 | Status: DISCONTINUED | OUTPATIENT
Start: 2023-08-15 | End: 2023-08-20

## 2023-08-15 RX ADMIN — HYDROMORPHONE HYDROCHLORIDE 30 MILLILITER(S): 2 INJECTION INTRAMUSCULAR; INTRAVENOUS; SUBCUTANEOUS at 12:51

## 2023-08-15 RX ADMIN — Medication 100 MILLIGRAM(S): at 20:18

## 2023-08-15 RX ADMIN — Medication 650 MILLIGRAM(S): at 17:17

## 2023-08-15 RX ADMIN — SODIUM CHLORIDE 150 MILLILITER(S): 9 INJECTION, SOLUTION INTRAVENOUS at 12:58

## 2023-08-15 RX ADMIN — Medication 650 MILLIGRAM(S): at 18:33

## 2023-08-15 RX ADMIN — CYCLOBENZAPRINE HYDROCHLORIDE 10 MILLIGRAM(S): 10 TABLET, FILM COATED ORAL at 21:48

## 2023-08-15 RX ADMIN — SENNA PLUS 2 TABLET(S): 8.6 TABLET ORAL at 21:48

## 2023-08-15 RX ADMIN — GABAPENTIN 300 MILLIGRAM(S): 400 CAPSULE ORAL at 21:48

## 2023-08-15 RX ADMIN — ATORVASTATIN CALCIUM 10 MILLIGRAM(S): 80 TABLET, FILM COATED ORAL at 21:48

## 2023-08-15 RX ADMIN — Medication 75 MILLIGRAM(S): at 17:17

## 2023-08-15 NOTE — CONSULT NOTE ADULT - SUBJECTIVE AND OBJECTIVE BOX
Patient is a 68y old  Male who presents with a chief complaint of     BRIEF HOSPITAL COURSE: 67yo male PMHx HTN, HLD, aortic stenosis s/p AV replacement 2019, anxiety, depression, hx gastric bypass, hx recent SBO, multiple recurrence of infectious diarrhea, hx of lumbar radiculopathy. Spinal stimulator implanted and s/p removal of spinal cord stimulator; presents to PST for planned revision laminectomy right L4 L5 Excision of herniated disc right L4 L5 , transforaminal lumbar interbody fusion L4 L5    8/15     PAST MEDICAL & SURGICAL HISTORY:  Hyperlipidemia  GERD (gastroesophageal reflux disease)  Anxiety  Skin cancer  external right ear, unsure of type  HTN (hypertension)  Aortic stenosis  Bicuspid Aortic Valve Replacement- 5/29/2018  Chronic neck and back pain  Sciatica  Internal and external prolapsed hemorrhoids  had surgery  Helicobacter pylori infection  History of colonic polyps  Herniated lumbar intervertebral disc  Gastrointestinal hemorrhage  Carpal tunnel syndrome  had surgery  Depressive disorder  Loss of hearing  Morbid obesity  Lumbar radiculopathy  Hearing loss  Erectile dysfunction  Shigella infection  Campylobacter diarrhea  H/O small bowel obstruction  Foot drop  Hand fracture, right  S/P carpal tunnel release  right side, left side- 2014  History of back surgery  laminectomy x2 last 2018  S/P trigger finger release  right thumb and middle finger  H/O external ear surgery  excision mass right ear- cancer 2018  H/O bicuspid aortic valve  replacement- 5/29/18  Status post coronary angioplasty  pt not sure he had this procedure  S/P tonsillectomy  H/O hemorrhoidectomy  Spinal cord stimulator status  10/2020  H/O bariatric surgery  Status post laparotomy with lysis of adhesio  History of appendectom  S/P ORIF (open reduction internal fixation) fracture      Medications:  cyclobenzaprine 10 milliGRAM(s) Oral every 8 hours  HYDROmorphone PCA (1 mG/mL) 30 milliLiter(s) PCA Continuous PCA Continuous  HYDROmorphone PCA (1 mG/mL) Rescue Clinician Bolus 1 milliGRAM(s) IV Push every 30 minutes PRN  ondansetron Injectable 4 milliGRAM(s) IV Push every 6 hours PRN  tranexamic acid IVPB 1000 milliGRAM(s) IV Intermittent once  lactated ringers. 1000 milliLiter(s) IV Continuous <Continuous>  naloxone Injectable 0.1 milliGRAM(s) IV Push every 3 minutes PRN      ICU Vital Signs Last 24 Hrs  T(C): 37.1 (15 Aug 2023 12:25), Max: 37.1 (15 Aug 2023 12:25)  T(F): 98.7 (15 Aug 2023 12:25), Max: 98.7 (15 Aug 2023 12:25)  HR: 72 (15 Aug 2023 13:00) (62 - 73)  BP: 114/66 (15 Aug 2023 13:00) (112/65 - 135/81)  BP(mean): --  ABP: --  ABP(mean): --  RR: 15 (15 Aug 2023 13:00) (12 - 16)  SpO2: 98% (15 Aug 2023 13:00) (96% - 100%)    O2 Parameters below as of 15 Aug 2023 12:25  Patient On (Oxygen Delivery Method): nasal cannula  O2 Flow (L/min): 3      I&O's Detail    15 Aug 2023 07:01  -  15 Aug 2023 13:42  --------------------------------------------------------  IN:    Other (mL): 1500 mL  Total IN: 1500 mL    OUT:    Other (mL): 265 mL  Total OUT: 265 mL    Total NET: 1235 mL      LABS:                        11.5   8.79  )-----------( 260      ( 15 Aug 2023 13:20 )             35.2       CAPILLARY BLOOD GLUCOSE  POCT Blood Glucose.: 117 mg/dL (15 Aug 2023 12:30)    CULTURES:      Physical Examination:    General: No acute distress.    PULM: Clear to auscultation bilaterally  CVS: Regular rate and rhythm, no murmurs  ABD: Soft, nondistended, nontender, normoactive bowel sounds  EXT: No edema, nontender  SKIN: Warm and well perfused, no rashes noted.  NEURO: Alert, oriented, interactive, nonfocal    DEVICES:   GAYLA ingram   Patient is a 68y old  Male who presents with a chief complaint of     BRIEF HOSPITAL COURSE: 69yo male PMHx HTN, HLD, aortic stenosis s/p AV replacement 2019, anxiety, depression, hx gastric bypass, hx recent SBO, multiple recurrence of infectious diarrhea, hx of lumbar radiculopathy. Spinal stimulator implanted and s/p removal of spinal cord stimulator; presents to PST for planned revision laminectomy right L4 L5 Excision of herniated disc right L4 L5 , transforaminal lumbar interbody fusion L4 L5    8/15 seen in PACU, back pain is present but controlled. denies paresthesias in LE, abd pain, nausea.     PAST MEDICAL & SURGICAL HISTORY:  Hyperlipidemia  GERD (gastroesophageal reflux disease)  Anxiety  Skin cancer  external right ear, unsure of type  HTN (hypertension)  Aortic stenosis  Bicuspid Aortic Valve Replacement- 5/29/2018  Chronic neck and back pain  Sciatica  Internal and external prolapsed hemorrhoids  had surgery  Helicobacter pylori infection  History of colonic polyps  Herniated lumbar intervertebral disc  Gastrointestinal hemorrhage  Carpal tunnel syndrome  had surgery  Depressive disorder  Loss of hearing  Morbid obesity  Lumbar radiculopathy  Hearing loss  Erectile dysfunction  Shigella infection  Campylobacter diarrhea  H/O small bowel obstruction  Foot drop  Hand fracture, right  S/P carpal tunnel release  right side, left side- 2014  History of back surgery  laminectomy x2 last 2018  S/P trigger finger release  right thumb and middle finger  H/O external ear surgery  excision mass right ear- cancer 2018  H/O bicuspid aortic valve  replacement- 5/29/18  Status post coronary angioplasty  pt not sure he had this procedure  S/P tonsillectomy  H/O hemorrhoidectomy  Spinal cord stimulator status  10/2020  H/O bariatric surgery  Status post laparotomy with lysis of adhesio  History of appendectom  S/P ORIF (open reduction internal fixation) fracture      Medications:  cyclobenzaprine 10 milliGRAM(s) Oral every 8 hours  HYDROmorphone PCA (1 mG/mL) 30 milliLiter(s) PCA Continuous PCA Continuous  HYDROmorphone PCA (1 mG/mL) Rescue Clinician Bolus 1 milliGRAM(s) IV Push every 30 minutes PRN  ondansetron Injectable 4 milliGRAM(s) IV Push every 6 hours PRN  tranexamic acid IVPB 1000 milliGRAM(s) IV Intermittent once  lactated ringers. 1000 milliLiter(s) IV Continuous <Continuous>  naloxone Injectable 0.1 milliGRAM(s) IV Push every 3 minutes PRN      ICU Vital Signs Last 24 Hrs  T(C): 37.1 (15 Aug 2023 12:25), Max: 37.1 (15 Aug 2023 12:25)  T(F): 98.7 (15 Aug 2023 12:25), Max: 98.7 (15 Aug 2023 12:25)  HR: 72 (15 Aug 2023 13:00) (62 - 73)  BP: 114/66 (15 Aug 2023 13:00) (112/65 - 135/81)  BP(mean): --  ABP: --  ABP(mean): --  RR: 15 (15 Aug 2023 13:00) (12 - 16)  SpO2: 98% (15 Aug 2023 13:00) (96% - 100%)    O2 Parameters below as of 15 Aug 2023 12:25  Patient On (Oxygen Delivery Method): nasal cannula  O2 Flow (L/min): 3      I&O's Detail    15 Aug 2023 07:01  -  15 Aug 2023 13:42  --------------------------------------------------------  IN:    Other (mL): 1500 mL  Total IN: 1500 mL    OUT:    Other (mL): 265 mL  Total OUT: 265 mL    Total NET: 1235 mL      LABS:                        11.5   8.79  )-----------( 260      ( 15 Aug 2023 13:20 )             35.2       CAPILLARY BLOOD GLUCOSE  POCT Blood Glucose.: 117 mg/dL (15 Aug 2023 12:30)    CULTURES:      Physical Examination:    General: No acute distress.    PULM: Clear to auscultation bilaterally  CVS: Regular rate and rhythm, no murmurs  ABD: Soft, nondistended, nontender, normoactive bowel sounds  EXT: No edema, nontender  SKIN: Warm and well perfused, no rashes noted.  NEURO: Alert, oriented, interactive, nonfocal    DEVICES:   GAYLA ingram

## 2023-08-15 NOTE — BRIEF OPERATIVE NOTE - NSICDXBRIEFPOSTOP_GEN_ALL_CORE_FT
POST-OP DIAGNOSIS:  Herniated nucleus pulposus, lumbar 15-Aug-2023 12:00:28  Geovanny Staples  Lumbar postlaminectomy syndrome 15-Aug-2023 12:00:35  Geovanny Staples

## 2023-08-15 NOTE — BRIEF OPERATIVE NOTE - NSICDXBRIEFPREOP_GEN_ALL_CORE_FT
PRE-OP DIAGNOSIS:  Lumbar postlaminectomy syndrome 15-Aug-2023 11:59:58  Geovanny Staples  Herniated lumbar intervertebral disc 15-Aug-2023 12:00:12  Geovanny Staples

## 2023-08-15 NOTE — PATIENT PROFILE ADULT - FUNCTIONAL SCREEN CURRENT LEVEL: SWALLOWING (IF SCORE 2 OR MORE FOR ANY ITEM, CONSULT REHAB SERVICES), MLM)
Quality 431: Preventive Care And Screening: Unhealthy Alcohol Use - Screening: Patient screened for unhealthy alcohol use using a single question and scores 2 or greater episodes per year and brief intervention occurred Detail Level: Zone Quality 111:Pneumonia Vaccination Status For Older Adults: Pneumococcal Vaccination Previously Received Quality 110: Preventive Care And Screening: Influenza Immunization: Influenza Immunization previously received during influenza season 0 = swallows foods/liquids without difficulty Quality 226: Preventive Care And Screening: Tobacco Use: Screening And Cessation Intervention: Patient screened for tobacco and is an ex-smoker

## 2023-08-15 NOTE — BRIEF OPERATIVE NOTE - NSICDXBRIEFPROCEDURE_GEN_ALL_CORE_FT
PROCEDURES:  Laminectomy, spine, lumbar, with osteotomy and instrumented fusion 15-Aug-2023 11:59:30  Geovanny Staples  Fusion, spine, lumbar, TLIF, 1-2 levels 15-Aug-2023 11:59:44  Geovanny Staples

## 2023-08-15 NOTE — CONSULT NOTE ADULT - ASSESSMENT
ASSESSMENT  69yo male PMHx HTN, HLD, aortic stenosis s/p AV replacement 2019, anxiety, depression, hx gastric bypass, hx recent SBO, multiple recurrence of infectious diarrhea, hx of lumbar radiculopathy. Spinal stimulator implanted and s/p removal of spinal cord stimulator, still with back pain so present for elective revision of lumbar laminectomy    s/p laminectomy with osteotomy and fusion, TLIF L4- L5  EBL 250cc  marcus drain in place    PLAN  - pain control prn dilaudid PCA, flexeril, gabapetin. cont effexor, wellbutrin   - monitor BP. resume home toprol 12.5mg qd  - encourage incentive spirometer.   - marcus drain mgmt as per ortho spine. monitor ouptut  - PO diet as tolerated . bowel regimen prn  - ingram in place. monitor I& Os and electrolytes  - cont synthroid  - post op IV ancef x 2 doses. cont Descovy for PREP hiv ppx.  - Trend H/H. no chemical DVT ppx due to bleeding risk . SCDs.   PT eval    Dispo: SICU. pain control. PT . f/u routine post op labs    Will discuss with Dr. Wilcox ASSESSMENT  67yo male PMHx HTN, HLD, aortic stenosis s/p AV replacement 2019, anxiety, depression, hx gastric bypass, hx recent SBO, multiple recurrence of infectious diarrhea, hx of lumbar radiculopathy. Spinal stimulator implanted and s/p removal of spinal cord stimulator, still with back pain so present for elective revision of lumbar laminectomy    s/p laminectomy with osteotomy and fusion, TLIF L4- L5  EBL 250cc  marcus drain in place    PLAN  - pain control prn dilaudid PCA, flexeril, gabapetin. cont effexor, wellbutrin   - monitor BP. resume home toprol 12.5mg qd  - encourage incentive spirometer.   - marcus drain mgmt as per ortho spine. monitor ouptut  - PO diet as tolerated . bowel regimen prn. cont IV fluids until taking adequate PO.   - ingram in place. monitor I& Os and electrolytes  - cont synthroid  - post op IV ancef x 3 doses. cont Descovy for PREP hiv ppx (patient has been taking at home for months to decrease risk)  - Hgb down slightly from pre op labs. cont to trend.  no chemical DVT ppx due to bleeding risk . SCDs.   PT eval    Dispo: SICU. pain control. PT . monitor GAYLA output.    Will discuss with Dr. Wilcox

## 2023-08-15 NOTE — CONSULT NOTE ADULT - NS ATTEND AMEND GEN_ALL_CORE FT
Patient seen  S/p lami with fusion, 2 levels   VSS   Resp stable   Monitor drain output   Pain mx  IS   SCDs

## 2023-08-16 ENCOUNTER — APPOINTMENT (OUTPATIENT)
Dept: GASTROENTEROLOGY | Facility: CLINIC | Age: 68
End: 2023-08-16

## 2023-08-16 DIAGNOSIS — M96.1 POSTLAMINECTOMY SYNDROME, NOT ELSEWHERE CLASSIFIED: ICD-10-CM

## 2023-08-16 LAB
ANION GAP SERPL CALC-SCNC: 5 MMOL/L — SIGNIFICANT CHANGE UP (ref 5–17)
BASOPHILS # BLD AUTO: 0.04 K/UL — SIGNIFICANT CHANGE UP (ref 0–0.2)
BASOPHILS NFR BLD AUTO: 0.4 % — SIGNIFICANT CHANGE UP (ref 0–2)
BUN SERPL-MCNC: 16 MG/DL — SIGNIFICANT CHANGE UP (ref 7–23)
CALCIUM SERPL-MCNC: 8.3 MG/DL — LOW (ref 8.5–10.1)
CHLORIDE SERPL-SCNC: 106 MMOL/L — SIGNIFICANT CHANGE UP (ref 96–108)
CO2 SERPL-SCNC: 27 MMOL/L — SIGNIFICANT CHANGE UP (ref 22–31)
CREAT SERPL-MCNC: 0.88 MG/DL — SIGNIFICANT CHANGE UP (ref 0.5–1.3)
EGFR: 94 ML/MIN/1.73M2 — SIGNIFICANT CHANGE UP
EOSINOPHIL # BLD AUTO: 0 K/UL — SIGNIFICANT CHANGE UP (ref 0–0.5)
EOSINOPHIL NFR BLD AUTO: 0 % — SIGNIFICANT CHANGE UP (ref 0–6)
GLUCOSE SERPL-MCNC: 114 MG/DL — HIGH (ref 70–99)
HCT VFR BLD CALC: 32.6 % — LOW (ref 39–50)
HGB BLD-MCNC: 10.6 G/DL — LOW (ref 13–17)
IMM GRANULOCYTES NFR BLD AUTO: 0.7 % — SIGNIFICANT CHANGE UP (ref 0–0.9)
LYMPHOCYTES # BLD AUTO: 2.77 K/UL — SIGNIFICANT CHANGE UP (ref 1–3.3)
LYMPHOCYTES # BLD AUTO: 28 % — SIGNIFICANT CHANGE UP (ref 13–44)
MCHC RBC-ENTMCNC: 30.6 PG — SIGNIFICANT CHANGE UP (ref 27–34)
MCHC RBC-ENTMCNC: 32.5 GM/DL — SIGNIFICANT CHANGE UP (ref 32–36)
MCV RBC AUTO: 94.2 FL — SIGNIFICANT CHANGE UP (ref 80–100)
MONOCYTES # BLD AUTO: 0.75 K/UL — SIGNIFICANT CHANGE UP (ref 0–0.9)
MONOCYTES NFR BLD AUTO: 7.6 % — SIGNIFICANT CHANGE UP (ref 2–14)
NEUTROPHILS # BLD AUTO: 6.28 K/UL — SIGNIFICANT CHANGE UP (ref 1.8–7.4)
NEUTROPHILS NFR BLD AUTO: 63.3 % — SIGNIFICANT CHANGE UP (ref 43–77)
PLATELET # BLD AUTO: 224 K/UL — SIGNIFICANT CHANGE UP (ref 150–400)
POTASSIUM SERPL-MCNC: 4.1 MMOL/L — SIGNIFICANT CHANGE UP (ref 3.5–5.3)
POTASSIUM SERPL-SCNC: 4.1 MMOL/L — SIGNIFICANT CHANGE UP (ref 3.5–5.3)
RBC # BLD: 3.46 M/UL — LOW (ref 4.2–5.8)
RBC # FLD: 15.5 % — HIGH (ref 10.3–14.5)
SODIUM SERPL-SCNC: 138 MMOL/L — SIGNIFICANT CHANGE UP (ref 135–145)
WBC # BLD: 9.91 K/UL — SIGNIFICANT CHANGE UP (ref 3.8–10.5)
WBC # FLD AUTO: 9.91 K/UL — SIGNIFICANT CHANGE UP (ref 3.8–10.5)

## 2023-08-16 RX ORDER — SODIUM CHLORIDE 9 MG/ML
3 INJECTION INTRAMUSCULAR; INTRAVENOUS; SUBCUTANEOUS EVERY 8 HOURS
Refills: 0 | Status: DISCONTINUED | OUTPATIENT
Start: 2023-08-16 | End: 2023-08-20

## 2023-08-16 RX ORDER — BUPROPION HYDROCHLORIDE 150 MG/1
100 TABLET, EXTENDED RELEASE ORAL THREE TIMES A DAY
Refills: 0 | Status: DISCONTINUED | OUTPATIENT
Start: 2023-08-17 | End: 2023-08-20

## 2023-08-16 RX ORDER — BUPROPION HYDROCHLORIDE 150 MG/1
300 TABLET, EXTENDED RELEASE ORAL DAILY
Refills: 0 | Status: DISCONTINUED | OUTPATIENT
Start: 2023-08-16 | End: 2023-08-16

## 2023-08-16 RX ADMIN — Medication 650 MILLIGRAM(S): at 06:23

## 2023-08-16 RX ADMIN — Medication 12.5 MILLIGRAM(S): at 09:17

## 2023-08-16 RX ADMIN — PREGABALIN 1000 MICROGRAM(S): 225 CAPSULE ORAL at 09:17

## 2023-08-16 RX ADMIN — CYCLOBENZAPRINE HYDROCHLORIDE 10 MILLIGRAM(S): 10 TABLET, FILM COATED ORAL at 13:07

## 2023-08-16 RX ADMIN — ATORVASTATIN CALCIUM 10 MILLIGRAM(S): 80 TABLET, FILM COATED ORAL at 21:31

## 2023-08-16 RX ADMIN — OXYCODONE HYDROCHLORIDE 10 MILLIGRAM(S): 5 TABLET ORAL at 12:25

## 2023-08-16 RX ADMIN — PANTOPRAZOLE SODIUM 40 MILLIGRAM(S): 20 TABLET, DELAYED RELEASE ORAL at 06:24

## 2023-08-16 RX ADMIN — CYCLOBENZAPRINE HYDROCHLORIDE 10 MILLIGRAM(S): 10 TABLET, FILM COATED ORAL at 21:30

## 2023-08-16 RX ADMIN — GABAPENTIN 300 MILLIGRAM(S): 400 CAPSULE ORAL at 09:18

## 2023-08-16 RX ADMIN — Medication 75 MILLIGRAM(S): at 17:12

## 2023-08-16 RX ADMIN — Medication 650 MILLIGRAM(S): at 14:19

## 2023-08-16 RX ADMIN — CYCLOBENZAPRINE HYDROCHLORIDE 10 MILLIGRAM(S): 10 TABLET, FILM COATED ORAL at 06:24

## 2023-08-16 RX ADMIN — Medication 75 MILLIGRAM(S): at 09:16

## 2023-08-16 RX ADMIN — Medication 25 MICROGRAM(S): at 06:24

## 2023-08-16 RX ADMIN — BUPROPION HYDROCHLORIDE 300 MILLIGRAM(S): 150 TABLET, EXTENDED RELEASE ORAL at 09:17

## 2023-08-16 RX ADMIN — Medication 650 MILLIGRAM(S): at 13:07

## 2023-08-16 RX ADMIN — Medication 650 MILLIGRAM(S): at 17:12

## 2023-08-16 RX ADMIN — SODIUM CHLORIDE 3 MILLILITER(S): 9 INJECTION INTRAMUSCULAR; INTRAVENOUS; SUBCUTANEOUS at 12:37

## 2023-08-16 RX ADMIN — EMTRICITABINE AND TENOFOVIR DISOPROXIL FUMARATE 1 TABLET(S): 200; 300 TABLET, FILM COATED ORAL at 09:16

## 2023-08-16 RX ADMIN — Medication 1 TABLET(S): at 09:17

## 2023-08-16 RX ADMIN — Medication 100 MILLIGRAM(S): at 06:25

## 2023-08-16 RX ADMIN — OXYCODONE HYDROCHLORIDE 10 MILLIGRAM(S): 5 TABLET ORAL at 20:20

## 2023-08-16 RX ADMIN — SODIUM CHLORIDE 25 MILLILITER(S): 9 INJECTION INTRAMUSCULAR; INTRAVENOUS; SUBCUTANEOUS at 21:31

## 2023-08-16 RX ADMIN — GABAPENTIN 300 MILLIGRAM(S): 400 CAPSULE ORAL at 21:30

## 2023-08-16 NOTE — PHYSICAL THERAPY INITIAL EVALUATION ADULT - GENERAL OBSERVATIONS, REHAB EVAL
juan jose D/C by ALEX Abbott; GAYLA drain; HM; BP cuff; pulse oxym; pt rec'd in bed supine;HR 84; /71; O2 Sat 95%; RR 25

## 2023-08-16 NOTE — PHYSICAL THERAPY INITIAL EVALUATION ADULT - PRECAUTIONS/LIMITATIONS, REHAB EVAL
log rolling with transfers; back brace prn as per verbal conversation with NILS BABCOCK 8/16/23 8331/fall precautions/spinal precautions

## 2023-08-16 NOTE — PROGRESS NOTE ADULT - SUBJECTIVE AND OBJECTIVE BOX
POD#1  oob  comfortable  afeb vss  nv intact  dressing was changed last evening   due to drainage  now  looks clean and dry  labs ok  drain 120 cc    stable

## 2023-08-16 NOTE — PHYSICAL THERAPY INITIAL EVALUATION ADULT - MODALITIES TREATMENT COMMENTS
pt left seated on commode in BR post Eval @ pt request; GAYLA drain intact; callbell in reach; pt instructed not to get up alone; call nursing for assist; pamella well; denied pain; RN Milena made aware pt OOB in BR

## 2023-08-16 NOTE — CHART NOTE - NSCHARTNOTEFT_GEN_A_CORE
67yo male PMHx HTN, HLD, aortic stenosis s/p AV replacement 2019, anxiety, depression, hx gastric bypass, hx recent SBO, multiple recurrence of infectious diarrhea, hx of lumbar radiculopathy. Spinal stimulator implanted and s/p removal of spinal cord stimulator, still with back pain so present for elective revision of lumbar laminectomy    s/p laminectomy with osteotomy and fusion, TLIF L4- L5  EBL 250cc  marcus drain in place  POD#2    8/16 pt ambulating well, still with mild back pain    Vital Signs Last 24 Hrs  T(C): 36.4 (16 Aug 2023 13:45), Max: 37.2 (16 Aug 2023 08:22)  T(F): 97.6 (16 Aug 2023 13:45), Max: 99 (16 Aug 2023 08:22)  HR: 74 (16 Aug 2023 17:01) (68 - 89)  BP: 108/87 (16 Aug 2023 17:01) (92/59 - 130/76)  BP(mean): 94 (16 Aug 2023 17:01) (70 - 94)  RR: 13 (16 Aug 2023 17:01) (13 - 31)  SpO2: 97% (16 Aug 2023 17:01) (90% - 98%)    Parameters below as of 16 Aug 2023 17:01  Patient On (Oxygen Delivery Method): room air                        10.6   9.91  )-----------( 224      ( 16 Aug 2023 05:54 )             32.6     08-16    138  |  106  |  16  ----------------------------<  114<H>  4.1   |  27  |  0.88    Ca    8.3<L>      16 Aug 2023 05:54      Physical Examination:  General: No acute distress.    PULM: Clear to auscultation bilaterally  CVS: Regular rate and rhythm, no murmurs  ABD: Soft, nondistended, nontender  MSK: GAYLA drain   NEURO: Alert, oriented, interactive. strength 5/5 in UE and LE     PLAN  - pain control prn flexeril, gabapetin. cont effexor, wellbutrin . dc PCA pump  - monitor BP. cont toprol 12.5mg qd  - encourage incentive spirometer.   - marcus drain mgmt as per ortho spine.  - PO diet as tolerated . bowel regimen prn.   - dc ingram   - cont synthroid  - s/p post op IV ancef. cont Descovy for PREP hiv ppx (patient has been taking at home for months to decrease risk)  - Hgb down slightly.  cont to trend.  no chemical DVT ppx due to bleeding risk . SCDs.   PT eval    Dispo: Downgrade 2N. pain control. PT . monitor GAYLA output.    discussed with Dr. Wilcox    Signed out to Dr Stacie Rowley 345pm

## 2023-08-17 LAB
ANION GAP SERPL CALC-SCNC: 6 MMOL/L — SIGNIFICANT CHANGE UP (ref 5–17)
BASOPHILS # BLD AUTO: 0.04 K/UL — SIGNIFICANT CHANGE UP (ref 0–0.2)
BASOPHILS NFR BLD AUTO: 0.5 % — SIGNIFICANT CHANGE UP (ref 0–2)
BUN SERPL-MCNC: 15 MG/DL — SIGNIFICANT CHANGE UP (ref 7–23)
C DIFF BY PCR RESULT: SIGNIFICANT CHANGE UP
CALCIUM SERPL-MCNC: 8.3 MG/DL — LOW (ref 8.5–10.1)
CAMPYLOBACTER DNA SPEC NAA+PROBE: DETECTED
CHLORIDE SERPL-SCNC: 106 MMOL/L — SIGNIFICANT CHANGE UP (ref 96–108)
CO2 SERPL-SCNC: 26 MMOL/L — SIGNIFICANT CHANGE UP (ref 22–31)
CREAT SERPL-MCNC: 0.8 MG/DL — SIGNIFICANT CHANGE UP (ref 0.5–1.3)
EGFR: 96 ML/MIN/1.73M2 — SIGNIFICANT CHANGE UP
EOSINOPHIL # BLD AUTO: 0 K/UL — SIGNIFICANT CHANGE UP (ref 0–0.5)
EOSINOPHIL NFR BLD AUTO: 0 % — SIGNIFICANT CHANGE UP (ref 0–6)
GI PCR PANEL: DETECTED
GLUCOSE SERPL-MCNC: 94 MG/DL — SIGNIFICANT CHANGE UP (ref 70–99)
HCT VFR BLD CALC: 33.4 % — LOW (ref 39–50)
HGB BLD-MCNC: 10.9 G/DL — LOW (ref 13–17)
IMM GRANULOCYTES NFR BLD AUTO: 0.4 % — SIGNIFICANT CHANGE UP (ref 0–0.9)
LYMPHOCYTES # BLD AUTO: 2.57 K/UL — SIGNIFICANT CHANGE UP (ref 1–3.3)
LYMPHOCYTES # BLD AUTO: 31.5 % — SIGNIFICANT CHANGE UP (ref 13–44)
MCHC RBC-ENTMCNC: 30.9 PG — SIGNIFICANT CHANGE UP (ref 27–34)
MCHC RBC-ENTMCNC: 32.6 GM/DL — SIGNIFICANT CHANGE UP (ref 32–36)
MCV RBC AUTO: 94.6 FL — SIGNIFICANT CHANGE UP (ref 80–100)
MONOCYTES # BLD AUTO: 0.62 K/UL — SIGNIFICANT CHANGE UP (ref 0–0.9)
MONOCYTES NFR BLD AUTO: 7.6 % — SIGNIFICANT CHANGE UP (ref 2–14)
NEUTROPHILS # BLD AUTO: 4.9 K/UL — SIGNIFICANT CHANGE UP (ref 1.8–7.4)
NEUTROPHILS NFR BLD AUTO: 60 % — SIGNIFICANT CHANGE UP (ref 43–77)
PLATELET # BLD AUTO: 230 K/UL — SIGNIFICANT CHANGE UP (ref 150–400)
POTASSIUM SERPL-MCNC: 3.9 MMOL/L — SIGNIFICANT CHANGE UP (ref 3.5–5.3)
POTASSIUM SERPL-SCNC: 3.9 MMOL/L — SIGNIFICANT CHANGE UP (ref 3.5–5.3)
RBC # BLD: 3.53 M/UL — LOW (ref 4.2–5.8)
RBC # FLD: 15.8 % — HIGH (ref 10.3–14.5)
SODIUM SERPL-SCNC: 138 MMOL/L — SIGNIFICANT CHANGE UP (ref 135–145)
WBC # BLD: 8.16 K/UL — SIGNIFICANT CHANGE UP (ref 3.8–10.5)
WBC # FLD AUTO: 8.16 K/UL — SIGNIFICANT CHANGE UP (ref 3.8–10.5)

## 2023-08-17 PROCEDURE — 99233 SBSQ HOSP IP/OBS HIGH 50: CPT

## 2023-08-17 RX ADMIN — BUPROPION HYDROCHLORIDE 100 MILLIGRAM(S): 150 TABLET, EXTENDED RELEASE ORAL at 21:03

## 2023-08-17 RX ADMIN — Medication 1 TABLET(S): at 10:22

## 2023-08-17 RX ADMIN — Medication 650 MILLIGRAM(S): at 10:13

## 2023-08-17 RX ADMIN — Medication 25 MICROGRAM(S): at 06:17

## 2023-08-17 RX ADMIN — ATORVASTATIN CALCIUM 10 MILLIGRAM(S): 80 TABLET, FILM COATED ORAL at 21:03

## 2023-08-17 RX ADMIN — OXYCODONE HYDROCHLORIDE 10 MILLIGRAM(S): 5 TABLET ORAL at 10:13

## 2023-08-17 RX ADMIN — OXYCODONE HYDROCHLORIDE 10 MILLIGRAM(S): 5 TABLET ORAL at 04:49

## 2023-08-17 RX ADMIN — Medication 12.5 MILLIGRAM(S): at 10:22

## 2023-08-17 RX ADMIN — Medication 75 MILLIGRAM(S): at 10:22

## 2023-08-17 RX ADMIN — CYCLOBENZAPRINE HYDROCHLORIDE 10 MILLIGRAM(S): 10 TABLET, FILM COATED ORAL at 13:46

## 2023-08-17 RX ADMIN — CYCLOBENZAPRINE HYDROCHLORIDE 10 MILLIGRAM(S): 10 TABLET, FILM COATED ORAL at 06:17

## 2023-08-17 RX ADMIN — OXYCODONE HYDROCHLORIDE 10 MILLIGRAM(S): 5 TABLET ORAL at 21:03

## 2023-08-17 RX ADMIN — EMTRICITABINE AND TENOFOVIR DISOPROXIL FUMARATE 1 TABLET(S): 200; 300 TABLET, FILM COATED ORAL at 10:22

## 2023-08-17 RX ADMIN — BUPROPION HYDROCHLORIDE 100 MILLIGRAM(S): 150 TABLET, EXTENDED RELEASE ORAL at 13:46

## 2023-08-17 RX ADMIN — OXYCODONE HYDROCHLORIDE 10 MILLIGRAM(S): 5 TABLET ORAL at 10:29

## 2023-08-17 RX ADMIN — CYCLOBENZAPRINE HYDROCHLORIDE 10 MILLIGRAM(S): 10 TABLET, FILM COATED ORAL at 21:03

## 2023-08-17 RX ADMIN — BUPROPION HYDROCHLORIDE 100 MILLIGRAM(S): 150 TABLET, EXTENDED RELEASE ORAL at 06:37

## 2023-08-17 RX ADMIN — PANTOPRAZOLE SODIUM 40 MILLIGRAM(S): 20 TABLET, DELAYED RELEASE ORAL at 10:22

## 2023-08-17 RX ADMIN — SODIUM CHLORIDE 3 MILLILITER(S): 9 INJECTION INTRAMUSCULAR; INTRAVENOUS; SUBCUTANEOUS at 00:03

## 2023-08-17 RX ADMIN — OXYCODONE HYDROCHLORIDE 10 MILLIGRAM(S): 5 TABLET ORAL at 00:06

## 2023-08-17 RX ADMIN — PREGABALIN 1000 MICROGRAM(S): 225 CAPSULE ORAL at 10:22

## 2023-08-17 RX ADMIN — Medication 75 MILLIGRAM(S): at 21:07

## 2023-08-17 RX ADMIN — GABAPENTIN 300 MILLIGRAM(S): 400 CAPSULE ORAL at 10:22

## 2023-08-17 RX ADMIN — SODIUM CHLORIDE 3 MILLILITER(S): 9 INJECTION INTRAMUSCULAR; INTRAVENOUS; SUBCUTANEOUS at 21:05

## 2023-08-17 RX ADMIN — GABAPENTIN 300 MILLIGRAM(S): 400 CAPSULE ORAL at 21:03

## 2023-08-17 RX ADMIN — Medication 650 MILLIGRAM(S): at 06:18

## 2023-08-17 NOTE — PROGRESS NOTE ADULT - SUBJECTIVE AND OBJECTIVE BOX
· Subjective and Objective:   Patient is a 68y old  Male who presents with a chief complaint of     BRIEF HOSPITAL COURSE: 69yo male PMHx HTN, HLD, aortic stenosis s/p AV replacement 2019, anxiety, depression, hx gastric bypass, hx recent SBO, multiple recurrence of infectious diarrhea, hx of lumbar radiculopathy. Spinal stimulator implanted and s/p removal of spinal cord stimulator; presents to PST for planned revision laminectomy right L4 L5 Excision of herniated disc right L4 L5 , transforaminal lumbar interbody fusion L4 L5    8/17 - pain controlled. mulitiple episodes of diarrhea today.  no fever or chills      PAST MEDICAL & SURGICAL HISTORY:  Hyperlipidemia  GERD (gastroesophageal reflux disease)  Anxiety  Skin cancer  external right ear, unsure of type  HTN (hypertension)  Aortic stenosis  Bicuspid Aortic Valve Replacement- 5/29/2018  Chronic neck and back pain  Sciatica  Internal and external prolapsed hemorrhoids  had surgery  Helicobacter pylori infection  History of colonic polyps  Herniated lumbar intervertebral disc  Gastrointestinal hemorrhage  Carpal tunnel syndrome  had surgery  Depressive disorder  Loss of hearing  Morbid obesity  Lumbar radiculopathy  Hearing loss  Erectile dysfunction  Shigella infection  Campylobacter diarrhea  H/O small bowel obstruction  Foot drop  Hand fracture, right  S/P carpal tunnel release  right side, left side- 2014  History of back surgery  laminectomy x2 last 2018  S/P trigger finger release  right thumb and middle finger  H/O external ear surgery  excision mass right ear- cancer 2018  H/O bicuspid aortic valve  replacement- 5/29/18  Status post coronary angioplasty  pt not sure he had this procedure  S/P tonsillectomy  H/O hemorrhoidectomy  Spinal cord stimulator status  10/2020  H/O bariatric surgery  Status post laparotomy with lysis of adhesio  History of appendectom  S/P ORIF (open reduction internal fixation) fracture      Medications:  cyclobenzaprine 10 milliGRAM(s) Oral every 8 hours  HYDROmorphone PCA (1 mG/mL) 30 milliLiter(s) PCA Continuous PCA Continuous  HYDROmorphone PCA (1 mG/mL) Rescue Clinician Bolus 1 milliGRAM(s) IV Push every 30 minutes PRN  ondansetron Injectable 4 milliGRAM(s) IV Push every 6 hours PRN  tranexamic acid IVPB 1000 milliGRAM(s) IV Intermittent once  lactated ringers. 1000 milliLiter(s) IV Continuous <Continuous>  naloxone Injectable 0.1 milliGRAM(s) IV Push every 3 minutes PRN    Allergies - NKA    FH - non contributory     SH - no tob/etoh/drugs     ROS:   All 10 systems reviewed and found to be negative with the exception of what has been described above.    Vital Signs Last 24 Hrs  T(C): 36.4 (17 Aug 2023 08:24), Max: 36.5 (16 Aug 2023 18:46)  T(F): 97.5 (17 Aug 2023 08:24), Max: 97.7 (16 Aug 2023 18:46)  HR: 86 (17 Aug 2023 08:24) (74 - 86)  BP: 114/61 (17 Aug 2023 08:24) (108/87 - 114/61)  BP(mean): 94 (16 Aug 2023 17:01) (94 - 94)  RR: 18 (17 Aug 2023 08:24) (13 - 18)  SpO2: 99% (17 Aug 2023 08:24) (97% - 99%)    Parameters below as of 17 Aug 2023 08:24  Patient On (Oxygen Delivery Method): room air      Physical Examination:    General: No acute distress.    PULM: Clear to auscultation bilaterally  CVS: Regular rate and rhythm, no murmurs  ABD: Soft, nondistended, nontender, + bowel sounds  EXT: No edema, nontender  SKIN: Warm and well perfused, no rashes noted.  NEURO: Alert, oriented, interactive, nonfocal  DEVICES:   GAYLA         Assessment and Recommendation:   · Assessment	  ASSESSMENT  69yo male PMHx HTN, HLD, aortic stenosis s/p AV replacement 2019, anxiety, depression, hx gastric bypass, hx recent SBO, multiple recurrence of infectious diarrhea, hx of lumbar radiculopathy. Spinal stimulator implanted and s/p removal of spinal cord stimulator, still with back pain so present for elective revision of lumbar laminectomy    s/p laminectomy with osteotomy and fusion, TLIF L4- L5  EBL 250cc  marcus drain in place  now with diarrhea    PLAN  - s/p lumbar lami - plan as per ortho spine  - diarrhea - check GI pCR and cdiff.  plan for ID eval positive   - continue pain meds prn.  flexeril for muscle spasms  - HTN - continue toprol XL  - encourage incentive spirometer.   - marcus drain mgmt as per ortho spine. monitor ouptut  - PO diet as tolerated . bowel regimen prn. cont IV fluids until taking adequate PO.   - ingram in place. monitor I& Os and electrolytes  - cont synthroid  - post op IV ancef x 3 doses. cont Descovy for PREP hiv ppx (patient has been taking at home for months to decrease risk)  - Hgb down slightly from pre op labs. cont to trend.  no chemical DVT ppx due to bleeding risk . SCDs.   PT eval    Dispo: as per ortho spine  pain control. PT . monitor GAYLA output.  will follow with you

## 2023-08-17 NOTE — PROGRESS NOTE ADULT - SUBJECTIVE AND OBJECTIVE BOX
POD#2  oob walking  c/o LBP  no leg pain    nv intact  dressing intact  marcus drain 75 cc left in    needs PT      labs ok    doing well

## 2023-08-18 LAB
ANION GAP SERPL CALC-SCNC: 6 MMOL/L — SIGNIFICANT CHANGE UP (ref 5–17)
BASOPHILS # BLD AUTO: 0.05 K/UL — SIGNIFICANT CHANGE UP (ref 0–0.2)
BASOPHILS NFR BLD AUTO: 0.5 % — SIGNIFICANT CHANGE UP (ref 0–2)
BUN SERPL-MCNC: 11 MG/DL — SIGNIFICANT CHANGE UP (ref 7–23)
CALCIUM SERPL-MCNC: 8.9 MG/DL — SIGNIFICANT CHANGE UP (ref 8.5–10.1)
CHLORIDE SERPL-SCNC: 107 MMOL/L — SIGNIFICANT CHANGE UP (ref 96–108)
CO2 SERPL-SCNC: 27 MMOL/L — SIGNIFICANT CHANGE UP (ref 22–31)
CREAT SERPL-MCNC: 0.92 MG/DL — SIGNIFICANT CHANGE UP (ref 0.5–1.3)
EGFR: 91 ML/MIN/1.73M2 — SIGNIFICANT CHANGE UP
EOSINOPHIL # BLD AUTO: 0.01 K/UL — SIGNIFICANT CHANGE UP (ref 0–0.5)
EOSINOPHIL NFR BLD AUTO: 0.1 % — SIGNIFICANT CHANGE UP (ref 0–6)
GLUCOSE SERPL-MCNC: 132 MG/DL — HIGH (ref 70–99)
HCT VFR BLD CALC: 35.6 % — LOW (ref 39–50)
HGB BLD-MCNC: 11.4 G/DL — LOW (ref 13–17)
IMM GRANULOCYTES NFR BLD AUTO: 0.3 % — SIGNIFICANT CHANGE UP (ref 0–0.9)
LYMPHOCYTES # BLD AUTO: 2.66 K/UL — SIGNIFICANT CHANGE UP (ref 1–3.3)
LYMPHOCYTES # BLD AUTO: 28.8 % — SIGNIFICANT CHANGE UP (ref 13–44)
MCHC RBC-ENTMCNC: 30.6 PG — SIGNIFICANT CHANGE UP (ref 27–34)
MCHC RBC-ENTMCNC: 32 GM/DL — SIGNIFICANT CHANGE UP (ref 32–36)
MCV RBC AUTO: 95.4 FL — SIGNIFICANT CHANGE UP (ref 80–100)
MONOCYTES # BLD AUTO: 0.51 K/UL — SIGNIFICANT CHANGE UP (ref 0–0.9)
MONOCYTES NFR BLD AUTO: 5.5 % — SIGNIFICANT CHANGE UP (ref 2–14)
NEUTROPHILS # BLD AUTO: 5.99 K/UL — SIGNIFICANT CHANGE UP (ref 1.8–7.4)
NEUTROPHILS NFR BLD AUTO: 64.8 % — SIGNIFICANT CHANGE UP (ref 43–77)
PLATELET # BLD AUTO: 225 K/UL — SIGNIFICANT CHANGE UP (ref 150–400)
POTASSIUM SERPL-MCNC: 3.8 MMOL/L — SIGNIFICANT CHANGE UP (ref 3.5–5.3)
POTASSIUM SERPL-SCNC: 3.8 MMOL/L — SIGNIFICANT CHANGE UP (ref 3.5–5.3)
RBC # BLD: 3.73 M/UL — LOW (ref 4.2–5.8)
RBC # FLD: 15.6 % — HIGH (ref 10.3–14.5)
SODIUM SERPL-SCNC: 140 MMOL/L — SIGNIFICANT CHANGE UP (ref 135–145)
WBC # BLD: 9.25 K/UL — SIGNIFICANT CHANGE UP (ref 3.8–10.5)
WBC # FLD AUTO: 9.25 K/UL — SIGNIFICANT CHANGE UP (ref 3.8–10.5)

## 2023-08-18 PROCEDURE — 99233 SBSQ HOSP IP/OBS HIGH 50: CPT

## 2023-08-18 RX ORDER — AZITHROMYCIN 500 MG/1
500 TABLET, FILM COATED ORAL DAILY
Refills: 0 | Status: DISCONTINUED | OUTPATIENT
Start: 2023-08-18 | End: 2023-08-20

## 2023-08-18 RX ADMIN — BUPROPION HYDROCHLORIDE 100 MILLIGRAM(S): 150 TABLET, EXTENDED RELEASE ORAL at 06:19

## 2023-08-18 RX ADMIN — CYCLOBENZAPRINE HYDROCHLORIDE 10 MILLIGRAM(S): 10 TABLET, FILM COATED ORAL at 15:49

## 2023-08-18 RX ADMIN — CYCLOBENZAPRINE HYDROCHLORIDE 10 MILLIGRAM(S): 10 TABLET, FILM COATED ORAL at 21:19

## 2023-08-18 RX ADMIN — GABAPENTIN 300 MILLIGRAM(S): 400 CAPSULE ORAL at 21:19

## 2023-08-18 RX ADMIN — ATORVASTATIN CALCIUM 10 MILLIGRAM(S): 80 TABLET, FILM COATED ORAL at 21:18

## 2023-08-18 RX ADMIN — Medication 650 MILLIGRAM(S): at 06:19

## 2023-08-18 RX ADMIN — Medication 650 MILLIGRAM(S): at 23:57

## 2023-08-18 RX ADMIN — GABAPENTIN 300 MILLIGRAM(S): 400 CAPSULE ORAL at 09:34

## 2023-08-18 RX ADMIN — Medication 25 MICROGRAM(S): at 06:19

## 2023-08-18 RX ADMIN — BUPROPION HYDROCHLORIDE 100 MILLIGRAM(S): 150 TABLET, EXTENDED RELEASE ORAL at 21:19

## 2023-08-18 RX ADMIN — SODIUM CHLORIDE 3 MILLILITER(S): 9 INJECTION INTRAMUSCULAR; INTRAVENOUS; SUBCUTANEOUS at 16:05

## 2023-08-18 RX ADMIN — OXYCODONE HYDROCHLORIDE 10 MILLIGRAM(S): 5 TABLET ORAL at 08:10

## 2023-08-18 RX ADMIN — Medication 650 MILLIGRAM(S): at 11:51

## 2023-08-18 RX ADMIN — CYCLOBENZAPRINE HYDROCHLORIDE 10 MILLIGRAM(S): 10 TABLET, FILM COATED ORAL at 06:19

## 2023-08-18 RX ADMIN — PANTOPRAZOLE SODIUM 40 MILLIGRAM(S): 20 TABLET, DELAYED RELEASE ORAL at 06:19

## 2023-08-18 RX ADMIN — OXYCODONE HYDROCHLORIDE 10 MILLIGRAM(S): 5 TABLET ORAL at 21:19

## 2023-08-18 RX ADMIN — PREGABALIN 1000 MICROGRAM(S): 225 CAPSULE ORAL at 09:35

## 2023-08-18 RX ADMIN — EMTRICITABINE AND TENOFOVIR DISOPROXIL FUMARATE 1 TABLET(S): 200; 300 TABLET, FILM COATED ORAL at 09:34

## 2023-08-18 RX ADMIN — Medication 1 TABLET(S): at 09:34

## 2023-08-18 RX ADMIN — Medication 12.5 MILLIGRAM(S): at 09:34

## 2023-08-18 RX ADMIN — Medication 650 MILLIGRAM(S): at 17:38

## 2023-08-18 RX ADMIN — AZITHROMYCIN 500 MILLIGRAM(S): 500 TABLET, FILM COATED ORAL at 11:52

## 2023-08-18 RX ADMIN — Medication 75 MILLIGRAM(S): at 17:39

## 2023-08-18 RX ADMIN — BUPROPION HYDROCHLORIDE 100 MILLIGRAM(S): 150 TABLET, EXTENDED RELEASE ORAL at 15:49

## 2023-08-18 RX ADMIN — Medication 75 MILLIGRAM(S): at 08:10

## 2023-08-18 NOTE — PROGRESS NOTE ADULT - SUBJECTIVE AND OBJECTIVE BOX
HPI:  Patient presents with intractable back and R leg pain. Hx discectomy L4/5 X2. Patient with recurrent R leg pain. 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 Has smaller L 3/4 & L2/3 HNPs. Failed nonoperative modalities, Patient underwent revision laminectomy/TLIF/fusion L4/5 on 8/15/23.    8/18/23 Patient developed diarrhea yesterday. Cultures (+) for Campylobacter. Negative for C, diff. Patient with residual paresthesia R foot    PAST MEDICAL & SURGICAL HISTORY:  Hyperlipidemia      GERD (gastroesophageal reflux disease)      Anxiety      Skin cancer  external right ear, unsure of type      HTN (hypertension)      Aortic stenosis  Bicuspid Aortic Valve Replacement- 5/29/2018      Chronic neck and back pain      Sciatica      Internal and external prolapsed hemorrhoids  had surgery      Helicobacter pylori infection      History of colonic polyps      Herniated lumbar intervertebral disc      Gastrointestinal hemorrhage      Carpal tunnel syndrome  had surgery      Depressive disorder      Loss of hearing      Morbid obesity      Lumbar radiculopathy      Hearing loss      Erectile dysfunction      Shigella infection      Campylobacter diarrhea      H/O small bowel obstruction      Foot drop      Hand fracture, right      S/P carpal tunnel release  right side, left side- 2014      History of back surgery  laminectomy x2 last 2018      S/P trigger finger release  right thumb and middle finger      H/O external ear surgery  excision mass right ear- cancer 2018      H/O bicuspid aortic valve  replacement- 5/29/18      Status post coronary angioplasty  pt not sure he had this procedure      S/P tonsillectomy      H/O hemorrhoidectomy      Spinal cord stimulator status  10/2020      H/O bariatric surgery      Status post laparotomy with lysis of adhesions      History of appendectomy      S/P ORIF (open reduction internal fixation) fracture        MEDICATIONS  (STANDING):  acetaminophen     Tablet .. 650 milliGRAM(s) Oral every 6 hours  atorvastatin 10 milliGRAM(s) Oral at bedtime  buPROPion . 100 milliGRAM(s) Oral three times a day  cyanocobalamin 1000 MICROGram(s) Oral daily  cyclobenzaprine 10 milliGRAM(s) Oral every 8 hours  emtricitabine 200 mG/tenofovir alafenamide 25 mG (DESCOVY) Tablet 1 Tablet(s) Oral daily  gabapentin 300 milliGRAM(s) Oral two times a day  levothyroxine 25 MICROGram(s) Oral daily  metoprolol succinate ER 12.5 milliGRAM(s) Oral daily  multivitamin 1 Tablet(s) Oral daily  pantoprazole    Tablet 40 milliGRAM(s) Oral before breakfast  senna 2 Tablet(s) Oral at bedtime  sodium chloride 0.9% lock flush 3 milliLiter(s) IV Push every 8 hours  Sodium chloride 0.9%. 1000 milliLiter(s) (25 mL/Hr) IV Continuous <Continuous>  venlafaxine 75 milliGRAM(s) Oral two times a day with meals    MEDICATIONS  (PRN):  naloxone Injectable 0.1 milliGRAM(s) IV Push every 3 minutes PRN For ANY of the following changes in patient status:  A. RR LESS THAN 10 breaths per minute, B. Oxygen saturation LESS THAN 90%, C. Sedation score of 6  ondansetron Injectable 4 milliGRAM(s) IV Push every 6 hours PRN Nausea  oxyCODONE    IR 10 milliGRAM(s) Oral every 3 hours PRN Moderate Pain (4 - 6)   Campylobacter: Detected: Antibiotic treatment for Campylobacter may be indicated for severe  infections and high-risk patients; please contact the lab if antibiotic  testing is needed.    Allergies    No Known Allergies    Intolerances    PE:    Vital Signs Last 24 Hrs  T(C): 36.6 (18 Aug 2023 08:25), Max: 36.9 (17 Aug 2023 23:15)  T(F): 97.9 (18 Aug 2023 08:25), Max: 98.4 (17 Aug 2023 23:15)  HR: 93 (18 Aug 2023 08:25) (71 - 93)  BP: 110/61 (18 Aug 2023 08:25) (102/61 - 110/61)  BP(mean): --  RR: 18 (18 Aug 2023 08:25) (17 - 18)  SpO2: 100% (18 Aug 2023 08:25) (99% - 100%)    Parameters below as of 18 Aug 2023 08:25  Patient On (Oxygen Delivery Method): room air    Patient sitting upright in bed with c/o diarrhea and paresthesia R foot  Tolerating diet  Voiding  Dressing clean and dry  GAYLA with moderate drainage  Weakness R EHL otherwise normal strength    LABS                          11.4   9.25  )-----------( 225      ( 18 Aug 2023 08:35 )             35.6     08-18    140  |  107  |  11  ----------------------------<  132<H>  3.8   |  27  |  0.92    Ca    8.9      18 Aug 2023 08:35      GI PCR Panel Stool (08.17.23 @ 10:17)    GI PCR Panel: Detected: GI Panel PCR evaluates for:  Campylobacter, Plesiomonas shigelloides, Salmonella, Vibrio, Yersinia  enterocolitica, Enteroaggregative Escherichia (EAEC), Enteropathogenic E.  coli (EPEC), Enterotoxigenic E. coli (ETEC), Shiga-like toxin producing  E.coli (STEC), E. coli O157, Shigella/Enteroinvasive E. coli (EIEC),  Adenovirus, Astrovirus, Norovirus, Rotavirus, Sapovirus, Cryptosporidium,  Cyclospora cayetanensis, Entamoeba histolytica, Giardia lamblia.  For culture and susceptibility reports refer to "reflex stool culture".    Clostridium difficile Toxin by PCR (08.17.23 @ 10:17)    C Diff by PCR Result: NotDetec: The results of this test should be interpreted with consideration of  clinical context.  Not Detected result indicates the absence of C. difficile or that the  number of organisms is below the assay limit of detection.  Detected result indicates the presence of C. difficile nucleic acid.  Indeterminate result may indicate the presence of amplification  inhibitors in specimen; presence or absence of organisms cannot be  determined. Consider collecting new specimen if further testing is still  needed.  This qualitative PCR assay detects the toxin B gene; it is FDA-approved,  and its performance was established by [facility],  [place], [place].

## 2023-08-18 NOTE — PROGRESS NOTE ADULT - ASSESSMENT
67yo male PMHx HTN, HLD, aortic stenosis s/p AV replacement 2019, anxiety, depression, hx gastric bypass, hx recent SBO, multiple recurrence of infectious diarrhea, hx of lumbar radiculopathy. Spinal stimulator implanted and s/p removal of spinal cord stimulator, still with back pain so present for elective revision of lumbar laminectomy    s/p laminectomy with osteotomy and fusion, TLIF L4- L5  EBL 250cc  marcus drain in place  Infectious diarrhea - campylobacter     PLAN  - s/p lumbar lami - plan as per ortho spine  - diarrhea - check GI pCR and cdiff.  plan for ID eval positive   - continue pain meds prn.  flexeril for muscle spasms  - HTN - continue toprol XL  - encourage incentive spirometer.   - marcus drain mgmt as per ortho spine. monitor ouptut  - PO diet as tolerated . bowel regimen prn. cont IV fluids until taking adequate PO.   - ingram in place. monitor I& Os and electrolytes  - cont synthroid  - post op IV ancef x 3 doses. cont Descovy for PREP hiv ppx (patient has been taking at home for months to decrease risk)  - Hgb down slightly from pre op labs. cont to trend.  no chemical DVT ppx due to bleeding risk . SCDs.   - PT eval  - ID eval: started azithromycin   - Encourage po hydration     Dispo: as per ortho spine  pain control. PT . monitor GAYLA output.  will follow with you    69yo male PMHx HTN, HLD, aortic stenosis s/p AV replacement 2019, anxiety, depression, hx gastric bypass, hx recent SBO, multiple recurrence of infectious diarrhea, hx of lumbar radiculopathy. Spinal stimulator implanted and s/p removal of spinal cord stimulator, still with back pain so present for elective revision of lumbar laminectomy    s/p laminectomy with osteotomy and fusion, TLIF L4- L5  EBL 250cc  marcus drain in place  Infectious diarrhea - campylobacter   Severe protein-calorie malnutrition    PLAN  - s/p lumbar lami - plan as per ortho spine  - diarrhea - positive campylobacter   - continue pain meds prn.  flexeril for muscle spasms  - HTN - continue toprol XL  - encourage incentive spirometer.   - marcus drain mgmt as per ortho spine. monitor ouptut  - PO diet as tolerated . bowel regimen prn. cont IV fluids until taking adequate PO.   - ingram in place. monitor I& Os and electrolytes  - cont synthroid  - post op IV ancef x 3 doses. cont Descovy for PREP hiv ppx (patient has been taking at home for months to decrease risk)  - Hgb down slightly from pre op labs. cont to trend.  no chemical DVT ppx due to bleeding risk . SCDs.   - PT eval  - ID eval: started azithromycin   - Encourage po hydration   - nutrition consult noted     Dispo: as per ortho spine  pain control. PT . monitor GAYLA output.  will follow with you

## 2023-08-18 NOTE — DIETITIAN INITIAL EVALUATION ADULT - NSFNSGIIOFT_GEN_A_CORE
I&O's Detail    17 Aug 2023 07:01  -  18 Aug 2023 07:00  --------------------------------------------------------  IN:    Oral Fluid: 420 mL  Total IN: 420 mL    OUT:  Total OUT: 0 mL    Total NET: 420 mL      18 Aug 2023 07:01  -  18 Aug 2023 14:26  --------------------------------------------------------  IN:  Total IN: 0 mL    OUT:    Blood Loss (mL): 50 mL  Total OUT: 50 mL    Total NET: -50 mL

## 2023-08-18 NOTE — PROGRESS NOTE ADULT - SUBJECTIVE AND OBJECTIVE BOX
BRIEF HOSPITAL COURSE: 67yo male PMHx HTN, HLD, aortic stenosis s/p AV replacement 2019, anxiety, depression, hx gastric bypass, hx recent SBO, multiple recurrence of infectious diarrhea, hx of lumbar radiculopathy. Spinal stimulator implanted and s/p removal of spinal cord stimulator; presents to PST for planned revision laminectomy right L4 L5 Excision of herniated disc right L4 L5 , transforaminal lumbar interbody fusion L4 L5    8/17 - pain controlled. mulitiple episodes of diarrhea today.  no fever or chills  8/18 - patient seen today, continued diarrhea, multiple episodes, + campylobacter, has been + since July 2023, s/p azithro 1 course.     REVIEW OF SYSTEMS:    CONSTITUTIONAL: No weakness, fevers or chills  EYES/ENT: No visual changes;  No vertigo or throat pain   NECK: No pain or stiffness  RESPIRATORY: No cough, wheezing, hemoptysis; No shortness of breath  CARDIOVASCULAR: No chest pain or palpitations  GASTROINTESTINAL: No abdominal or epigastric pain. No nausea, vomiting, or hematemesis; + diarrhea, no constipation. No melena or hematochezia.  GENITOURINARY: No dysuria, frequency or hematuria  NEUROLOGICAL: No numbness or weakness  SKIN: No itching, rashes    Vital Signs Last 24 Hrs  T(C): 36.5 (18 Aug 2023 15:36), Max: 36.9 (17 Aug 2023 23:15)  T(F): 97.7 (18 Aug 2023 15:36), Max: 98.4 (17 Aug 2023 23:15)  HR: 78 (18 Aug 2023 15:36) (71 - 93)  BP: 111/67 (18 Aug 2023 15:36) (102/61 - 111/67)  RR: 18 (18 Aug 2023 15:36) (17 - 18)  SpO2: 97% (18 Aug 2023 15:36) (97% - 100%)    Parameters below as of 18 Aug 2023 15:36  Patient On (Oxygen Delivery Method): room air    PHYSICAL EXAM:  GENERAL: NAD, lying in bed comfortably  HEAD:  Atraumatic, Normocephalic  EYES: conjunctiva and sclera clear  ENT: Moist mucous membranes  NECK: Supple, No JVD  CHEST/LUNG: Clear to auscultation bilaterally; No rales, rhonchi, wheezing. Unlabored respirations  HEART: Regular rate and rhythm; No murmurs  ABDOMEN: Bowel sounds present; Soft, Nontender, Nondistended.   EXTREMITIES:  2+ Peripheral Pulses, brisk capillary refill. No clubbing, cyanosis, or edema  NERVOUS SYSTEM:  Alert & Oriented X3, speech clear. No deficits   MSK: FROM all 4 extremities, full and equal strength    MEDICATIONS  (STANDING):  acetaminophen     Tablet .. 650 milliGRAM(s) Oral every 6 hours  atorvastatin 10 milliGRAM(s) Oral at bedtime  azithromycin   Tablet 500 milliGRAM(s) Oral daily  buPROPion . 100 milliGRAM(s) Oral three times a day  cyanocobalamin 1000 MICROGram(s) Oral daily  cyclobenzaprine 10 milliGRAM(s) Oral every 8 hours  emtricitabine 200 mG/tenofovir alafenamide 25 mG (DESCOVY) Tablet 1 Tablet(s) Oral daily  gabapentin 300 milliGRAM(s) Oral two times a day  levothyroxine 25 MICROGram(s) Oral daily  metoprolol succinate ER 12.5 milliGRAM(s) Oral daily  multivitamin 1 Tablet(s) Oral daily  pantoprazole    Tablet 40 milliGRAM(s) Oral before breakfast  senna 2 Tablet(s) Oral at bedtime  sodium chloride 0.9% lock flush 3 milliLiter(s) IV Push every 8 hours  sodium chloride 0.9%. 1000 milliLiter(s) (25 mL/Hr) IV Continuous <Continuous>  venlafaxine 75 milliGRAM(s) Oral two times a day with meals    MEDICATIONS  (PRN):  naloxone Injectable 0.1 milliGRAM(s) IV Push every 3 minutes PRN For ANY of the following changes in patient status:  A. RR LESS THAN 10 breaths per minute, B. Oxygen saturation LESS THAN 90%, C. Sedation score of 6  ondansetron Injectable 4 milliGRAM(s) IV Push every 6 hours PRN Nausea  oxyCODONE    IR 10 milliGRAM(s) Oral every 3 hours PRN Moderate Pain (4 - 6)

## 2023-08-18 NOTE — DIETITIAN INITIAL EVALUATION ADULT - ADD RECOMMEND
1) C/w regular diet as tolerated  2) Encourage protein-rich foods, maximize food preferences  3) Monitor bowel movements, if no BM for >3 days, consider implementing bowel regimen.  4) Check serum zinc level for suspected deficiency. Consider adding 220 mg of zinc sulfate daily 2/2 persistent diarrhea   5) Consider adding banatrol mixed w/ apple sauce to help thicken stools 2/2 persistent diarrhea  6) Monitor lytes/ min and replete prn.  7) Encourage adequate hydration 2/2 diarrhea  8) Confirm goals of care regarding nutrition support  RD will continue to monitor PO intake, labs, hydration, and wt prn.

## 2023-08-18 NOTE — CONSULT NOTE ADULT - SUBJECTIVE AND OBJECTIVE BOX
Patient is a 68y old  Male who presents with a chief complaint of HNP recurrent     HPI:  69 y/o male h/o HTN, HLD, aortic stenosis s/p AV replacement 2019, anxiety, depression, prior gastric bypass, recent acute enterocolitis with campylobacter, multiple recurrence of infectious diarrhea, lumbar radiculopathy, spinal stimulator implanted and s/p removal of spinal cord stimulator was admitted on 8/15 for planned revision laminectomy right L4 L5 Excision of herniated disc right L4 L5 , transforaminal lumbar interbody fusion L4 L5. He underwent surgery on 8/15. He was noted with diarrhea and stool studies reported campylobacter by PCR.     PAST MEDICAL & SURGICAL HISTORY:  Hyperlipidemia  GERD (gastroesophageal reflux disease)  Anxiety  Skin cancer  external right ear, unsure of type  HTN (hypertension)  Aortic stenosis  Bicuspid Aortic Valve Replacement- 5/29/2018  Chronic neck and back pain  Sciatica  Internal and external prolapsed hemorrhoids s/psurgery  Helicobacter pylori infection  History of colonic polyps  Herniated lumbar intervertebral disc  Gastrointestinal hemorrhage  Carpal tunnel syndrome s/p surgery  Depressive disorder  Loss of hearing  Morbid obesity  Lumbar radiculopathy  Hearing loss  Erectile dysfunction  Shigella infection  Campylobacter diarrhea  H/O small bowel obstruction  Foot drop  Hand fracture, right  S/P carpal tunnel release  right side, left side- 2014  History of back surgery  laminectomy x2 last 2018  S/P trigger finger release  right thumb and middle finger  H/O external ear surgery  excision mass right ear- cancer 2018  H/O bicuspid aortic valve  replacement- 5/29/18  Status post coronary angioplasty  pt not sure he had this procedure  S/P tonsillectomy  H/O hemorrhoidectomy  Spinal cord stimulator status  10/2020  H/O bariatric surgery  Status post laparotomy with lysis of adhesio  History of appendectom  S/P ORIF (open reduction internal fixation) fracture      Meds: per reconciliation sheet, noted below  MEDICATIONS  (STANDING):  acetaminophen     Tablet .. 650 milliGRAM(s) Oral every 6 hours  atorvastatin 10 milliGRAM(s) Oral at bedtime  buPROPion . 100 milliGRAM(s) Oral three times a day  cyanocobalamin 1000 MICROGram(s) Oral daily  cyclobenzaprine 10 milliGRAM(s) Oral every 8 hours  emtricitabine 200 mG/tenofovir alafenamide 25 mG (DESCOVY) Tablet 1 Tablet(s) Oral daily  gabapentin 300 milliGRAM(s) Oral two times a day  levothyroxine 25 MICROGram(s) Oral daily  metoprolol succinate ER 12.5 milliGRAM(s) Oral daily  multivitamin 1 Tablet(s) Oral daily  pantoprazole    Tablet 40 milliGRAM(s) Oral before breakfast  senna 2 Tablet(s) Oral at bedtime  sodium chloride 0.9% lock flush 3 milliLiter(s) IV Push every 8 hours  sodium chloride 0.9%. 1000 milliLiter(s) (25 mL/Hr) IV Continuous <Continuous>  venlafaxine 75 milliGRAM(s) Oral two times a day with meals    MEDICATIONS  (PRN):  naloxone Injectable 0.1 milliGRAM(s) IV Push every 3 minutes PRN For ANY of the following changes in patient status:  A. RR LESS THAN 10 breaths per minute, B. Oxygen saturation LESS THAN 90%, C. Sedation score of 6  ondansetron Injectable 4 milliGRAM(s) IV Push every 6 hours PRN Nausea  oxyCODONE    IR 10 milliGRAM(s) Oral every 3 hours PRN Moderate Pain (4 - 6)    Allergies    No Known Allergies    Intolerances      Social: no smoking, no alcohol, no illegal drugs; no recent travel, no exposure to TB  FAMILY HISTORY:  FH: HTN (hypertension)  father    Family history of dementia (Father)      no history of premature cardiovascular disease in first degree relatives    ROS: the patient denies fever, no chills, no HA, no seizures, no dizziness, no sore throat, no nasal congestion, no blurry vision, no CP, no palpitations, no SOB, no cough, no abdominal pain, has diarrhea, no N/V, no dysuria, no leg pain, no claudication, no rash, no joint aches, no rectal pain or bleeding, no night sweats, has low back pain  All other systems reviewed and are negative    Vital Signs Last 24 Hrs  T(C): 36.6 (18 Aug 2023 08:25), Max: 36.9 (17 Aug 2023 23:15)  T(F): 97.9 (18 Aug 2023 08:25), Max: 98.4 (17 Aug 2023 23:15)  HR: 93 (18 Aug 2023 08:25) (71 - 93)  BP: 110/61 (18 Aug 2023 08:25) (102/61 - 110/61)  BP(mean): --  RR: 18 (18 Aug 2023 08:25) (17 - 18)  SpO2: 100% (18 Aug 2023 08:25) (99% - 100%)    Parameters below as of 18 Aug 2023 08:25  Patient On (Oxygen Delivery Method): room air    PE:    Constitutional:  No acute distress  HEENT: NC/AT, EOMI, PERRLA, conjunctivae clear; ears and nose atraumatic; pharynx benign  Neck: supple; thyroid not palpable  Back: no tenderness  Respiratory: respiratory effort normal; clear to auscultation  Cardiovascular: S1S2 regular, no murmurs  Abdomen: soft, not tender, not distended, positive BS; no liver or spleen organomegaly  Genitourinary: no suprapubic tenderness  Lymphatic: no LN palpable  Musculoskeletal: no muscle tenderness, no joint swelling or tenderness  low ricardo tenderness  Extremities: no pedal edema  Neurological/ Psychiatric: AxOx3, judgement and insight normal; moving all extremities  Skin: no rashes; no palpable lesions    Labs: all available labs reviewed                        11.4   9.25  )-----------( 225      ( 18 Aug 2023 08:35 )             35.6     08-18    140  |  107  |  11  ----------------------------<  132<H>  3.8   |  27  |  0.92    Ca    8.9      18 Aug 2023 08:35  Campylobacter: Detected: (08.17.23 @ 10:17)   Campylobacter: Detected: (07.07.23 @ 14:54)   Radiology: all available radiological tests reviewed    < from: Xray Lumbosacral Spine (08.15.23 @ 13:51) >  1. Surgical hardware inserting at L4-L5, showing good anatomic alignment   and no sign of complication.  2. No evidence of fracture.  3. Multilevel spondylosis and facet arthropathy, associated with trace   retrolisthesis at L2-L3 and L3-L4.  4. Diminished disc height at L3-L4.  < end of copied text >    < from: CT Abdomen and Pelvis w/ Oral Cont and w/ IV Cont (07.28.23 @ 12:42) >  Closed loop small bowel obstruction with transition points in the distal   ileum. Extensive interloop edema is present and bowel ischemia should be excluded with lactate level.  < end of copied text >      Advanced directives addressed: full resuscitation

## 2023-08-18 NOTE — DIETITIAN INITIAL EVALUATION ADULT - ORAL INTAKE PTA/DIET HISTORY
Pt lives at home w/ daughter upstairs; pt cooks  Pt lives at home w/ daughter upstairs; pt cooks/grocery shops w/o difficulty. Reports good appetite, consumes 3 meals/day, likely w/ fair PO intake 50-75% of ENN.

## 2023-08-18 NOTE — DIETITIAN INITIAL EVALUATION ADULT - ETIOLOGY
r/t inability to meet increased needs 2/2 acute enterocolitis, campylobacter, s/p revision laminectomy

## 2023-08-18 NOTE — DIETITIAN INITIAL EVALUATION ADULT - OTHER INFO
69yo male PMHx HTN, HLD, aortic stenosis s/p AV replacement 2019, anxiety, depression, hx gastric bypass, hx recent SBO, multiple recurrence of infectious diarrhea, hx of lumbar radiculopathy. Spinal stimulator implanted and s/p removal of spinal cord stimulator; presents to PST for planned revision laminectomy right L4 L5 Excision of herniated disc right L4 L5 , transforaminal lumbar interbody fusion L4 L5  Admit for acute enterocolitis with campylobacter and spinal stenosis s/p revision laminectomy.  69yo male PMHx HTN, HLD, aortic stenosis s/p AV replacement 2019, anxiety, depression, hx gastric bypass, hx recent SBO, multiple recurrence of infectious diarrhea, hx of lumbar radiculopathy. Spinal stimulator implanted and s/p removal of spinal cord stimulator; presents to PST for planned revision laminectomy right L4 L5 Excision of herniated disc right L4 L5 , transforaminal lumbar interbody fusion L4 L5  Admit for acute enterocolitis with campylobacter and spinal stenosis s/p revision laminectomy.     Known to nutr services and dx'd w/ mal on 11/30/21; still meets criteria. Reports continued good appetite, consuming ~75% of ENN.    69yo male PMHx HTN, HLD, aortic stenosis s/p AV replacement 2019, anxiety, depression, hx gastric bypass, hx recent SBO, multiple recurrence of infectious diarrhea, hx of lumbar radiculopathy. Spinal stimulator implanted and s/p removal of spinal cord stimulator; presents to PST for planned revision laminectomy right L4 L5 Excision of herniated disc right L4 L5 , transforaminal lumbar interbody fusion L4 L5  Admit for acute enterocolitis with campylobacter and spinal stenosis s/p revision laminectomy.     POD #3; tolerating pain well. Known to nutr services and dx'd w/ mal on 11/30/21; still meets criteria. Reports continued good appetite, consuming ~75% of ENN. Reports UBW of 150# in November 2022 (x 9 mo ago); bed scale wt of 146# taken by RD on 8/18/23. Wt hx as per EMR: 149# (as per EMR on 11/30/21). Unintentional wt loss of 4# / 2.7% wt loss x 9 mo; not clinically significant. NFPE reveals moderate muscle/ fat wasting  - pt appears thin. C/w regular diet as tolerated; pt denies ALL nutr supp 2/2 reports good PO intake. Encourage protein-rich foods, maximize food preference. See below for other recommendations.

## 2023-08-18 NOTE — DIETITIAN INITIAL EVALUATION ADULT - PERTINENT MEDS FT
MEDICATIONS  (STANDING):  acetaminophen     Tablet .. 650 milliGRAM(s) Oral every 6 hours  atorvastatin 10 milliGRAM(s) Oral at bedtime  azithromycin   Tablet 500 milliGRAM(s) Oral daily  buPROPion . 100 milliGRAM(s) Oral three times a day  cyanocobalamin 1000 MICROGram(s) Oral daily  cyclobenzaprine 10 milliGRAM(s) Oral every 8 hours  emtricitabine 200 mG/tenofovir alafenamide 25 mG (DESCOVY) Tablet 1 Tablet(s) Oral daily  gabapentin 300 milliGRAM(s) Oral two times a day  levothyroxine 25 MICROGram(s) Oral daily  metoprolol succinate ER 12.5 milliGRAM(s) Oral daily  multivitamin 1 Tablet(s) Oral daily  pantoprazole    Tablet 40 milliGRAM(s) Oral before breakfast  senna 2 Tablet(s) Oral at bedtime  sodium chloride 0.9% lock flush 3 milliLiter(s) IV Push every 8 hours  sodium chloride 0.9%. 1000 milliLiter(s) (25 mL/Hr) IV Continuous <Continuous>  venlafaxine 75 milliGRAM(s) Oral two times a day with meals    MEDICATIONS  (PRN):  naloxone Injectable 0.1 milliGRAM(s) IV Push every 3 minutes PRN For ANY of the following changes in patient status:  A. RR LESS THAN 10 breaths per minute, B. Oxygen saturation LESS THAN 90%, C. Sedation score of 6  ondansetron Injectable 4 milliGRAM(s) IV Push every 6 hours PRN Nausea  oxyCODONE    IR 10 milliGRAM(s) Oral every 3 hours PRN Moderate Pain (4 - 6)

## 2023-08-18 NOTE — PROGRESS NOTE ADULT - ASSESSMENT
Patient presents with intractable back and R leg pain. Hx discectomy L4/5 X2. Patient with recurrent R leg pain. 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 Has smaller L 3/4 & L2/3 HNPs. Failed nonoperative modalities, Patient underwent revision laminectomy/TLIF/fusion L4/5 on 8/15/23. (    (+) stool cultures    POD#3  Analgesia prn  OOB/PT as tolerated  Abx for stool cultures as per ID  Monitor GAYLA  Monitor labs  Medical management

## 2023-08-18 NOTE — DIETITIAN INITIAL EVALUATION ADULT - PERTINENT LABORATORY DATA
08-18    140  |  107  |  11  ----------------------------<  132<H>  3.8   |  27  |  0.92    Ca    8.9      18 Aug 2023 08:35    A1C with Estimated Average Glucose Result: 5.8 % (08-10-23 @ 15:43)

## 2023-08-18 NOTE — CONSULT NOTE ADULT - ASSESSMENT
67 y/o male h/o HTN, HLD, aortic stenosis s/p AV replacement 2019, anxiety, depression, prior gastric bypass, recent acute enterocolitis with campylobacter, multiple recurrence of infectious diarrhea, lumbar radiculopathy, spinal stimulator implanted and s/p removal of spinal cord stimulator was admitted on 8/15 for planned revision laminectomy right L4 L5 Excision of herniated disc right L4 L5 , transforaminal lumbar interbody fusion L4 L5. He underwent surgery on 8/15. He was noted with diarrhea and stool studies reported campylobacter by PCR.     1. Acute enterocolitis with campylobacter. Spinal stenosis s/p revision laminectomy.   -first noted with campylobacter PCR in stool on 7/7 ?persistent vs recurrent infection  -received azithromycin PO for 5 days in July 2023  -start azithromycin 500 mg PO qd  -reason for abx use and side effects reviewed with patient; monitor BMP   -may need a longer course of abx therapy until his symptoms resolve   -HIV testing  -monitor abdomen  -old chart reviewed to assess prior cultures  -monitor temps  -f/u CBC  -supportive care  2. Other issues:   -care per medicine       67 y/o male h/o HTN, HLD, aortic stenosis s/p AV replacement 2019, anxiety, depression, prior gastric bypass, recent acute enterocolitis with campylobacter, multiple recurrence of infectious diarrhea, lumbar radiculopathy, spinal stimulator implanted and s/p removal of spinal cord stimulator was admitted on 8/15 for planned revision laminectomy right L4 L5 Excision of herniated disc right L4 L5 , transforaminal lumbar interbody fusion L4 L5. He underwent surgery on 8/15. He was noted with diarrhea and stool studies reported campylobacter by PCR.     1. Acute enterocolitis with campylobacter. Spinal stenosis s/p revision laminectomy.   -first noted with campylobacter PCR in stool on 7/7 ?persistent vs recurrent infection  -received azithromycin PO for 5 days in July 2023  -start azithromycin 500 mg PO qd  -reason for abx use and side effects reviewed with patient; monitor BMP   -may need a longer course of abx therapy until his symptoms resolve   -he reported he was HIV tested one week ago and that he is HIV negative   -monitor abdomen  -local spine wound care  -old chart reviewed to assess prior cultures  -monitor temps  -f/u CBC  -supportive care  2. Other issues:   -care per medicine

## 2023-08-19 PROCEDURE — 99232 SBSQ HOSP IP/OBS MODERATE 35: CPT

## 2023-08-19 RX ADMIN — CYCLOBENZAPRINE HYDROCHLORIDE 10 MILLIGRAM(S): 10 TABLET, FILM COATED ORAL at 14:32

## 2023-08-19 RX ADMIN — Medication 25 MICROGRAM(S): at 05:37

## 2023-08-19 RX ADMIN — OXYCODONE HYDROCHLORIDE 10 MILLIGRAM(S): 5 TABLET ORAL at 12:11

## 2023-08-19 RX ADMIN — Medication 650 MILLIGRAM(S): at 12:11

## 2023-08-19 RX ADMIN — Medication 12.5 MILLIGRAM(S): at 10:04

## 2023-08-19 RX ADMIN — ATORVASTATIN CALCIUM 10 MILLIGRAM(S): 80 TABLET, FILM COATED ORAL at 21:44

## 2023-08-19 RX ADMIN — BUPROPION HYDROCHLORIDE 100 MILLIGRAM(S): 150 TABLET, EXTENDED RELEASE ORAL at 14:32

## 2023-08-19 RX ADMIN — Medication 650 MILLIGRAM(S): at 05:38

## 2023-08-19 RX ADMIN — AZITHROMYCIN 500 MILLIGRAM(S): 500 TABLET, FILM COATED ORAL at 10:05

## 2023-08-19 RX ADMIN — SODIUM CHLORIDE 3 MILLILITER(S): 9 INJECTION INTRAMUSCULAR; INTRAVENOUS; SUBCUTANEOUS at 22:52

## 2023-08-19 RX ADMIN — Medication 650 MILLIGRAM(S): at 18:54

## 2023-08-19 RX ADMIN — CYCLOBENZAPRINE HYDROCHLORIDE 10 MILLIGRAM(S): 10 TABLET, FILM COATED ORAL at 05:37

## 2023-08-19 RX ADMIN — Medication 75 MILLIGRAM(S): at 08:54

## 2023-08-19 RX ADMIN — PREGABALIN 1000 MICROGRAM(S): 225 CAPSULE ORAL at 10:05

## 2023-08-19 RX ADMIN — PANTOPRAZOLE SODIUM 40 MILLIGRAM(S): 20 TABLET, DELAYED RELEASE ORAL at 08:54

## 2023-08-19 RX ADMIN — SODIUM CHLORIDE 3 MILLILITER(S): 9 INJECTION INTRAMUSCULAR; INTRAVENOUS; SUBCUTANEOUS at 14:50

## 2023-08-19 RX ADMIN — Medication 75 MILLIGRAM(S): at 18:54

## 2023-08-19 RX ADMIN — Medication 1 TABLET(S): at 10:05

## 2023-08-19 RX ADMIN — GABAPENTIN 300 MILLIGRAM(S): 400 CAPSULE ORAL at 21:44

## 2023-08-19 RX ADMIN — SODIUM CHLORIDE 3 MILLILITER(S): 9 INJECTION INTRAMUSCULAR; INTRAVENOUS; SUBCUTANEOUS at 00:04

## 2023-08-19 RX ADMIN — GABAPENTIN 300 MILLIGRAM(S): 400 CAPSULE ORAL at 10:05

## 2023-08-19 RX ADMIN — BUPROPION HYDROCHLORIDE 100 MILLIGRAM(S): 150 TABLET, EXTENDED RELEASE ORAL at 21:44

## 2023-08-19 RX ADMIN — CYCLOBENZAPRINE HYDROCHLORIDE 10 MILLIGRAM(S): 10 TABLET, FILM COATED ORAL at 21:44

## 2023-08-19 RX ADMIN — BUPROPION HYDROCHLORIDE 100 MILLIGRAM(S): 150 TABLET, EXTENDED RELEASE ORAL at 05:38

## 2023-08-19 RX ADMIN — EMTRICITABINE AND TENOFOVIR DISOPROXIL FUMARATE 1 TABLET(S): 200; 300 TABLET, FILM COATED ORAL at 10:05

## 2023-08-19 NOTE — PROGRESS NOTE ADULT - SUBJECTIVE AND OBJECTIVE BOX
Date of service: 08-19-23 @ 09:07    Lying in bed in NAD  Loose stools are improving  No abdominal cramps  No fever    ROS: no fever or chills; denies dizziness, no HA, no SOB or cough, no abdominal pain, no diarrhea or constipation; no dysuria, no legs pain, no rashes    MEDICATIONS  (STANDING):  acetaminophen     Tablet .. 650 milliGRAM(s) Oral every 6 hours  atorvastatin 10 milliGRAM(s) Oral at bedtime  azithromycin   Tablet 500 milliGRAM(s) Oral daily  buPROPion . 100 milliGRAM(s) Oral three times a day  cyanocobalamin 1000 MICROGram(s) Oral daily  cyclobenzaprine 10 milliGRAM(s) Oral every 8 hours  emtricitabine 200 mG/tenofovir alafenamide 25 mG (DESCOVY) Tablet 1 Tablet(s) Oral daily  gabapentin 300 milliGRAM(s) Oral two times a day  levothyroxine 25 MICROGram(s) Oral daily  metoprolol succinate ER 12.5 milliGRAM(s) Oral daily  multivitamin 1 Tablet(s) Oral daily  pantoprazole    Tablet 40 milliGRAM(s) Oral before breakfast  senna 2 Tablet(s) Oral at bedtime  sodium chloride 0.9% lock flush 3 milliLiter(s) IV Push every 8 hours  sodium chloride 0.9%. 1000 milliLiter(s) (25 mL/Hr) IV Continuous <Continuous>  venlafaxine 75 milliGRAM(s) Oral two times a day with meals    Vital Signs Last 24 Hrs  T(C): 36.7 (19 Aug 2023 08:16), Max: 36.9 (18 Aug 2023 23:12)  T(F): 98.1 (19 Aug 2023 08:16), Max: 98.4 (18 Aug 2023 23:12)  HR: 71 (19 Aug 2023 08:16) (71 - 92)  BP: 118/68 (19 Aug 2023 08:16) (107/62 - 118/68)  BP(mean): --  RR: 18 (19 Aug 2023 08:16) (18 - 18)  SpO2: 97% (19 Aug 2023 08:16) (96% - 97%)    Parameters below as of 19 Aug 2023 08:16  Patient On (Oxygen Delivery Method): room air     Physical exam:    Constitutional:  No acute distress  HEENT: NC/AT, EOMI, PERRLA, conjunctivae clear; ears and nose atraumatic  Neck: supple; thyroid not palpable  Back: no tenderness  Respiratory: respiratory effort normal; clear to auscultation  Cardiovascular: S1S2 regular, no murmurs  Abdomen: soft, not tender, not distended, positive BS  Genitourinary: no suprapubic tenderness  Lymphatic: no LN palpable  Musculoskeletal: no muscle tenderness, no joint swelling or tenderness  low ricardo tenderness  Extremities: no pedal edema  Neurological/ Psychiatric: AxOx3, judgement and insight normal; moving all extremities  Skin: no rashes; no palpable lesions    Labs: all available labs reviewed                        11.4   9.25  )-----------( 225      ( 18 Aug 2023 08:35 )             35.6     08-18    140  |  107  |  11  ----------------------------<  132<H>  3.8   |  27  |  0.92    Ca    8.9      18 Aug 2023 08:35  Campylobacter: Detected: (08.17.23 @ 10:17)   Campylobacter: Detected: (07.07.23 @ 14:54)   Radiology: all available radiological tests reviewed    < from: Xray Lumbosacral Spine (08.15.23 @ 13:51) >  1. Surgical hardware inserting at L4-L5, showing good anatomic alignment   and no sign of complication.  2. No evidence of fracture.  3. Multilevel spondylosis and facet arthropathy, associated with trace   retrolisthesis at L2-L3 and L3-L4.  4. Diminished disc height at L3-L4.  < end of copied text >    < from: CT Abdomen and Pelvis w/ Oral Cont and w/ IV Cont (07.28.23 @ 12:42) >  Closed loop small bowel obstruction with transition points in the distal   ileum. Extensive interloop edema is present and bowel ischemia should be excluded with lactate level.  < end of copied text >      Advanced directives addressed: full resuscitation

## 2023-08-19 NOTE — PROGRESS NOTE ADULT - ASSESSMENT
67 y/o male h/o HTN, HLD, aortic stenosis s/p AV replacement 2019, anxiety, depression, prior gastric bypass, recent acute enterocolitis with campylobacter, multiple recurrence of infectious diarrhea, lumbar radiculopathy, spinal stimulator implanted and s/p removal of spinal cord stimulator was admitted on 8/15 for planned revision laminectomy right L4 L5 Excision of herniated disc right L4 L5 , transforaminal lumbar interbody fusion L4 L5. He underwent surgery on 8/15. He was noted with diarrhea and stool studies reported campylobacter by PCR.     1. Acute enterocolitis with campylobacter. Spinal stenosis s/p revision laminectomy.   -first noted with campylobacter PCR in stool on 7/7 ?persistent vs recurrent infection  -received azithromycin PO for 5 days in July 2023  -on azithromycin 500 mg PO qd # 2  -tolerating abx well so far; no side effects noted  -he reported he was HIV tested one week ago and that he is HIV negative   -continue oral azithro for 10 days  -may f/u with GI if after 10 days his BM are not WNL  -monitor abdomen  -local spine wound care  -monitor temps  -f/u CBC  -supportive care  2. Other issues:   -care per medicine

## 2023-08-19 NOTE — PROGRESS NOTE ADULT - ASSESSMENT
Patient presents with intractable back and R leg pain. Hx discectomy L4/5 X2. Patient with recurrent R leg pain. 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 Has smaller L 3/4 & L2/3 HNPs. Failed nonoperative modalities, Patient underwent revision laminectomy/TLIF/fusion L4/5 on 8/15/23. (    (+) stool cultures    POD#4  Analgesia prn  OOB/PT as tolerated  Abx for stool cultures as per ID  Monitor GAYLA  Monitor labs  Medical management

## 2023-08-19 NOTE — PROGRESS NOTE ADULT - SUBJECTIVE AND OBJECTIVE BOX
BRIEF HOSPITAL COURSE: 67yo male PMHx HTN, HLD, aortic stenosis s/p AV replacement 2019, anxiety, depression, hx gastric bypass, hx recent SBO, multiple recurrence of infectious diarrhea, hx of lumbar radiculopathy. Spinal stimulator implanted and s/p removal of spinal cord stimulator; presents to PST for planned revision laminectomy right L4 L5 Excision of herniated disc right L4 L5 , transforaminal lumbar interbody fusion L4 L5    8/17 - pain controlled. mulitiple episodes of diarrhea today.  no fever or chills  8/18 - patient seen today, continued diarrhea, multiple episodes, + campylobacter, has been + since July 2023, s/p azithro 1 course.   8/19 -     REVIEW OF SYSTEMS:    CONSTITUTIONAL: No weakness, fevers or chills  EYES/ENT: No visual changes;  No vertigo or throat pain   NECK: No pain or stiffness  RESPIRATORY: No cough, wheezing, hemoptysis; No shortness of breath  CARDIOVASCULAR: No chest pain or palpitations  GASTROINTESTINAL: No abdominal or epigastric pain. No nausea, vomiting, or hematemesis; + diarrhea, no constipation. No melena or hematochezia.  GENITOURINARY: No dysuria, frequency or hematuria  NEUROLOGICAL: No numbness or weakness  SKIN: No itching, rashes    Vital Signs Last 24 Hrs  T(C): 36.7 (19 Aug 2023 08:16), Max: 36.9 (18 Aug 2023 23:12)  T(F): 98.1 (19 Aug 2023 08:16), Max: 98.4 (18 Aug 2023 23:12)  HR: 71 (19 Aug 2023 08:16) (71 - 92)  BP: 118/68 (19 Aug 2023 08:16) (107/62 - 118/68)  RR: 18 (19 Aug 2023 08:16) (18 - 18)  SpO2: 97% (19 Aug 2023 08:16) (96% - 97%)    Parameters below as of 19 Aug 2023 08:16  Patient On (Oxygen Delivery Method): room air    PHYSICAL EXAM:  GENERAL: NAD, lying in bed comfortably  HEAD:  Atraumatic, Normocephalic  EYES: conjunctiva and sclera clear  ENT: Moist mucous membranes  NECK: Supple, No JVD  CHEST/LUNG: Clear to auscultation bilaterally; No rales, rhonchi, wheezing. Unlabored respirations  HEART: Regular rate and rhythm; No murmurs  ABDOMEN: Bowel sounds present; Soft, Nontender, Nondistended.   EXTREMITIES:  2+ Peripheral Pulses, brisk capillary refill. No clubbing, cyanosis, or edema  NERVOUS SYSTEM:  Alert & Oriented X3, speech clear. No deficits   MSK: FROM all 4 extremities, full and equal strength    MEDICATIONS  (STANDING):  acetaminophen     Tablet .. 650 milliGRAM(s) Oral every 6 hours  atorvastatin 10 milliGRAM(s) Oral at bedtime  azithromycin   Tablet 500 milliGRAM(s) Oral daily  buPROPion . 100 milliGRAM(s) Oral three times a day  cyanocobalamin 1000 MICROGram(s) Oral daily  cyclobenzaprine 10 milliGRAM(s) Oral every 8 hours  emtricitabine 200 mG/tenofovir alafenamide 25 mG (DESCOVY) Tablet 1 Tablet(s) Oral daily  gabapentin 300 milliGRAM(s) Oral two times a day  levothyroxine 25 MICROGram(s) Oral daily  metoprolol succinate ER 12.5 milliGRAM(s) Oral daily  multivitamin 1 Tablet(s) Oral daily  pantoprazole    Tablet 40 milliGRAM(s) Oral before breakfast  senna 2 Tablet(s) Oral at bedtime  sodium chloride 0.9% lock flush 3 milliLiter(s) IV Push every 8 hours  sodium chloride 0.9%. 1000 milliLiter(s) (25 mL/Hr) IV Continuous <Continuous>  venlafaxine 75 milliGRAM(s) Oral two times a day with meals    MEDICATIONS  (PRN):  naloxone Injectable 0.1 milliGRAM(s) IV Push every 3 minutes PRN For ANY of the following changes in patient status:  A. RR LESS THAN 10 breaths per minute, B. Oxygen saturation LESS THAN 90%, C. Sedation score of 6  ondansetron Injectable 4 milliGRAM(s) IV Push every 6 hours PRN Nausea  oxyCODONE    IR 10 milliGRAM(s) Oral every 3 hours PRN Moderate Pain (4 - 6)       BRIEF HOSPITAL COURSE: 67yo male PMHx HTN, HLD, aortic stenosis s/p AV replacement 2019, anxiety, depression, hx gastric bypass, hx recent SBO, multiple recurrence of infectious diarrhea, hx of lumbar radiculopathy. Spinal stimulator implanted and s/p removal of spinal cord stimulator; presents to PST for planned revision laminectomy right L4 L5 Excision of herniated disc right L4 L5 , transforaminal lumbar interbody fusion L4 L5    8/17 - pain controlled. mulitiple episodes of diarrhea today.  no fever or chills  8/18 - patient seen today, continued diarrhea, multiple episodes, + campylobacter, has been + since July 2023, s/p azithro 1 course.   8/19 - patient seen today, feels better, continued diarrhea    REVIEW OF SYSTEMS:    CONSTITUTIONAL: No weakness, fevers or chills  EYES/ENT: No visual changes;  No vertigo or throat pain   NECK: No pain or stiffness  RESPIRATORY: No cough, wheezing, hemoptysis; No shortness of breath  CARDIOVASCULAR: No chest pain or palpitations  GASTROINTESTINAL: No abdominal or epigastric pain. No nausea, vomiting, or hematemesis; + diarrhea, no constipation. No melena or hematochezia.  GENITOURINARY: No dysuria, frequency or hematuria  NEUROLOGICAL: No numbness or weakness  SKIN: No itching, rashes    Vital Signs Last 24 Hrs  T(C): 36.7 (19 Aug 2023 08:16), Max: 36.9 (18 Aug 2023 23:12)  T(F): 98.1 (19 Aug 2023 08:16), Max: 98.4 (18 Aug 2023 23:12)  HR: 71 (19 Aug 2023 08:16) (71 - 92)  BP: 118/68 (19 Aug 2023 08:16) (107/62 - 118/68)  RR: 18 (19 Aug 2023 08:16) (18 - 18)  SpO2: 97% (19 Aug 2023 08:16) (96% - 97%)    Parameters below as of 19 Aug 2023 08:16  Patient On (Oxygen Delivery Method): room air    PHYSICAL EXAM:  GENERAL: NAD, lying in bed comfortably  HEAD:  Atraumatic, Normocephalic  EYES: conjunctiva and sclera clear  ENT: Moist mucous membranes  NECK: Supple, No JVD  CHEST/LUNG: Clear to auscultation bilaterally; No rales, rhonchi, wheezing. Unlabored respirations  HEART: Regular rate and rhythm; No murmurs  ABDOMEN: Bowel sounds present; Soft, Nontender, Nondistended.   EXTREMITIES:  2+ Peripheral Pulses, brisk capillary refill. No clubbing, cyanosis, or edema  NERVOUS SYSTEM:  Alert & Oriented X3, speech clear. No deficits   MSK: FROM all 4 extremities, full and equal strength    MEDICATIONS  (STANDING):  acetaminophen     Tablet .. 650 milliGRAM(s) Oral every 6 hours  atorvastatin 10 milliGRAM(s) Oral at bedtime  azithromycin   Tablet 500 milliGRAM(s) Oral daily  buPROPion . 100 milliGRAM(s) Oral three times a day  cyanocobalamin 1000 MICROGram(s) Oral daily  cyclobenzaprine 10 milliGRAM(s) Oral every 8 hours  emtricitabine 200 mG/tenofovir alafenamide 25 mG (DESCOVY) Tablet 1 Tablet(s) Oral daily  gabapentin 300 milliGRAM(s) Oral two times a day  levothyroxine 25 MICROGram(s) Oral daily  metoprolol succinate ER 12.5 milliGRAM(s) Oral daily  multivitamin 1 Tablet(s) Oral daily  pantoprazole    Tablet 40 milliGRAM(s) Oral before breakfast  senna 2 Tablet(s) Oral at bedtime  sodium chloride 0.9% lock flush 3 milliLiter(s) IV Push every 8 hours  sodium chloride 0.9%. 1000 milliLiter(s) (25 mL/Hr) IV Continuous <Continuous>  venlafaxine 75 milliGRAM(s) Oral two times a day with meals    MEDICATIONS  (PRN):  naloxone Injectable 0.1 milliGRAM(s) IV Push every 3 minutes PRN For ANY of the following changes in patient status:  A. RR LESS THAN 10 breaths per minute, B. Oxygen saturation LESS THAN 90%, C. Sedation score of 6  ondansetron Injectable 4 milliGRAM(s) IV Push every 6 hours PRN Nausea  oxyCODONE    IR 10 milliGRAM(s) Oral every 3 hours PRN Moderate Pain (4 - 6)    LABS:                           11.4   9.25  )-----------( 225      ( 18 Aug 2023 08:35 )             35.6   08-18    140  |  107  |  11  ----------------------------<  132<H>  3.8   |  27  |  0.92    Ca    8.9      18 Aug 2023 08:35

## 2023-08-19 NOTE — PROGRESS NOTE ADULT - ASSESSMENT
69yo male PMHx HTN, HLD, aortic stenosis s/p AV replacement 2019, anxiety, depression, hx gastric bypass, hx recent SBO, multiple recurrence of infectious diarrhea, hx of lumbar radiculopathy. Spinal stimulator implanted and s/p removal of spinal cord stimulator, still with back pain so present for elective revision of lumbar laminectomy    s/p laminectomy with osteotomy and fusion, TLIF L4- L5  EBL 250cc  marcus drain in place  Infectious diarrhea - campylobacter   Severe protein-calorie malnutrition    PLAN  - s/p lumbar lami - plan as per ortho spine  - diarrhea - positive campylobacter   - continue pain meds prn.  flexeril for muscle spasms  - HTN - continue toprol XL  - encourage incentive spirometer.   - marcus drain mgmt as per ortho spine. monitor ouptut  - PO diet as tolerated . bowel regimen prn. cont IV fluids until taking adequate PO.   - ingram in place. monitor I& Os and electrolytes  - cont synthroid  - post op IV ancef x 3 doses. cont Descovy for PREP hiv ppx (patient has been taking at home for months to decrease risk)  - Hgb down slightly from pre op labs. cont to trend.  no chemical DVT ppx due to bleeding risk . SCDs.   - PT eval  - ID eval: started azithromycin   - Encourage po hydration   - nutrition consult noted     Dispo: as per ortho spine  pain control  will follow with you    67yo male PMHx HTN, HLD, aortic stenosis s/p AV replacement 2019, anxiety, depression, hx gastric bypass, hx recent SBO, multiple recurrence of infectious diarrhea, hx of lumbar radiculopathy. Spinal stimulator implanted and s/p removal of spinal cord stimulator, still with back pain so present for elective revision of lumbar laminectomy    s/p laminectomy with osteotomy and fusion, TLIF L4- L5  EBL 250cc  marcus drain in place  Infectious diarrhea - campylobacter   Severe protein-calorie malnutrition    PLAN  - s/p lumbar lami - plan as per ortho spine  - diarrhea - positive campylobacter   - continue pain meds prn.  flexeril for muscle spasms  - HTN - continue toprol XL  - encourage incentive spirometer.   - marcus drain mgmt as per ortho spine. monitor ouptut  - PO diet as tolerated . bowel regimen prn. cont IV fluids until taking adequate PO.   - ingram in place. monitor I& Os and electrolytes  - cont synthroid  - post op IV ancef x 3 doses. cont Descovy for PREP hiv ppx (patient has been taking at home for months to decrease risk)  - Hgb down slightly from pre op labs. cont to trend.  no chemical DVT ppx due to bleeding risk . SCDs.   - PT eval  - ID eval: started azithromycin 8/18  - Encourage po hydration   - nutrition consult noted     Dispo: as per ortho spine  pain control, likely d/c tomorrow, to continue azithromycin for 10 day course, f/u with GI if persistent diarrhea   will follow with you

## 2023-08-19 NOTE — PROGRESS NOTE ADULT - SUBJECTIVE AND OBJECTIVE BOX
HPI:  Patient presents with intractable back and R leg pain. Hx discectomy L4/5 X2. Patient with recurrent R leg pain. 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 Has smaller L 3/4 & L2/3 HNPs. Failed nonoperative modalities, Patient underwent revision laminectomy/TLIF/fusion L4/5 on 8/15/23.    8/18/23 Patient developed diarrhea yesterday. Cultures (+) for Campylobacter. Negative for C, diff. Patient with residual paresthesia R foot  8/19/23 Patient with persistent diarrhea    PAST MEDICAL & SURGICAL HISTORY:  Hyperlipidemia      GERD (gastroesophageal reflux disease)      Anxiety      Skin cancer  external right ear, unsure of type      HTN (hypertension)      Aortic stenosis  Bicuspid Aortic Valve Replacement- 5/29/2018      Chronic neck and back pain      Sciatica      Internal and external prolapsed hemorrhoids  had surgery      Helicobacter pylori infection      History of colonic polyps      Herniated lumbar intervertebral disc      Gastrointestinal hemorrhage      Carpal tunnel syndrome  had surgery      Depressive disorder      Loss of hearing      Morbid obesity      Lumbar radiculopathy      Hearing loss      Erectile dysfunction      Shigella infection      Campylobacter diarrhea      H/O small bowel obstruction      Foot drop      Hand fracture, right      S/P carpal tunnel release  right side, left side- 2014      History of back surgery  laminectomy x2 last 2018      S/P trigger finger release  right thumb and middle finger      H/O external ear surgery  excision mass right ear- cancer 2018      H/O bicuspid aortic valve  replacement- 5/29/18      Status post coronary angioplasty  pt not sure he had this procedure      S/P tonsillectomy      H/O hemorrhoidectomy      Spinal cord stimulator status  10/2020      H/O bariatric surgery      Status post laparotomy with lysis of adhesions      History of appendectomy      S/P ORIF (open reduction internal fixation) fracture        MEDICATIONS  (STANDING):  acetaminophen     Tablet .. 650 milliGRAM(s) Oral every 6 hours  atorvastatin 10 milliGRAM(s) Oral at bedtime  azithromycin   Tablet 500 milliGRAM(s) Oral daily  buPROPion . 100 milliGRAM(s) Oral three times a day  cyanocobalamin 1000 MICROGram(s) Oral daily  cyclobenzaprine 10 milliGRAM(s) Oral every 8 hours  emtricitabine 200 mG/tenofovir alafenamide 25 mG (DESCOVY) Tablet 1 Tablet(s) Oral daily  gabapentin 300 milliGRAM(s) Oral two times a day  levothyroxine 25 MICROGram(s) Oral daily  metoprolol succinate ER 12.5 milliGRAM(s) Oral daily  multivitamin 1 Tablet(s) Oral daily  pantoprazole    Tablet 40 milliGRAM(s) Oral before breakfast  senna 2 Tablet(s) Oral at bedtime  sodium chloride 0.9% lock flush 3 milliLiter(s) IV Push every 8 hours  sodium chloride 0.9%. 1000 milliLiter(s) (25 mL/Hr) IV Continuous <Continuous>  venlafaxine 75 milliGRAM(s) Oral two times a day with meals      MEDICATIONS  (PRN):  naloxone Injectable 0.1 milliGRAM(s) IV Push every 3 minutes PRN For ANY of the following changes in patient status:  A. RR LESS THAN 10 breaths per minute, B. Oxygen saturation LESS THAN 90%, C. Sedation score of 6  ondansetron Injectable 4 milliGRAM(s) IV Push every 6 hours PRN Nausea  oxyCODONE    IR 10 milliGRAM(s) Oral every 3 hours PRN Moderate Pain (4 - 6)   Campylobacter: Detected: Antibiotic treatment for Campylobacter may be indicated for severe  infections and high-risk patients; please contact the lab if antibiotic  testing is needed.    Allergies    No Known Allergies    Intolerances    PE:    Vital Signs Last 24 Hrs  T(C): 36.7 (19 Aug 2023 08:16), Max: 36.9 (18 Aug 2023 23:12)  T(F): 98.1 (19 Aug 2023 08:16), Max: 98.4 (18 Aug 2023 23:12)  HR: 71 (19 Aug 2023 08:16) (71 - 92)  BP: 118/68 (19 Aug 2023 08:16) (107/62 - 118/68)  BP(mean): --  RR: 18 (19 Aug 2023 08:16) (18 - 18)  SpO2: 97% (19 Aug 2023 08:16) (96% - 97%)    Parameters below as of 19 Aug 2023 08:16  Patient On (Oxygen Delivery Method): room air    Patient sitting upright in bed with c/o diarrhea and paresthesia R foot  Tolerating diet  Voiding  Dressing clean and dry  GAYLA with decreasing drainage-left in  Weakness R EHL otherwise normal strength    LABS                                     11.4   9.25  )-----------( 225      ( 18 Aug 2023 08:35 )             35.6     08-18    140  |  107  |  11  ----------------------------<  132<H>  3.8   |  27  |  0.92    Ca    8.9      18 Aug 2023 08:35        GI PCR Panel Stool (08.17.23 @ 10:17)    GI PCR Panel: Detected: GI Panel PCR evaluates for:  Campylobacter, Plesiomonas shigelloides, Salmonella, Vibrio, Yersinia  enterocolitica, Enteroaggregative Escherichia (EAEC), Enteropathogenic E.  coli (EPEC), Enterotoxigenic E. coli (ETEC), Shiga-like toxin producing  E.coli (STEC), E. coli O157, Shigella/Enteroinvasive E. coli (EIEC),  Adenovirus, Astrovirus, Norovirus, Rotavirus, Sapovirus, Cryptosporidium,  Cyclospora cayetanensis, Entamoeba histolytica, Giardia lamblia.  For culture and susceptibility reports refer to "reflex stool culture".    Clostridium difficile Toxin by PCR (08.17.23 @ 10:17)    C Diff by PCR Result: NotDetec: The results of this test should be interpreted with consideration of  clinical context.  Not Detected result indicates the absence of C. difficile or that the  number of organisms is below the assay limit of detection.  Detected result indicates the presence of C. difficile nucleic acid.  Indeterminate result may indicate the presence of amplification  inhibitors in specimen; presence or absence of organisms cannot be  determined. Consider collecting new specimen if further testing is still  needed.  This qualitative PCR assay detects the toxin B gene; it is FDA-approved,  and its performance was established by [facility] [employer],  [place], [place].

## 2023-08-20 ENCOUNTER — TRANSCRIPTION ENCOUNTER (OUTPATIENT)
Age: 68
End: 2023-08-20

## 2023-08-20 VITALS
HEART RATE: 75 BPM | SYSTOLIC BLOOD PRESSURE: 103 MMHG | TEMPERATURE: 98 F | DIASTOLIC BLOOD PRESSURE: 73 MMHG | OXYGEN SATURATION: 100 % | RESPIRATION RATE: 18 BRPM

## 2023-08-20 PROCEDURE — 99231 SBSQ HOSP IP/OBS SF/LOW 25: CPT

## 2023-08-20 RX ORDER — BUPROPION HYDROCHLORIDE 150 MG/1
1 TABLET, EXTENDED RELEASE ORAL
Qty: 0 | Refills: 0 | DISCHARGE

## 2023-08-20 RX ORDER — AZITHROMYCIN 500 MG/1
1 TABLET, FILM COATED ORAL
Qty: 14 | Refills: 0
Start: 2023-08-20 | End: 2023-09-02

## 2023-08-20 RX ORDER — BUPROPION HYDROCHLORIDE 150 MG/1
1 TABLET, EXTENDED RELEASE ORAL
Qty: 0 | Refills: 0 | DISCHARGE
Start: 2023-08-20

## 2023-08-20 RX ADMIN — EMTRICITABINE AND TENOFOVIR DISOPROXIL FUMARATE 1 TABLET(S): 200; 300 TABLET, FILM COATED ORAL at 09:59

## 2023-08-20 RX ADMIN — Medication 650 MILLIGRAM(S): at 06:08

## 2023-08-20 RX ADMIN — CYCLOBENZAPRINE HYDROCHLORIDE 10 MILLIGRAM(S): 10 TABLET, FILM COATED ORAL at 06:09

## 2023-08-20 RX ADMIN — CYCLOBENZAPRINE HYDROCHLORIDE 10 MILLIGRAM(S): 10 TABLET, FILM COATED ORAL at 13:24

## 2023-08-20 RX ADMIN — Medication 12.5 MILLIGRAM(S): at 09:59

## 2023-08-20 RX ADMIN — Medication 1 TABLET(S): at 09:59

## 2023-08-20 RX ADMIN — PANTOPRAZOLE SODIUM 40 MILLIGRAM(S): 20 TABLET, DELAYED RELEASE ORAL at 06:09

## 2023-08-20 RX ADMIN — OXYCODONE HYDROCHLORIDE 10 MILLIGRAM(S): 5 TABLET ORAL at 08:34

## 2023-08-20 RX ADMIN — Medication 650 MILLIGRAM(S): at 13:24

## 2023-08-20 RX ADMIN — PREGABALIN 1000 MICROGRAM(S): 225 CAPSULE ORAL at 09:59

## 2023-08-20 RX ADMIN — GABAPENTIN 300 MILLIGRAM(S): 400 CAPSULE ORAL at 09:59

## 2023-08-20 RX ADMIN — BUPROPION HYDROCHLORIDE 100 MILLIGRAM(S): 150 TABLET, EXTENDED RELEASE ORAL at 06:09

## 2023-08-20 RX ADMIN — BUPROPION HYDROCHLORIDE 100 MILLIGRAM(S): 150 TABLET, EXTENDED RELEASE ORAL at 13:24

## 2023-08-20 RX ADMIN — Medication 75 MILLIGRAM(S): at 08:34

## 2023-08-20 RX ADMIN — AZITHROMYCIN 500 MILLIGRAM(S): 500 TABLET, FILM COATED ORAL at 09:59

## 2023-08-20 RX ADMIN — Medication 25 MICROGRAM(S): at 06:09

## 2023-08-20 NOTE — PROGRESS NOTE ADULT - PROVIDER SPECIALTY LIST ADULT
Hospitalist
Infectious Disease
Orthopedics

## 2023-08-20 NOTE — DISCHARGE NOTE NURSING/CASE MANAGEMENT/SOCIAL WORK - PATIENT PORTAL LINK FT
You can access the FollowMyHealth Patient Portal offered by [facility] by registering at the following website: [email or web address]. By joining [employer]’s FollowMyHealth portal, you will also be able to view your health information using other applications (apps) compatible with our system.

## 2023-08-20 NOTE — DISCHARGE NOTE NURSING/CASE MANAGEMENT/SOCIAL WORK - NSDCVIVACCINE_GEN_ALL_CORE_FT
Tdap; 18-Apr-2023 18:27; Tanya Jaime (ALEX); [employer]; [id number] (Exp. Date: 15-Feb-2025); IntraMuscular; Deltoid Left.; 0.5 milliLiter(s); VIS (VIS Published: 09-May-2013, VIS Presented: 18-Apr-2023);

## 2023-08-20 NOTE — DISCHARGE NOTE NURSING/CASE MANAGEMENT/SOCIAL WORK - NSDCPEEMAIL_GEN_ALL_CORE
[facility] for Tobacco Control email [email or web address]

## 2023-08-20 NOTE — DISCHARGE NOTE PROVIDER - CARE PROVIDER_API CALL
Geovanny Staples  Orthopaedic Surgery  [address] [place] [id number]  Phone: [phone]  Fax: [phone]  Follow Up Time:

## 2023-08-20 NOTE — PROGRESS NOTE ADULT - NUTRITIONAL ASSESSMENT
This patient has been assessed with a concern for Malnutrition and has been determined to have a diagnosis/diagnoses of Severe protein-calorie malnutrition.    This patient is being managed with:   Diet Regular-  Entered: Aug 15 2023  3:59PM  

## 2023-08-20 NOTE — DISCHARGE NOTE PROVIDER - NSDCMRMEDTOKEN_GEN_ALL_CORE_FT
Aspirin Enteric Coated 81 mg oral delayed release tablet: 1 tab(s) orally once a day  atorvastatin 10 mg oral tablet: 1 tab(s) orally once a day (in the morning)  buPROPion 100 mg oral tablet: 1 tab(s) orally 3 times a day  cyanocobalamin 1000 mcg oral tablet: 1 tab(s) orally once a day  Descovy 200 mg-25 mg oral tablet: 1 tab(s) orally once a day  esomeprazole 40 mg oral delayed release capsule: 1 orally once a day  gabapentin 300 mg oral capsule: 1 cap(s) orally 2 times a day  levothyroxine 25 mcg (0.025 mg) oral tablet: 1 tab(s) orally once a day  metoprolol tartrate 25 mg oral tablet: 0.5 tab(s) orally once a day  Multiple Vitamins oral tablet: 1 tab(s) orally once a day  Omega-3 1000 mg oral capsule: 1 cap(s) orally once a day  oxycodone-acetaminophen 5 mg-325 mg oral tablet: 1 tab(s) orally 4 times a day as needed for pain  Probiotic Formula oral capsule: 1 cap(s) orally once a day  Tylenol 325 mg oral capsule: 1 orally prn  venlafaxine 75 mg oral tablet: 1 tab(s) orally 2 times a day  Vitamin D3 25 mcg (1000 intl units) oral capsule: 1 cap(s) orally once a day  Zithromax 500 mg oral tablet: 1 tab(s) orally once a day MDD: 1

## 2023-08-20 NOTE — PROGRESS NOTE ADULT - ASSESSMENT
67yo male PMHx HTN, HLD, aortic stenosis s/p AV replacement 2019, anxiety, depression, hx gastric bypass, hx recent SBO, multiple recurrence of infectious diarrhea, hx of lumbar radiculopathy. Spinal stimulator implanted and s/p removal of spinal cord stimulator, still with back pain so present for elective revision of lumbar laminectomy    s/p laminectomy with osteotomy and fusion, TLIF L4- L5  EBL 250cc  marcus drain in place  Infectious diarrhea - campylobacter   Severe protein-calorie malnutrition    PLAN  - s/p lumbar lami - plan as per ortho spine  - diarrhea - positive campylobacter   - continue pain meds prn.  flexeril for muscle spasms  - HTN - continue toprol XL  - encourage incentive spirometer.   - marcus drain mgmt as per ortho spine. monitor ouptut  - PO diet as tolerated . bowel regimen prn. cont IV fluids until taking adequate PO.   - ingram in place. monitor I& Os and electrolytes  - cont synthroid  - post op IV ancef x 3 doses. cont Descovy for PREP hiv ppx (patient has been taking at home for months to decrease risk)  - Hgb down slightly from pre op labs. cont to trend.  no chemical DVT ppx due to bleeding risk . SCDs.   - PT eval  - ID eval: started azithromycin 8/18  - Encourage po hydration   - nutrition consult noted     Dispo: as per ortho spine  pain control, likely d/c today, to continue azithromycin for 10 day course, f/u with GI if persistent diarrhea

## 2023-08-20 NOTE — DISCHARGE NOTE NURSING/CASE MANAGEMENT/SOCIAL WORK - NSDCPEFALRISK_GEN_ALL_CORE
For information on Fall & Injury Prevention, visit: [email or web address] OR  [email or web address] OR  https://www.cdc.gov/steadi/patient.html

## 2023-08-20 NOTE — DISCHARGE NOTE PROVIDER - HOSPITAL COURSE
Patient presents with intractable back and R leg pain. Hx discectomy L4/5 X2. Patient with recurrent R leg pain. 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 Has smaller L 3/4 & L2/3 HNPs. Failed nonoperative modalities, Patient underwent revision laminectomy/TLIF/fusion L4/5 on 8/15/23.  8/17/23 Patient with residual leg paresthesia, PCA, GAYLA with moderate drainage. Zamorano put-voiding  8/18/23 Patient developed diarrhea yesterday. Cultures (+) for Campylobacter. Negative for C, diff. Patient with residual paresthesia R foot  8/19/23 Patient with persistent diarrhea-antibiotics continue. GAYLA with minimal drainage.   8/20/23 Patient with less diarrhea-oral Abx started with minimal output-removed. Incision clean and dry, neuro intact. Stable for D/C home

## 2023-08-20 NOTE — PROGRESS NOTE ADULT - SUBJECTIVE AND OBJECTIVE BOX
BRIEF HOSPITAL COURSE: 69yo male PMHx HTN, HLD, aortic stenosis s/p AV replacement 2019, anxiety, depression, hx gastric bypass, hx recent SBO, multiple recurrence of infectious diarrhea, hx of lumbar radiculopathy. Spinal stimulator implanted and s/p removal of spinal cord stimulator; presents to PST for planned revision laminectomy right L4 L5 Excision of herniated disc right L4 L5 , transforaminal lumbar interbody fusion L4 L5    8/17 - pain controlled. mulitiple episodes of diarrhea today.  no fever or chills  8/18 - patient seen today, continued diarrhea, multiple episodes, + campylobacter, has been + since July 2023, s/p azithro 1 course.   8/19 - patient seen today, feels better, continued diarrhea  8/20 - patient seen today, feels well, improved diarrhea. No overnight issues     REVIEW OF SYSTEMS:    CONSTITUTIONAL: No weakness, fevers or chills  EYES/ENT: No visual changes;  No vertigo or throat pain   NECK: No pain or stiffness  RESPIRATORY: No cough, wheezing, hemoptysis; No shortness of breath  CARDIOVASCULAR: No chest pain or palpitations  GASTROINTESTINAL: No abdominal or epigastric pain. No nausea, vomiting, or hematemesis; + diarrhea, no constipation. No melena or hematochezia.  GENITOURINARY: No dysuria, frequency or hematuria  NEUROLOGICAL: No numbness or weakness  SKIN: No itching, rashes    Vital Signs Last 24 Hrs  T(C): 36.5 (20 Aug 2023 08:24), Max: 36.5 (20 Aug 2023 08:24)  T(F): 97.7 (20 Aug 2023 08:24), Max: 97.7 (20 Aug 2023 08:24)  HR: 75 (20 Aug 2023 08:24) (66 - 75)  BP: 103/73 (20 Aug 2023 08:24) (97/57 - 103/73)  RR: 18 (20 Aug 2023 08:24) (17 - 18)  SpO2: 100% (20 Aug 2023 08:24) (95% - 100%)    Parameters below as of 20 Aug 2023 08:24  Patient On (Oxygen Delivery Method): room air        PHYSICAL EXAM:  GENERAL: NAD, lying in bed comfortably  HEAD:  Atraumatic, Normocephalic  EYES: conjunctiva and sclera clear  ENT: Moist mucous membranes  NECK: Supple, No JVD  CHEST/LUNG: Clear to auscultation bilaterally; No rales, rhonchi, wheezing. Unlabored respirations  HEART: Regular rate and rhythm; No murmurs  ABDOMEN: Bowel sounds present; Soft, Nontender, Nondistended.   EXTREMITIES:  2+ Peripheral Pulses, brisk capillary refill. No clubbing, cyanosis, or edema  NERVOUS SYSTEM:  Alert & Oriented X3, speech clear. No deficits   MSK: FROM all 4 extremities, full and equal strength    MEDICATIONS  (STANDING):  acetaminophen     Tablet .. 650 milliGRAM(s) Oral every 6 hours  atorvastatin 10 milliGRAM(s) Oral at bedtime  azithromycin   Tablet 500 milliGRAM(s) Oral daily  buPROPion . 100 milliGRAM(s) Oral three times a day  cyanocobalamin 1000 MICROGram(s) Oral daily  cyclobenzaprine 10 milliGRAM(s) Oral every 8 hours  emtricitabine 200 mG/tenofovir alafenamide 25 mG (DESCOVY) Tablet 1 Tablet(s) Oral daily  gabapentin 300 milliGRAM(s) Oral two times a day  levothyroxine 25 MICROGram(s) Oral daily  metoprolol succinate ER 12.5 milliGRAM(s) Oral daily  multivitamin 1 Tablet(s) Oral daily  pantoprazole    Tablet 40 milliGRAM(s) Oral before breakfast  senna 2 Tablet(s) Oral at bedtime  sodium chloride 0.9% lock flush 3 milliLiter(s) IV Push every 8 hours  sodium chloride 0.9%. 1000 milliLiter(s) (25 mL/Hr) IV Continuous <Continuous>  venlafaxine 75 milliGRAM(s) Oral two times a day with meals    MEDICATIONS  (PRN):  naloxone Injectable 0.1 milliGRAM(s) IV Push every 3 minutes PRN For ANY of the following changes in patient status:  A. RR LESS THAN 10 breaths per minute, B. Oxygen saturation LESS THAN 90%, C. Sedation score of 6  ondansetron Injectable 4 milliGRAM(s) IV Push every 6 hours PRN Nausea  oxyCODONE    IR 10 milliGRAM(s) Oral every 3 hours PRN Moderate Pain (4 - 6)    LABS:                           11.4   9.25  )-----------( 225      ( 18 Aug 2023 08:35 )             35.6   08-18    140  |  107  |  11  ----------------------------<  132<H>  3.8   |  27  |  0.92    Ca    8.9      18 Aug 2023 08:35

## 2023-08-20 NOTE — DISCHARGE NOTE NURSING/CASE MANAGEMENT/SOCIAL WORK - NSDCPEWEB_GEN_ALL_CORE
[facility] for Tobacco Control website --- [email or web address] website --- [email or web address]

## 2023-08-20 NOTE — DISCHARGE NOTE PROVIDER - DETAILS OF MALNUTRITION DIAGNOSIS/DIAGNOSES
This patient has been assessed with a concern for Malnutrition and was treated during this hospitalization for the following Nutrition diagnosis/diagnoses:     -  08/18/2023: Severe protein-calorie malnutrition

## 2023-08-20 NOTE — DISCHARGE NOTE PROVIDER - NSDCCPCAREPLAN_GEN_ALL_CORE_FT
PRINCIPAL DISCHARGE DIAGNOSIS  Diagnosis: Herniated nucleus pulposus, L4-5  Assessment and Plan of Treatment:

## 2023-08-22 NOTE — PATIENT PROFILE ADULT - FLU SEASON?
Rx Refill Note  Requested Prescriptions     Pending Prescriptions Disp Refills    lisinopril-hydrochlorothiazide (PRINZIDE,ZESTORETIC) 20-12.5 MG per tablet [Pharmacy Med Name: LISINOPRIL-HCTZ 20-12.5 MG TAB] 60 tablet 0     Sig: TAKE 2 TABLETS BY MOUTH DAILY      Last office visit with prescribing clinician: 8/9/2022   Last telemedicine visit with prescribing clinician: Visit date not found   Next office visit with prescribing clinician: Visit date not found                         Would you like a call back once the refill request has been completed: [] Yes [] No    If the office needs to give you a call back, can they leave a voicemail: [] Yes [] No    Paola Quezada MA  08/22/23, 08:10 EDT   No

## 2023-08-23 LAB — SURGICAL PATHOLOGY STUDY: SIGNIFICANT CHANGE UP

## 2023-08-24 DIAGNOSIS — M96.1 POSTLAMINECTOMY SYNDROME, NOT ELSEWHERE CLASSIFIED: ICD-10-CM

## 2023-08-24 DIAGNOSIS — Z98.84 BARIATRIC SURGERY STATUS: ICD-10-CM

## 2023-08-24 DIAGNOSIS — I10 ESSENTIAL (PRIMARY) HYPERTENSION: ICD-10-CM

## 2023-08-24 DIAGNOSIS — F41.9 ANXIETY DISORDER, UNSPECIFIED: ICD-10-CM

## 2023-08-24 DIAGNOSIS — I25.10 ATHEROSCLEROTIC HEART DISEASE OF NATIVE CORONARY ARTERY WITHOUT ANGINA PECTORIS: ICD-10-CM

## 2023-08-24 DIAGNOSIS — Z85.828 PERSONAL HISTORY OF OTHER MALIGNANT NEOPLASM OF SKIN: ICD-10-CM

## 2023-08-24 DIAGNOSIS — M62.838 OTHER MUSCLE SPASM: ICD-10-CM

## 2023-08-24 DIAGNOSIS — K21.9 GASTRO-ESOPHAGEAL REFLUX DISEASE WITHOUT ESOPHAGITIS: ICD-10-CM

## 2023-08-24 DIAGNOSIS — Z86.19 PERSONAL HISTORY OF OTHER INFECTIOUS AND PARASITIC DISEASES: ICD-10-CM

## 2023-08-24 DIAGNOSIS — A04.5 CAMPYLOBACTER ENTERITIS: ICD-10-CM

## 2023-08-24 DIAGNOSIS — E43 UNSPECIFIED SEVERE PROTEIN-CALORIE MALNUTRITION: ICD-10-CM

## 2023-08-24 DIAGNOSIS — E78.00 PURE HYPERCHOLESTEROLEMIA, UNSPECIFIED: ICD-10-CM

## 2023-08-24 DIAGNOSIS — Z87.891 PERSONAL HISTORY OF NICOTINE DEPENDENCE: ICD-10-CM

## 2023-08-24 DIAGNOSIS — M51.16 INTERVERTEBRAL DISC DISORDERS WITH RADICULOPATHY, LUMBAR REGION: ICD-10-CM

## 2023-08-24 DIAGNOSIS — F31.9 BIPOLAR DISORDER, UNSPECIFIED: ICD-10-CM

## 2023-08-24 DIAGNOSIS — Z95.2 PRESENCE OF PROSTHETIC HEART VALVE: ICD-10-CM

## 2023-08-24 DIAGNOSIS — H91.90 UNSPECIFIED HEARING LOSS, UNSPECIFIED EAR: ICD-10-CM

## 2023-09-20 NOTE — ED STATDOCS - NSCAREINITIATED _GEN_ER
What Type Of Note Output Would You Prefer (Optional)?: Bullet Format
How Did Your Itching Occur?: sudden in onset (over a period of weeks to a few months)
How Severe Is Your Itching?: moderate
Additional History: - Appetite is not good but she eats out of habit and boredom.  Her weight is increasing that she attributes to this eating by boredom.
Severo Rg(Attending)

## 2023-09-21 ENCOUNTER — EMERGENCY (EMERGENCY)
Facility: HOSPITAL | Age: 68
LOS: 0 days | Discharge: HOME | End: 2023-09-21
Attending: EMERGENCY MEDICINE
Payer: MEDICARE

## 2023-09-21 VITALS
HEART RATE: 62 BPM | OXYGEN SATURATION: 97 % | RESPIRATION RATE: 18 BRPM | SYSTOLIC BLOOD PRESSURE: 136 MMHG | DIASTOLIC BLOOD PRESSURE: 74 MMHG | TEMPERATURE: 98 F

## 2023-09-21 VITALS — WEIGHT: 149.91 LBS | HEIGHT: 66.5 IN

## 2023-09-21 DIAGNOSIS — R42 DIZZINESS AND GIDDINESS: ICD-10-CM

## 2023-09-21 DIAGNOSIS — Z96.89 PRESENCE OF OTHER SPECIFIED FUNCTIONAL IMPLANTS: Chronic | ICD-10-CM

## 2023-09-21 DIAGNOSIS — Z98.890 OTHER SPECIFIED POSTPROCEDURAL STATES: Chronic | ICD-10-CM

## 2023-09-21 DIAGNOSIS — Z86.39 PERSONAL HISTORY OF OTHER ENDOCRINE, NUTRITIONAL AND METABOLIC DISEASE: ICD-10-CM

## 2023-09-21 DIAGNOSIS — E78.5 HYPERLIPIDEMIA, UNSPECIFIED: ICD-10-CM

## 2023-09-21 DIAGNOSIS — F32.A DEPRESSION, UNSPECIFIED: ICD-10-CM

## 2023-09-21 DIAGNOSIS — Z90.89 ACQUIRED ABSENCE OF OTHER ORGANS: Chronic | ICD-10-CM

## 2023-09-21 DIAGNOSIS — Z90.49 ACQUIRED ABSENCE OF OTHER SPECIFIED PARTS OF DIGESTIVE TRACT: Chronic | ICD-10-CM

## 2023-09-21 DIAGNOSIS — Z98.61 CORONARY ANGIOPLASTY STATUS: Chronic | ICD-10-CM

## 2023-09-21 DIAGNOSIS — Z82.49 FAMILY HISTORY OF ISCHEMIC HEART DISEASE AND OTHER DISEASES OF THE CIRCULATORY SYSTEM: ICD-10-CM

## 2023-09-21 DIAGNOSIS — M54.16 RADICULOPATHY, LUMBAR REGION: ICD-10-CM

## 2023-09-21 DIAGNOSIS — G89.29 OTHER CHRONIC PAIN: ICD-10-CM

## 2023-09-21 DIAGNOSIS — Z90.49 ACQUIRED ABSENCE OF OTHER SPECIFIED PARTS OF DIGESTIVE TRACT: ICD-10-CM

## 2023-09-21 DIAGNOSIS — Z85.22 PERSONAL HISTORY OF MALIGNANT NEOPLASM OF NASAL CAVITIES, MIDDLE EAR, AND ACCESSORY SINUSES: ICD-10-CM

## 2023-09-21 DIAGNOSIS — Z98.84 BARIATRIC SURGERY STATUS: ICD-10-CM

## 2023-09-21 DIAGNOSIS — Z87.74 PERSONAL HISTORY OF (CORRECTED) CONGENITAL MALFORMATIONS OF HEART AND CIRCULATORY SYSTEM: Chronic | ICD-10-CM

## 2023-09-21 DIAGNOSIS — Z98.89 OTHER SPECIFIED POSTPROCEDURAL STATES: Chronic | ICD-10-CM

## 2023-09-21 DIAGNOSIS — I10 ESSENTIAL (PRIMARY) HYPERTENSION: ICD-10-CM

## 2023-09-21 DIAGNOSIS — Z79.82 LONG TERM (CURRENT) USE OF ASPIRIN: ICD-10-CM

## 2023-09-21 DIAGNOSIS — H91.90 UNSPECIFIED HEARING LOSS, UNSPECIFIED EAR: ICD-10-CM

## 2023-09-21 DIAGNOSIS — E87.5 HYPERKALEMIA: ICD-10-CM

## 2023-09-21 DIAGNOSIS — R06.02 SHORTNESS OF BREATH: ICD-10-CM

## 2023-09-21 DIAGNOSIS — Z98.84 BARIATRIC SURGERY STATUS: Chronic | ICD-10-CM

## 2023-09-21 DIAGNOSIS — Z87.19 PERSONAL HISTORY OF OTHER DISEASES OF THE DIGESTIVE SYSTEM: ICD-10-CM

## 2023-09-21 DIAGNOSIS — F41.9 ANXIETY DISORDER, UNSPECIFIED: ICD-10-CM

## 2023-09-21 LAB
ALBUMIN SERPL ELPH-MCNC: 3.4 G/DL — SIGNIFICANT CHANGE UP (ref 3.3–5)
ALP SERPL-CCNC: 147 U/L — HIGH (ref 40–120)
ALT FLD-CCNC: 60 U/L — SIGNIFICANT CHANGE UP (ref 12–78)
ANION GAP SERPL CALC-SCNC: 3 MMOL/L — LOW (ref 5–17)
AST SERPL-CCNC: 54 U/L — HIGH (ref 15–37)
BASOPHILS # BLD AUTO: 0.06 K/UL — SIGNIFICANT CHANGE UP (ref 0–0.2)
BASOPHILS NFR BLD AUTO: 0.7 % — SIGNIFICANT CHANGE UP (ref 0–2)
BILIRUB SERPL-MCNC: 0.3 MG/DL — SIGNIFICANT CHANGE UP (ref 0.2–1.2)
BUN SERPL-MCNC: 32 MG/DL — HIGH (ref 7–23)
CALCIUM SERPL-MCNC: 9.2 MG/DL — SIGNIFICANT CHANGE UP (ref 8.5–10.1)
CHLORIDE SERPL-SCNC: 108 MMOL/L — SIGNIFICANT CHANGE UP (ref 96–108)
CO2 SERPL-SCNC: 26 MMOL/L — SIGNIFICANT CHANGE UP (ref 22–31)
CREAT SERPL-MCNC: 1.26 MG/DL — SIGNIFICANT CHANGE UP (ref 0.5–1.3)
EGFR: 62 ML/MIN/1.73M2 — SIGNIFICANT CHANGE UP
EOSINOPHIL # BLD AUTO: 0.02 K/UL — SIGNIFICANT CHANGE UP (ref 0–0.5)
EOSINOPHIL NFR BLD AUTO: 0.2 % — SIGNIFICANT CHANGE UP (ref 0–6)
GLUCOSE SERPL-MCNC: 101 MG/DL — HIGH (ref 70–99)
HCT VFR BLD CALC: 39.1 % — SIGNIFICANT CHANGE UP (ref 39–50)
HGB BLD-MCNC: 12.4 G/DL — LOW (ref 13–17)
IMM GRANULOCYTES NFR BLD AUTO: 0.6 % — SIGNIFICANT CHANGE UP (ref 0–0.9)
LYMPHOCYTES # BLD AUTO: 3.01 K/UL — SIGNIFICANT CHANGE UP (ref 1–3.3)
LYMPHOCYTES # BLD AUTO: 33.1 % — SIGNIFICANT CHANGE UP (ref 13–44)
MAGNESIUM SERPL-MCNC: 2.5 MG/DL — SIGNIFICANT CHANGE UP (ref 1.6–2.6)
MCHC RBC-ENTMCNC: 30.2 PG — SIGNIFICANT CHANGE UP (ref 27–34)
MCHC RBC-ENTMCNC: 31.7 GM/DL — LOW (ref 32–36)
MCV RBC AUTO: 95.1 FL — SIGNIFICANT CHANGE UP (ref 80–100)
MONOCYTES # BLD AUTO: 0.69 K/UL — SIGNIFICANT CHANGE UP (ref 0–0.9)
MONOCYTES NFR BLD AUTO: 7.6 % — SIGNIFICANT CHANGE UP (ref 2–14)
NEUTROPHILS # BLD AUTO: 5.25 K/UL — SIGNIFICANT CHANGE UP (ref 1.8–7.4)
NEUTROPHILS NFR BLD AUTO: 57.8 % — SIGNIFICANT CHANGE UP (ref 43–77)
PHOSPHATE SERPL-MCNC: 4.1 MG/DL — SIGNIFICANT CHANGE UP (ref 2.5–4.5)
PLATELET # BLD AUTO: 279 K/UL — SIGNIFICANT CHANGE UP (ref 150–400)
POTASSIUM SERPL-MCNC: 5.5 MMOL/L — HIGH (ref 3.5–5.3)
POTASSIUM SERPL-SCNC: 5.5 MMOL/L — HIGH (ref 3.5–5.3)
PROT SERPL-MCNC: 7.8 GM/DL — SIGNIFICANT CHANGE UP (ref 6–8.3)
RBC # BLD: 4.11 M/UL — LOW (ref 4.2–5.8)
RBC # FLD: 13.3 % — SIGNIFICANT CHANGE UP (ref 10.3–14.5)
SODIUM SERPL-SCNC: 137 MMOL/L — SIGNIFICANT CHANGE UP (ref 135–145)
TROPONIN I, HIGH SENSITIVITY RESULT: 9.52 NG/L — SIGNIFICANT CHANGE UP
WBC # BLD: 9.08 K/UL — SIGNIFICANT CHANGE UP (ref 3.8–10.5)
WBC # FLD AUTO: 9.08 K/UL — SIGNIFICANT CHANGE UP (ref 3.8–10.5)

## 2023-09-21 PROCEDURE — 99285 EMERGENCY DEPT VISIT HI MDM: CPT | Mod: FS

## 2023-09-21 PROCEDURE — 84100 ASSAY OF PHOSPHORUS: CPT

## 2023-09-21 PROCEDURE — 93005 ELECTROCARDIOGRAM TRACING: CPT

## 2023-09-21 PROCEDURE — 93010 ELECTROCARDIOGRAM REPORT: CPT

## 2023-09-21 PROCEDURE — 85025 COMPLETE CBC W/AUTO DIFF WBC: CPT

## 2023-09-21 PROCEDURE — 99283 EMERGENCY DEPT VISIT LOW MDM: CPT

## 2023-09-21 PROCEDURE — 84484 ASSAY OF TROPONIN QUANT: CPT

## 2023-09-21 PROCEDURE — 80053 COMPREHEN METABOLIC PANEL: CPT

## 2023-09-21 PROCEDURE — 83735 ASSAY OF MAGNESIUM: CPT

## 2023-09-21 PROCEDURE — 36415 COLL VENOUS BLD VENIPUNCTURE: CPT

## 2023-09-21 RX ORDER — SODIUM CHLORIDE 9 MG/ML
1000 INJECTION, SOLUTION INTRAVENOUS ONCE
Refills: 0 | Status: COMPLETED | OUTPATIENT
Start: 2023-09-21 | End: 2023-09-21

## 2023-09-21 RX ADMIN — SODIUM CHLORIDE 1000 MILLILITER(S): 9 INJECTION, SOLUTION INTRAVENOUS at 21:13

## 2023-09-21 NOTE — ED STATDOCS - NSICDXPASTMEDICALHX_GEN_ALL_CORE_FT
PAST MEDICAL HISTORY:  Anxiety     Aortic stenosis Bicuspid Aortic Valve Replacement- 5/29/2018    Campylobacter diarrhea     Carpal tunnel syndrome had surgery    Chronic neck and back pain     Depressive disorder     Erectile dysfunction     Foot drop     Gastrointestinal hemorrhage     GERD (gastroesophageal reflux disease)     H/O small bowel obstruction     Hand fracture, right     Hearing loss     Helicobacter pylori infection     Herniated lumbar intervertebral disc     History of colonic polyps     HTN (hypertension)     Hyperlipidemia     Internal and external prolapsed hemorrhoids had surgery    Loss of hearing     Lumbar radiculopathy     Morbid obesity     Sciatica     Shigella infection     Skin cancer external right ear, unsure of type

## 2023-09-21 NOTE — ED ADULT NURSE NOTE - CAS EDP DISCH TYPE
I will STOP taking the medications listed below when I get home from the hospital:    Lovenox 60 mg/0.6 mL injectable solution  -- 1 dose(s) injectable 2 times a day HOSP  LAST DOSE 9 AM today    Plavix 75 mg oral tablet  -- 1 tab(s) by mouth once a day HOSP    predniSONE  -- 40 milligram(s) by mouth once 4/28  hosp Home

## 2023-09-21 NOTE — ED STATDOCS - OBJECTIVE STATEMENT
69 y/o male with PMHx of foot drop, SBO, campylobacter diarrhea, shigella, campinella, E. Coli, s/p bariatric surgery, s/p appendectomy presents to ED c/o lightheadedness and dizziness for the past few days, worsening today. Reports sitting down relieves symptoms. States dizziness is associated with some shortness of breath. Denies feeling nervous with episodes. No fever, coughs, chest pain, nausea, or vomiting.

## 2023-09-21 NOTE — ED STATDOCS - NSFOLLOWUPINSTRUCTIONS_ED_ALL_ED_FT
Follow up with your primary care doctor and with a neurologist for further evaluation of your symptoms.   Stay hydrated .  Return to the Emergency Department for worsening or persistent symptoms, and/or ANY NEW OR CONCERNING SYMPTOMS. If you have issues obtaining follow up, please call: [phone] or [phone]  to obtain a doctor or specialist who takes your insurance in your area.       Lightheadedness    WHAT YOU NEED TO KNOW:    Lightheadedness is the feeling that you may faint, but you do not. Your heartbeat may be fast or feel like it flutters. Lightheadedness may occur when you take certain medicines, such as medicine to lower your blood pressure. Dehydration, low sodium, low blood sugar, an abnormal heart rhythm, and anxiety are other common causes.    DISCHARGE INSTRUCTIONS:    Return to the emergency department if:    You have sudden chest pain.    You have trouble breathing or shortness of breath.    You have vision changes, are sweating, and have nausea while you are sitting or lying down.    You feel flushed and your heart is fluttering.    You faint.  Contact your healthcare provider if:    You feel lightheaded often.    Your heart beats faster or slower than usual.    You have questions or concerns about your condition or care.  Follow up with your healthcare provider as directed: You may need more tests to help find the cause of your lightheadedness. The tests will help healthcare providers plan the best treatment for you. Write down your questions so you remember to ask them during your visits.    Self-care: Talk with your healthcare provider about these and other ways to manage your symptoms:    Lie down when you feel lightheaded, your throat gets tight, or your vision changes. Raise your legs above the level of your heart.    Stand up slowly. Sit on the side of the bed or couch for a few minutes before you stand up.    Take slow, deep breaths when you feel lightheaded. This can help decrease the feeling that you might faint.    Ask if you need to avoid hot baths and saunas. These may make your symptoms worse.  Watch for signs of low blood sugar: These include hunger, nervousness, sweating, and fast or fluttery heartbeats. Talk with your healthcare provider about ways to keep your blood sugar level steady.    Check your blood pressure often: You should do this especially if you take medicine to lower your blood pressure. Check your blood pressure when you are lying down and when you are standing. Ask how often to check during the day. Keep a record of your blood pressure numbers. Your healthcare provider may use the record to help plan your treatment.    Keep a record of your lightheadedness episodes: Include your symptoms and your activity before and after the episode. The record can help your healthcare provider find the cause of your lightheadedness and help you manage episodes.

## 2023-09-21 NOTE — ED ADULT NURSE NOTE - OBJECTIVE STATEMENT
The patient is a 68y Male complaining of dizziness. Pt endorses light headedness for the past couple of days. Pt denies CP, SOB. Pt is GCS 15, HEENT clear, PERRL, PMS x4, A & O x4. Pt safety Is maintained.

## 2023-09-21 NOTE — ED ADULT TRIAGE NOTE - CHIEF COMPLAINT QUOTE
Pt presents with lightheadedness and dizziness for the past few days. Denies vision changes, HA, chest pain. STAT EKG to be completed.

## 2023-09-21 NOTE — ED STATDOCS - NSICDXPASTSURGICALHX_GEN_ALL_CORE_FT
PAST SURGICAL HISTORY:  H/O bariatric surgery     H/O bicuspid aortic valve replacement- 5/29/18    H/O external ear surgery excision mass right ear- cancer 2018    H/O hemorrhoidectomy     History of appendectomy     History of back surgery laminectomy x2 last 2018    S/P carpal tunnel release right side, left side- 2014    S/P ORIF (open reduction internal fixation) fracture     S/P tonsillectomy     S/P trigger finger release right thumb and middle finger    Spinal cord stimulator status 10/2020    Status post coronary angioplasty pt not sure he had this procedure    Status post laparotomy with lysis of adhesions

## 2023-09-21 NOTE — ED STATDOCS - PROGRESS NOTE DETAILS
69 yo male with a PMH of stools infections, s/p bariatric surgery with Dr. Amor presents with dizziness, lightheadedness, and episodes of SOB. Denies fever, cough, n/v, sick contacts. Will check labs, IVF, ekg, and reeval. -Emir Mcmillan PA-C Labs unremarkable. Pt requesting for food and receiving LR. Informed pt she needs to f/u with pmd and will also give referral for neuro. Pt aware and agree with plan. -Emir Mcmillan PA-C

## 2023-09-21 NOTE — ED ADULT NURSE NOTE - NSFALLRISKINTERV_ED_ALL_ED

## 2023-09-21 NOTE — ED STATDOCS - PATIENT PORTAL LINK FT
You can access the FollowMyHealth Patient Portal offered by [facility] by registering at the following website: [email or web address]. By joining [employer]’s FollowMyHealth portal, you will also be able to view your health information using other applications (apps) compatible with our system.

## 2023-09-21 NOTE — ED STATDOCS - CLINICAL SUMMARY MEDICAL DECISION MAKING FREE TEXT BOX
EKG nonischemic.  No events on tele.  Labs with mild hyperkalemia to 5.5, and elevated BUN, likely reflecting a dehydration.  Creatinine is normal howeve.r  Troponin WNL.  Given IVF with improvement.  D/c home in good condition, f/u with PCP.  Return precautions given.

## 2023-09-22 PROBLEM — Z87.19 PERSONAL HISTORY OF OTHER DISEASES OF THE DIGESTIVE SYSTEM: Chronic | Status: ACTIVE | Noted: 2023-08-10

## 2023-09-22 PROBLEM — S62.91XA UNSPECIFIED FRACTURE OF RIGHT WRIST AND HAND, INITIAL ENCOUNTER FOR CLOSED FRACTURE: Chronic | Status: ACTIVE | Noted: 2023-08-10

## 2023-09-22 PROBLEM — M21.379 FOOT DROP, UNSPECIFIED FOOT: Chronic | Status: ACTIVE | Noted: 2023-08-10

## 2023-10-30 NOTE — ED ADULT NURSE NOTE - NS PRO PASSIVE SMOKE EXP
Giovanna Epstein is a 46 y.o. male who presents today for Follow-up (Testicular hypofunction )  . He has a history of   Patient Active Problem List   Diagnosis    Localized edema    Anemia, unspecified    Poor circulation of extremity    History of testicular cancer    Splenomegaly    Thyroid nodule    Hypertension, unspecified type    Hyperlipidemia, unspecified hyperlipidemia type    Chronic ulcer of right leg, limited to breakdown of skin (HCC)    Fatigue, unspecified type    Abnormal finding on MRI of brain    History of nephrolithiasis    Idiopathic chronic venous hypertension of right lower extremity with ulcer ([address])    Non-pressure chronic ulcer of right ankle with fat layer exposed ([address])    Lung nodule   . Today patient is here for follow-up. he does not have other concerns. Will be moving back to [place]. Family staying here. Will be traveling back and forth. Family has really enjoyed living here. This is a good opportunity for him    Hypogonadism: Patient remains on topical testosterone. Overall feeling well. Hypertension  Hypertension ROS: taking medications as instructed, no medication side effects noted, no TIA's, no chest pain on exertion, no dyspnea on exertion, no swelling of ankles     reports that he has quit smoking. He has never used smokeless tobacco.    reports current alcohol use. BP Readings from Last 2 Encounters:   10/30/23 119/84   10/10/22 128/85     Hyperlipidemia  On statin. ROS: taking medications as instructed, no medication side effects noted  No new myalgias, no joint pains, no weakness  No TIA's, no chest pain on exertion, no dyspnea on exertion, no swelling of ankles. Lab Results   Component Value Date/Time    CHOL 132 08/01/2022 09:32 AM    HDL 38 08/01/2022 09:32 AM    VLDL 22 07/12/2022 08:39 AM     Enlarged spleen. We have been monitoring this. Repeat CT this year. Vascular issues with ulcers of legs have gotten better.   Did have a vascular Unknown

## 2023-11-13 NOTE — ED ADULT NURSE NOTE - NS ED NOTE ABUSE SUSPICION NEGLECT YN
Caller: Unknown Fee    Doctor/Office: Dr. Jonathan Torres    #: [phone]    Escalation: Surgery Patient returning the office's call from 11-10 to discuss upcoming surgery. Please return call.  Thank you No
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
## 2023-12-08 ENCOUNTER — EMERGENCY (EMERGENCY)
Facility: HOSPITAL | Age: 68
LOS: 1 days | Discharge: HOME | End: 2023-12-08
Attending: STUDENT IN AN ORGANIZED HEALTH CARE EDUCATION/TRAINING PROGRAM
Payer: MEDICARE

## 2023-12-08 VITALS
RESPIRATION RATE: 18 BRPM | DIASTOLIC BLOOD PRESSURE: 73 MMHG | SYSTOLIC BLOOD PRESSURE: 134 MMHG | OXYGEN SATURATION: 99 % | TEMPERATURE: 97 F | HEART RATE: 61 BPM

## 2023-12-08 VITALS
WEIGHT: 149.47 LBS | OXYGEN SATURATION: 99 % | SYSTOLIC BLOOD PRESSURE: 94 MMHG | HEIGHT: 66 IN | DIASTOLIC BLOOD PRESSURE: 67 MMHG | HEART RATE: 75 BPM | RESPIRATION RATE: 18 BRPM | TEMPERATURE: 98 F

## 2023-12-08 DIAGNOSIS — Z96.89 PRESENCE OF OTHER SPECIFIED FUNCTIONAL IMPLANTS: Chronic | ICD-10-CM

## 2023-12-08 DIAGNOSIS — Z98.84 BARIATRIC SURGERY STATUS: Chronic | ICD-10-CM

## 2023-12-08 DIAGNOSIS — Z87.74 PERSONAL HISTORY OF (CORRECTED) CONGENITAL MALFORMATIONS OF HEART AND CIRCULATORY SYSTEM: Chronic | ICD-10-CM

## 2023-12-08 DIAGNOSIS — Z98.61 CORONARY ANGIOPLASTY STATUS: Chronic | ICD-10-CM

## 2023-12-08 DIAGNOSIS — Z98.890 OTHER SPECIFIED POSTPROCEDURAL STATES: Chronic | ICD-10-CM

## 2023-12-08 DIAGNOSIS — Z90.49 ACQUIRED ABSENCE OF OTHER SPECIFIED PARTS OF DIGESTIVE TRACT: Chronic | ICD-10-CM

## 2023-12-08 DIAGNOSIS — Z90.89 ACQUIRED ABSENCE OF OTHER ORGANS: Chronic | ICD-10-CM

## 2023-12-08 DIAGNOSIS — Z98.89 OTHER SPECIFIED POSTPROCEDURAL STATES: Chronic | ICD-10-CM

## 2023-12-08 LAB
ALBUMIN SERPL ELPH-MCNC: 4.2 G/DL — SIGNIFICANT CHANGE UP (ref 3.3–5.2)
ALBUMIN SERPL ELPH-MCNC: 4.2 G/DL — SIGNIFICANT CHANGE UP (ref 3.3–5.2)
ALP SERPL-CCNC: 124 U/L — HIGH (ref 40–120)
ALP SERPL-CCNC: 124 U/L — HIGH (ref 40–120)
ALT FLD-CCNC: 14 U/L — SIGNIFICANT CHANGE UP
ALT FLD-CCNC: 14 U/L — SIGNIFICANT CHANGE UP
ANION GAP SERPL CALC-SCNC: 13 MMOL/L — SIGNIFICANT CHANGE UP (ref 5–17)
ANION GAP SERPL CALC-SCNC: 13 MMOL/L — SIGNIFICANT CHANGE UP (ref 5–17)
ANION GAP SERPL CALC-SCNC: 16 MMOL/L — SIGNIFICANT CHANGE UP (ref 5–17)
ANION GAP SERPL CALC-SCNC: 16 MMOL/L — SIGNIFICANT CHANGE UP (ref 5–17)
AST SERPL-CCNC: 25 U/L — SIGNIFICANT CHANGE UP
AST SERPL-CCNC: 25 U/L — SIGNIFICANT CHANGE UP
BASOPHILS # BLD AUTO: 0.05 K/UL — SIGNIFICANT CHANGE UP (ref 0–0.2)
BASOPHILS # BLD AUTO: 0.05 K/UL — SIGNIFICANT CHANGE UP (ref 0–0.2)
BASOPHILS NFR BLD AUTO: 0.7 % — SIGNIFICANT CHANGE UP (ref 0–2)
BASOPHILS NFR BLD AUTO: 0.7 % — SIGNIFICANT CHANGE UP (ref 0–2)
BILIRUB SERPL-MCNC: <0.2 MG/DL — LOW (ref 0.4–2)
BILIRUB SERPL-MCNC: <0.2 MG/DL — LOW (ref 0.4–2)
BUN SERPL-MCNC: 30.3 MG/DL — HIGH (ref 8–20)
BUN SERPL-MCNC: 30.3 MG/DL — HIGH (ref 8–20)
BUN SERPL-MCNC: 34.3 MG/DL — HIGH (ref 8–20)
BUN SERPL-MCNC: 34.3 MG/DL — HIGH (ref 8–20)
C DIFF BY PCR RESULT: SIGNIFICANT CHANGE UP
C DIFF BY PCR RESULT: SIGNIFICANT CHANGE UP
CALCIUM SERPL-MCNC: 8.3 MG/DL — LOW (ref 8.4–10.5)
CALCIUM SERPL-MCNC: 8.3 MG/DL — LOW (ref 8.4–10.5)
CALCIUM SERPL-MCNC: 9.2 MG/DL — SIGNIFICANT CHANGE UP (ref 8.4–10.5)
CALCIUM SERPL-MCNC: 9.2 MG/DL — SIGNIFICANT CHANGE UP (ref 8.4–10.5)
CHLORIDE SERPL-SCNC: 103 MMOL/L — SIGNIFICANT CHANGE UP (ref 96–108)
CHLORIDE SERPL-SCNC: 103 MMOL/L — SIGNIFICANT CHANGE UP (ref 96–108)
CHLORIDE SERPL-SCNC: 107 MMOL/L — SIGNIFICANT CHANGE UP (ref 96–108)
CHLORIDE SERPL-SCNC: 107 MMOL/L — SIGNIFICANT CHANGE UP (ref 96–108)
CO2 SERPL-SCNC: 20 MMOL/L — LOW (ref 22–29)
CO2 SERPL-SCNC: 20 MMOL/L — LOW (ref 22–29)
CO2 SERPL-SCNC: 21 MMOL/L — LOW (ref 22–29)
CO2 SERPL-SCNC: 21 MMOL/L — LOW (ref 22–29)
CREAT SERPL-MCNC: 1.27 MG/DL — SIGNIFICANT CHANGE UP (ref 0.5–1.3)
CREAT SERPL-MCNC: 1.27 MG/DL — SIGNIFICANT CHANGE UP (ref 0.5–1.3)
CREAT SERPL-MCNC: 1.5 MG/DL — HIGH (ref 0.5–1.3)
CREAT SERPL-MCNC: 1.5 MG/DL — HIGH (ref 0.5–1.3)
EGFR: 50 ML/MIN/1.73M2 — LOW
EGFR: 50 ML/MIN/1.73M2 — LOW
EGFR: 62 ML/MIN/1.73M2 — SIGNIFICANT CHANGE UP
EGFR: 62 ML/MIN/1.73M2 — SIGNIFICANT CHANGE UP
EOSINOPHIL # BLD AUTO: 0.01 K/UL — SIGNIFICANT CHANGE UP (ref 0–0.5)
EOSINOPHIL # BLD AUTO: 0.01 K/UL — SIGNIFICANT CHANGE UP (ref 0–0.5)
EOSINOPHIL NFR BLD AUTO: 0.1 % — SIGNIFICANT CHANGE UP (ref 0–6)
EOSINOPHIL NFR BLD AUTO: 0.1 % — SIGNIFICANT CHANGE UP (ref 0–6)
GLUCOSE SERPL-MCNC: 102 MG/DL — HIGH (ref 70–99)
GLUCOSE SERPL-MCNC: 102 MG/DL — HIGH (ref 70–99)
GLUCOSE SERPL-MCNC: 91 MG/DL — SIGNIFICANT CHANGE UP (ref 70–99)
GLUCOSE SERPL-MCNC: 91 MG/DL — SIGNIFICANT CHANGE UP (ref 70–99)
HCT VFR BLD CALC: 39.9 % — SIGNIFICANT CHANGE UP (ref 39–50)
HCT VFR BLD CALC: 39.9 % — SIGNIFICANT CHANGE UP (ref 39–50)
HGB BLD-MCNC: 12.9 G/DL — LOW (ref 13–17)
HGB BLD-MCNC: 12.9 G/DL — LOW (ref 13–17)
IMM GRANULOCYTES NFR BLD AUTO: 0.9 % — SIGNIFICANT CHANGE UP (ref 0–0.9)
IMM GRANULOCYTES NFR BLD AUTO: 0.9 % — SIGNIFICANT CHANGE UP (ref 0–0.9)
LIDOCAIN IGE QN: 62 U/L — HIGH (ref 22–51)
LIDOCAIN IGE QN: 62 U/L — HIGH (ref 22–51)
LYMPHOCYTES # BLD AUTO: 2.98 K/UL — SIGNIFICANT CHANGE UP (ref 1–3.3)
LYMPHOCYTES # BLD AUTO: 2.98 K/UL — SIGNIFICANT CHANGE UP (ref 1–3.3)
LYMPHOCYTES # BLD AUTO: 44 % — SIGNIFICANT CHANGE UP (ref 13–44)
LYMPHOCYTES # BLD AUTO: 44 % — SIGNIFICANT CHANGE UP (ref 13–44)
MCHC RBC-ENTMCNC: 28 PG — SIGNIFICANT CHANGE UP (ref 27–34)
MCHC RBC-ENTMCNC: 28 PG — SIGNIFICANT CHANGE UP (ref 27–34)
MCHC RBC-ENTMCNC: 32.3 GM/DL — SIGNIFICANT CHANGE UP (ref 32–36)
MCHC RBC-ENTMCNC: 32.3 GM/DL — SIGNIFICANT CHANGE UP (ref 32–36)
MCV RBC AUTO: 86.7 FL — SIGNIFICANT CHANGE UP (ref 80–100)
MCV RBC AUTO: 86.7 FL — SIGNIFICANT CHANGE UP (ref 80–100)
MONOCYTES # BLD AUTO: 0.89 K/UL — SIGNIFICANT CHANGE UP (ref 0–0.9)
MONOCYTES # BLD AUTO: 0.89 K/UL — SIGNIFICANT CHANGE UP (ref 0–0.9)
MONOCYTES NFR BLD AUTO: 13.1 % — SIGNIFICANT CHANGE UP (ref 2–14)
MONOCYTES NFR BLD AUTO: 13.1 % — SIGNIFICANT CHANGE UP (ref 2–14)
NEUTROPHILS # BLD AUTO: 2.79 K/UL — SIGNIFICANT CHANGE UP (ref 1.8–7.4)
NEUTROPHILS # BLD AUTO: 2.79 K/UL — SIGNIFICANT CHANGE UP (ref 1.8–7.4)
NEUTROPHILS NFR BLD AUTO: 41.2 % — LOW (ref 43–77)
NEUTROPHILS NFR BLD AUTO: 41.2 % — LOW (ref 43–77)
PLATELET # BLD AUTO: 381 K/UL — SIGNIFICANT CHANGE UP (ref 150–400)
PLATELET # BLD AUTO: 381 K/UL — SIGNIFICANT CHANGE UP (ref 150–400)
POTASSIUM SERPL-MCNC: 3.8 MMOL/L — SIGNIFICANT CHANGE UP (ref 3.5–5.3)
POTASSIUM SERPL-MCNC: 3.8 MMOL/L — SIGNIFICANT CHANGE UP (ref 3.5–5.3)
POTASSIUM SERPL-MCNC: 4 MMOL/L — SIGNIFICANT CHANGE UP (ref 3.5–5.3)
POTASSIUM SERPL-MCNC: 4 MMOL/L — SIGNIFICANT CHANGE UP (ref 3.5–5.3)
POTASSIUM SERPL-SCNC: 3.8 MMOL/L — SIGNIFICANT CHANGE UP (ref 3.5–5.3)
POTASSIUM SERPL-SCNC: 3.8 MMOL/L — SIGNIFICANT CHANGE UP (ref 3.5–5.3)
POTASSIUM SERPL-SCNC: 4 MMOL/L — SIGNIFICANT CHANGE UP (ref 3.5–5.3)
POTASSIUM SERPL-SCNC: 4 MMOL/L — SIGNIFICANT CHANGE UP (ref 3.5–5.3)
PROT SERPL-MCNC: 7.9 G/DL — SIGNIFICANT CHANGE UP (ref 6.6–8.7)
PROT SERPL-MCNC: 7.9 G/DL — SIGNIFICANT CHANGE UP (ref 6.6–8.7)
RBC # BLD: 4.6 M/UL — SIGNIFICANT CHANGE UP (ref 4.2–5.8)
RBC # BLD: 4.6 M/UL — SIGNIFICANT CHANGE UP (ref 4.2–5.8)
RBC # FLD: 13.4 % — SIGNIFICANT CHANGE UP (ref 10.3–14.5)
RBC # FLD: 13.4 % — SIGNIFICANT CHANGE UP (ref 10.3–14.5)
SODIUM SERPL-SCNC: 139 MMOL/L — SIGNIFICANT CHANGE UP (ref 135–145)
SODIUM SERPL-SCNC: 139 MMOL/L — SIGNIFICANT CHANGE UP (ref 135–145)
SODIUM SERPL-SCNC: 140 MMOL/L — SIGNIFICANT CHANGE UP (ref 135–145)
SODIUM SERPL-SCNC: 140 MMOL/L — SIGNIFICANT CHANGE UP (ref 135–145)
WBC # BLD: 6.78 K/UL — SIGNIFICANT CHANGE UP (ref 3.8–10.5)
WBC # BLD: 6.78 K/UL — SIGNIFICANT CHANGE UP (ref 3.8–10.5)
WBC # FLD AUTO: 6.78 K/UL — SIGNIFICANT CHANGE UP (ref 3.8–10.5)
WBC # FLD AUTO: 6.78 K/UL — SIGNIFICANT CHANGE UP (ref 3.8–10.5)

## 2023-12-08 PROCEDURE — 87507 IADNA-DNA/RNA PROBE TQ 12-25: CPT

## 2023-12-08 PROCEDURE — 36415 COLL VENOUS BLD VENIPUNCTURE: CPT

## 2023-12-08 PROCEDURE — 99285 EMERGENCY DEPT VISIT HI MDM: CPT

## 2023-12-08 PROCEDURE — 99284 EMERGENCY DEPT VISIT MOD MDM: CPT | Mod: 25

## 2023-12-08 PROCEDURE — 74177 CT ABD & PELVIS W/CONTRAST: CPT | Mod: 26,MA

## 2023-12-08 PROCEDURE — 80048 BASIC METABOLIC PNL TOTAL CA: CPT

## 2023-12-08 PROCEDURE — 87493 C DIFF AMPLIFIED PROBE: CPT

## 2023-12-08 PROCEDURE — 83690 ASSAY OF LIPASE: CPT

## 2023-12-08 PROCEDURE — 80053 COMPREHEN METABOLIC PANEL: CPT

## 2023-12-08 PROCEDURE — 99222 1ST HOSP IP/OBS MODERATE 55: CPT

## 2023-12-08 PROCEDURE — 85025 COMPLETE CBC W/AUTO DIFF WBC: CPT

## 2023-12-08 PROCEDURE — 74177 CT ABD & PELVIS W/CONTRAST: CPT | Mod: MA

## 2023-12-08 RX ORDER — METRONIDAZOLE 500 MG
500 TABLET ORAL ONCE
Refills: 0 | Status: COMPLETED | OUTPATIENT
Start: 2023-12-08 | End: 2023-12-08

## 2023-12-08 RX ORDER — SODIUM CHLORIDE 9 MG/ML
500 INJECTION INTRAMUSCULAR; INTRAVENOUS; SUBCUTANEOUS ONCE
Refills: 0 | Status: COMPLETED | OUTPATIENT
Start: 2023-12-08 | End: 2023-12-08

## 2023-12-08 RX ORDER — CIPROFLOXACIN LACTATE 400MG/40ML
500 VIAL (ML) INTRAVENOUS ONCE
Refills: 0 | Status: COMPLETED | OUTPATIENT
Start: 2023-12-08 | End: 2023-12-08

## 2023-12-08 RX ORDER — CIPROFLOXACIN LACTATE 400MG/40ML
1 VIAL (ML) INTRAVENOUS
Qty: 14 | Refills: 0
Start: 2023-12-08 | End: 2023-12-14

## 2023-12-08 RX ORDER — IOHEXOL 300 MG/ML
30 INJECTION, SOLUTION INTRAVENOUS ONCE
Refills: 0 | Status: COMPLETED | OUTPATIENT
Start: 2023-12-08 | End: 2023-12-08

## 2023-12-08 RX ORDER — METRONIDAZOLE 500 MG
1 TABLET ORAL
Qty: 21 | Refills: 0
Start: 2023-12-08 | End: 2023-12-14

## 2023-12-08 RX ORDER — SODIUM CHLORIDE 9 MG/ML
1000 INJECTION INTRAMUSCULAR; INTRAVENOUS; SUBCUTANEOUS ONCE
Refills: 0 | Status: COMPLETED | OUTPATIENT
Start: 2023-12-08 | End: 2023-12-08

## 2023-12-08 RX ADMIN — Medication 500 MILLIGRAM(S): at 19:06

## 2023-12-08 RX ADMIN — SODIUM CHLORIDE 1000 MILLILITER(S): 9 INJECTION INTRAMUSCULAR; INTRAVENOUS; SUBCUTANEOUS at 13:55

## 2023-12-08 RX ADMIN — IOHEXOL 30 MILLILITER(S): 300 INJECTION, SOLUTION INTRAVENOUS at 13:55

## 2023-12-08 RX ADMIN — SODIUM CHLORIDE 500 MILLILITER(S): 9 INJECTION INTRAMUSCULAR; INTRAVENOUS; SUBCUTANEOUS at 15:51

## 2023-12-08 NOTE — CONSULT NOTE ADULT - ASSESSMENT
69 yo M with history of bariatric surgery presenting with diarrhea and colitis.  No evidence of post-surgical complications.   HDN normal.    Plan:  No bariatric surgery indicated  Recommend to follow with his bariatric surgeon or us yearly  Dispo pe ED

## 2023-12-08 NOTE — CONSULT NOTE ADULT - SUBJECTIVE AND OBJECTIVE BOX
SURGERY CONSULT  ==============================================================================================================  HPI: 68y Male with history of RNYGB presents to the ED complining of diarrhea and occasional abdominal cramp.   Currently denies abdominal pain.  Denies fevers, chills, nausea, emesis.  Denies chest pain, dyspnea.  Denies constipation. Denies headaches, dizziness, changes in vision.   Surgery consulted for bariatric protocol.  CT abdomen with pan colitis. No bariatric complication.       PAST MEDICAL & SURGICAL HISTORY:  Hyperlipidemia      GERD (gastroesophageal reflux disease)      Anxiety      Skin cancer  external right ear, unsure of type      HTN (hypertension)      Aortic stenosis  Bicuspid Aortic Valve Replacement- 5/29/2018      Chronic neck and back pain      Sciatica      Internal and external prolapsed hemorrhoids  had surgery      Helicobacter pylori infection      History of colonic polyps      Herniated lumbar intervertebral disc      Gastrointestinal hemorrhage      Carpal tunnel syndrome  had surgery      Depressive disorder      Loss of hearing      Morbid obesity      Lumbar radiculopathy      Hearing loss      Erectile dysfunction      Shigella infection      Campylobacter diarrhea      H/O small bowel obstruction      Foot drop      Hand fracture, right      S/P carpal tunnel release  right side, left side- 2014      History of back surgery  laminectomy x2 last 2018      S/P trigger finger release  right thumb and middle finger      H/O external ear surgery  excision mass right ear- cancer 2018      H/O bicuspid aortic valve  replacement- 5/29/18      Status post coronary angioplasty  pt not sure he had this procedure      S/P tonsillectomy      H/O hemorrhoidectomy      Spinal cord stimulator status  10/2020      H/O bariatric surgery      Status post laparotomy with lysis of adhesions      History of appendectomy      S/P ORIF (open reduction internal fixation) fracture        Home Meds: Home Medications:  Aspirin Enteric Coated 81 mg oral delayed release tablet: 1 tab(s) orally once a day (15 Aug 2023 07:02)  atorvastatin 10 mg oral tablet: 1 tab(s) orally once a day (in the morning) (15 Aug 2023 07:02)  buPROPion 100 mg oral tablet: 1 tab(s) orally 3 times a day (20 Aug 2023 13:35)  cyanocobalamin 1000 mcg oral tablet: 1 tab(s) orally once a day (15 Aug 2023 07:02)  Descovy 200 mg-25 mg oral tablet: 1 tab(s) orally once a day (15 Aug 2023 07:02)  esomeprazole 40 mg oral delayed release capsule: 1 orally once a day (15 Aug 2023 07:02)  gabapentin 300 mg oral capsule: 1 cap(s) orally 2 times a day (15 Aug 2023 07:02)  levothyroxine 25 mcg (0.025 mg) oral tablet: 1 tab(s) orally once a day (15 Aug 2023 07:02)  metoprolol tartrate 25 mg oral tablet: 0.5 tab(s) orally once a day (15 Aug 2023 07:02)  Multiple Vitamins oral tablet: 1 tab(s) orally once a day (15 Aug 2023 07:02)  Omega-3 1000 mg oral capsule: 1 cap(s) orally once a day (15 Aug 2023 07:02)  oxycodone-acetaminophen 5 mg-325 mg oral tablet: 1 tab(s) orally 4 times a day as needed for pain (15 Aug 2023 07:02)  Probiotic Formula oral capsule: 1 cap(s) orally once a day (15 Aug 2023 07:02)  Tylenol 325 mg oral capsule: 1 orally prn (15 Aug 2023 07:02)  venlafaxine 75 mg oral tablet: 1 tab(s) orally 2 times a day (15 Aug 2023 07:02)  Vitamin D3 25 mcg (1000 intl units) oral capsule: 1 cap(s) orally once a day (15 Aug 2023 07:02)    Allergies: Allergies    No Known Allergies    Intolerances      Soc:   Advanced Directives: Presumed Full Code     CURRENT MEDICATIONS:   --------------------------------------------------------------------------------------  Neurologic Medications    Respiratory Medications    Cardiovascular Medications    Gastrointestinal Medications    Genitourinary Medications    Hematologic/Oncologic Medications    Antimicrobial/Immunologic Medications    Endocrine/Metabolic Medications    Topical/Other Medications    --------------------------------------------------------------------------------------    VITAL SIGNS, INS/OUTS (last 24 hours):  --------------------------------------------------------------------------------------  ICU Vital Signs Last 24 Hrs  T(C): 36.3 (08 Dec 2023 15:31), Max: 36.4 (08 Dec 2023 12:03)  T(F): 97.4 (08 Dec 2023 15:31), Max: 97.5 (08 Dec 2023 12:03)  HR: 61 (08 Dec 2023 15:31) (61 - 75)  BP: 116/74 (08 Dec 2023 15:31) (94/67 - 116/74)  BP(mean): --  ABP: --  ABP(mean): --  RR: 18 (08 Dec 2023 15:31) (18 - 18)  SpO2: 99% (08 Dec 2023 15:31) (99% - 99%)    O2 Parameters below as of 08 Dec 2023 15:31  Patient On (Oxygen Delivery Method): room air          I&O's Summary    --------------------------------------------------------------------------------------    PHYSICAL EXAM:  GENERAL: NAD, well-groomed, well-developed  HEAD:  Atraumatic, Normocephalic  EYES: EOMI, PERRLA, conjunctiva and sclera clear  NECK: Supple, No JVD  CHEST/LUNG: non-labored breathing on room air.   HEART: Regular rate and rhythm; S1/S2  ABDOMEN: Soft, Nontender, Nondistended  VASCULAR: Normal pulses, Normal capillary refill  EXTREMITIES:  2+ Peripheral Pulses, No cyanosis, No edema  SKIN: Warm, Intact     LABS  --------------------------------------------------------------------------------------  Labs:  CAPILLARY BLOOD GLUCOSE                              12.9   6.78  )-----------( 381      ( 08 Dec 2023 13:15 )             39.9       Auto Neutrophil %: 41.2 % (12-08-23 @ 13:15)  Auto Immature Granulocyte %: 0.9 % (12-08-23 @ 13:15)    12-08    140  |  107  |  30.3<H>  ----------------------------<  91  3.8   |  20.0<L>  |  1.27      Calcium: 8.3 mg/dL (12-08-23 @ 17:25)      LFTs:             7.9  | <0.2 | 25       ------------------[124     ( 08 Dec 2023 13:15 )  4.2  | x    | 14          Lipase:62     Amylase:x             Coags:            Urinalysis Basic - ( 08 Dec 2023 17:25 )    Color: x / Appearance: x / SG: x / pH: x  Gluc: 91 mg/dL / Ketone: x  / Bili: x / Urobili: x   Blood: x / Protein: x / Nitrite: x   Leuk Esterase: x / RBC: x / WBC x   Sq Epi: x / Non Sq Epi: x / Bacteria: x          --------------------------------------------------------------------------------------

## 2023-12-08 NOTE — ED STATDOCS - CLINICAL SUMMARY MEDICAL DECISION MAKING FREE TEXT BOX
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 Will send stool cultures due to Hx of colonizing, CT to r/o intraabdominal pathologies/infection. Treat symptomatically. 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 Will send stool cultures due to Hx of colonizing, CT to r/o intraabdominal pathologies/infection. Treat symptomatically. 69 y/o male with PMHx of HLD, GERD, bariatric surgery, aortic valve repair, and HTN presents to ED c/o diarrhea every hour, no abdominal pain, nausea, fevers or antibiotic use. Will send stool cultures due to Hx of colonizing, CT to r/o intraabdominal pathologies/infection. Treat symptomatically. 67 y/o male with PMHx of HLD, GERD, bariatric surgery, aortic valve repair, and HTN presents to ED c/o diarrhea every hour, no abdominal pain, nausea, fevers or antibiotic use. Will send stool cultures due to Hx of colonizing, CT to r/o intraabdominal pathologies/infection. Treat symptomatically.

## 2023-12-08 NOTE — ED STATDOCS - NSFOLLOWUPINSTRUCTIONS_ED_ALL_ED_FT
Take antibiotics as prescribed  : Follow-up with your bariatric surgery in 2-3 days    - call you back for stool culture if any positive for any type of bacteria growing  - come back in the emergency room if any bloody diarrhea, fever, severe abdominal pain, nausea or vomiting, or any new concerns  -  stay well-hydrated    Colitis       Colitis is inflammation of the colon. Colitis may last a short time (be acute), or it may last a long time (become chronic).    What are the causes?  This condition may be caused by:    Viruses.  Bacteria.  Reaction to medicine.  Certain autoimmune diseases such as Crohn's disease or ulcerative colitis.  Radiation treatment.  Decreased blood flow to the bowel (ischemia).    What are the signs or symptoms?  Symptoms of this condition include:    Watery diarrhea.  Passing bloody or tarry stool.  Pain.  Fever.  Vomiting.  Tiredness (fatigue).  Weight loss.  Bloating.  Abdominal pain.  Having fewer bowel movements than usual.  A strong and sudden urge to have a bowel movement.  Feeling like the bowel is not empty after a bowel movement.    How is this diagnosed?  This condition is diagnosed with a stool test or a blood test.    You may also have other tests, such as:    X-rays.  CT scan.  Colonoscopy.  Endoscopy.  Biopsy.    How is this treated?  Treatment for this condition depends on the cause. The condition may be treated by:    Resting the bowel. This involves not eating or drinking for a period of time.  Fluids that are given through an IV.  Medicine for pain and diarrhea.  Antibiotic medicines.  Cortisone medicines.  Surgery.    Follow these instructions at home:      Eating and drinking     Follow instructions from your health care provider about eating or drinking restrictions.  Drink enough fluid to keep your urine pale yellow.  Work with a dietitian to determine which foods cause your condition to flare up.  Avoid foods that cause flare-ups.  Eat a well-balanced diet.        General instructions    If you were prescribed an antibiotic medicine, take it as told by your health care provider. Do not stop taking the antibiotic even if you start to feel better.  Take over-the-counter and prescription medicines only as told by your health care provider.  Keep all follow-up visits as told by your health care provider. This is important.    Contact a health care provider if:  Your symptoms do not go away.  You develop new symptoms.    Get help right away if you:  Have a fever that does not go away with treatment.  Develop chills.  Have extreme weakness, fainting, or dehydration.  Have repeated vomiting.  Develop severe pain in your abdomen.  Pass bloody or tarry stool.    Summary  Colitis is inflammation of the colon. Colitis may last a short time (be acute), or it may last a long time (become chronic).  Treatment for this condition depends on the cause and may include resting the bowel, taking medicines, or having surgery.  If you were prescribed an antibiotic medicine, take it as told by your health care provider. Do not stop taking the antibiotic even if you start to feel better.  Get help right away if you develop severe pain in your abdomen.  Keep all follow-up visits as told by your health care provider. This is important.    ADDITIONAL NOTES AND INSTRUCTIONS    Please follow up with your Primary MD in 24-48 hr.  Seek immediate medical care for any new/worsening signs or symptoms.

## 2023-12-08 NOTE — ED ADULT NURSE NOTE - NSFALLRISKASMTTYPE_ED_ALL_ED
Pt is quiet and cooperative with no problems noted, he has even respirations     Jennifer Escamilla, CHRISTINE  05/04/19 5805 Initial (On Arrival)

## 2023-12-08 NOTE — ED STATDOCS - OBJECTIVE STATEMENT
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 No sick contacts that pt is aware of. No blood in BM. Pt denies  ha, loc, focal neuro deficits, cp/sob/palp, cough, urinary symptoms, or recent travel. 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 No sick contacts that pt is aware of. No blood in BM. Pt denies  ha, loc, focal neuro deficits, cp/sob/palp, cough, urinary symptoms, or recent travel. 67 y/o male with PMHx of HLD, GERD, bariatric surgery, aortic valve repair, and HTN presents to ED c/o diarrhea every hour, no abdominal pain, nausea, fevers or antibiotic use. No sick contacts that pt is aware of. No blood in BM. Pt denies  ha, loc, focal neuro deficits, cp/sob/palp, cough, urinary symptoms, or recent travel. 69 y/o male with PMHx of HLD, GERD, bariatric surgery, aortic valve repair, and HTN presents to ED c/o diarrhea every hour, no abdominal pain, nausea, fevers or antibiotic use. No sick contacts that pt is aware of. No blood in BM. Pt denies  ha, loc, focal neuro deficits, cp/sob/palp, cough, urinary symptoms, or recent travel.

## 2023-12-08 NOTE — ED ADULT NURSE NOTE - NSFALLUNIVINTERV_ED_ALL_ED
Bed/Stretcher in lowest position, wheels locked, appropriate side rails in place/Call bell, personal items and telephone in reach/Instruct patient to call for assistance before getting out of bed/chair/stretcher/Non-slip footwear applied when patient is off stretcher/[place] to call system/Physically safe environment - no spills, clutter or unnecessary equipment/Purposeful proactive rounding/Room/bathroom lighting operational, light cord in reach Bed/Stretcher in lowest position, wheels locked, appropriate side rails in place/Call bell, personal items and telephone in reach/Instruct patient to call for assistance before getting out of bed/chair/stretcher/Non-slip footwear applied when patient is off stretcher/[place] to call system/Physically safe environment - no spills, clutter or unnecessary equipment/Purposeful proactive rounding/Room/bathroom lighting operational, light cord in reach

## 2023-12-08 NOTE — ED ADULT TRIAGE NOTE - CHIEF COMPLAINT QUOTE
Pt with lower abdominal cramping and diarrhea for the last 5 days. Has h/o Shigella and states it feels the same as previous infections.

## 2023-12-08 NOTE — CONSULT NOTE ADULT - ATTENDING COMMENTS
Patient seen and examined on evening of 12/8 with on call surgery resident   No surgical intervention indicated  Recommend patient followup with his bariatric surgeon (Dr. Amor) or with me for standard post bariatric surgery followup

## 2023-12-08 NOTE — ED STATDOCS - PATIENT PORTAL LINK FT
You can access the FollowMyHealth Patient Portal offered by [facility] by registering at the following website: [email or web address]. By joining [employer]’s FollowMyHealth portal, you will also be able to view your health information using other applications (apps) compatible with our system. You can access the FollowMyHealth Patient Portal offered by [facility] by registering at the following website: [email or web address]. By joining [employer]’s FollowMyHealth portal, you will also be able to view your health information using other applications (apps) compatible with our system.

## 2023-12-08 NOTE — ED STATDOCS - PROGRESS NOTE DETAILS
Given the significant and immediate threats to this patient based on initial presentation, the benefits of emergency contrast-enhanced CT imaging without obtaining GFR/creatinine serum level results greatly outweigh the potential risk of harm due to contrast-induced nephropathy. Pt is receiving fluids in ER likely dehydrations Given the significant and immediate threats to this patient based on initial presentation, the benefits of emergency contrast-enhanced CT imaging with elevated  creatinine serum level results greatly outweigh the potential risk of harm due to contrast-induced nephropathy. pt likely has dehydration due to diarrhea Repeated BMP with improved it kidney function   suggested consistent with pancolitis called surgery consult for the patient. as per surgery state making and takes couple hours come see the patient. Pt states her daughter is PA she tried to send him ciprofloxacin for his symptoms. will tx the pt with cipro and flagyl for. patient tolerating liquid very well Repeated BMP with improved it kidney function   suggested consistent with pancolitis called surgery consult for the patient. as per surgery state making and takes couple hours come see the patient. Pt states her daughter is PA she tried to send him ciprofloxacin for his symptoms. will tx the pt with cipro and flagyl for. patient tolerating liquid very well  +hx of shigella on his fecal before as per pt Pt is see by Surgery team .no further workup need  f.u with surgeon

## 2023-12-09 LAB
CAMPYLOBACTER DNA SPEC NAA+PROBE: DETECTED
CAMPYLOBACTER DNA SPEC NAA+PROBE: DETECTED
GI PCR PANEL: DETECTED
GI PCR PANEL: DETECTED

## 2024-01-06 NOTE — H&P PST ADULT - NSCAFFEINEWITH_GEN_ALL_CORE_SD
POC reviewed with mom and dad at bedside. All questions encouraged and answered. Emotional support provided.     [RESP] Pt remains intubated and mechanically ventilated. FiO2 weaned per MD as ordered. ABGs spaced to q6h. Frequently suctioning thick, cloudy, white secretions from ETT, respiratory treatments continued as ordered.     [NEURO] Afebrile. Tmax. PRN Fentanyl x3 for discomfort, moderate relief noted. Dex drip remains unchanged. Neuro checks continued q2h, remains @ neuro baseline.     [CV] Line heparin remains @ 10u/kg. Significant gradient noted between art line and cuff pressures, MD aware, no changes at this time.     [GI/] UOP adequate. No BM this shift. Pt remains NPO, TPN/IL infusing as ordered.     See eMAR and flowsheets for details.   none

## 2024-01-13 NOTE — H&P PST ADULT - PRIMARY CARE PROVIDER
Bed locked, in lowest position. Call bell in reach. Purposeful rounding done. Cardiac monitoring in use. Chart check complete. Will continue with plan of care.   Dr. Iban Romero [phone]

## 2024-01-26 ENCOUNTER — APPOINTMENT (OUTPATIENT)
Dept: FAMILY MEDICINE | Facility: CLINIC | Age: 69
End: 2024-01-26

## 2024-02-02 NOTE — ED STATDOCS - NSDCPRINTRESULTS_ED_ALL_ED
Patient complaining of right dental pain x2 days
Patient requests all Lab, Cardiology, and Radiology Results on their Discharge Instructions

## 2024-07-02 NOTE — ED STATDOCS - CARDIAC, MLM
No gross diabetic retinopathy is noted in either eye. Mild macular drusen is present in both eyes. Advise dilated fundus exam in 12 months. 
normal rate, regular rhythm, and no murmur.

## 2024-08-21 NOTE — ASU PREOP CHECKLIST - ALLERGY BAND ON
August 21, 2024     Patient: Emerita Lopez   YOB: 2007   Date of Visit: 8/21/2024       To Whom it May Concern:    Emerita Lopez was seen in my clinic on 8/21/2024 at 9:00 am.     Please excuse Emerita for her tardiness from school on the date listed above to be able to make her appointment.    Sincerely,       Brianne Garcia, LPC    Medical information is confidential and cannot be disclosed without the written consent of the patient or her representative.      
no known allergies

## 2024-09-07 NOTE — DISCHARGE NOTE NURSING/CASE MANAGEMENT/SOCIAL WORK - NSDCPEPAMP_GEN_ALL_CORE
“[facility] for Tobacco Control” pamphlet given CHEST TIGHTNESS/SHORTNESS OF BREATH

## 2024-10-28 NOTE — ED STATDOCS - DISPOSITION TYPE
ED Provider Note    CHIEF COMPLAINT  Chief Complaint   Patient presents with    N/V       EXTERNAL RECORDS REVIEWED  Inpatient Notes discharge summary    HPI/ROS  LIMITATION TO HISTORY   Select: : None  OUTSIDE HISTORIAN(S):  Family mother    Odalys Negron is a 10 y.o. female who presents with 1 day of nausea, recurrent vomiting, had abdominal pain earlier in the evening but this is since resolved, other notes her sister has been sick with upper respiratory symptoms over the past 3 days.  The patient had been doing well as an outpatient, she is awaiting nephrology follow-up after she was admitted for uncontrolled hypertension, received a work-up which was notable for a positive MATT.  She has seen her primary care doctor however no further work-up has been performed up until now.  Mother reports she has not been needing as needed amlodipine at home, as her blood pressure systolic has been less than 110 however yesterday it started climbing. Pt denies any complaint at this time.     PAST MEDICAL HISTORY   has a past medical history of Asthma and Hypertension.    SURGICAL HISTORY  patient denies any surgical history    FAMILY HISTORY  No family history on file.    SOCIAL HISTORY  Social History     Tobacco Use    Smoking status: Never     Passive exposure: Never    Smokeless tobacco: Never   Vaping Use    Vaping Use: Never used   Substance and Sexual Activity    Alcohol use: Never    Drug use: Never    Sexual activity: Not on file       CURRENT MEDICATIONS  Home Medications       Reviewed by Kunal Pandya R.N. (Registered Nurse) on 09/02/23 at 0348  Med List Status: Not Addressed     Medication Last Dose Status   acetaminophen (TYLENOL) 325 MG Tab  Active   albuterol (PROVENTIL) 2.5mg/3ml Nebu Soln solution for nebulization  Active   albuterol 108 (90 Base) MCG/ACT Aero Soln inhalation aerosol  Active   amLODIPine (NORVASC) 2.5 MG Tab 9/1/2023 Active   famotidine (PEPCID) 20 MG Tab 9/1/2023 Active   fluticasone (FLOVENT  "HFA) 110 MCG/ACT Aerosol 2023 Active   polyethylene glycol/lytes (MIRALAX) 17 g Pack  Active                    ALLERGIES  Allergies   Allergen Reactions    Amoxicillin Rash     Rash all over body per MOM       PHYSICAL EXAM  VITAL SIGNS: BP (!) 137/89   Pulse 129   Temp 37.2 °C (99 °F) (Temporal)   Resp 28   Ht 1.38 m (4' 6.33\")   Wt 36.6 kg (80 lb 11 oz)   SpO2 97%   BMI 19.22 kg/m²    General: Uncomfortable appearing female, sleeping, easily awakened, alert and interactive when awakened.  Head: Normocephalic atraumatic  Eyes: Extraocular motion intact, midrange, reactive.  Neck: Supple, no rigidity  Cardiovascular: tachycardic rate and rhythm no murmurs rubs or gallops  Respiratory: Clear to auscultation bilaterally, equal chest rise and fall, no increased work of breathing  Abdomen: Soft nontender no guarding, no right lower quadrant tenderness, negative Rovsing.  Musculoskeletal: Warm and well perfused, no peripheral edema  Neuro: Alert, no focal deficits  Integumentary: No wounds or rashes      DIAGNOSTIC STUDIES / PROCEDURES  EKG  I have independently interpreted this EKG  Results for orders placed or performed during the hospital encounter of 23   EKG   Result Value Ref Range    Report       [facility] Emergency Dept.    Test Date:  2023  Pt Name:    PAULINO GAONA                Department: ER  MRN:        [id number]                      Room:       [facility]  Gender:     Female                       Technician: [id number]  :        2013                   Requested By:ROBERT GOETZ  Order #:    [id number]                    Reading MD: Robert Goetz    Measurements  Intervals                                Axis  Rate:       125                          P:          58  NH:         170                          QRS:        56  QRSD:       72                           T:          -13  QT:         359  QTc:        518    Interpretive Statements  EKG: Sinus tachycardia, " rate of 125, normal axis, nonspecific ST inversions  in the anterior lateral leads, new compared to prior 8/23/2023.  No ST  elevation.  Electronically Signed On 09- 07:01:30 PDT by [facility]           LABS  Labs Reviewed   CBC WITH DIFFERENTIAL - Abnormal; Notable for the following components:       Result Value    RBC 5.17 (*)     Hematocrit 40.3 (*)     MCV 77.9 (*)     MCHC 33.0 (*)     Platelet Count 401 (*)     MPV 8.7 (*)     Neutrophils-Polys 91.80 (*)     Lymphocytes 6.20 (*)     Monocytes 1.50 (*)     Neutrophils (Absolute) 8.01 (*)     Lymphs (Absolute) 0.54 (*)     Monos (Absolute) 0.13 (*)     All other components within normal limits   COMP METABOLIC PANEL - Abnormal; Notable for the following components:    Co2 18 (*)     Anion Gap 17.0 (*)     Glucose 155 (*)     Creatinine 0.40 (*)     Albumin 5.2 (*)     Total Protein 8.5 (*)     All other components within normal limits   URINALYSIS - Abnormal; Notable for the following components:    Ketones 40 (*)     Protein 30 (*)     All other components within normal limits    Narrative:     Release to patient->Immediate   URINE MICROSCOPIC (W/UA) - Abnormal; Notable for the following components:    WBC 5-10 (*)     RBC 0-2 (*)     All other components within normal limits    Narrative:     Release to patient->Immediate   LIPASE   CRP QUANTITIVE (NON-CARDIAC)   BLOOD CULTURE,HOLD   COV-2, FLU A/B, AND RSV BY PCR (CEPWavestreamID)             COURSE & MEDICAL DECISION MAKING    ED Observation Status? Yes; I am placing the patient in to an observation status due to a diagnostic uncertainty as well as therapeutic intensity. Patient placed in observation status at 04:22 AM, 9/2/2023.     Observation plan is as follows: Attempt to control blood pressure, give antiemetics, give IV fluids, check labs     Upon Reevaluation, the patient's condition has: not improved.        INITIAL ASSESSMENT, COURSE AND PLAN  Care Narrative: 10-year-old female, history of  hypertension, concern for autoimmune disorder, presenting with vomiting, elevated blood pressures at home, x1 day.  Was admitted about 2 weeks ago for similar, awaiting outpatient follow-up, vitals notable for elevated blood pressures, 145/102 initially, initially mildly improved after amlodipine, but recurrently persistently elevated.  Will give amlodipine again, and give IV hydralazine.  Patient still having recurrent vomiting despite antiemetics, unable to tolerate p.o. hydration and nutrition.    Benign abd exam doubt acute appendicitis. Considered ALEJA, electrolyte abnormality, covid/flu, UTI, vasculitis/nephritis.      HYDRATION: Based on the patient's presentation of Acute Vomiting and Dehydration the patient was given IV fluids. IV Hydration was used because oral hydration failed due to inability to tolerate PO. Upon recheck following hydration, the patient was unchanged.        DISPOSITION AND DISCUSSIONS  I have discussed management of the patient with the following physicians and NILSA's:  Dr. Ryan. Given ECG changes will await troponin to determine level of care.    Signed out to my colleague pending troponin, covid/flu, admission awaiting determination of level of care which will hinge on troponin.      FINAL DIAGNOSIS  1. Hypertension, unspecified type    2. Nausea and vomiting, unspecified vomiting type    3. Prolonged Q-T interval on ECG           Electronically signed by: Robert Kitchen M.D., 9/2/2023 7:07 AM       15 DISCHARGE

## 2024-12-12 ENCOUNTER — APPOINTMENT (OUTPATIENT)
Dept: INFECTIOUS DISEASE | Facility: CLINIC | Age: 69
End: 2024-12-12

## 2024-12-12 VITALS
SYSTOLIC BLOOD PRESSURE: 125 MMHG | WEIGHT: 170 LBS | HEIGHT: 69 IN | DIASTOLIC BLOOD PRESSURE: 70 MMHG | TEMPERATURE: 97.6 F | BODY MASS INDEX: 25.18 KG/M2

## 2024-12-12 PROCEDURE — 99205 OFFICE O/P NEW HI 60 MIN: CPT

## 2024-12-23 PROBLEM — Z79.899 ON PRE-EXPOSURE PROPHYLAXIS FOR HIV: Status: ACTIVE | Noted: 2024-12-23

## 2024-12-30 NOTE — ED ADULT NURSE NOTE - CAS DISCH ACCOMP BY
Medication(s) requesting:   Requested Prescriptions     Pending Prescriptions Disp Refills    oxyBUTYnin (DITROPAN-XL) 10 MG extended release tablet 90 tablet 3     Sig: Take 1 tablet by mouth daily    rosuvastatin (CRESTOR) 20 MG tablet 90 tablet 1     Sig: Take 1 tablet by mouth nightly    Icosapent Ethyl (VASCEPA) 1 g CAPS capsule 120 capsule 3     Sig: Take 2 capsules by mouth 2 times daily       Last office visit:  11/12/2024  Next office visit DMA: 1/9/2025   Self

## 2025-01-06 DIAGNOSIS — Z29.89 ENCOUNTER. FOR OTHER SPECIFIED PROPHYLACTIC MEASURES: ICD-10-CM

## 2025-01-06 RX ORDER — DOXYCYCLINE HYCLATE 100 MG/1
100 CAPSULE ORAL
Qty: 20 | Refills: 2 | Status: ACTIVE | COMMUNITY
Start: 2025-01-06 | End: 1900-01-01

## 2025-01-09 ENCOUNTER — NON-APPOINTMENT (OUTPATIENT)
Age: 70
End: 2025-01-09

## 2025-02-03 NOTE — DISCHARGE NOTE NURSING/CASE MANAGEMENT/SOCIAL WORK - NSDCPETBCESMAN_GEN_ALL_CORE
The patient is Stable - Low risk of patient condition declining or worsening      Problem: Dialysis  Goal: Patient will maintain stable vital signs and fluid balance  Outcome: Progressing     Problem: Fall Risk - Rehab  Goal: Patient will remain free from falls  Outcome: Progressing      If you are a smoker, it is important for your health to stop smoking. Please be aware that second hand smoke is also harmful.

## 2025-02-15 NOTE — DISCHARGE NOTE PROVIDER - NSDCQMSTROKE_NEU_ALL_CORE
Confirming Previous Code Status:   Advance Care Planning Note     Discussion Date: 02/15/25   Discussion Participants: patient and spouse    The patient wishes to discuss Advance Care Planning today and the following is a brief summary of our discussion.     Patient has capacity to make their own medical decisions: Yes  Health Care Agent/Surrogate Decision Maker documented in chart: Yes    Documents on file and valid:  Advance Directive/Living Will: Yes   Health Care Power of : No    Communication of Medical Status/Prognosis:   Patient understands he does not currently have a terminal diagnosis. He states he has been thinking about his code status overnight after being asked to confirm his code status (previously full code) upon transfer to the floor 2/14. He states he would not want to live for a prolonged time on a ventilator/feeding tube, as outlined in his living will. He states he is concerned he would not recover well after CPR given his extensive vascular disease.    Communication of Treatment Goals/Options:   He states he wants to be DNR and would prefer to let nature take its course if his heart stops. He communicated that he understands he can decide to change his code status at any time. His wife was also present for the conversation and was in agreement with patient's wishes.    Treatment Decisions  Goals of Care: survival is prioritized, if goals for quality or survival can reasonably be achieved     Follow Up Plan  Patient code status changed to DNR per patient wishes. Patient stated he is okay with intubation and escalation of care if needed.  Team Members  Annalee Tucker MD (PGY-1)  Time Statement: Total face to face time spent on advance care planning was 10-15 minutes with 5-10 minutes spent in counseling, including the explanation.    Annalee Tucker MD  2/15/2025 8:22 AM   No

## 2025-03-13 ENCOUNTER — APPOINTMENT (OUTPATIENT)
Dept: INFECTIOUS DISEASE | Facility: CLINIC | Age: 70
End: 2025-03-13

## 2025-03-13 DIAGNOSIS — Z29.89 ENCOUNTER. FOR OTHER SPECIFIED PROPHYLACTIC MEASURES: ICD-10-CM

## 2025-03-13 DIAGNOSIS — Z79.899 OTHER LONG TERM (CURRENT) DRUG THERAPY: ICD-10-CM

## 2025-03-13 DIAGNOSIS — Z11.3 ENCOUNTER FOR SCREENING FOR INFECTIONS WITH A PREDOMINANTLY SEXUAL MODE OF TRANSMISSION: ICD-10-CM

## 2025-03-13 PROCEDURE — 99213 OFFICE O/P EST LOW 20 MIN: CPT

## 2025-03-18 PROBLEM — Z11.3 SCREENING FOR STD (SEXUALLY TRANSMITTED DISEASE): Status: ACTIVE | Noted: 2025-03-18

## 2025-03-24 ENCOUNTER — TRANSCRIPTION ENCOUNTER (OUTPATIENT)
Age: 70
End: 2025-03-24

## 2025-04-04 NOTE — ED ADULT NURSE NOTE - CAS EDN DISCHARGE ASSESSMENT
Session ID: [id number]  Language: [facility]   ID: #[id number]   Name: Noris
Alert and oriented to person, place and time/Awake

## 2025-04-10 NOTE — ED ADULT TRIAGE NOTE - DOMESTIC TRAVEL HIGH RISK QUESTION
PHYSICAL THERAPY - MEDICARE DAILY TREATMENT NOTE (updated 3/23)      Date: 4/10/2025          Patient Name:  Fidencio Ramírez Jr. :  1975   Medical   Diagnosis:  Other low back pain [M54.59] Treatment Diagnosis:  M54.59  OTHER LOWER BACK PAIN    Referral Source:  Flakita Jackson DO [place]:   [place] / [place] / Product Type: *No Product type* /                     Patient  verified yes     Visit #   Current  / Total 2 24   Time   In / Out 1002 1048   Total Treatment Time 46   Total Timed Codes 46   1:1 Treatment Time 46       BC Totals Reminder:  bill using total billable   min of TIMED therapeutic procedures and modalities.   8-22 min = 1 unit; 23-37 min = 2 units; 38-52 min = 3 units; 53-67 min = 4 units; 68-82 min = 5 units            SUBJECTIVE    Pain Level (0-10 scale): 0    Any medication changes, allergies to medications, adverse drug reactions, diagnosis change, or new procedure performed?: [x] No    [] Yes (see summary sheet for update)  Medications: Verified on Patient Summary List    Subjective functional status/changes:     Pt reports that he is doing about the same    OBJECTIVE      Therapeutic Procedures:  Tx Min Billable or 1:1 Min (if diff from Tx Min) Procedure, Rationale, Specifics   46  [id number] Therapeutic Exercise (timed):  increase ROM, strength, coordination, balance, and proprioception to improve patient's ability to progress to PLOF and address remaining functional goals. (see flow sheet as applicable)     Details if applicable:                         46 46    Total Total         [x]  Patient Education billed concurrently with other procedures   [x] Review HEP    [] Progressed/Changed HEP, detail:    [] Other detail:         Other Objective/Functional Measures  NA    Pain Level at end of session (0-10 scale): 0      Assessment   Pt is able to complete all activity well.  He demonstrates proper form and has little to no discomfort with activity.  Will continue 
No

## 2025-05-12 RX ORDER — ESCITALOPRAM OXALATE 20 MG/1
0 TABLET ORAL
Refills: 0 | DISCHARGE

## 2025-05-12 RX ORDER — TRANEXAMIC ACID 1000 MG/10
2000 AMPUL (ML) INTRAVENOUS ONCE
Refills: 0 | Status: DISCONTINUED | OUTPATIENT
Start: 2025-05-13 | End: 2025-05-19

## 2025-05-12 RX ORDER — TRANEXAMIC ACID 1000 MG/10
2000 AMPUL (ML) INTRAVENOUS ONCE
Refills: 0 | Status: DISCONTINUED | OUTPATIENT
Start: 2025-05-13 | End: 2025-05-13

## 2025-05-13 ENCOUNTER — INPATIENT (INPATIENT)
Facility: HOSPITAL | Age: 70
LOS: 5 days | Discharge: NURSING, REHAB OR LONG-TERM CARE | DRG: 427 | End: 2025-05-19
Attending: ORTHOPAEDIC SURGERY | Admitting: ORTHOPAEDIC SURGERY
Payer: MEDICARE

## 2025-05-13 VITALS
HEART RATE: 67 BPM | RESPIRATION RATE: 18 BRPM | SYSTOLIC BLOOD PRESSURE: 114 MMHG | HEIGHT: 66 IN | WEIGHT: 167.99 LBS | DIASTOLIC BLOOD PRESSURE: 73 MMHG | OXYGEN SATURATION: 100 % | TEMPERATURE: 98 F

## 2025-05-13 DIAGNOSIS — Z87.74 PERSONAL HISTORY OF (CORRECTED) CONGENITAL MALFORMATIONS OF HEART AND CIRCULATORY SYSTEM: Chronic | ICD-10-CM

## 2025-05-13 DIAGNOSIS — Z98.84 BARIATRIC SURGERY STATUS: Chronic | ICD-10-CM

## 2025-05-13 DIAGNOSIS — Z98.890 OTHER SPECIFIED POSTPROCEDURAL STATES: Chronic | ICD-10-CM

## 2025-05-13 DIAGNOSIS — M96.1 POSTLAMINECTOMY SYNDROME, NOT ELSEWHERE CLASSIFIED: ICD-10-CM

## 2025-05-13 DIAGNOSIS — Z98.61 CORONARY ANGIOPLASTY STATUS: Chronic | ICD-10-CM

## 2025-05-13 DIAGNOSIS — Z90.89 ACQUIRED ABSENCE OF OTHER ORGANS: Chronic | ICD-10-CM

## 2025-05-13 DIAGNOSIS — Z98.89 OTHER SPECIFIED POSTPROCEDURAL STATES: Chronic | ICD-10-CM

## 2025-05-13 DIAGNOSIS — Z90.49 ACQUIRED ABSENCE OF OTHER SPECIFIED PARTS OF DIGESTIVE TRACT: Chronic | ICD-10-CM

## 2025-05-13 DIAGNOSIS — Z96.89 PRESENCE OF OTHER SPECIFIED FUNCTIONAL IMPLANTS: Chronic | ICD-10-CM

## 2025-05-13 LAB
ANION GAP SERPL CALC-SCNC: 7 MMOL/L — SIGNIFICANT CHANGE UP (ref 5–17)
BASOPHILS # BLD AUTO: 0.03 K/UL — SIGNIFICANT CHANGE UP (ref 0–0.2)
BASOPHILS NFR BLD AUTO: 0.3 % — SIGNIFICANT CHANGE UP (ref 0–2)
BLD GP AB SCN SERPL QL: SIGNIFICANT CHANGE UP
BUN SERPL-MCNC: 19 MG/DL — SIGNIFICANT CHANGE UP (ref 7–23)
CALCIUM SERPL-MCNC: 8 MG/DL — LOW (ref 8.5–10.1)
CHLORIDE SERPL-SCNC: 109 MMOL/L — HIGH (ref 96–108)
CO2 SERPL-SCNC: 20 MMOL/L — LOW (ref 22–31)
CREAT SERPL-MCNC: 0.95 MG/DL — SIGNIFICANT CHANGE UP (ref 0.5–1.3)
EGFR: 87 ML/MIN/1.73M2 — SIGNIFICANT CHANGE UP
EGFR: 87 ML/MIN/1.73M2 — SIGNIFICANT CHANGE UP
EOSINOPHIL # BLD AUTO: 0.01 K/UL — SIGNIFICANT CHANGE UP (ref 0–0.5)
EOSINOPHIL NFR BLD AUTO: 0.1 % — SIGNIFICANT CHANGE UP (ref 0–6)
GLUCOSE BLDC GLUCOMTR-MCNC: 108 MG/DL — HIGH (ref 70–99)
GLUCOSE BLDC GLUCOMTR-MCNC: 131 MG/DL — HIGH (ref 70–99)
GLUCOSE SERPL-MCNC: 165 MG/DL — HIGH (ref 70–99)
HCT VFR BLD CALC: 36.3 % — LOW (ref 39–50)
HGB BLD-MCNC: 12.2 G/DL — LOW (ref 13–17)
IMM GRANULOCYTES # BLD AUTO: 0.05 K/UL — SIGNIFICANT CHANGE UP (ref 0–0.07)
IMM GRANULOCYTES NFR BLD AUTO: 0.5 % — SIGNIFICANT CHANGE UP (ref 0–0.9)
LYMPHOCYTES # BLD AUTO: 1.05 K/UL — SIGNIFICANT CHANGE UP (ref 1–3.3)
LYMPHOCYTES NFR BLD AUTO: 10.9 % — LOW (ref 13–44)
MCHC RBC-ENTMCNC: 32.4 PG — SIGNIFICANT CHANGE UP (ref 27–34)
MCHC RBC-ENTMCNC: 33.6 G/DL — SIGNIFICANT CHANGE UP (ref 32–36)
MCV RBC AUTO: 96.3 FL — SIGNIFICANT CHANGE UP (ref 80–100)
MONOCYTES # BLD AUTO: 0.15 K/UL — SIGNIFICANT CHANGE UP (ref 0–0.9)
MONOCYTES NFR BLD AUTO: 1.6 % — LOW (ref 2–14)
NEUTROPHILS # BLD AUTO: 8.3 K/UL — HIGH (ref 1.8–7.4)
NEUTROPHILS NFR BLD AUTO: 86.6 % — HIGH (ref 43–77)
NRBC # BLD AUTO: 0 K/UL — SIGNIFICANT CHANGE UP (ref 0–0)
NRBC # FLD: 0 K/UL — SIGNIFICANT CHANGE UP (ref 0–0)
NRBC BLD AUTO-RTO: 0 /100 WBCS — SIGNIFICANT CHANGE UP (ref 0–0)
PLATELET # BLD AUTO: 189 K/UL — SIGNIFICANT CHANGE UP (ref 150–400)
PMV BLD: 8.9 FL — SIGNIFICANT CHANGE UP (ref 7–13)
POTASSIUM SERPL-MCNC: 5 MMOL/L — SIGNIFICANT CHANGE UP (ref 3.5–5.3)
POTASSIUM SERPL-SCNC: 5 MMOL/L — SIGNIFICANT CHANGE UP (ref 3.5–5.3)
RBC # BLD: 3.77 M/UL — LOW (ref 4.2–5.8)
RBC # FLD: 13.7 % — SIGNIFICANT CHANGE UP (ref 10.3–14.5)
SODIUM SERPL-SCNC: 136 MMOL/L — SIGNIFICANT CHANGE UP (ref 135–145)
WBC # BLD: 9.59 K/UL — SIGNIFICANT CHANGE UP (ref 3.8–10.5)
WBC # FLD AUTO: 9.59 K/UL — SIGNIFICANT CHANGE UP (ref 3.8–10.5)

## 2025-05-13 PROCEDURE — 85014 HEMATOCRIT: CPT

## 2025-05-13 PROCEDURE — 80048 BASIC METABOLIC PNL TOTAL CA: CPT

## 2025-05-13 PROCEDURE — 72100 X-RAY EXAM L-S SPINE 2/3 VWS: CPT | Mod: 26

## 2025-05-13 PROCEDURE — 87081 CULTURE SCREEN ONLY: CPT

## 2025-05-13 PROCEDURE — 87640 STAPH A DNA AMP PROBE: CPT

## 2025-05-13 PROCEDURE — 97116 GAIT TRAINING THERAPY: CPT | Mod: GP

## 2025-05-13 PROCEDURE — 85018 HEMOGLOBIN: CPT

## 2025-05-13 PROCEDURE — 97162 PT EVAL MOD COMPLEX 30 MIN: CPT | Mod: GP

## 2025-05-13 PROCEDURE — 86850 RBC ANTIBODY SCREEN: CPT

## 2025-05-13 PROCEDURE — 88304 TISSUE EXAM BY PATHOLOGIST: CPT | Mod: 26

## 2025-05-13 PROCEDURE — 85027 COMPLETE CBC AUTOMATED: CPT

## 2025-05-13 PROCEDURE — 72100 X-RAY EXAM L-S SPINE 2/3 VWS: CPT

## 2025-05-13 PROCEDURE — 87641 MR-STAPH DNA AMP PROBE: CPT

## 2025-05-13 PROCEDURE — 82962 GLUCOSE BLOOD TEST: CPT

## 2025-05-13 PROCEDURE — C9399: CPT

## 2025-05-13 PROCEDURE — 86900 BLOOD TYPING SEROLOGIC ABO: CPT

## 2025-05-13 PROCEDURE — 86891 AUTOLOGOUS BLOOD OP SALVAGE: CPT

## 2025-05-13 PROCEDURE — 82803 BLOOD GASES ANY COMBINATION: CPT

## 2025-05-13 PROCEDURE — 88304 TISSUE EXAM BY PATHOLOGIST: CPT

## 2025-05-13 PROCEDURE — 36415 COLL VENOUS BLD VENIPUNCTURE: CPT

## 2025-05-13 PROCEDURE — 36600 WITHDRAWAL OF ARTERIAL BLOOD: CPT

## 2025-05-13 PROCEDURE — 86901 BLOOD TYPING SEROLOGIC RH(D): CPT

## 2025-05-13 PROCEDURE — C1889: CPT

## 2025-05-13 PROCEDURE — 99232 SBSQ HOSP IP/OBS MODERATE 35: CPT

## 2025-05-13 PROCEDURE — 76000 FLUOROSCOPY <1 HR PHYS/QHP: CPT

## 2025-05-13 PROCEDURE — 85025 COMPLETE CBC W/AUTO DIFF WBC: CPT

## 2025-05-13 PROCEDURE — 97530 THERAPEUTIC ACTIVITIES: CPT | Mod: GP

## 2025-05-13 PROCEDURE — C1713: CPT

## 2025-05-13 PROCEDURE — 87075 CULTR BACTERIA EXCEPT BLOOD: CPT

## 2025-05-13 RX ORDER — VENLAFAXINE HYDROCHLORIDE 37.5 MG/1
150 CAPSULE, EXTENDED RELEASE ORAL DAILY
Refills: 0 | Status: DISCONTINUED | OUTPATIENT
Start: 2025-05-13 | End: 2025-05-13

## 2025-05-13 RX ORDER — METOPROLOL SUCCINATE 50 MG/1
12.5 TABLET, EXTENDED RELEASE ORAL DAILY
Refills: 0 | Status: DISCONTINUED | OUTPATIENT
Start: 2025-05-13 | End: 2025-05-19

## 2025-05-13 RX ORDER — ONDANSETRON HCL/PF 4 MG/2 ML
4 VIAL (ML) INJECTION ONCE
Refills: 0 | Status: DISCONTINUED | OUTPATIENT
Start: 2025-05-13 | End: 2025-05-13

## 2025-05-13 RX ORDER — VENLAFAXINE HYDROCHLORIDE 37.5 MG/1
75 CAPSULE, EXTENDED RELEASE ORAL
Refills: 0 | Status: DISCONTINUED | OUTPATIENT
Start: 2025-05-13 | End: 2025-05-19

## 2025-05-13 RX ORDER — HYDROMORPHONE/SOD CHLOR,ISO/PF 2 MG/10 ML
0.5 SYRINGE (ML) INJECTION
Refills: 0 | Status: DISCONTINUED | OUTPATIENT
Start: 2025-05-13 | End: 2025-05-13

## 2025-05-13 RX ORDER — HYDROMORPHONE/SOD CHLOR,ISO/PF 2 MG/10 ML
30 SYRINGE (ML) INJECTION
Refills: 0 | Status: DISCONTINUED | OUTPATIENT
Start: 2025-05-13 | End: 2025-05-15

## 2025-05-13 RX ORDER — SODIUM CHLORIDE 9 G/1000ML
1000 INJECTION, SOLUTION INTRAVENOUS
Refills: 0 | Status: DISCONTINUED | OUTPATIENT
Start: 2025-05-13 | End: 2025-05-13

## 2025-05-13 RX ORDER — B1/B2/B3/B5/B6/B12/VIT C/FOLIC 500-0.5 MG
1 TABLET ORAL DAILY
Refills: 0 | Status: DISCONTINUED | OUTPATIENT
Start: 2025-05-13 | End: 2025-05-19

## 2025-05-13 RX ORDER — CEFAZOLIN SODIUM IN 0.9 % NACL 3 G/100 ML
2000 INTRAVENOUS SOLUTION, PIGGYBACK (ML) INTRAVENOUS EVERY 8 HOURS
Refills: 0 | Status: COMPLETED | OUTPATIENT
Start: 2025-05-13 | End: 2025-05-14

## 2025-05-13 RX ORDER — ONDANSETRON HCL/PF 4 MG/2 ML
4 VIAL (ML) INJECTION EVERY 6 HOURS
Refills: 0 | Status: DISCONTINUED | OUTPATIENT
Start: 2025-05-13 | End: 2025-05-15

## 2025-05-13 RX ORDER — MECLIZINE HCL 12.5 MG
25 TABLET ORAL DAILY
Refills: 0 | Status: DISCONTINUED | OUTPATIENT
Start: 2025-05-13 | End: 2025-05-19

## 2025-05-13 RX ORDER — ALPRAZOLAM 0.5 MG
0.25 TABLET, EXTENDED RELEASE 24 HR ORAL EVERY 6 HOURS
Refills: 0 | Status: DISCONTINUED | OUTPATIENT
Start: 2025-05-13 | End: 2025-05-19

## 2025-05-13 RX ORDER — OXYCODONE HYDROCHLORIDE 30 MG/1
10 TABLET ORAL
Refills: 0 | Status: DISCONTINUED | OUTPATIENT
Start: 2025-05-13 | End: 2025-05-19

## 2025-05-13 RX ORDER — BUPROPION HYDROBROMIDE 522 MG/1
300 TABLET, EXTENDED RELEASE ORAL DAILY
Refills: 0 | Status: DISCONTINUED | OUTPATIENT
Start: 2025-05-13 | End: 2025-05-19

## 2025-05-13 RX ORDER — HYDROMORPHONE/SOD CHLOR,ISO/PF 2 MG/10 ML
0.5 SYRINGE (ML) INJECTION
Refills: 0 | Status: DISCONTINUED | OUTPATIENT
Start: 2025-05-13 | End: 2025-05-15

## 2025-05-13 RX ORDER — OXYCODONE HYDROCHLORIDE 30 MG/1
5 TABLET ORAL
Refills: 0 | Status: DISCONTINUED | OUTPATIENT
Start: 2025-05-13 | End: 2025-05-19

## 2025-05-13 RX ORDER — ATORVASTATIN CALCIUM 80 MG/1
10 TABLET, FILM COATED ORAL AT BEDTIME
Refills: 0 | Status: DISCONTINUED | OUTPATIENT
Start: 2025-05-13 | End: 2025-05-19

## 2025-05-13 RX ORDER — LEVOTHYROXINE SODIUM 300 MCG
25 TABLET ORAL DAILY
Refills: 0 | Status: DISCONTINUED | OUTPATIENT
Start: 2025-05-13 | End: 2025-05-13

## 2025-05-13 RX ORDER — HYDROMORPHONE/SOD CHLOR,ISO/PF 2 MG/10 ML
1 SYRINGE (ML) INJECTION
Refills: 0 | Status: DISCONTINUED | OUTPATIENT
Start: 2025-05-13 | End: 2025-05-19

## 2025-05-13 RX ORDER — ACETAMINOPHEN 500 MG/5ML
650 LIQUID (ML) ORAL EVERY 6 HOURS
Refills: 0 | Status: DISCONTINUED | OUTPATIENT
Start: 2025-05-13 | End: 2025-05-19

## 2025-05-13 RX ORDER — GABAPENTIN 400 MG/1
300 CAPSULE ORAL
Refills: 0 | Status: DISCONTINUED | OUTPATIENT
Start: 2025-05-13 | End: 2025-05-19

## 2025-05-13 RX ORDER — LEVOTHYROXINE SODIUM 300 MCG
25 TABLET ORAL DAILY
Refills: 0 | Status: DISCONTINUED | OUTPATIENT
Start: 2025-05-13 | End: 2025-05-19

## 2025-05-13 RX ORDER — CELECOXIB 50 MG/1
400 CAPSULE ORAL ONCE
Refills: 0 | Status: COMPLETED | OUTPATIENT
Start: 2025-05-13 | End: 2025-05-13

## 2025-05-13 RX ORDER — NALOXONE HYDROCHLORIDE 0.4 MG/ML
0.1 INJECTION, SOLUTION INTRAMUSCULAR; INTRAVENOUS; SUBCUTANEOUS
Refills: 0 | Status: DISCONTINUED | OUTPATIENT
Start: 2025-05-13 | End: 2025-05-15

## 2025-05-13 RX ORDER — CARIPRAZINE 3 MG/1
3 CAPSULE, GELATIN COATED ORAL DAILY
Refills: 0 | Status: DISCONTINUED | OUTPATIENT
Start: 2025-05-13 | End: 2025-05-19

## 2025-05-13 RX ORDER — CEFAZOLIN SODIUM IN 0.9 % NACL 3 G/100 ML
2000 INTRAVENOUS SOLUTION, PIGGYBACK (ML) INTRAVENOUS EVERY 8 HOURS
Refills: 0 | Status: DISCONTINUED | OUTPATIENT
Start: 2025-05-13 | End: 2025-05-13

## 2025-05-13 RX ORDER — FLUTICASONE PROPIONATE 50 UG/1
1 SPRAY, METERED NASAL
Refills: 0 | DISCHARGE

## 2025-05-13 RX ORDER — CELECOXIB 50 MG/1
200 CAPSULE ORAL DAILY
Refills: 0 | Status: DISCONTINUED | OUTPATIENT
Start: 2025-05-14 | End: 2025-05-19

## 2025-05-13 RX ADMIN — Medication 30 MILLILITER(S): at 15:43

## 2025-05-13 RX ADMIN — ATORVASTATIN CALCIUM 10 MILLIGRAM(S): 80 TABLET, FILM COATED ORAL at 21:15

## 2025-05-13 RX ADMIN — VENLAFAXINE HYDROCHLORIDE 75 MILLIGRAM(S): 37.5 CAPSULE, EXTENDED RELEASE ORAL at 18:53

## 2025-05-13 RX ADMIN — Medication 650 MILLIGRAM(S): at 23:30

## 2025-05-13 RX ADMIN — CARIPRAZINE 3 MILLIGRAM(S): 3 CAPSULE, GELATIN COATED ORAL at 22:24

## 2025-05-13 RX ADMIN — CELECOXIB 400 MILLIGRAM(S): 50 CAPSULE ORAL at 18:54

## 2025-05-13 RX ADMIN — Medication 650 MILLIGRAM(S): at 19:56

## 2025-05-13 RX ADMIN — GABAPENTIN 300 MILLIGRAM(S): 400 CAPSULE ORAL at 21:15

## 2025-05-13 RX ADMIN — SODIUM CHLORIDE 125 MILLILITER(S): 9 INJECTION, SOLUTION INTRAVENOUS at 15:30

## 2025-05-13 RX ADMIN — CELECOXIB 400 MILLIGRAM(S): 50 CAPSULE ORAL at 19:24

## 2025-05-13 RX ADMIN — Medication 650 MILLIGRAM(S): at 20:30

## 2025-05-13 RX ADMIN — Medication 2000 MILLIGRAM(S): at 21:14

## 2025-05-13 RX ADMIN — Medication 0.5 MILLIGRAM(S): at 15:40

## 2025-05-13 RX ADMIN — BUPROPION HYDROBROMIDE 300 MILLIGRAM(S): 522 TABLET, EXTENDED RELEASE ORAL at 22:18

## 2025-05-13 RX ADMIN — Medication 650 MILLIGRAM(S): at 23:02

## 2025-05-13 RX ADMIN — Medication 0.5 MILLIGRAM(S): at 15:30

## 2025-05-13 RX ADMIN — Medication 1 TABLET(S): at 22:18

## 2025-05-13 RX ADMIN — Medication 0.5 MILLIGRAM(S): at 15:50

## 2025-05-13 NOTE — BRIEF OPERATIVE NOTE - NSICDXBRIEFPOSTOP_GEN_ALL_CORE_FT
POST-OP DIAGNOSIS:  Post laminectomy syndrome 13-May-2025 15:16:34  Geovanny Staples  Spinal stenosis of lumbar region with neurogenic claudication 13-May-2025 15:16:48  Geovanny Staples  Herniated lumbar intervertebral disc 13-May-2025 15:17:05  Geovanny Staples

## 2025-05-13 NOTE — BRIEF OPERATIVE NOTE - NSICDXBRIEFPROCEDURE_GEN_ALL_CORE_FT
PROCEDURES:  Laminectomy, spine, lumbar, with osteotomy and instrumented fusion 13-May-2025 15:14:59  Geovanny Staples

## 2025-05-13 NOTE — CONSULT NOTE ADULT - TIME-BASED BILLING (NON-CRITICAL CARE)
Rosa M Payne MD;  Location: [facility] [phone]                                     [id number]                                     [id number]                      Final result by Navya Porter [id number]                                     Daja.  Responded to wife with, "I love you baby."  "SLP Goals          Problem: SLP    Goal Priority Disciplines Outcome   SLP Goal     SLP Time-based billing (NON-critical care)

## 2025-05-13 NOTE — BRIEF OPERATIVE NOTE - NSICDXBRIEFPREOP_GEN_ALL_CORE_FT
PRE-OP DIAGNOSIS:  Herniated lumbar intervertebral disc 13-May-2025 15:15:36  Geovanny Staples  Spinal stenosis, lumbar region with neurogenic claudication 13-May-2025 15:15:50  Geovanny Staples  Lumbar postlaminectomy syndrome 13-May-2025 15:16:03  Geovanny Staples

## 2025-05-13 NOTE — PATIENT PROFILE ADULT - NSPROPASSIVESMOKEEXPOSURE_GEN_A_NUR
[facility] ambulatory encounter  FAMILY PRACTICE OFFICE VISIT    CHIEF COMPLAINT:    Chief Complaint   Patient presents with   • Follow-up     medication - inhaler       SUBJECTIVE:  Mayito Reyes is a 64 year old male who presented requesting evaluation for multiple medical concerns.     1. Diverticulitis: The patient is here for a follow up on this chronic problem. Patient states that he is doing well.  Patient complains of no new problems.  2. Insomnia: The patient is here for a follow up on this chronic problem. Patient states that he is doing well. Patient reports he no longer is in need of Ambien medication management. Discussed discontinuing Ambien medication with patient. Patient agreeable to plan.   3. Mild Intermittent Asthma: The patient is here for a follow up on this chronic problem. Patient states that he is doing well. The patient denies chest pain, shortness of breath, or headache. The patient is tolerating his current medication regimen with no problems. Patient complains of no new problems.     Review of systems:   Constitutional: Negative for fever and chills.   Skin: Negative for rash.   HEENT: Negative for ear pain or sore throat.  Respiratory: Negative for cough or shortness of breath.    Cardiovascular: Negative for chest pain or chest pressure.   Gastrointestinal: Negative for nausea, vomiting, diarrhea or abdominal pain.   Genitourinary: Negative for dysuria, urgency or frequency.  Extremities:  Negative for joint swelling or joint pain.     OBJECTIVE:  PROBLEM LIST:   Patient Active Problem List   Diagnosis   • Essential hypertension, benign   • Reactive airway disease   • Anxiety   • Diverticulitis large intestine       PAST HISTORIES:   I have reviewed the past medical history, family history, social history, medications and allergies listed in the medical record as obtained by my nursing staff and support staff and agree with their documentation.  ALLERGIES:  No Known Allergies  Current  Outpatient Prescriptions   Medication Sig Dispense Refill   • albuterol (VENTOLIN HFA) 108 (90 Base) MCG/ACT inhaler Inhale 2 puffs into the lungs four times daily. As needed for Shortness of Breath or Wheezing 1 Inhaler 0   • Multiple Vitamin (MULTI-VITAMIN DAILY PO) Take  by mouth daily.       No current facility-administered medications for this visit.      Immunization History   Administered Date(s) Administered   • Influenza 11/03/2014   • Influenza Quadrivalent Preservative Free 12/10/2015     Past Medical History:   Diagnosis Date   • Alcohol abuse    • Anxiety    • Depression    • Essential (primary) hypertension    • Other chronic pain    • RAD (reactive airway disease)      Past Surgical History:   Procedure Laterality Date   • BACK SURGERY      4 years ago   • COLONOSCOPY  6/30/16     Social History     Social History   • Marital status:      Spouse name: N/A   • Number of children: N/A   • Years of education: N/A     Social History Main Topics   • Smoking status: Never Smoker   • Smokeless tobacco: Never Used   • Alcohol use No      Comment: hx of ETOH use   • Drug use: No   • Sexual activity: Not Asked     Other Topics Concern   • None     Social History Narrative     History   Smoking Status   • Never Smoker   Smokeless Tobacco   • Never Used     History   Alcohol Use No     Comment: hx of ETOH use     Family History   Problem Relation Age of Onset   • Cancer Mother      lymphoma   • High blood pressure Mother    • Heart disease Father    • Cancer Father      breast cancer   • High blood pressure Father    • Cancer Sister      breast cancer   • Substance abuse Brother      Health Maintenance   Topic Date Due   • DTaP/Tdap/Td Vaccine (1 - Tdap) 10/17/1971   • Zoster Vaccine  10/17/2012   • Influenza Vaccine (Season Ended) 09/01/2017   • Medicare Wellness 65+  05/23/2018   • Colorectal Cancer Screening-Colonoscopy  01/01/2020       PHYSICAL EXAM:   Physical Exam   Constitutional: He is oriented to  person, place, and time and well-developed, well-nourished, and in no distress. No distress.   HENT:   Head: Normocephalic and atraumatic.   Mouth/Throat: No oropharyngeal exudate.   Eyes: Conjunctivae are normal. Right eye exhibits no discharge. Left eye exhibits no discharge. No scleral icterus.   Neck: Neck supple. No tracheal deviation present. No thyromegaly present.   Cardiovascular: Normal rate and regular rhythm.  Exam reveals no gallop and no friction rub.    No murmur heard.  Pulmonary/Chest: Breath sounds normal. No respiratory distress. He has no wheezes. He has no rales.   Lymphadenopathy:     He has no cervical adenopathy.   Neurological: He is alert and oriented to person, place, and time. No cranial nerve deficit.   Skin: Skin is warm and dry. He is not diaphoretic.   Psychiatric: Mood, memory, affect and judgment normal.       LAB RESULTS:   All pertinent laboratory results were reviewed.    ASSESSMENT:   1. Diverticulitis of intestine without perforation or abscess without bleeding, unspecified part of intestinal tract    2. Insomnia, unspecified type    3. Mild intermittent asthma without complication    4. Laboratory examination ordered as part of a routine general medical examination        PLAN:   Orders Placed This Encounter   • Lipid Panel with Reflex   • Comprehensive Metabolic Panel       1. Diverticulitis: Patient is stable. Will continue to monitor and make further recommendations needed.   2. Insomnia: Will stop Ambien. Will continue to monitor and make further recommendations as needed.   3. Mild Intermittent Asthma: Clinically controlled.  Will continue current Albuterol Inhaler medication regimen.  Will continue to monitor and make adjustments to medication and treatment regimen as indicated.  4. Will check a flp and cmp level for routine laboratory screening.     Return in about 6 months (around 11/23/2017).    Instructions provided as documented in the after visit summary.    The  patient indicated understanding of the diagnosis and agreed with the plan of care.     On 5/23/2017, I, Karime Hernandez RN scribed the services personally performed by Vlad Rich MD      I attest that I performed all of the work for this encounter, and the scribe merely recorded my findings.  Vlad Rich MD.     No

## 2025-05-14 DIAGNOSIS — M96.1 POSTLAMINECTOMY SYNDROME, NOT ELSEWHERE CLASSIFIED: ICD-10-CM

## 2025-05-14 LAB
ANION GAP SERPL CALC-SCNC: 7 MMOL/L — SIGNIFICANT CHANGE UP (ref 5–17)
BASOPHILS # BLD AUTO: 0 K/UL — SIGNIFICANT CHANGE UP (ref 0–0.2)
BASOPHILS NFR BLD AUTO: 0 % — SIGNIFICANT CHANGE UP (ref 0–2)
BUN SERPL-MCNC: 16 MG/DL — SIGNIFICANT CHANGE UP (ref 7–23)
CALCIUM SERPL-MCNC: 7.9 MG/DL — LOW (ref 8.5–10.1)
CHLORIDE SERPL-SCNC: 113 MMOL/L — HIGH (ref 96–108)
CO2 SERPL-SCNC: 22 MMOL/L — SIGNIFICANT CHANGE UP (ref 22–31)
CREAT SERPL-MCNC: 0.92 MG/DL — SIGNIFICANT CHANGE UP (ref 0.5–1.3)
EGFR: 90 ML/MIN/1.73M2 — SIGNIFICANT CHANGE UP
EGFR: 90 ML/MIN/1.73M2 — SIGNIFICANT CHANGE UP
EOSINOPHIL # BLD AUTO: 0 K/UL — SIGNIFICANT CHANGE UP (ref 0–0.5)
EOSINOPHIL NFR BLD AUTO: 0 % — SIGNIFICANT CHANGE UP (ref 0–6)
GLUCOSE SERPL-MCNC: 124 MG/DL — HIGH (ref 70–99)
HCT VFR BLD CALC: 28.3 % — LOW (ref 39–50)
HCT VFR BLD CALC: 28.9 % — LOW (ref 39–50)
HGB BLD-MCNC: 9.2 G/DL — LOW (ref 13–17)
HGB BLD-MCNC: 9.7 G/DL — LOW (ref 13–17)
IMM GRANULOCYTES # BLD AUTO: 0.02 K/UL — SIGNIFICANT CHANGE UP (ref 0–0.07)
IMM GRANULOCYTES NFR BLD AUTO: 0.3 % — SIGNIFICANT CHANGE UP (ref 0–0.9)
LYMPHOCYTES # BLD AUTO: 1.68 K/UL — SIGNIFICANT CHANGE UP (ref 1–3.3)
LYMPHOCYTES NFR BLD AUTO: 22.9 % — SIGNIFICANT CHANGE UP (ref 13–44)
MCHC RBC-ENTMCNC: 32.3 PG — SIGNIFICANT CHANGE UP (ref 27–34)
MCHC RBC-ENTMCNC: 32.4 PG — SIGNIFICANT CHANGE UP (ref 27–34)
MCHC RBC-ENTMCNC: 32.5 G/DL — SIGNIFICANT CHANGE UP (ref 32–36)
MCHC RBC-ENTMCNC: 33.6 G/DL — SIGNIFICANT CHANGE UP (ref 32–36)
MCV RBC AUTO: 96.7 FL — SIGNIFICANT CHANGE UP (ref 80–100)
MCV RBC AUTO: 99.3 FL — SIGNIFICANT CHANGE UP (ref 80–100)
MONOCYTES # BLD AUTO: 0.43 K/UL — SIGNIFICANT CHANGE UP (ref 0–0.9)
MONOCYTES NFR BLD AUTO: 5.9 % — SIGNIFICANT CHANGE UP (ref 2–14)
MRSA PCR RESULT.: SIGNIFICANT CHANGE UP
NEUTROPHILS # BLD AUTO: 5.21 K/UL — SIGNIFICANT CHANGE UP (ref 1.8–7.4)
NEUTROPHILS NFR BLD AUTO: 70.9 % — SIGNIFICANT CHANGE UP (ref 43–77)
NRBC # BLD AUTO: 0 K/UL — SIGNIFICANT CHANGE UP (ref 0–0)
NRBC # BLD AUTO: 0 K/UL — SIGNIFICANT CHANGE UP (ref 0–0)
NRBC # FLD: 0 K/UL — SIGNIFICANT CHANGE UP (ref 0–0)
NRBC # FLD: 0 K/UL — SIGNIFICANT CHANGE UP (ref 0–0)
NRBC BLD AUTO-RTO: 0 /100 WBCS — SIGNIFICANT CHANGE UP (ref 0–0)
NRBC BLD AUTO-RTO: 0 /100 WBCS — SIGNIFICANT CHANGE UP (ref 0–0)
PLATELET # BLD AUTO: 156 K/UL — SIGNIFICANT CHANGE UP (ref 150–400)
PLATELET # BLD AUTO: 161 K/UL — SIGNIFICANT CHANGE UP (ref 150–400)
PMV BLD: 8.7 FL — SIGNIFICANT CHANGE UP (ref 7–13)
PMV BLD: 9.3 FL — SIGNIFICANT CHANGE UP (ref 7–13)
POTASSIUM SERPL-MCNC: 3.9 MMOL/L — SIGNIFICANT CHANGE UP (ref 3.5–5.3)
POTASSIUM SERPL-SCNC: 3.9 MMOL/L — SIGNIFICANT CHANGE UP (ref 3.5–5.3)
RBC # BLD: 2.85 M/UL — LOW (ref 4.2–5.8)
RBC # BLD: 2.99 M/UL — LOW (ref 4.2–5.8)
RBC # FLD: 13.5 % — SIGNIFICANT CHANGE UP (ref 10.3–14.5)
RBC # FLD: 13.6 % — SIGNIFICANT CHANGE UP (ref 10.3–14.5)
S AUREUS DNA NOSE QL NAA+PROBE: SIGNIFICANT CHANGE UP
SODIUM SERPL-SCNC: 142 MMOL/L — SIGNIFICANT CHANGE UP (ref 135–145)
WBC # BLD: 7.34 K/UL — SIGNIFICANT CHANGE UP (ref 3.8–10.5)
WBC # BLD: 7.77 K/UL — SIGNIFICANT CHANGE UP (ref 3.8–10.5)
WBC # FLD AUTO: 7.34 K/UL — SIGNIFICANT CHANGE UP (ref 3.8–10.5)
WBC # FLD AUTO: 7.77 K/UL — SIGNIFICANT CHANGE UP (ref 3.8–10.5)

## 2025-05-14 RX ADMIN — Medication 650 MILLIGRAM(S): at 19:00

## 2025-05-14 RX ADMIN — CARIPRAZINE 3 MILLIGRAM(S): 3 CAPSULE, GELATIN COATED ORAL at 10:12

## 2025-05-14 RX ADMIN — GABAPENTIN 300 MILLIGRAM(S): 400 CAPSULE ORAL at 22:04

## 2025-05-14 RX ADMIN — Medication 650 MILLIGRAM(S): at 13:47

## 2025-05-14 RX ADMIN — ATORVASTATIN CALCIUM 10 MILLIGRAM(S): 80 TABLET, FILM COATED ORAL at 22:04

## 2025-05-14 RX ADMIN — VENLAFAXINE HYDROCHLORIDE 75 MILLIGRAM(S): 37.5 CAPSULE, EXTENDED RELEASE ORAL at 18:28

## 2025-05-14 RX ADMIN — GABAPENTIN 300 MILLIGRAM(S): 400 CAPSULE ORAL at 10:10

## 2025-05-14 RX ADMIN — Medication 25 MICROGRAM(S): at 06:04

## 2025-05-14 RX ADMIN — BUPROPION HYDROBROMIDE 300 MILLIGRAM(S): 522 TABLET, EXTENDED RELEASE ORAL at 10:12

## 2025-05-14 RX ADMIN — Medication 20 MILLIGRAM(S): at 10:10

## 2025-05-14 RX ADMIN — CELECOXIB 200 MILLIGRAM(S): 50 CAPSULE ORAL at 10:41

## 2025-05-14 RX ADMIN — Medication 2000 MILLIGRAM(S): at 13:17

## 2025-05-14 RX ADMIN — Medication 650 MILLIGRAM(S): at 06:04

## 2025-05-14 RX ADMIN — Medication 40 MILLIGRAM(S): at 06:04

## 2025-05-14 RX ADMIN — Medication 125 MILLILITER(S): at 00:49

## 2025-05-14 RX ADMIN — Medication 1 TABLET(S): at 10:11

## 2025-05-14 RX ADMIN — Medication 650 MILLIGRAM(S): at 13:17

## 2025-05-14 RX ADMIN — Medication 2000 MILLIGRAM(S): at 06:05

## 2025-05-14 RX ADMIN — VENLAFAXINE HYDROCHLORIDE 75 MILLIGRAM(S): 37.5 CAPSULE, EXTENDED RELEASE ORAL at 10:12

## 2025-05-14 RX ADMIN — Medication 650 MILLIGRAM(S): at 18:29

## 2025-05-14 RX ADMIN — Medication 1 TABLET(S): at 10:10

## 2025-05-14 RX ADMIN — CELECOXIB 200 MILLIGRAM(S): 50 CAPSULE ORAL at 10:11

## 2025-05-14 RX ADMIN — Medication 25 MILLIGRAM(S): at 10:11

## 2025-05-14 NOTE — PROGRESS NOTE ADULT - SUBJECTIVE AND OBJECTIVE BOX
HPI: 68 y/o M w/ pmh of htn, hld, AS s/p AVR in 2019, anxiety, depression, gastric bypass, SBO, recurrent GI infectious, lumbar radiculopathy who presented to  For scheduled lami and fusion of L2-3 and L3-4 and removal of hardware for L4-L5. Post-op pt admitted to the SICU for post-op monitoring.    24 hour events:     Review of Systems:  Constitutional: No fever, chills, fatigue  Neuro: No headache, numbness, weakness  Resp: No cough, wheezing, shortness of breath  CVS: No chest pain, palpitations, leg swelling  GI: No abdominal pain, nausea, vomiting, diarrhea   : No dysuria, frequency, incontinence  Skin: No itching, burning, rashes, or lesions   Msk: No joint pain or swelling  Psych: No depression, anxiety, mood swings    T(F): 97.9 (05-14-25 @ 08:00), Max: 98.3 (05-14-25 @ 05:00)  HR: 86 (05-14-25 @ 10:00) (62 - 86)  BP: 101/62 (05-14-25 @ 10:00) (85/61 - 117/59)  RR: 13 (05-14-25 @ 10:00) (8 - 25)  SpO2: 98% (05-14-25 @ 10:00) (93% - 100%)  Wt(kg): --      05-13-25 @ 07:01  -  05-14-25 @ 07:00  --------------------------------------------------------  IN: 4250 mL / OUT: 2035 mL / NET: 2215 mL        CAPILLARY BLOOD GLUCOSE      POCT Blood Glucose.: 131 mg/dL (13 May 2025 15:01)      I&O's Summary    13 May 2025 07:01  -  14 May 2025 07:00  --------------------------------------------------------  IN: 4250 mL / OUT: 2035 mL / NET: 2215 mL        Physical Exam:   Gen:  Neuro:  HEENT:  Resp:  CVS:  Abd:  Ext:  Skin:    Meds:  ceFAZolin  Injectable. IV Push  emtricitabine 200 mG/tenofovir alafenamide 25 mG (DESCOVY) Tablet Oral    metoprolol tartrate Oral    atorvastatin Oral  levothyroxine Oral      acetaminophen     Tablet .. Oral  ALPRAZolam Oral PRN  buPROPion XL (24-Hour) . Oral  cariprazine Oral  celecoxib Oral  gabapentin Oral  HYDROmorphone  Injectable IV Push PRN  HYDROmorphone PCA (1 mG/mL) PCA Continuous  HYDROmorphone PCA (1 mG/mL) Rescue Clinician Bolus IV Push PRN  meclizine Oral  ondansetron Injectable IV Push PRN  oxyCODONE    IR Oral PRN  oxyCODONE    IR Oral PRN  venlafaxine Oral      tranexamic acid IVPB IV Intermittent    famotidine    Tablet Oral  pantoprazole    Tablet Oral      multivitamin Oral  sodium chloride 0.9%. IV Continuous    naloxone Injectable IV Push PRN                            9.7    7.34  )-----------( 161      ( 14 May 2025 06:00 )             28.9       05-14    142  |  113[H]  |  16  ----------------------------<  124[H]  3.9   |  22  |  0.92    Ca    7.9[L]      14 May 2025 06:00    Urinalysis Basic - ( 14 May 2025 06:00 )    Color: x / Appearance: x / SG: x / pH: x  Gluc: 124 mg/dL / Ketone: x  / Bili: x / Urobili: x   Blood: x / Protein: x / Nitrite: x   Leuk Esterase: x / RBC: x / WBC x   Sq Epi: x / Non Sq Epi: x / Bacteria: x      Radiology: ***  Bedside ultrasound: ***    CENTRAL LINE: N/Y          DATE INSERTED:              REMOVE: Y/N  MERCADO: N/Y                       DATE INSERTED:              REMOVE: Y/N  A-LINE: N/Y                       DATE INSERTED:              REMOVE: Y/N    GLOBAL ISSUE/BEST PRACTICE:  Analgesia:  Sedation:  CAM-ICU:   HOB elevation: yes  Stress ulcer prophylaxis:  VTE prophylaxis:  Glycemic control:  Nutrition:    CODE STATUS: ***    Time spent on this patient encounter, which includes documenting this note in the electronic medical record, was 42 minutes including assessing the presenting problems with associated risk, reviewing the medical record to prepare for the encounter, and meeting face to face with patient to obtain additional history. I have also performed an appropriate physical exam, made interventions listed and ordered and interpreted appropriate diagnostic studies as documented. To improve communication and patient safety. I have coordinated care with the multidisciplinary team including the bedside nurse, appropriate attending of record and consultant as needed.         HPI: 70 y/o M w/ pmh of htn, hld, AS s/p AVR in 2019, anxiety, depression, gastric bypass, SBO, recurrent GI infectious, lumbar radiculopathy who presented to  For scheduled lami and fusion of L2-3 and L3-4 and removal of hardware for L4-L5. Post-op pt admitted to the SICU for post-op monitoring.    24 hour events: Pt seen and examined this morning, currently comfortable and w/out complains, tolerating diet, having BM and passing gas    Review of Systems:  Constitutional: No fever, chills, fatigue  Neuro: No headache, numbness, weakness  Resp: No cough, wheezing, shortness of breath  CVS: No chest pain, palpitations, leg swelling  GI: No abdominal pain, nausea, vomiting, diarrhea   : No dysuria, frequency, incontinence  Skin: No itching, burning, rashes, or lesions   Msk: No joint pain or swelling  Psych: No depression, anxiety, mood swings    T(F): 97.9 (05-14-25 @ 08:00), Max: 98.3 (05-14-25 @ 05:00)  HR: 86 (05-14-25 @ 10:00) (62 - 86)  BP: 101/62 (05-14-25 @ 10:00) (85/61 - 117/59)  RR: 13 (05-14-25 @ 10:00) (8 - 25)  SpO2: 98% (05-14-25 @ 10:00) (93% - 100%)  Wt(kg): --      05-13-25 @ 07:01  -  05-14-25 @ 07:00  --------------------------------------------------------  IN: 4250 mL / OUT: 2035 mL / NET: 2215 mL        CAPILLARY BLOOD GLUCOSE      POCT Blood Glucose.: 131 mg/dL (13 May 2025 15:01)      I&O's Summary    13 May 2025 07:01  -  14 May 2025 07:00  --------------------------------------------------------  IN: 4250 mL / OUT: 2035 mL / NET: 2215 mL        Physical Exam:   Gen: Comfortable in bed in NAD  Neuro: AAOx3  HEENT: NC/AT  Resp: good air entry b/l  CVS: +RRR  Abd: BSx4, soft, nt/nd  Ext: no edema   Skin: warm/dry    Meds:  ceFAZolin  Injectable. IV Push  emtricitabine 200 mG/tenofovir alafenamide 25 mG (DESCOVY) Tablet Oral    metoprolol tartrate Oral    atorvastatin Oral  levothyroxine Oral      acetaminophen     Tablet .. Oral  ALPRAZolam Oral PRN  buPROPion XL (24-Hour) . Oral  cariprazine Oral  celecoxib Oral  gabapentin Oral  HYDROmorphone  Injectable IV Push PRN  HYDROmorphone PCA (1 mG/mL) PCA Continuous  HYDROmorphone PCA (1 mG/mL) Rescue Clinician Bolus IV Push PRN  meclizine Oral  ondansetron Injectable IV Push PRN  oxyCODONE    IR Oral PRN  oxyCODONE    IR Oral PRN  venlafaxine Oral      tranexamic acid IVPB IV Intermittent    famotidine    Tablet Oral  pantoprazole    Tablet Oral      multivitamin Oral  sodium chloride 0.9%. IV Continuous    naloxone Injectable IV Push PRN                            9.7    7.34  )-----------( 161      ( 14 May 2025 06:00 )             28.9       05-14    142  |  113[H]  |  16  ----------------------------<  124[H]  3.9   |  22  |  0.92    Ca    7.9[L]      14 May 2025 06:00    Urinalysis Basic - ( 14 May 2025 06:00 )    Color: x / Appearance: x / SG: x / pH: x  Gluc: 124 mg/dL / Ketone: x  / Bili: x / Urobili: x   Blood: x / Protein: x / Nitrite: x   Leuk Esterase: x / RBC: x / WBC x   Sq Epi: x / Non Sq Epi: x / Bacteria: x    Radiology:     < from: Xray Lumbosacral Spine (05.13.25 @ 14:08) >    ACC: [id number] EXAM:  XR LS SPINE AP LAT 2-3 VIEWS   ORDERED BY: ROSALINO PATTON     PROCEDURE DATE:  05/13/2025          INTERPRETATION:  EXAM:XR LUMBAR SPINE AP AND LATERAL 2 OR 3 VIEWS.     CLINICAL INDICATION: intra op .     TECHNIQUE: 2 views.     PRIOR EXAM: 08/15/2023.     FINDINGS:     Evidence of posterior fixation rods with pedicle screws extending from   L2 down to L5 along with evidence of disc implants. The fixation hardware   is intact. Alignment is within normal limits.      IMPRESSION:    Postsurgical changes.    --- End of Report ---        EDNG PACE MD; Attending Radiologist  This document has been electronically signed. May 14 2025  9:51AM    < end of copied text >        CODE STATUS: FULL CODE    Time spent on this patient encounter, which includes documenting this note in the electronic medical record, was 42 minutes including assessing the presenting problems with associated risk, reviewing the medical record to prepare for the encounter, and meeting face to face with patient to obtain additional history. I have also performed an appropriate physical exam, made interventions listed and ordered and interpreted appropriate diagnostic studies as documented. To improve communication and patient safety. I have coordinated care with the multidisciplinary team including the bedside nurse, appropriate attending of record and consultant as needed.         HPI: 68 y/o M w/ pmh of htn, hld, AS s/p AVR in 2019, anxiety, depression, gastric bypass, SBO, recurrent GI infectious, lumbar radiculopathy who presented to  For scheduled lami and fusion of L2-3 and L3-4 and removal of hardware for L4-L5. Post-op pt admitted to the SICU for post-op monitoring.    24 hour events: Pt seen and examined this morning, currently comfortable and w/out complains, tolerating diet, having BM and passing gas    Review of Systems:  Constitutional: No fever, chills, fatigue  Neuro: No headache, numbness, weakness  Resp: No cough, wheezing, shortness of breath  CVS: No chest pain, palpitations, leg swelling  GI: No abdominal pain, nausea, vomiting, diarrhea   : No dysuria, frequency, incontinence  Skin: No itching, burning, rashes, or lesions   Msk: No joint pain or swelling  Psych: No depression, anxiety, mood swings    T(F): 97.9 (05-14-25 @ 08:00), Max: 98.3 (05-14-25 @ 05:00)  HR: 86 (05-14-25 @ 10:00) (62 - 86)  BP: 101/62 (05-14-25 @ 10:00) (85/61 - 117/59)  RR: 13 (05-14-25 @ 10:00) (8 - 25)  SpO2: 98% (05-14-25 @ 10:00) (93% - 100%)  Wt(kg): --      05-13-25 @ 07:01  -  05-14-25 @ 07:00  --------------------------------------------------------  IN: 4250 mL / OUT: 2035 mL / NET: 2215 mL        CAPILLARY BLOOD GLUCOSE      POCT Blood Glucose.: 131 mg/dL (13 May 2025 15:01)      I&O's Summary    13 May 2025 07:01  -  14 May 2025 07:00  --------------------------------------------------------  IN: 4250 mL / OUT: 2035 mL / NET: 2215 mL        Physical Exam:   Gen: Comfortable in bed in NAD  Neuro: AAOx3  HEENT: NC/AT  Resp: good air entry b/l  CVS: +RRR  Abd: BSx4, soft, nt/nd  Ext: no edema   Skin: warm/dry    Meds:  ceFAZolin  Injectable. IV Push  emtricitabine 200 mG/tenofovir alafenamide 25 mG (DESCOVY) Tablet Oral    metoprolol tartrate Oral    atorvastatin Oral  levothyroxine Oral      acetaminophen     Tablet .. Oral  ALPRAZolam Oral PRN  buPROPion XL (24-Hour) . Oral  cariprazine Oral  celecoxib Oral  gabapentin Oral  HYDROmorphone  Injectable IV Push PRN  HYDROmorphone PCA (1 mG/mL) PCA Continuous  HYDROmorphone PCA (1 mG/mL) Rescue Clinician Bolus IV Push PRN  meclizine Oral  ondansetron Injectable IV Push PRN  oxyCODONE    IR Oral PRN  oxyCODONE    IR Oral PRN  venlafaxine Oral      tranexamic acid IVPB IV Intermittent    famotidine    Tablet Oral  pantoprazole    Tablet Oral      multivitamin Oral  sodium chloride 0.9%. IV Continuous    naloxone Injectable IV Push PRN                            9.7    7.34  )-----------( 161      ( 14 May 2025 06:00 )             28.9       05-14    142  |  113[H]  |  16  ----------------------------<  124[H]  3.9   |  22  |  0.92    Ca    7.9[L]      14 May 2025 06:00    Urinalysis Basic - ( 14 May 2025 06:00 )    Color: x / Appearance: x / SG: x / pH: x  Gluc: 124 mg/dL / Ketone: x  / Bili: x / Urobili: x   Blood: x / Protein: x / Nitrite: x   Leuk Esterase: x / RBC: x / WBC x   Sq Epi: x / Non Sq Epi: x / Bacteria: x    Radiology:     < from: Xray Lumbosacral Spine (05.13.25 @ 14:08) >    ACC: [id number] EXAM:  XR LS SPINE AP LAT 2-3 VIEWS   ORDERED BY: ROSALINO PATTON     PROCEDURE DATE:  05/13/2025          INTERPRETATION:  EXAM:XR LUMBAR SPINE AP AND LATERAL 2 OR 3 VIEWS.     CLINICAL INDICATION: intra op .     TECHNIQUE: 2 views.     PRIOR EXAM: 08/15/2023.     FINDINGS:     Evidence of posterior fixation rods with pedicle screws extending from   L2 down to L5 along with evidence of disc implants. The fixation hardware   is intact. Alignment is within normal limits.      IMPRESSION:    Postsurgical changes.    --- End of Report ---        DENG PACE MD; Attending Radiologist  This document has been electronically signed. May 14 2025  9:51AM    < end of copied text >

## 2025-05-14 NOTE — PHYSICAL THERAPY INITIAL EVALUATION ADULT - MODALITIES TREATMENT COMMENTS
Pt c/o some dizziness with sit to stand. BP 94/56. Pt sat in chair for a few minutes. States dizziness subsided. Pt ambulates short distance with assist. Returned to sitting in bedside chair. ALEX Feliciano made aware.

## 2025-05-14 NOTE — PROGRESS NOTE ADULT - SUBJECTIVE AND OBJECTIVE BOX
POD#1  c/o LBP  no leg pain  afeb vss  NV intact  drain 80 cc this shift  270 overnight    labs stable  H/H 9.2/28.3  lytes ok

## 2025-05-14 NOTE — PHYSICAL THERAPY INITIAL EVALUATION ADULT - ADDITIONAL COMMENTS
Pt states he lives in a studio apartment with no stairs. Lives alone. States he cannot use RW in his home due to space, states he holds on to furniture if needed.

## 2025-05-14 NOTE — PHYSICAL THERAPY INITIAL EVALUATION ADULT - PERTINENT HX OF CURRENT PROBLEM, REHAB EVAL
Pt admitted to  on 5/13/25 s/p laminectomy, fusion L2-L3, L3-L4, removal of hardware L4-L5. Pt agreeable to PT. No complaints of pain at this time. BPs have been soft. Pt states he has a brace at home but does not have anyone to bring it to hospital. Dr. Staples at bedside, states no brace needed for OOB at this time.

## 2025-05-14 NOTE — PHYSICAL THERAPY INITIAL EVALUATION ADULT - GENERAL OBSERVATIONS, REHAB EVAL
Pt supine in bed in SICU in NAD, all lines intact, all monitors attached, +2 drains, +PCA, +ingram. RN Jodie aware.

## 2025-05-15 LAB
ANION GAP SERPL CALC-SCNC: 4 MMOL/L — LOW (ref 5–17)
BASOPHILS # BLD AUTO: 0.02 K/UL — SIGNIFICANT CHANGE UP (ref 0–0.2)
BASOPHILS NFR BLD AUTO: 0.3 % — SIGNIFICANT CHANGE UP (ref 0–2)
BUN SERPL-MCNC: 14 MG/DL — SIGNIFICANT CHANGE UP (ref 7–23)
CALCIUM SERPL-MCNC: 8.6 MG/DL — SIGNIFICANT CHANGE UP (ref 8.5–10.1)
CHLORIDE SERPL-SCNC: 107 MMOL/L — SIGNIFICANT CHANGE UP (ref 96–108)
CO2 SERPL-SCNC: 27 MMOL/L — SIGNIFICANT CHANGE UP (ref 22–31)
CREAT SERPL-MCNC: 0.83 MG/DL — SIGNIFICANT CHANGE UP (ref 0.5–1.3)
EGFR: 95 ML/MIN/1.73M2 — SIGNIFICANT CHANGE UP
EGFR: 95 ML/MIN/1.73M2 — SIGNIFICANT CHANGE UP
EOSINOPHIL # BLD AUTO: 0.03 K/UL — SIGNIFICANT CHANGE UP (ref 0–0.5)
EOSINOPHIL NFR BLD AUTO: 0.5 % — SIGNIFICANT CHANGE UP (ref 0–6)
GLUCOSE SERPL-MCNC: 99 MG/DL — SIGNIFICANT CHANGE UP (ref 70–99)
HCT VFR BLD CALC: 29.4 % — LOW (ref 39–50)
HGB BLD-MCNC: 9.4 G/DL — LOW (ref 13–17)
IMM GRANULOCYTES # BLD AUTO: 0.02 K/UL — SIGNIFICANT CHANGE UP (ref 0–0.07)
IMM GRANULOCYTES NFR BLD AUTO: 0.3 % — SIGNIFICANT CHANGE UP (ref 0–0.9)
LYMPHOCYTES # BLD AUTO: 1.92 K/UL — SIGNIFICANT CHANGE UP (ref 1–3.3)
LYMPHOCYTES NFR BLD AUTO: 32.1 % — SIGNIFICANT CHANGE UP (ref 13–44)
MCHC RBC-ENTMCNC: 31.9 PG — SIGNIFICANT CHANGE UP (ref 27–34)
MCHC RBC-ENTMCNC: 32 G/DL — SIGNIFICANT CHANGE UP (ref 32–36)
MCV RBC AUTO: 99.7 FL — SIGNIFICANT CHANGE UP (ref 80–100)
MONOCYTES # BLD AUTO: 0.45 K/UL — SIGNIFICANT CHANGE UP (ref 0–0.9)
MONOCYTES NFR BLD AUTO: 7.5 % — SIGNIFICANT CHANGE UP (ref 2–14)
NEUTROPHILS # BLD AUTO: 3.54 K/UL — SIGNIFICANT CHANGE UP (ref 1.8–7.4)
NEUTROPHILS NFR BLD AUTO: 59.3 % — SIGNIFICANT CHANGE UP (ref 43–77)
NRBC # BLD AUTO: 0 K/UL — SIGNIFICANT CHANGE UP (ref 0–0)
NRBC # FLD: 0 K/UL — SIGNIFICANT CHANGE UP (ref 0–0)
NRBC BLD AUTO-RTO: 0 /100 WBCS — SIGNIFICANT CHANGE UP (ref 0–0)
PLATELET # BLD AUTO: 158 K/UL — SIGNIFICANT CHANGE UP (ref 150–400)
PMV BLD: 9 FL — SIGNIFICANT CHANGE UP (ref 7–13)
POTASSIUM SERPL-MCNC: 4 MMOL/L — SIGNIFICANT CHANGE UP (ref 3.5–5.3)
POTASSIUM SERPL-SCNC: 4 MMOL/L — SIGNIFICANT CHANGE UP (ref 3.5–5.3)
RBC # BLD: 2.95 M/UL — LOW (ref 4.2–5.8)
RBC # FLD: 14 % — SIGNIFICANT CHANGE UP (ref 10.3–14.5)
SODIUM SERPL-SCNC: 138 MMOL/L — SIGNIFICANT CHANGE UP (ref 135–145)
WBC # BLD: 5.98 K/UL — SIGNIFICANT CHANGE UP (ref 3.8–10.5)
WBC # FLD AUTO: 5.98 K/UL — SIGNIFICANT CHANGE UP (ref 3.8–10.5)

## 2025-05-15 PROCEDURE — 99232 SBSQ HOSP IP/OBS MODERATE 35: CPT

## 2025-05-15 RX ADMIN — VENLAFAXINE HYDROCHLORIDE 75 MILLIGRAM(S): 37.5 CAPSULE, EXTENDED RELEASE ORAL at 10:13

## 2025-05-15 RX ADMIN — ATORVASTATIN CALCIUM 10 MILLIGRAM(S): 80 TABLET, FILM COATED ORAL at 21:08

## 2025-05-15 RX ADMIN — Medication 20 MILLIGRAM(S): at 10:14

## 2025-05-15 RX ADMIN — OXYCODONE HYDROCHLORIDE 10 MILLIGRAM(S): 30 TABLET ORAL at 19:31

## 2025-05-15 RX ADMIN — Medication 650 MILLIGRAM(S): at 17:14

## 2025-05-15 RX ADMIN — GABAPENTIN 300 MILLIGRAM(S): 400 CAPSULE ORAL at 10:12

## 2025-05-15 RX ADMIN — Medication 25 MICROGRAM(S): at 06:01

## 2025-05-15 RX ADMIN — Medication 40 MILLIGRAM(S): at 06:01

## 2025-05-15 RX ADMIN — VENLAFAXINE HYDROCHLORIDE 75 MILLIGRAM(S): 37.5 CAPSULE, EXTENDED RELEASE ORAL at 17:14

## 2025-05-15 RX ADMIN — CELECOXIB 200 MILLIGRAM(S): 50 CAPSULE ORAL at 10:13

## 2025-05-15 RX ADMIN — BUPROPION HYDROBROMIDE 300 MILLIGRAM(S): 522 TABLET, EXTENDED RELEASE ORAL at 10:15

## 2025-05-15 RX ADMIN — CARIPRAZINE 3 MILLIGRAM(S): 3 CAPSULE, GELATIN COATED ORAL at 10:15

## 2025-05-15 RX ADMIN — GABAPENTIN 300 MILLIGRAM(S): 400 CAPSULE ORAL at 21:08

## 2025-05-15 RX ADMIN — Medication 1 TABLET(S): at 10:14

## 2025-05-15 RX ADMIN — Medication 25 MILLIGRAM(S): at 10:13

## 2025-05-15 RX ADMIN — Medication 1 TABLET(S): at 10:12

## 2025-05-15 RX ADMIN — Medication 650 MILLIGRAM(S): at 23:45

## 2025-05-15 RX ADMIN — Medication 650 MILLIGRAM(S): at 06:01

## 2025-05-15 RX ADMIN — Medication 0.25 MILLIGRAM(S): at 21:08

## 2025-05-15 RX ADMIN — Medication 650 MILLIGRAM(S): at 11:03

## 2025-05-15 RX ADMIN — OXYCODONE HYDROCHLORIDE 10 MILLIGRAM(S): 30 TABLET ORAL at 20:01

## 2025-05-15 NOTE — PROVIDER CONTACT NOTE (OTHER) - SITUATION
Office is aware that patient is in [facility].  Please fax discharge papers to [phone].
Left Deep GAYLA drain came out

## 2025-05-15 NOTE — PROGRESS NOTE ADULT - NS ATTEND AMEND GEN_ALL_CORE FT
ASSESSMENT  67yo male with PMHx HLD, HTN, AS s/p TAVR, anxiety, bipolar,  lumbar radiculopathy presents for planned revision laminectomy right L4 L5 Excision of herniated disc right L4 L5 , transforaminal lumbar interbody fusion L4 L5    s/p laminectomy with osteotomy and fusion L2-L4 5/14  EBL 650cc, received 250 cell saver    Plan:  SICu  PAin Control  Continue Lopressor  IS  Po Diet  D/C IVF  OOB  PT    Teansfer to 2N
· Assessment	  70 y/o M w/ pmh of htn, hld, AS s/p AVR in 2019, anxiety, depression, gastric bypass, SBO, recurrent GI infectious, lumbar radiculopathy who presented to  For scheduled lami and fusion of L2-3 and L3-4 and removal of hardware for L4-L5. Post-op pt admitted to the SICU for post-op monitoring.      Plan:  SICU  Pain Control  PCA  Continue Lopressor  Po Diet  Continue Zamorano  Venodynes  D/C A-Line  PT

## 2025-05-15 NOTE — PROGRESS NOTE ADULT - SUBJECTIVE AND OBJECTIVE BOX
70yo WM presents with increased back and leg pain. Underwent L4/5 lami/fusion/Tlif in Aug 2023. Patient notes progressive back and L thigh pain. Studies reveal adjacent level DDD/stenosis. Patient failed nonoperative modalities. Underwent exploration fusion, removal hardware, lami L2-4, TlIF L2/3/& L3/4 fusion L2-5 on 5/13/25. Patient had had multiple previous lumbar surgeries prior to the most recent fusion including discectomy L4/5, insertion and removal DSC stimulator.    5/15/25 Patient notes residual L anterior thigh paresthesia today-has ambulated in unit without difficulty    PAST MEDICAL & SURGICAL HISTORY:  Hyperlipidemia      GERD (gastroesophageal reflux disease)      Anxiety      Skin cancer  external right ear, unsure of type      HTN (hypertension)      Aortic stenosis  Bicuspid Aortic Valve Replacement- 5/29/2018      Chronic neck and back pain      Sciatica      Internal and external prolapsed hemorrhoids  had surgery      Helicobacter pylori infection      History of colonic polyps      Herniated lumbar intervertebral disc      Gastrointestinal hemorrhage      Carpal tunnel syndrome  had surgery      Depressive disorder      Loss of hearing      Morbid obesity      Lumbar radiculopathy      Hearing loss      Erectile dysfunction      Shigella infection      Campylobacter diarrhea      H/O small bowel obstruction      Foot drop      Hand fracture, right      Hypothyroid      Bipolar illness      MALACHI (generalized anxiety disorder)      S/P carpal tunnel release  right side, left side- 2014      History of back surgery  laminectomy x2 last 2018      S/P trigger finger release  right thumb and middle finger      H/O external ear surgery  excision mass right ear- cancer 2018      H/O bicuspid aortic valve  replacement- 5/29/18      Status post coronary angioplasty  pt not sure he had this procedure      S/P tonsillectomy      H/O hemorrhoidectomy      Spinal cord stimulator status  10/2020      H/O bariatric surgery      Status post laparotomy with lysis of adhesions      History of appendectomy      S/P ORIF (open reduction internal fixation) fracture      MEDICATIONS  (STANDING):  acetaminophen     Tablet .. 650 milliGRAM(s) Oral every 6 hours  atorvastatin 10 milliGRAM(s) Oral at bedtime  buPROPion XL (24-Hour) . 300 milliGRAM(s) Oral daily  cariprazine 3 milliGRAM(s) Oral daily  celecoxib 200 milliGRAM(s) Oral daily  emtricitabine 200 mG/tenofovir alafenamide 25 mG (DESCOVY) Tablet 1 Tablet(s) Oral daily  famotidine    Tablet 20 milliGRAM(s) Oral daily  gabapentin 300 milliGRAM(s) Oral two times a day  HYDROmorphone PCA (1 mG/mL) 30 milliLiter(s) PCA Continuous PCA Continuous  levothyroxine 25 MICROGram(s) Oral daily  meclizine 25 milliGRAM(s) Oral daily  metoprolol tartrate 12.5 milliGRAM(s) Oral daily  multivitamin 1 Tablet(s) Oral daily  pantoprazole    Tablet 40 milliGRAM(s) Oral before breakfast  sodium chloride 0.9%. 1000 milliLiter(s) (125 mL/Hr) IV Continuous <Continuous>  tranexamic acid IVPB 2000 milliGRAM(s) IV Intermittent once  venlafaxine 75 milliGRAM(s) Oral two times a day with meals    MEDICATIONS  (PRN):  ALPRAZolam 0.25 milliGRAM(s) Oral every 6 hours PRN anxiety  HYDROmorphone  Injectable 1 milliGRAM(s) IV Push every 3 hours PRN Severe Pain (7 - 10)  HYDROmorphone PCA (1 mG/mL) Rescue Clinician Bolus 0.5 milliGRAM(s) IV Push every 15 minutes PRN for Pain Scale GREATER THAN 6  naloxone Injectable 0.1 milliGRAM(s) IV Push every 3 minutes PRN For ANY of the following changes in patient status:  A. RR LESS THAN 10 breaths per minute, B. Oxygen saturation LESS THAN 90%, C. Sedation score of 6  ondansetron Injectable 4 milliGRAM(s) IV Push every 6 hours PRN Nausea  oxyCODONE    IR 5 milliGRAM(s) Oral every 3 hours PRN Mild Pain (1 - 3)  oxyCODONE    IR 10 milliGRAM(s) Oral every 3 hours PRN Moderate Pain (4 - 6)    Allergies    No Known Allergies    Intolerances    PE:    ICU Vital Signs Last 24 Hrs  T(C): 36.7 (15 May 2025 09:12), Max: 36.9 (15 May 2025 05:00)  T(F): 98 (15 May 2025 09:12), Max: 98.4 (15 May 2025 05:00)  HR: 92 (15 May 2025 08:31) (72 - 102)  BP: 103/60 (15 May 2025 08:31) (93/53 - 140/71)  BP(mean): 74 (15 May 2025 08:31) (66 - 89)  ABP: --  ABP(mean): --  RR: 12 (15 May 2025 08:31) (8 - 20)  SpO2: 97% (15 May 2025 08:31) (90% - 97%)    O2 Parameters below as of 15 May 2025 08:31  Patient On (Oxygen Delivery Method): nasal cannula  O2 Flow (L/min): 2      Patient sitting in bedside chair c/o mild R thigh paresthesia-improving  Tolerating diet  Zamorano removed-good U/O prior to removal  Deep GAYLA with continued drainage-200 cc last 24 hrs/60 cc last 12 hrs  Superficial GAYLA with decreasing drainage-36 cc last 24 hrs/6 cc last 12 hrs-will remove in AM  Dressing clean and dry  Old bloody drainage on bed sheets and pillows behind back  Neuro intact LEs    LABS                    9.4    5.98  )-----------( 158      ( 15 May 2025 05:38 )             29.4     05-15    138  |  107  |  14  ----------------------------<  99  4.0   |  27  |  0.83    Ca    8.6      15 May 2025 05:38

## 2025-05-15 NOTE — PROGRESS NOTE ADULT - SUBJECTIVE AND OBJECTIVE BOX
Patient is a 69y old  Male who presents with a chief complaint of LSS and leg pain (14 May 2025 17:58)    BRIEF HOSPITAL COURSE: ***        PAST MEDICAL & SURGICAL HISTORY:  Hyperlipidemia  GERD (gastroesophageal reflux disease)  Anxiety  Skin cancer  external right ear, unsure of type  HTN (hypertension)  Aortic stenosis  Bicuspid Aortic Valve Replacement- 5/29/2018  Chronic neck and back pain  Sciatica  Internal and external prolapsed hemorrhoids  had surgery  Helicobacter pylori infection  History of colonic polyps  Herniated lumbar intervertebral disc  Gastrointestinal hemorrhage  Carpal tunnel syndrome  had surgery  Depressive disorder  Loss of hearing  Morbid obesity  Lumbar radiculopathy  Hearing loss  Erectile dysfunction  Shigella infection  Campylobacter diarrhea  H/O small bowel obstruction  Foot drop  Hand fracture, right  Hypothyroid  Bipolar illness  MALACHI (generalized anxiety disorder)  S/P carpal tunnel release  right side, left side- 2014  History of back surgery  laminectomy x2 last 2018  S/P trigger finger release  right thumb and middle finger  H/O external ear surgery  excision mass right ear- cancer 2018  H/O bicuspid aortic valve  replacement- 5/29/18  Status post coronary angioplasty  pt not sure he had this procedure  S/P tonsillectomy  H/O hemorrhoidectomy  Spinal cord stimulator status  10/2020  H/O bariatric surgery  Status post laparotomy with lysis of adhesions  History of appendectomy  S/P ORIF (open reduction internal fixation) fracture    Medications:  emtricitabine 200 mG/tenofovir alafenamide 25 mG (DESCOVY) Tablet 1 Tablet(s) Oral daily  metoprolol tartrate 12.5 milliGRAM(s) Oral daily  acetaminophen     Tablet .. 650 milliGRAM(s) Oral every 6 hours  ALPRAZolam 0.25 milliGRAM(s) Oral every 6 hours PRN  buPROPion XL (24-Hour) . 300 milliGRAM(s) Oral daily  cariprazine 3 milliGRAM(s) Oral daily  celecoxib 200 milliGRAM(s) Oral daily  gabapentin 300 milliGRAM(s) Oral two times a day  HYDROmorphone  Injectable 1 milliGRAM(s) IV Push every 3 hours PRN  meclizine 25 milliGRAM(s) Oral daily  oxyCODONE    IR 5 milliGRAM(s) Oral every 3 hours PRN  oxyCODONE    IR 10 milliGRAM(s) Oral every 3 hours PRN  venlafaxine 75 milliGRAM(s) Oral two times a day with meals  tranexamic acid IVPB 2000 milliGRAM(s) IV Intermittent onc  famotidine    Tablet 20 milliGRAM(s) Oral daily  pantoprazole    Tablet 40 milliGRAM(s) Oral before breakfast  atorvastatin 10 milliGRAM(s) Oral at bedtime  levothyroxine 25 MICROGram(s) Oral daily  multivitamin 1 Tablet(s) Oral daily  sodium chloride 0.9%. 1000 milliLiter(s) IV Continuous <Continuous>      ICU Vital Signs Last 24 Hrs  T(C): 36.4 (15 May 2025 12:23), Max: 36.9 (15 May 2025 05:00)  T(F): 97.5 (15 May 2025 12:23), Max: 98.4 (15 May 2025 05:00)  HR: 89 (15 May 2025 14:00) (74 - 102)  BP: 99/61 (15 May 2025 14:00) (94/61 - 140/71)  BP(mean): 72 (15 May 2025 14:00) (72 - 89)  ABP: --  ABP(mean): --  RR: 19 (15 May 2025 14:00) (8 - 20)  SpO2: 99% (15 May 2025 14:00) (92% - 99%)    O2 Parameters below as of 15 May 2025 12:00  Patient On (Oxygen Delivery Method): nasal cannula  O2 Flow (L/min): 2      I&O's Detail    14 May 2025 07:01  -  15 May 2025 07:00  --------------------------------------------------------  IN:  Total IN: 0 mL    OUT:    Bulb (mL): 30 mL    Bulb (mL): 145 mL    Indwelling Catheter - Urethral (mL): 900 mL  Total OUT: 1075 mL    Total NET: -1075 mL    LABS:                        9.4    5.98  )-----------( 158      ( 15 May 2025 05:38 )             29.4     05-15    138  |  107  |  14  ----------------------------<  99  4.0   |  27  |  0.83    Ca    8.6      15 May 2025 05:38      CAPILLARY BLOOD GLUCOS  Urinalysis Basic - ( 15 May 2025 05:38 )    Color: x / Appearance: x / SG: x / pH: x  Gluc: 99 mg/dL / Ketone: x  / Bili: x / Urobili: x   Blood: x / Protein: x / Nitrite: x   Leuk Esterase: x / RBC: x / WBC x   Sq Epi: x / Non Sq Epi: x / Bacteria: x    CULTURES:  Culture Results:   Culture in progress (05-13 @ 07:16)    Physical Examination:    General: No acute distress.    PULM: Clear to auscultation biaterally  CVS: Regular rate and rhythm, no murmurs  ABD: Soft, nondistended, nontender  EXT: No LE edema  SKIN: Warm and well perfused,   NEURO: Alert, oriented, interactive    DEVICES:   b/l posterior GAYLA- serosanguinous    RADIOLOGY: ***    CRITICAL CARE TIME SPENT: ***   Patient is a 69y old  Male who presents with a chief complaint of LSS and leg pain (14 May 2025 17:58)    BRIEF HOSPITAL COURSE: 67yo male with PMHx HLD, HTN, AS s/p TAVR, anxiety,  lumbar radiculopathy presents for planned revision laminectomy right L4 L5 Excision of herniated disc right L4 L5 , transforaminal lumbar interbody fusion L4 L5    s/p laminectomy with osteotomy and fusion L2-L4 5/14  EBL 650cc, received 250 cell saver      5/15 pt seen this am, was ambulating around unit, having some lower back pain and numbness on L thigh.     PAST MEDICAL & SURGICAL HISTORY:  Hyperlipidemia  GERD (gastroesophageal reflux disease)  Anxiety  Skin cancer  external right ear, unsure of type  HTN (hypertension)  Aortic stenosis  Bicuspid Aortic Valve Replacement- 5/29/2018  Chronic neck and back pain  Sciatica  Internal and external prolapsed hemorrhoids  had surgery  Helicobacter pylori infection  History of colonic polyps  Herniated lumbar intervertebral disc  Gastrointestinal hemorrhage  Carpal tunnel syndrome  had surgery  Depressive disorder  Loss of hearing  Morbid obesity  Lumbar radiculopathy  Hearing loss  Erectile dysfunction  Shigella infection  Campylobacter diarrhea  H/O small bowel obstruction  Foot drop  Hand fracture, right  Hypothyroid  Bipolar illness  MALACHI (generalized anxiety disorder)  S/P carpal tunnel release  right side, left side- 2014  History of back surgery  laminectomy x2 last 2018  S/P trigger finger release  right thumb and middle finger  H/O external ear surgery  excision mass right ear- cancer 2018  H/O bicuspid aortic valve  replacement- 5/29/18  Status post coronary angioplasty  pt not sure he had this procedure  S/P tonsillectomy  H/O hemorrhoidectomy  Spinal cord stimulator status  10/2020  H/O bariatric surgery  Status post laparotomy with lysis of adhesions  History of appendectomy  S/P ORIF (open reduction internal fixation) fracture    Medications:  emtricitabine 200 mG/tenofovir alafenamide 25 mG (DESCOVY) Tablet 1 Tablet(s) Oral daily  metoprolol tartrate 12.5 milliGRAM(s) Oral daily  acetaminophen     Tablet .. 650 milliGRAM(s) Oral every 6 hours  ALPRAZolam 0.25 milliGRAM(s) Oral every 6 hours PRN  buPROPion XL (24-Hour) . 300 milliGRAM(s) Oral daily  cariprazine 3 milliGRAM(s) Oral daily  celecoxib 200 milliGRAM(s) Oral daily  gabapentin 300 milliGRAM(s) Oral two times a day  HYDROmorphone  Injectable 1 milliGRAM(s) IV Push every 3 hours PRN  meclizine 25 milliGRAM(s) Oral daily  oxyCODONE    IR 5 milliGRAM(s) Oral every 3 hours PRN  oxyCODONE    IR 10 milliGRAM(s) Oral every 3 hours PRN  venlafaxine 75 milliGRAM(s) Oral two times a day with meals  tranexamic acid IVPB 2000 milliGRAM(s) IV Intermittent onc  famotidine    Tablet 20 milliGRAM(s) Oral daily  pantoprazole    Tablet 40 milliGRAM(s) Oral before breakfast  atorvastatin 10 milliGRAM(s) Oral at bedtime  levothyroxine 25 MICROGram(s) Oral daily  multivitamin 1 Tablet(s) Oral daily  sodium chloride 0.9%. 1000 milliLiter(s) IV Continuous <Continuous>      ICU Vital Signs Last 24 Hrs  T(C): 36.4 (15 May 2025 12:23), Max: 36.9 (15 May 2025 05:00)  T(F): 97.5 (15 May 2025 12:23), Max: 98.4 (15 May 2025 05:00)  HR: 89 (15 May 2025 14:00) (74 - 102)  BP: 99/61 (15 May 2025 14:00) (94/61 - 140/71)  BP(mean): 72 (15 May 2025 14:00) (72 - 89)  ABP: --  ABP(mean): --  RR: 19 (15 May 2025 14:00) (8 - 20)  SpO2: 99% (15 May 2025 14:00) (92% - 99%)    O2 Parameters below as of 15 May 2025 12:00  Patient On (Oxygen Delivery Method): nasal cannula  O2 Flow (L/min): 2      I&O's Detail    14 May 2025 07:01  -  15 May 2025 07:00  --------------------------------------------------------  IN:  Total IN: 0 mL    OUT:    Bulb (mL): 30 mL    Bulb (mL): 145 mL    Indwelling Catheter - Urethral (mL): 900 mL  Total OUT: 1075 mL    Total NET: -1075 mL    LABS:                        9.4    5.98  )-----------( 158      ( 15 May 2025 05:38 )             29.4     05-15    138  |  107  |  14  ----------------------------<  99  4.0   |  27  |  0.83    Ca    8.6      15 May 2025 05:38      CAPILLARY BLOOD GLUCOS  Urinalysis Basic - ( 15 May 2025 05:38 )    Color: x / Appearance: x / SG: x / pH: x  Gluc: 99 mg/dL / Ketone: x  / Bili: x / Urobili: x   Blood: x / Protein: x / Nitrite: x   Leuk Esterase: x / RBC: x / WBC x   Sq Epi: x / Non Sq Epi: x / Bacteria: x    CULTURES:  Culture Results:   Culture in progress (05-13 @ 07:16)    Physical Examination:    General: No acute distress.    PULM: Clear to auscultation biaterally  CVS: Regular rate and rhythm, no murmurs  ABD: Soft, nondistended, nontender  EXT: No LE edema  SKIN: Warm and well perfused,   NEURO: Alert, oriented, interactive    DEVICES:   b/l posterior GAYLA- serosanguinous    RADIOLOGY: ***    CRITICAL CARE TIME SPENT: ***

## 2025-05-16 LAB
ANION GAP SERPL CALC-SCNC: 4 MMOL/L — LOW (ref 5–17)
BASOPHILS # BLD AUTO: 0.02 K/UL — SIGNIFICANT CHANGE UP (ref 0–0.2)
BASOPHILS NFR BLD AUTO: 0.3 % — SIGNIFICANT CHANGE UP (ref 0–2)
BUN SERPL-MCNC: 10 MG/DL — SIGNIFICANT CHANGE UP (ref 7–23)
CALCIUM SERPL-MCNC: 8.4 MG/DL — LOW (ref 8.5–10.1)
CHLORIDE SERPL-SCNC: 106 MMOL/L — SIGNIFICANT CHANGE UP (ref 96–108)
CO2 SERPL-SCNC: 27 MMOL/L — SIGNIFICANT CHANGE UP (ref 22–31)
CREAT SERPL-MCNC: 0.83 MG/DL — SIGNIFICANT CHANGE UP (ref 0.5–1.3)
CULTURE RESULTS: SIGNIFICANT CHANGE UP
EGFR: 95 ML/MIN/1.73M2 — SIGNIFICANT CHANGE UP
EGFR: 95 ML/MIN/1.73M2 — SIGNIFICANT CHANGE UP
EOSINOPHIL # BLD AUTO: 0.02 K/UL — SIGNIFICANT CHANGE UP (ref 0–0.5)
EOSINOPHIL NFR BLD AUTO: 0.3 % — SIGNIFICANT CHANGE UP (ref 0–6)
GLUCOSE SERPL-MCNC: 103 MG/DL — HIGH (ref 70–99)
HCT VFR BLD CALC: 27.1 % — LOW (ref 39–50)
HGB BLD-MCNC: 9.1 G/DL — LOW (ref 13–17)
IMM GRANULOCYTES # BLD AUTO: 0.04 K/UL — SIGNIFICANT CHANGE UP (ref 0–0.07)
IMM GRANULOCYTES NFR BLD AUTO: 0.7 % — SIGNIFICANT CHANGE UP (ref 0–0.9)
LYMPHOCYTES # BLD AUTO: 1.33 K/UL — SIGNIFICANT CHANGE UP (ref 1–3.3)
LYMPHOCYTES NFR BLD AUTO: 23.1 % — SIGNIFICANT CHANGE UP (ref 13–44)
MCHC RBC-ENTMCNC: 32.4 PG — SIGNIFICANT CHANGE UP (ref 27–34)
MCHC RBC-ENTMCNC: 33.6 G/DL — SIGNIFICANT CHANGE UP (ref 32–36)
MCV RBC AUTO: 96.4 FL — SIGNIFICANT CHANGE UP (ref 80–100)
MONOCYTES # BLD AUTO: 0.37 K/UL — SIGNIFICANT CHANGE UP (ref 0–0.9)
MONOCYTES NFR BLD AUTO: 6.4 % — SIGNIFICANT CHANGE UP (ref 2–14)
NEUTROPHILS # BLD AUTO: 3.98 K/UL — SIGNIFICANT CHANGE UP (ref 1.8–7.4)
NEUTROPHILS NFR BLD AUTO: 69.2 % — SIGNIFICANT CHANGE UP (ref 43–77)
NRBC # BLD AUTO: 0 K/UL — SIGNIFICANT CHANGE UP (ref 0–0)
NRBC # FLD: 0 K/UL — SIGNIFICANT CHANGE UP (ref 0–0)
NRBC BLD AUTO-RTO: 0 /100 WBCS — SIGNIFICANT CHANGE UP (ref 0–0)
PLATELET # BLD AUTO: 173 K/UL — SIGNIFICANT CHANGE UP (ref 150–400)
PMV BLD: 9 FL — SIGNIFICANT CHANGE UP (ref 7–13)
POTASSIUM SERPL-MCNC: 3.4 MMOL/L — LOW (ref 3.5–5.3)
POTASSIUM SERPL-SCNC: 3.4 MMOL/L — LOW (ref 3.5–5.3)
RBC # BLD: 2.81 M/UL — LOW (ref 4.2–5.8)
RBC # FLD: 13.6 % — SIGNIFICANT CHANGE UP (ref 10.3–14.5)
SODIUM SERPL-SCNC: 137 MMOL/L — SIGNIFICANT CHANGE UP (ref 135–145)
SPECIMEN SOURCE: SIGNIFICANT CHANGE UP
SURGICAL PATHOLOGY STUDY: SIGNIFICANT CHANGE UP
WBC # BLD: 5.76 K/UL — SIGNIFICANT CHANGE UP (ref 3.8–10.5)
WBC # FLD AUTO: 5.76 K/UL — SIGNIFICANT CHANGE UP (ref 3.8–10.5)

## 2025-05-16 PROCEDURE — 99222 1ST HOSP IP/OBS MODERATE 55: CPT | Mod: FS

## 2025-05-16 RX ADMIN — Medication 650 MILLIGRAM(S): at 05:46

## 2025-05-16 RX ADMIN — Medication 1 TABLET(S): at 10:51

## 2025-05-16 RX ADMIN — GABAPENTIN 300 MILLIGRAM(S): 400 CAPSULE ORAL at 21:12

## 2025-05-16 RX ADMIN — OXYCODONE HYDROCHLORIDE 10 MILLIGRAM(S): 30 TABLET ORAL at 19:44

## 2025-05-16 RX ADMIN — OXYCODONE HYDROCHLORIDE 10 MILLIGRAM(S): 30 TABLET ORAL at 20:14

## 2025-05-16 RX ADMIN — METOPROLOL SUCCINATE 12.5 MILLIGRAM(S): 50 TABLET, EXTENDED RELEASE ORAL at 10:51

## 2025-05-16 RX ADMIN — Medication 25 MICROGRAM(S): at 05:46

## 2025-05-16 RX ADMIN — CELECOXIB 200 MILLIGRAM(S): 50 CAPSULE ORAL at 10:49

## 2025-05-16 RX ADMIN — Medication 20 MILLIGRAM(S): at 10:51

## 2025-05-16 RX ADMIN — VENLAFAXINE HYDROCHLORIDE 75 MILLIGRAM(S): 37.5 CAPSULE, EXTENDED RELEASE ORAL at 10:52

## 2025-05-16 RX ADMIN — OXYCODONE HYDROCHLORIDE 10 MILLIGRAM(S): 30 TABLET ORAL at 11:48

## 2025-05-16 RX ADMIN — Medication 650 MILLIGRAM(S): at 18:27

## 2025-05-16 RX ADMIN — Medication 25 MILLIGRAM(S): at 10:51

## 2025-05-16 RX ADMIN — Medication 40 MILLIGRAM(S): at 05:46

## 2025-05-16 RX ADMIN — ATORVASTATIN CALCIUM 10 MILLIGRAM(S): 80 TABLET, FILM COATED ORAL at 21:12

## 2025-05-16 RX ADMIN — BUPROPION HYDROBROMIDE 300 MILLIGRAM(S): 522 TABLET, EXTENDED RELEASE ORAL at 10:52

## 2025-05-16 RX ADMIN — VENLAFAXINE HYDROCHLORIDE 75 MILLIGRAM(S): 37.5 CAPSULE, EXTENDED RELEASE ORAL at 21:12

## 2025-05-16 RX ADMIN — OXYCODONE HYDROCHLORIDE 10 MILLIGRAM(S): 30 TABLET ORAL at 10:48

## 2025-05-16 RX ADMIN — GABAPENTIN 300 MILLIGRAM(S): 400 CAPSULE ORAL at 10:50

## 2025-05-16 RX ADMIN — Medication 40 MILLIEQUIVALENT(S): at 18:27

## 2025-05-16 RX ADMIN — CARIPRAZINE 3 MILLIGRAM(S): 3 CAPSULE, GELATIN COATED ORAL at 10:52

## 2025-05-16 RX ADMIN — Medication 0.25 MILLIGRAM(S): at 21:12

## 2025-05-16 NOTE — CONSULT NOTE ADULT - SUBJECTIVE AND OBJECTIVE BOX
Patient is a 69y old  Male who presents with a chief complaint of back pain    HPI: PMHx HTN, HLD, aortic stenosis s/p AV replacement 2019, anxiety, depression, hx gastric bypass, hx recent SBO, multiple recurrence of infectious diarrhea, hx of lumbar radiculopathy. presents for lumbar back surgery. Went for lumbar lami and fusion L2-3, L3-4, removal of hardware L4-5. EBL 650ml got 250ml cell saver.     seen and examined at the bedside, feels well, no pain. Wants to eat.     PAST MEDICAL & SURGICAL HISTORY:  Hyperlipidemia      GERD (gastroesophageal reflux disease)      Anxiety      Skin cancer  external right ear, unsure of type      HTN (hypertension)      Aortic stenosis  Bicuspid Aortic Valve Replacement- 5/29/2018      Chronic neck and back pain      Sciatica      Internal and external prolapsed hemorrhoids  had surgery      Helicobacter pylori infection      History of colonic polyps      Herniated lumbar intervertebral disc      Gastrointestinal hemorrhage      Carpal tunnel syndrome  had surgery      Depressive disorder      Loss of hearing      Morbid obesity      Lumbar radiculopathy      Hearing loss      Erectile dysfunction      Shigella infection      Campylobacter diarrhea      H/O small bowel obstruction      Foot drop      Hand fracture, right      Hypothyroid      Bipolar illness      MALACHI (generalized anxiety disorder)      S/P carpal tunnel release  right side, left side- 2014      History of back surgery  laminectomy x2 last 2018      S/P trigger finger release  right thumb and middle finger      H/O external ear surgery  excision mass right ear- cancer 2018      H/O bicuspid aortic valve  replacement- 5/29/18      Status post coronary angioplasty  pt not sure he had this procedure      S/P tonsillectomy      H/O hemorrhoidectomy      Spinal cord stimulator status  10/2020      H/O bariatric surgery      Status post laparotomy with lysis of adhesions      History of appendectomy      S/P ORIF (open reduction internal fixation) fracture          Review of Systems:  CONSTITUTIONAL: No fever, chills, or fatigue  EYES: No eye pain, visual disturbances, or discharge  ENMT:  No difficulty hearing, tinnitus, vertigo; No sinus or throat pain  NECK: No pain or stiffness  RESPIRATORY: No cough, wheezing, chills or hemoptysis; No shortness of breath  CARDIOVASCULAR: No chest pain, palpitations, dizziness, or leg swelling  GASTROINTESTINAL: No abdominal or epigastric pain. No nausea, vomiting, or hematemesis; No diarrhea or constipation. No melena or hematochezia.  GENITOURINARY: No dysuria, frequency, hematuria, or incontinence  NEUROLOGICAL: No headaches, memory loss, loss of strength, numbness, or tremors  SKIN: No itching, burning, rashes, or lesions   MUSCULOSKELETAL: No joint pain or swelling; No muscle, back, or extremity pain  PSYCHIATRIC: No depression, anxiety, mood swings, or difficulty sleeping      Medications:  ceFAZolin  Injectable. 2000 milliGRAM(s) IV Push every 8 hours  emtricitabine 200 mG/tenofovir alafenamide 25 mG (DESCOVY) Tablet 1 Tablet(s) Oral daily    metoprolol tartrate 12.5 milliGRAM(s) Oral daily      acetaminophen     Tablet .. 650 milliGRAM(s) Oral every 6 hours  ALPRAZolam 0.25 milliGRAM(s) Oral every 6 hours PRN  buPROPion XL (24-Hour) . 300 milliGRAM(s) Oral daily  celecoxib 400 milliGRAM(s) Oral once  gabapentin 300 milliGRAM(s) Oral two times a day  HYDROmorphone  Injectable 1 milliGRAM(s) IV Push every 3 hours PRN  HYDROmorphone PCA (1 mG/mL) 30 milliLiter(s) PCA Continuous PCA Continuous  HYDROmorphone PCA (1 mG/mL) Rescue Clinician Bolus 0.5 milliGRAM(s) IV Push every 15 minutes PRN  meclizine 25 milliGRAM(s) Oral daily  ondansetron Injectable 4 milliGRAM(s) IV Push every 6 hours PRN  oxyCODONE    IR 5 milliGRAM(s) Oral every 3 hours PRN  oxyCODONE    IR 10 milliGRAM(s) Oral every 3 hours PRN  venlafaxine 75 milliGRAM(s) Oral two times a day with meals      tranexamic acid IVPB 2000 milliGRAM(s) IV Intermittent once    famotidine    Tablet 20 milliGRAM(s) Oral daily  pantoprazole    Tablet 40 milliGRAM(s) Oral before breakfast      atorvastatin 10 milliGRAM(s) Oral at bedtime  levothyroxine 25 MICROGram(s) Oral daily    multivitamin 1 Tablet(s) Oral daily  sodium chloride 0.9%. 1000 milliLiter(s) IV Continuous <Continuous>        naloxone Injectable 0.1 milliGRAM(s) IV Push every 3 minutes PRN          ICU Vital Signs Last 24 Hrs  T(C): 36.1 (13 May 2025 16:00), Max: 36.6 (13 May 2025 07:29)  T(F): 97 (13 May 2025 16:00), Max: 97.9 (13 May 2025 07:29)  HR: 79 (13 May 2025 18:00) (62 - 79)  BP: 92/64 (13 May 2025 17:10) (92/64 - 114/82)  BP(mean): 73 (13 May 2025 17:10) (73 - 73)  ABP: 109/70 (13 May 2025 18:00) (90/47 - 109/70)  ABP(mean): 85 (13 May 2025 18:00) (73 - 85)  RR: 15 (13 May 2025 18:00) (10 - 20)  SpO2: 100% (13 May 2025 18:00) (95% - 100%)    O2 Parameters below as of 13 May 2025 17:10  Patient On (Oxygen Delivery Method): nasal cannula  O2 Flow (L/min): 2          ABG - ( 13 May 2025 13:32 )  pH, Arterial: 7.41  pH, Blood: x     /  pCO2: 36    /  pO2: 256   / HCO3: 23    / Base Excess: -1.4  /  SaO2: 99                  I&O's Detail    13 May 2025 07:01  -  13 May 2025 18:11  --------------------------------------------------------  IN:    Lactated Ringers: 125 mL    Other (mL): 2750 mL  Total IN: 2875 mL    OUT:    Bulb (mL): 25 mL    Bulb (mL): 70 mL    Indwelling Catheter - Urethral (mL): 45 mL    Other (mL): 695 mL  Total OUT: 835 mL    Total NET: 2040 mL            LABS:                        12.2   9.59  )-----------( 189      ( 13 May 2025 15:10 )             36.3     05-13    136  |  109[H]  |  19  ----------------------------<  165[H]  5.0   |  20[L]  |  0.95    Ca    8.0[L]      13 May 2025 15:10            CAPILLARY BLOOD GLUCOSE      POCT Blood Glucose.: 131 mg/dL (13 May 2025 15:01)      Urinalysis Basic - ( 13 May 2025 15:10 )    Color: x / Appearance: x / SG: x / pH: x  Gluc: 165 mg/dL / Ketone: x  / Bili: x / Urobili: x   Blood: x / Protein: x / Nitrite: x   Leuk Esterase: x / RBC: x / WBC x   Sq Epi: x / Non Sq Epi: x / Bacteria: x      CULTURES:      Physical Examination:    General:  Alert, oriented, interactive, nonfocal    HEENT: Pupils equal, reactive to light.  Symmetric.    PULM: Clear to auscultation bilaterally    CVS: Regular rate and rhythm    ABD: Soft, nondistended, nontender    EXT: No edema, nontender    SKIN: Warm      RADIOLOGY: reviewed    
  69 yo man with history of HLD, HTN, AS s/p TAVR, anxiety,  lumbar radiculopathy s/p laminectomy right L4 L5 Excision of herniated disc right L4 L5 , transforaminal lumbar interbody fusion L4 L5.  Post op was transferred to critical care for close neuro monitoring. Now tranferred to  for further medical management.  Hospitalist consulted for post op medical management.     5/16- patient was seen and examined. VSS. denies CP, fever or chills. No acute overnight events.     PAST MEDICAL & SURGICAL HISTORY:  Hyperlipidemia  GERD (gastroesophageal reflux disease)  Anxiety  Skin cancer  external right ear, unsure of type  HTN (hypertension)  Aortic stenosis  Bicuspid Aortic Valve Replacement- 5/29/2018  Chronic neck and back pain  Sciatica  Internal and external prolapsed hemorrhoids  Helicobacter pylori infection   History of colonic polyps  Herniated lumbar intervertebral disc  Gastrointestinal hemorrhage  Carpal tunnel syndrome  Depressive disorder  Morbid obesity  Lumbar radiculopathy  Erectile dysfunction  Shigella infection  Campylobacter diarrhea  H/O small bowel obstruction  Hand fracture, right  Hypothyroid  Bipolar illness  MALACHI (generalized anxiety disorder)  S/P carpal tunnel release  right side, left side- 2014  History of back surgery  laminectomy x2 last 2018  S/P trigger finger release  right thumb and middle finger  H/O external ear surgery  excision mass right ear- cancer 2018  H/O bicuspid aortic valve  replacement- 5/29/18  Status post coronary angioplasty  pt not sure he had this procedure  S/P tonsillectomy  H/O hemorrhoidectomy  Spinal cord stimulator status  10/2020  H/O bariatric surgery  Status post laparotomy with lysis of adhesions  History of appendectomy  S/P ORIF (open reduction internal fixation) fracture      FAMILY HISTORY:  FH: HTN (hypertension)  Family history of dementia (Father)    Social History: denies smoking  drinks on occasions  denies substance or drug use     Allergies  No Known Allergies  Intolerances      Home Medications:  Aspirin Enteric Coated 81 mg oral delayed release tablet: 1 tab(s) orally once a day (13 May 2025 07:49)  atorvastatin 10 mg oral tablet: 1 tab(s) orally once a day (in the morning) (13 May 2025 07:49)  buPROPion 100 mg oral tablet: 1 tab(s) orally 3 times a day (13 May 2025 07:49)  Descovy 200 mg-25 mg oral tablet: 1 tab(s) orally once a day (13 May 2025 07:49)  Effexor  mg oral capsule, extended release: 1 cap(s) orally (12 May 2025 13:15)  esomeprazole 40 mg oral delayed release capsule: 1 orally once a day (13 May 2025 07:49)  Famotidine:  (12 May 2025 13:12)  gabapentin 300 mg oral capsule: 1 cap(s) orally 2 times a day (13 May 2025 07:49)  HIV Prep:  (13 May 2025 07:48)  levothyroxine 25 mcg (0.025 mg) oral tablet: 1 tab(s) orally once a day (13 May 2025 07:49)  meclizine 25 mg oral tablet: 1 tab(s) orally (13 May 2025 07:49)  metoprolol tartrate 25 mg oral tablet: 0.5 tab(s) orally once a day (13 May 2025 07:49)  Multiple Vitamins oral tablet: 1 tab(s) orally once a day (13 May 2025 07:49)  Omega-3 1000 mg oral capsule: 1 cap(s) orally once a day (13 May 2025 07:49)  oxycodone-acetaminophen 5 mg-325 mg oral tablet: 1 tab(s) orally 4 times a day as needed for pain (13 May 2025 07:49)  Probiotic Formula oral capsule: 1 cap(s) orally once a day (13 May 2025 07:49)  Tylenol 325 mg oral capsule: 1 orally prn (13 May 2025 07:49)  venlafaxine 75 mg oral tablet: 1 tab(s) orally 2 times a day (13 May 2025 07:49)  Vitamin D3 25 mcg (1000 intl units) oral capsule: 1 cap(s) orally once a day (13 May 2025 07:49)  Vraylar 3 mg oral capsule: 1 cap(s) orally (13 May 2025 07:43)  Xanax 0.25 mg oral tablet: 1 tab(s) orally (13 May 2025 07:49)    Vital Signs Last 24 Hrs  T(C): 36.7 (16 May 2025 09:38), Max: 37 (15 May 2025 23:59)  T(F): 98.1 (16 May 2025 09:38), Max: 98.6 (15 May 2025 23:59)  HR: 84 (16 May 2025 09:38) (80 - 85)  BP: 139/71 (16 May 2025 09:38) (94/62 - 139/71)  BP(mean): 70 (15 May 2025 20:02) (70 - 70)  RR: 18 (16 May 2025 09:38) (18 - 18)  SpO2: 97% (16 May 2025 09:38) (93% - 100%)    Parameters below as of 16 May 2025 09:38  Patient On (Oxygen Delivery Method): room air        REVIEW OF SYSTEMS:   All 10 systems reviewed in detailed and found to be negative with the exception of what has already been described above    MEDICATIONS  (STANDING):  acetaminophen     Tablet .. 650 milliGRAM(s) Oral every 6 hours  atorvastatin 10 milliGRAM(s) Oral at bedtime  buPROPion XL (24-Hour) . 300 milliGRAM(s) Oral daily  cariprazine 3 milliGRAM(s) Oral daily  celecoxib 200 milliGRAM(s) Oral daily  emtricitabine 200 mG/tenofovir alafenamide 25 mG (DESCOVY) Tablet 1 Tablet(s) Oral daily  famotidine    Tablet 20 milliGRAM(s) Oral daily  gabapentin 300 milliGRAM(s) Oral two times a day  levothyroxine 25 MICROGram(s) Oral daily  meclizine 25 milliGRAM(s) Oral daily  metoprolol tartrate 12.5 milliGRAM(s) Oral daily  multivitamin 1 Tablet(s) Oral daily  pantoprazole    Tablet 40 milliGRAM(s) Oral before breakfast  sodium chloride 0.9%. 1000 milliLiter(s) (125 mL/Hr) IV Continuous <Continuous>  tranexamic acid IVPB 2000 milliGRAM(s) IV Intermittent once  venlafaxine 75 milliGRAM(s) Oral two times a day with meals    MEDICATIONS  (PRN):  ALPRAZolam 0.25 milliGRAM(s) Oral every 6 hours PRN anxiety  HYDROmorphone  Injectable 1 milliGRAM(s) IV Push every 3 hours PRN Severe Pain (7 - 10)  oxyCODONE    IR 5 milliGRAM(s) Oral every 3 hours PRN Mild Pain (1 - 3)  oxyCODONE    IR 10 milliGRAM(s) Oral every 3 hours PRN Moderate Pain (4 - 6)      PE:  Constitutional: NAD, laying in bed  HEENT: NC/AT  Back: no tenderness  Respiratory: respirations even and non labored, LCTA  Cardiovascular: S1S2 regular, no murmurs  Abdomen: soft, not tender, not distended, positive BS  Genitourinary: voiding  Musculoskeletal: no muscle tenderness, no joint swelling or tenderness  Extremities: no pedal edema   Neurological: no focal deficits- JPx1 with small amount of bloody drain       LABS:                            9.1    5.76  )-----------( 173      ( 16 May 2025 06:29 )             27.1     05-16    137  |  106  |  10  ----------------------------<  103[H]  3.4[L]   |  27  |  0.83    Ca    8.4[L]      16 May 2025 06:29            Urinalysis Basic - ( 16 May 2025 06:29 )    Color: x / Appearance: x / SG: x / pH: x  Gluc: 103 mg/dL / Ketone: x  / Bili: x / Urobili: x   Blood: x / Protein: x / Nitrite: x   Leuk Esterase: x / RBC: x / WBC x   Sq Epi: x / Non Sq Epi: x / Bacteria: x

## 2025-05-16 NOTE — CONSULT NOTE ADULT - ASSESSMENT
Problem List:  1) HTN  2) hypothyroid  3) anxiety/depression    Pain control   Home meds ordered as appropriate  IVF  diet  melanie-op abx  Plan discussed with IC[facility] attending Dr. Edwards 
67 yo man with history of HLD, HTN, AS s/p TAVR, anxiety,  lumbar radiculopathy s/p laminectomy right L4 L5 Excision of herniated disc right L4 L5 , transforaminal lumbar interbody fusion L4 L5.    *spinal stenosis with disc herniation  s/p lumbar laminectomy with osteostomy and fusion  - management as per Ortho spine  - Pain meds PRN  - encourage IS and ambulation  - monitor GAYLA output  - PT/OT   - monitor VS  - PPI     *HTN  - monitor BP  - c/w metoprolol    *HLD- statin    *hypothyroid- levothyroxine     * Acute Blood Loss Anemia- related to surgery- no need for transfusion for now.  monitor CBC.     *hypokalemia- replete and monitor     *anxiety and depression  - c/w psychotropic meds (vraylar and venlafaxine)    DVT prophylaxis  - ambulation/Venodynes   - Lovenox when cleared by surgery     Case d/w team on IDR.

## 2025-05-16 NOTE — PROGRESS NOTE ADULT - SUBJECTIVE AND OBJECTIVE BOX
POD#3  felt a little unsteady on his feet last night when he tried to pick something up off the floor  c/o LBP  legs feel better  afeb vss  nv intact  dressing dry and intact  sup drain 50...left in  deep drain 110...left in  H/H 9.1/27.1    walked w PT

## 2025-05-16 NOTE — CONSULT NOTE ADULT - NS ATTEND AMEND GEN_ALL_CORE FT
agree with assessment and plan as above
Patient was seen and examined by me at bedside with NP, Johana Daigle.  I personally had a face-to-face  encounter with the patient. I was physically present for the key portions of the evaluation and management (E/M) service provided.  I agree with the above history, physical, and plan which I have reviewed and edited where appropriate    69 yo man with history of HLD, HTN, AS s/p TAVR, anxiety,  lumbar radiculopathy s/p laminectomy right L4 L5 Excision of herniated disc right L4 L5 , transforaminal lumbar interbody fusion L4 L5.    *spinal stenosis with disc herniation  s/p lumbar laminectomy with osteostomy and fusion  - management as per Ortho spine  - Pain meds PRN  - encourage IS and ambulation  - monitor GAYLA output  - PT/OT   - monitor VS  - PPI     *HTN  - monitor BP  - c/w metoprolol    *HLD- statin    *hypothyroid- levothyroxine     * Acute Blood Loss Anemia- related to surgery- no need for transfusion for now.  monitor CBC.     *hypokalemia- replete and monitor     *anxiety and depression  - c/w psychotropic meds (vraylar and venlafaxine)    DVT prophylaxis  - ambulation/Venodynes   - Lovenox when cleared by surgery

## 2025-05-17 PROCEDURE — 99232 SBSQ HOSP IP/OBS MODERATE 35: CPT

## 2025-05-17 RX ADMIN — CELECOXIB 200 MILLIGRAM(S): 50 CAPSULE ORAL at 09:33

## 2025-05-17 RX ADMIN — CELECOXIB 200 MILLIGRAM(S): 50 CAPSULE ORAL at 10:46

## 2025-05-17 RX ADMIN — Medication 25 MICROGRAM(S): at 06:15

## 2025-05-17 RX ADMIN — ATORVASTATIN CALCIUM 10 MILLIGRAM(S): 80 TABLET, FILM COATED ORAL at 22:33

## 2025-05-17 RX ADMIN — Medication 650 MILLIGRAM(S): at 12:47

## 2025-05-17 RX ADMIN — BUPROPION HYDROBROMIDE 300 MILLIGRAM(S): 522 TABLET, EXTENDED RELEASE ORAL at 09:34

## 2025-05-17 RX ADMIN — OXYCODONE HYDROCHLORIDE 10 MILLIGRAM(S): 30 TABLET ORAL at 13:30

## 2025-05-17 RX ADMIN — Medication 650 MILLIGRAM(S): at 06:15

## 2025-05-17 RX ADMIN — GABAPENTIN 300 MILLIGRAM(S): 400 CAPSULE ORAL at 09:37

## 2025-05-17 RX ADMIN — VENLAFAXINE HYDROCHLORIDE 75 MILLIGRAM(S): 37.5 CAPSULE, EXTENDED RELEASE ORAL at 17:11

## 2025-05-17 RX ADMIN — Medication 1 TABLET(S): at 09:34

## 2025-05-17 RX ADMIN — VENLAFAXINE HYDROCHLORIDE 75 MILLIGRAM(S): 37.5 CAPSULE, EXTENDED RELEASE ORAL at 09:33

## 2025-05-17 RX ADMIN — GABAPENTIN 300 MILLIGRAM(S): 400 CAPSULE ORAL at 22:33

## 2025-05-17 RX ADMIN — OXYCODONE HYDROCHLORIDE 10 MILLIGRAM(S): 30 TABLET ORAL at 12:50

## 2025-05-17 RX ADMIN — Medication 0.25 MILLIGRAM(S): at 22:33

## 2025-05-17 RX ADMIN — Medication 25 MILLIGRAM(S): at 09:35

## 2025-05-17 RX ADMIN — Medication 650 MILLIGRAM(S): at 17:17

## 2025-05-17 RX ADMIN — CARIPRAZINE 3 MILLIGRAM(S): 3 CAPSULE, GELATIN COATED ORAL at 09:34

## 2025-05-17 RX ADMIN — Medication 20 MILLIGRAM(S): at 09:34

## 2025-05-17 RX ADMIN — Medication 650 MILLIGRAM(S): at 11:27

## 2025-05-17 RX ADMIN — OXYCODONE HYDROCHLORIDE 10 MILLIGRAM(S): 30 TABLET ORAL at 22:35

## 2025-05-17 RX ADMIN — Medication 650 MILLIGRAM(S): at 17:11

## 2025-05-17 RX ADMIN — OXYCODONE HYDROCHLORIDE 10 MILLIGRAM(S): 30 TABLET ORAL at 22:05

## 2025-05-17 NOTE — PROGRESS NOTE ADULT - PROBLEM SELECTOR PLAN 1
needs PT  will follow  check l;abs in am  all questions answered
needs PT for ambulation  plan transfer to rehab on Monday    all questions answered
kylah ingram in am  start PT today  ok to get oob without brace  if too much pain, will need a brace  all questions answered    anemia  check labs in am

## 2025-05-17 NOTE — PROGRESS NOTE ADULT - SUBJECTIVE AND OBJECTIVE BOX
c/o LBP  dr BOOTH had a hard time pulling the superficial drain    afeb vss  nv intact  wound ok      drain appeared to be stuck in th subcutaneous tissues    I pulled it out /felt it give way and the drain came out in its entirety without difficulty

## 2025-05-17 NOTE — PROGRESS NOTE ADULT - SUBJECTIVE AND OBJECTIVE BOX
HOSPITALIST PROGRESS NOTE:  SUBJECTIVE:  PCP:  Chief Complaint: Patient is a 69y old  Male who presents with a chief complaint of Back, leg pain (17 May 2025 12:57)      HPI: 67 yo man with history of HLD, HTN, AS s/p TAVR, anxiety,  lumbar radiculopathy s/p laminectomy right L4 L5 Excision of herniated disc right L4 L5 , transforaminal lumbar interbody fusion L4 L5.  Post op was transferred to critical care for close neuro monitoring. Now tranferred to  for further medical management.  Hospitalist consulted for post op medical management.     5/17: Patient has no complaints; has some slight dyspnea; No CP, abd pain, N/v/d     Allergies:  No Known Allergies    REVIEW OF SYSTEMS:  See HPI. All other review of systems is negative unless indicated above.     OBJECTIVE  Physical Exam:  Vital Signs:    Vital Signs Last 24 Hrs  T(C): 36.3 (17 May 2025 12:00), Max: 36.8 (17 May 2025 00:00)  T(F): 97.4 (17 May 2025 12:00), Max: 98.2 (17 May 2025 00:00)  HR: 79 (17 May 2025 12:00) (70 - 79)  BP: 108/56 (17 May 2025 12:00) (98/77 - 114/63)  BP(mean): --  RR: 18 (17 May 2025 12:00) (18 - 18)  SpO2: 96% (17 May 2025 12:00) (93% - 96%)    Parameters below as of 17 May 2025 12:00  Patient On (Oxygen Delivery Method): room air      I&O's Summary    16 May 2025 07:01  -  17 May 2025 07:00  --------------------------------------------------------  IN: 0 mL / OUT: 55 mL / NET: -55 mL    Constitutional: NAD, awake and alert  Neurological: AAO x 3, no focal deficits  HEENT: PERRLA, EOMI, MMM  Neck: Soft and supple, No LAD, No JVD  Respiratory: Breath sounds are clear bilaterally, No wheezing, rales or rhonchi  Cardiovascular: S1 and S2, regular rate and rhythm; no Murmurs, gallops or rubs  Gastrointestinal: Bowel Sounds present, soft, nontender, nondistended, no guarding, no rebound tenderness  Back: No CVA tenderness   Extremities: No peripheral edema  Vascular: 2+ peripheral pulses  Musculoskeletal: 5/5 strength b/l upper and lower extremities  Skin: No rashes  Breast: Deferred  Rectal: Deferred    MEDICATIONS  (STANDING):  acetaminophen     Tablet .. 650 milliGRAM(s) Oral every 6 hours  atorvastatin 10 milliGRAM(s) Oral at bedtime  buPROPion XL (24-Hour) . 300 milliGRAM(s) Oral daily  cariprazine 3 milliGRAM(s) Oral daily  celecoxib 200 milliGRAM(s) Oral daily  emtricitabine 200 mG/tenofovir alafenamide 25 mG (DESCOVY) Tablet 1 Tablet(s) Oral daily  famotidine    Tablet 20 milliGRAM(s) Oral daily  gabapentin 300 milliGRAM(s) Oral two times a day  levothyroxine 25 MICROGram(s) Oral daily  meclizine 25 milliGRAM(s) Oral daily  metoprolol tartrate 12.5 milliGRAM(s) Oral daily  multivitamin 1 Tablet(s) Oral daily  pantoprazole    Tablet 40 milliGRAM(s) Oral before breakfast  sodium chloride 0.9%. 1000 milliLiter(s) (125 mL/Hr) IV Continuous <Continuous>  tranexamic acid IVPB 2000 milliGRAM(s) IV Intermittent once  venlafaxine 75 milliGRAM(s) Oral two times a day with meals      LABS: All Labs Reviewed:                        9.1    5.76  )-----------( 173      ( 16 May 2025 06:29 )             27.1     05-16    137  |  106  |  10  ----------------------------<  103[H]  3.4[L]   |  27  |  0.83    Ca    8.4[L]      16 May 2025 06:29      Urinalysis Basic - ( 16 May 2025 06:29 )    Color: x / Appearance: x / SG: x / pH: x  Gluc: 103 mg/dL / Ketone: x  / Bili: x / Urobili: x   Blood: x / Protein: x / Nitrite: x   Leuk Esterase: x / RBC: x / WBC x   Sq Epi: x / Non Sq Epi: x / Bacteria: x    Blood Culture:   05-13 @ 07:16  Organism --  Gram Stain Blood -- Gram Stain --  Specimen Source Nares/Axilla/Groin Nares/Axilla/Groin  Culture-Blood --    RADIOLOGY/EKG:    < from: Xray Lumbosacral Spine (05.13.25 @ 14:08) >     FINDINGS:     Evidence of posterior fixation rods with pedicle screws extending from   L2 down to L5 along with evidence of disc implants. The fixation hardware   is intact. Alignment is within normal limits.      IMPRESSION:    Postsurgical changes.    < end of copied text >

## 2025-05-17 NOTE — PROGRESS NOTE ADULT - SUBJECTIVE AND OBJECTIVE BOX
· Subjective and Objective:   POD#4  felt a little unsteady on his feet last night,  feels better today with NS vis IV  Incisional LBP    ICU Vital Signs Last 24 Hrs  T(C): 36.3 (17 May 2025 12:00), Max: 36.8 (17 May 2025 00:00)  T(F): 97.4 (17 May 2025 12:00), Max: 98.2 (17 May 2025 00:00)  HR: 79 (17 May 2025 12:00) (70 - 79)  BP: 108/56 (17 May 2025 12:00) (98/77 - 114/63)  BP(mean): --  ABP: --  ABP(mean): --  RR: 18 (17 May 2025 12:00) (18 - 18)  SpO2: 96% (17 May 2025 12:00) (93% - 96%)    O2 Parameters below as of 17 May 2025 12:00  Patient On (Oxygen Delivery Method): room air      PE:    NAD, AAOx3  Ext: No c/c/e; no calf tenderness  SCDs in place    HMV: Deep HMV - dislodged and removed.             Superficial HMV - 35mL in place to suction.     Back: Incision c/d/i                          9.1    5.76  )-----------( 173      ( 16 May 2025 06:29 )             27.1       Assessment and Plan:   POD 4  Doing well  check 5/17 CBC  complete Normal Saline   pain control  OOB c PT  Rehab planning   all questions answered.

## 2025-05-18 PROCEDURE — 99232 SBSQ HOSP IP/OBS MODERATE 35: CPT

## 2025-05-18 RX ADMIN — Medication 650 MILLIGRAM(S): at 05:52

## 2025-05-18 RX ADMIN — METOPROLOL SUCCINATE 12.5 MILLIGRAM(S): 50 TABLET, EXTENDED RELEASE ORAL at 10:37

## 2025-05-18 RX ADMIN — Medication 650 MILLIGRAM(S): at 13:13

## 2025-05-18 RX ADMIN — Medication 650 MILLIGRAM(S): at 17:02

## 2025-05-18 RX ADMIN — OXYCODONE HYDROCHLORIDE 10 MILLIGRAM(S): 30 TABLET ORAL at 21:22

## 2025-05-18 RX ADMIN — Medication 20 MILLIGRAM(S): at 10:37

## 2025-05-18 RX ADMIN — Medication 1 TABLET(S): at 10:37

## 2025-05-18 RX ADMIN — Medication 650 MILLIGRAM(S): at 18:02

## 2025-05-18 RX ADMIN — Medication 40 MILLIGRAM(S): at 05:51

## 2025-05-18 RX ADMIN — VENLAFAXINE HYDROCHLORIDE 75 MILLIGRAM(S): 37.5 CAPSULE, EXTENDED RELEASE ORAL at 10:38

## 2025-05-18 RX ADMIN — OXYCODONE HYDROCHLORIDE 10 MILLIGRAM(S): 30 TABLET ORAL at 05:52

## 2025-05-18 RX ADMIN — Medication 1 TABLET(S): at 10:38

## 2025-05-18 RX ADMIN — Medication 650 MILLIGRAM(S): at 11:58

## 2025-05-18 RX ADMIN — CELECOXIB 200 MILLIGRAM(S): 50 CAPSULE ORAL at 10:37

## 2025-05-18 RX ADMIN — GABAPENTIN 300 MILLIGRAM(S): 400 CAPSULE ORAL at 10:37

## 2025-05-18 RX ADMIN — OXYCODONE HYDROCHLORIDE 10 MILLIGRAM(S): 30 TABLET ORAL at 06:22

## 2025-05-18 RX ADMIN — Medication 25 MICROGRAM(S): at 05:51

## 2025-05-18 RX ADMIN — ATORVASTATIN CALCIUM 10 MILLIGRAM(S): 80 TABLET, FILM COATED ORAL at 21:21

## 2025-05-18 RX ADMIN — Medication 25 MILLIGRAM(S): at 10:37

## 2025-05-18 RX ADMIN — CARIPRAZINE 3 MILLIGRAM(S): 3 CAPSULE, GELATIN COATED ORAL at 10:37

## 2025-05-18 RX ADMIN — OXYCODONE HYDROCHLORIDE 10 MILLIGRAM(S): 30 TABLET ORAL at 21:52

## 2025-05-18 RX ADMIN — BUPROPION HYDROBROMIDE 300 MILLIGRAM(S): 522 TABLET, EXTENDED RELEASE ORAL at 10:37

## 2025-05-18 RX ADMIN — Medication 0.25 MILLIGRAM(S): at 22:18

## 2025-05-18 RX ADMIN — VENLAFAXINE HYDROCHLORIDE 75 MILLIGRAM(S): 37.5 CAPSULE, EXTENDED RELEASE ORAL at 17:02

## 2025-05-18 RX ADMIN — GABAPENTIN 300 MILLIGRAM(S): 400 CAPSULE ORAL at 21:22

## 2025-05-18 NOTE — PROGRESS NOTE ADULT - SUBJECTIVE AND OBJECTIVE BOX
POST OPERATIVE DAY #:  5  STATUS POST:     Lumbar lami/fusion                 SUBJECTIVE: Patient seen and examined. Doing well. Tolerating OOB.    OBJECTIVE:     Vital Signs Last 24 Hrs  T(C): 37 (18 May 2025 07:19), Max: 37 (18 May 2025 07:19)  T(F): 98.6 (18 May 2025 07:19), Max: 98.6 (18 May 2025 07:19)  HR: 77 (18 May 2025 07:19) (76 - 81)  BP: 133/74 (18 May 2025 07:19) (107/54 - 133/74)  BP(mean): 76 (18 May 2025 00:11) (67 - 76)  RR: 18 (18 May 2025 07:19) (18 - 18)  SpO2: 94% (18 May 2025 07:19) (94% - 96%)    Parameters below as of 18 May 2025 07:19  Patient On (Oxygen Delivery Method): room air        Awake and alert  HEENT: unremarkable  Neck: supple  Back:          Dressing: clean/dry/intact             Drains: output per shift = 30cc, removed last evening by Dr Staples  Bilateral Upper Extremities:         Good Motor Strength         Sensation intact  Bilateral Lower Extremities:          Good Motor Strength          Sensation Intact                   I&O's Detail    17 May 2025 07:01  -  18 May 2025 07:00  --------------------------------------------------------  IN:    Oral Fluid: 960 mL  Total IN: 960 mL    OUT:    Bulb (mL): 30 mL    Voided (mL): 250 mL  Total OUT: 280 mL    Total NET: 680 mL        A/P :  69y Male s/p  5           POD # Lumbar lami/fusion  -    Pain control  -    DVT ppx: SCDs    -    OOB as tolerated  -    Physical Therapy, re; ambulation  -    Continue present treatment  -    Probable transfer to rehab in am

## 2025-05-18 NOTE — PROGRESS NOTE ADULT - SUBJECTIVE AND OBJECTIVE BOX
HOSPITALIST PROGRESS NOTE:  SUBJECTIVE:  PCP:  Chief Complaint: Patient is a 69y old  Male who presents with a chief complaint of Back, leg pain (17 May 2025 12:57)      HPI: 67 yo man with history of HLD, HTN, AS s/p TAVR, anxiety,  lumbar radiculopathy s/p laminectomy right L4 L5 Excision of herniated disc right L4 L5 , transforaminal lumbar interbody fusion L4 L5.  Post op was transferred to critical care for close neuro monitoring. Now tranferred to  for further medical management.  Hospitalist consulted for post op medical management.     5/17: Patient has no complaints; has some slight dyspnea; No CP, abd pain, N/v/d   5/18:  patient has no complaints; No CP, dyspnea, numbness and tingline, abd pain; feels soreness at the incision site.     Allergies:  No Known Allergies    REVIEW OF SYSTEMS:  See HPI. All other review of systems is negative unless indicated above.     OBJECTIVE  Physical Exam:  Vital Signs Last 24 Hrs  T(C): 37 (18 May 2025 07:19), Max: 37 (18 May 2025 07:19)  T(F): 98.6 (18 May 2025 07:19), Max: 98.6 (18 May 2025 07:19)  HR: 77 (18 May 2025 07:19) (76 - 81)  BP: 133/74 (18 May 2025 07:19) (107/54 - 133/74)  BP(mean): 76 (18 May 2025 00:11) (67 - 76)  RR: 18 (18 May 2025 07:19) (18 - 18)  SpO2: 94% (18 May 2025 07:19) (94% - 96%)    Parameters below as of 18 May 2025 07:19  Patient On (Oxygen Delivery Method): room air      Constitutional: NAD, awake and alert  Neurological: AAO x 3, no focal deficits  HEENT: PERRLA, EOMI, MMM  Neck: Soft and supple, No LAD, No JVD  Respiratory: Breath sounds are clear bilaterally, No wheezing, rales or rhonchi  Cardiovascular: S1 and S2, regular rate and rhythm; no Murmurs, gallops or rubs  Gastrointestinal: Bowel Sounds present, soft, nontender, nondistended, no guarding, no rebound tenderness  Back: No CVA tenderness dressing on the back intact   Extremities: No peripheral edema  Vascular: 2+ peripheral pulses  Musculoskeletal: 5/5 strength b/l upper and lower extremities  Skin: No rashes  Breast: Deferred  Rectal: Deferred    MEDICATIONS  (STANDING):  acetaminophen     Tablet .. 650 milliGRAM(s) Oral every 6 hours  atorvastatin 10 milliGRAM(s) Oral at bedtime  buPROPion XL (24-Hour) . 300 milliGRAM(s) Oral daily  cariprazine 3 milliGRAM(s) Oral daily  celecoxib 200 milliGRAM(s) Oral daily  emtricitabine 200 mG/tenofovir alafenamide 25 mG (DESCOVY) Tablet 1 Tablet(s) Oral daily  famotidine    Tablet 20 milliGRAM(s) Oral daily  gabapentin 300 milliGRAM(s) Oral two times a day  levothyroxine 25 MICROGram(s) Oral daily  meclizine 25 milliGRAM(s) Oral daily  metoprolol tartrate 12.5 milliGRAM(s) Oral daily  multivitamin 1 Tablet(s) Oral daily  pantoprazole    Tablet 40 milliGRAM(s) Oral before breakfast  sodium chloride 0.9%. 1000 milliLiter(s) (125 mL/Hr) IV Continuous <Continuous>  tranexamic acid IVPB 2000 milliGRAM(s) IV Intermittent once  venlafaxine 75 milliGRAM(s) Oral two times a day with meals      LABS: All Labs Reviewed:                        9.1    5.76  )-----------( 173      ( 16 May 2025 06:29 )             27.1     05-16    137  |  106  |  10  ----------------------------<  103[H]  3.4[L]   |  27  |  0.83    Ca    8.4[L]      16 May 2025 06:29      Urinalysis Basic - ( 16 May 2025 06:29 )    Color: x / Appearance: x / SG: x / pH: x  Gluc: 103 mg/dL / Ketone: x  / Bili: x / Urobili: x   Blood: x / Protein: x / Nitrite: x   Leuk Esterase: x / RBC: x / WBC x   Sq Epi: x / Non Sq Epi: x / Bacteria: x    Blood Culture:   05-13 @ 07:16  Organism --  Gram Stain Blood -- Gram Stain --  Specimen Source Nares/Axilla/Groin Nares/Axilla/Groin  Culture-Blood --    RADIOLOGY/EKG:    < from: Xray Lumbosacral Spine (05.13.25 @ 14:08) >     FINDINGS:     Evidence of posterior fixation rods with pedicle screws extending from   L2 down to L5 along with evidence of disc implants. The fixation hardware   is intact. Alignment is within normal limits.      IMPRESSION:    Postsurgical changes.    < end of copied text >

## 2025-05-19 ENCOUNTER — TRANSCRIPTION ENCOUNTER (OUTPATIENT)
Age: 70
End: 2025-05-19

## 2025-05-19 VITALS
HEART RATE: 76 BPM | DIASTOLIC BLOOD PRESSURE: 74 MMHG | SYSTOLIC BLOOD PRESSURE: 120 MMHG | OXYGEN SATURATION: 99 % | TEMPERATURE: 98 F | RESPIRATION RATE: 18 BRPM

## 2025-05-19 LAB
ANION GAP SERPL CALC-SCNC: 6 MMOL/L — SIGNIFICANT CHANGE UP (ref 5–17)
BUN SERPL-MCNC: 13 MG/DL — SIGNIFICANT CHANGE UP (ref 7–23)
CALCIUM SERPL-MCNC: 8.8 MG/DL — SIGNIFICANT CHANGE UP (ref 8.5–10.1)
CHLORIDE SERPL-SCNC: 105 MMOL/L — SIGNIFICANT CHANGE UP (ref 96–108)
CO2 SERPL-SCNC: 27 MMOL/L — SIGNIFICANT CHANGE UP (ref 22–31)
CREAT SERPL-MCNC: 1.05 MG/DL — SIGNIFICANT CHANGE UP (ref 0.5–1.3)
EGFR: 77 ML/MIN/1.73M2 — SIGNIFICANT CHANGE UP
EGFR: 77 ML/MIN/1.73M2 — SIGNIFICANT CHANGE UP
GLUCOSE SERPL-MCNC: 78 MG/DL — SIGNIFICANT CHANGE UP (ref 70–99)
HCT VFR BLD CALC: 31.9 % — LOW (ref 39–50)
HGB BLD-MCNC: 10.4 G/DL — LOW (ref 13–17)
MCHC RBC-ENTMCNC: 32.3 PG — SIGNIFICANT CHANGE UP (ref 27–34)
MCHC RBC-ENTMCNC: 32.6 G/DL — SIGNIFICANT CHANGE UP (ref 32–36)
MCV RBC AUTO: 99.1 FL — SIGNIFICANT CHANGE UP (ref 80–100)
NRBC # BLD AUTO: 0 K/UL — SIGNIFICANT CHANGE UP (ref 0–0)
NRBC # FLD: 0 K/UL — SIGNIFICANT CHANGE UP (ref 0–0)
NRBC BLD AUTO-RTO: 0 /100 WBCS — SIGNIFICANT CHANGE UP (ref 0–0)
PLATELET # BLD AUTO: 261 K/UL — SIGNIFICANT CHANGE UP (ref 150–400)
PMV BLD: 8.3 FL — SIGNIFICANT CHANGE UP (ref 7–13)
POTASSIUM SERPL-MCNC: 4.2 MMOL/L — SIGNIFICANT CHANGE UP (ref 3.5–5.3)
POTASSIUM SERPL-SCNC: 4.2 MMOL/L — SIGNIFICANT CHANGE UP (ref 3.5–5.3)
RBC # BLD: 3.22 M/UL — LOW (ref 4.2–5.8)
RBC # FLD: 13.4 % — SIGNIFICANT CHANGE UP (ref 10.3–14.5)
SODIUM SERPL-SCNC: 138 MMOL/L — SIGNIFICANT CHANGE UP (ref 135–145)
WBC # BLD: 7.59 K/UL — SIGNIFICANT CHANGE UP (ref 3.8–10.5)
WBC # FLD AUTO: 7.59 K/UL — SIGNIFICANT CHANGE UP (ref 3.8–10.5)

## 2025-05-19 PROCEDURE — 99232 SBSQ HOSP IP/OBS MODERATE 35: CPT

## 2025-05-19 RX ORDER — CYCLOBENZAPRINE HYDROCHLORIDE 15 MG/1
5 CAPSULE, EXTENDED RELEASE ORAL THREE TIMES A DAY
Refills: 0 | Status: DISCONTINUED | OUTPATIENT
Start: 2025-05-19 | End: 2025-05-19

## 2025-05-19 RX ORDER — ALPRAZOLAM 0.5 MG
1 TABLET, EXTENDED RELEASE 24 HR ORAL
Qty: 0 | Refills: 0 | DISCHARGE

## 2025-05-19 RX ORDER — CYCLOBENZAPRINE HYDROCHLORIDE 15 MG/1
1 CAPSULE, EXTENDED RELEASE ORAL
Qty: 0 | Refills: 0 | DISCHARGE
Start: 2025-05-19

## 2025-05-19 RX ORDER — MECLIZINE HCL 12.5 MG
1 TABLET ORAL
Qty: 0 | Refills: 0 | DISCHARGE

## 2025-05-19 RX ORDER — VENLAFAXINE HYDROCHLORIDE 37.5 MG/1
1 CAPSULE, EXTENDED RELEASE ORAL
Qty: 0 | Refills: 0 | DISCHARGE

## 2025-05-19 RX ORDER — CARIPRAZINE 3 MG/1
1 CAPSULE, GELATIN COATED ORAL
Qty: 0 | Refills: 0 | DISCHARGE

## 2025-05-19 RX ADMIN — Medication 650 MILLIGRAM(S): at 12:51

## 2025-05-19 RX ADMIN — OXYCODONE HYDROCHLORIDE 10 MILLIGRAM(S): 30 TABLET ORAL at 09:06

## 2025-05-19 RX ADMIN — Medication 650 MILLIGRAM(S): at 05:40

## 2025-05-19 RX ADMIN — CARIPRAZINE 3 MILLIGRAM(S): 3 CAPSULE, GELATIN COATED ORAL at 09:59

## 2025-05-19 RX ADMIN — OXYCODONE HYDROCHLORIDE 10 MILLIGRAM(S): 30 TABLET ORAL at 14:41

## 2025-05-19 RX ADMIN — Medication 25 MILLIGRAM(S): at 10:00

## 2025-05-19 RX ADMIN — VENLAFAXINE HYDROCHLORIDE 75 MILLIGRAM(S): 37.5 CAPSULE, EXTENDED RELEASE ORAL at 08:45

## 2025-05-19 RX ADMIN — Medication 1 TABLET(S): at 09:59

## 2025-05-19 RX ADMIN — OXYCODONE HYDROCHLORIDE 10 MILLIGRAM(S): 30 TABLET ORAL at 15:11

## 2025-05-19 RX ADMIN — CELECOXIB 200 MILLIGRAM(S): 50 CAPSULE ORAL at 09:59

## 2025-05-19 RX ADMIN — CYCLOBENZAPRINE HYDROCHLORIDE 5 MILLIGRAM(S): 15 CAPSULE, EXTENDED RELEASE ORAL at 14:43

## 2025-05-19 RX ADMIN — GABAPENTIN 300 MILLIGRAM(S): 400 CAPSULE ORAL at 10:00

## 2025-05-19 RX ADMIN — Medication 1 TABLET(S): at 10:00

## 2025-05-19 RX ADMIN — BUPROPION HYDROBROMIDE 300 MILLIGRAM(S): 522 TABLET, EXTENDED RELEASE ORAL at 09:59

## 2025-05-19 RX ADMIN — OXYCODONE HYDROCHLORIDE 10 MILLIGRAM(S): 30 TABLET ORAL at 10:06

## 2025-05-19 RX ADMIN — Medication 25 MICROGRAM(S): at 05:40

## 2025-05-19 RX ADMIN — METOPROLOL SUCCINATE 12.5 MILLIGRAM(S): 50 TABLET, EXTENDED RELEASE ORAL at 09:59

## 2025-05-19 RX ADMIN — Medication 20 MILLIGRAM(S): at 09:59

## 2025-05-19 RX ADMIN — Medication 40 MILLIGRAM(S): at 05:40

## 2025-05-19 NOTE — PROGRESS NOTE ADULT - PROVIDER SPECIALTY LIST ADULT
Hospitalist
Hospitalist
Orthopedics
SICU
Critical Care
Orthopedics
Hospitalist
Orthopedics
Orthopedics

## 2025-05-19 NOTE — DISCHARGE NOTE NURSING/CASE MANAGEMENT/SOCIAL WORK - FINANCIAL ASSISTANCE
[facility] provides services at a reduced cost to those who are determined to be eligible through [facility]’s financial assistance program. Information regarding [facility]’s financial assistance program can be found by going to [email or web address] or by calling [phone].

## 2025-05-19 NOTE — DISCHARGE NOTE PROVIDER - HOSPITAL COURSE
68yo WM presents with increased back and leg pain. Underwent L4/5 lami/fusion/Tlif in Aug 2023. Patient notes progressive back and L thigh pain. Studies reveal adjacent level DDD/stenosis. Patient failed nonoperative modalities. Underwent exploration fusion, removal hardware, lami L2-4, TlIF L2/3/& L3/4 fusion L2-5 on 5/13/25. Patient had had multiple previous lumbar surgeries prior to the most recent fusion including discectomy L4/5, insertion and removal DSC stimulator.    5/15/25 Patient notes residual L anterior thigh paresthesia today-has ambulated in unit without difficulty, PCA cont, (+) GAYLA and ingram  5/16/25 Patient feels well, ambulating with PT, PCA removed, (+) GAYLA, ingram out-voiding  5/17/25 Patient doing well overall, ambulating, (+) GAYLA, labs stable  5/18/25 GAYLA out, incision clean and dry, labs stable, for Rehab placement in AM  5/19/25 Patient c/o some increased back pain, no leg pain, ambulating with PT, incision clean and dry, neuro intact, stable for transfer to [facility]

## 2025-05-19 NOTE — PROGRESS NOTE ADULT - SUBJECTIVE AND OBJECTIVE BOX
HOSPITALIST PROGRESS NOTE:  SUBJECTIVE:  PCP:  Chief Complaint: Patient is a 69y old  Male who presents with a chief complaint of Back, leg pain (17 May 2025 12:57)      HPI: 69 yo man with history of HLD, HTN, AS s/p TAVR, anxiety,  lumbar radiculopathy s/p laminectomy right L4 L5 Excision of herniated disc right L4 L5 , transforaminal lumbar interbody fusion L4 L5.  Post op was transferred to critical care for close neuro monitoring. Now transferred to  for further medical management.  Hospitalist consulted for post op medical management.     5/17: Patient has no complaints; has some slight dyspnea; No CP, abd pain, N/v/d   5/18:  patient has no complaints; No CP, dyspnea, numbness and tingling, abd pain; feels soreness at the incision site.   5/19, seen in bed, c/o muscle spasms, will order flexeril, no cp or sob    Allergies:  No Known Allergies    REVIEW OF SYSTEMS:  See HPI. All other review of systems is negative unless indicated above.     OBJECTIVE  PHYSICAL EXAM:    GENERAL: Comfortable, no acute distress   HEAD:  Normocephalic, atraumatic  EYES: EOMI, PERRLA  HEENT: Moist mucous membranes  NECK: Supple, No JVD  NERVOUS SYSTEM:  Alert & Oriented X3, Motor Strength 5/5 B/L upper and lower extremities  CHEST/LUNG: Clear to auscultation bilaterally  HEART: Regular rate and rhythm  ABDOMEN: Soft, non tender, Nondistended, Bowel sounds present  GENITOURINARY: Voiding, no palpable bladder  EXTREMITIES:   No clubbing, cyanosis, or edema  MUSCULOSKELETAL- back dsg intact, = muscle spasms  SKIN-no rash      MEDICATIONS  (STANDING):  acetaminophen     Tablet .. 650 milliGRAM(s) Oral every 6 hours  atorvastatin 10 milliGRAM(s) Oral at bedtime  buPROPion XL (24-Hour) . 300 milliGRAM(s) Oral daily  cariprazine 3 milliGRAM(s) Oral daily  celecoxib 200 milliGRAM(s) Oral daily  emtricitabine 200 mG/tenofovir alafenamide 25 mG (DESCOVY) Tablet 1 Tablet(s) Oral daily  famotidine    Tablet 20 milliGRAM(s) Oral daily  gabapentin 300 milliGRAM(s) Oral two times a day  levothyroxine 25 MICROGram(s) Oral daily  meclizine 25 milliGRAM(s) Oral daily  metoprolol tartrate 12.5 milliGRAM(s) Oral daily  multivitamin 1 Tablet(s) Oral daily  pantoprazole    Tablet 40 milliGRAM(s) Oral before breakfast  sodium chloride 0.9%. 1000 milliLiter(s) (125 mL/Hr) IV Continuous <Continuous>  tranexamic acid IVPB 2000 milliGRAM(s) IV Intermittent once  venlafaxine 75 milliGRAM(s) Oral two times a day with meals    MEDICATIONS  (PRN):  ALPRAZolam 0.25 milliGRAM(s) Oral every 6 hours PRN anxiety  HYDROmorphone  Injectable 1 milliGRAM(s) IV Push every 3 hours PRN Severe Pain (7 - 10)  oxyCODONE    IR 5 milliGRAM(s) Oral every 3 hours PRN Mild Pain (1 - 3)  oxyCODONE    IR 10 milliGRAM(s) Oral every 3 hours PRN Moderate Pain (4 - 6)      LABS: pending                        RADIOLOGY/EKG:    < from: Xray Lumbosacral Spine (05.13.25 @ 14:08) >     FINDINGS:     Evidence of posterior fixation rods with pedicle screws extending from   L2 down to L5 along with evidence of disc implants. The fixation hardware   is intact. Alignment is within normal limits.      IMPRESSION:    Postsurgical changes.

## 2025-05-19 NOTE — PROGRESS NOTE ADULT - ASSESSMENT
70yo WM presents with increased back and leg pain. Underwent L4/5 lami/fusion/Tlif in Aug 2023. Patient notes progressive back and L thigh pain. Studies reveal adjacent level DDD/stenosis. Patient failed nonoperative modalities. Underwent exploration fusion, removal hardware, lami L2-4, TlIF L2/3/& L3/4 fusion L2-5 on 5/13/25. Patient had had multiple previous lumbar surgeries prior to the most recent fusion including discectomy L4/5, insertion and removal DSC stimulator.    POD#2  D/C PCA  Start oral and IV analgesia  Monitor Void  Monitor GAYLA drainage  Monitor labs  Increase activity with PT  May transfer to [facility]N  medical management
67 yo man with history of HLD, HTN, AS s/p TAVR, anxiety,  lumbar radiculopathy s/p laminectomy right L4 L5 Excision of herniated disc right L4 L5 , transforaminal lumbar interbody fusion L4 L5.    *spinal stenosis with disc herniation  s/p lumbar laminectomy with osteostomy and fusion POD#6  - management as per Ortho spine  - Pain meds PRN  - encourage IS and ambulation  - PT/OT   - monitor VS  - PPI   -flexeril    *HTN  - monitor BP  - c/w metoprolol    *HLD- statin    *hypothyroid- levothyroxine     * Acute Blood Loss Anemia- related to surgery- stable    *hypokalemia- recheck BMP    *Hx of HIV  -continue Descovy    *anxiety and depression  - c/w psychotropic meds (vraylar and venlafaxine)    DVT prophylaxis  - ambulation/Venodynes   - Lovenox when cleared by surgery     Case d/w team on IDR. rehab today
67 yo man with history of HLD, HTN, AS s/p TAVR, anxiety,  lumbar radiculopathy s/p laminectomy right L4 L5 Excision of herniated disc right L4 L5 , transforaminal lumbar interbody fusion L4 L5.    *spinal stenosis with disc herniation  s/p lumbar laminectomy with osteostomy and fusion POD#5  - management as per Ortho spine  - Pain meds PRN  - encourage IS and ambulation  - PT/OT   - monitor VS  - PPI     *HTN  - monitor BP  - c/w metoprolol    *HLD- statin    *hypothyroid- levothyroxine     * Acute Blood Loss Anemia- related to surgery- no need for transfusion for now.  monitor CBC.     *hypokalemia- replete and monitor     *Hx of HIV  -continue Descovy    *anxiety and depression  - c/w psychotropic meds (vraylar and venlafaxine)    DVT prophylaxis  - ambulation/Venodynes   - Lovenox when cleared by surgery     Case d/w team on IDR. rehab mon
69 yo man with history of HLD, HTN, AS s/p TAVR, anxiety,  lumbar radiculopathy s/p laminectomy right L4 L5 Excision of herniated disc right L4 L5 , transforaminal lumbar interbody fusion L4 L5.    *spinal stenosis with disc herniation  s/p lumbar laminectomy with osteostomy and fusion POD#4  - management as per Ortho spine  - Pain meds PRN  - encourage IS and ambulation  - monitor GAYLA output  - PT/OT   - monitor VS  - PPI     *HTN  - monitor BP  - c/w metoprolol    *HLD- statin    *hypothyroid- levothyroxine     * Acute Blood Loss Anemia- related to surgery- no need for transfusion for now.  monitor CBC.     *hypokalemia- replete and monitor     *anxiety and depression  - c/w psychotropic meds (vraylar and venlafaxine)    DVT prophylaxis  - ambulation/Venodynes   - Lovenox when cleared by surgery     Case d/w team on IDR. rehab mon
ASSESSMENT  67yo male with PMHx HLD, HTN, AS s/p TAVR, anxiety, bipolar,  lumbar radiculopathy presents for planned revision laminectomy right L4 L5 Excision of herniated disc right L4 L5 , transforaminal lumbar interbody fusion L4 L5    s/p laminectomy with osteotomy and fusion L2-L4 5/14  EBL 650cc, received 250 cell saver    PLAN  - pain control prn, dc dilaudid PCA, transition to PRNs. IV tylenol,cont wellbutrin and effexor.   - BP okay 117/70s. cont lopressor, statin  - on room air. encourage incentive spirometer  - PO diet. bowel regimen prn  - dc IV fluids. ingram dcd yesterday monitor I & Os   - GAYLA drain x2  mgmt as per sx. monitor output  - s/p post op abx IV ancef. cont descovy (home med) monitor WBC and temps  - Hgb stable 9.2 -> 9.4. no chemical DVT ppx d/t bleeding risk , recent surgery. cont SCDs  PT eval    Dispo: Transfer to  . pain control. monitor GAYLA output. PT    Will discuss with Dr. Zeng    CCM will sign off. please re-consult medicine as needed. 
68 y/o M w/ pmh of htn, hld, AS s/p AVR in 2019, anxiety, depression, gastric bypass, SBO, recurrent GI infectious, lumbar radiculopathy who presented to  For scheduled lami and fusion of L2-3 and L3-4 and removal of hardware for L4-L5. Post-op pt admitted to the SICU for post-op monitoring.    -Neuro: No acute issues, post-op pain control w/ tylenol, dilaudid PCA, and oxycodone per pain scale, depression/anxiety on home effexor/wellbutrin/vraylar  -Cardiac: HDS maintain MAP >65, htn on lopressor  -Resp: No acute issues, maintain o2 >90%  -GI: cont diet as ordered/tolerated, GI ppx w/ pepcid  -Renal: Renal function stable cont to monitor along w/ lytes, ingram to remain in place till tomorrow per ortho  -ID: cont post-op ppx course of IV cefazolin,   -Endo: No acute issues  -Heme: DVT ppx w/ SCD  -Tubs/lines: A-line d/c, Ingram plan for d/c tomorrow  -Dispo: Pt remains in the SICU today for monitoring will likely downgrade tomorrow, case d/w dr roberson
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
postlami syndrome    anemia    Stable
postlami syndrome

## 2025-05-19 NOTE — DISCHARGE NOTE PROVIDER - CARE PROVIDER_API CALL
Geovanny Staples  Orthopaedic Surgery  [address] [place] [id number]  Phone: [phone]  Fax: [phone]  Follow Up Time:

## 2025-05-19 NOTE — DISCHARGE NOTE NURSING/CASE MANAGEMENT/SOCIAL WORK - PATIENT PORTAL LINK FT
You can access the FollowMyHealth Patient Portal offered by [facility] by registering at the following website: [email or web address]. By joining [employer]’s FollowMyHealth portal, you will also be able to view your health information using other applications (apps) compatible with our system.

## 2025-05-19 NOTE — DISCHARGE NOTE PROVIDER - CARE PROVIDERS DIRECT ADDRESSES
[email or web address]

## 2025-05-19 NOTE — DISCHARGE NOTE NURSING/CASE MANAGEMENT/SOCIAL WORK - NSDCVIVACCINE_GEN_ALL_CORE_FT
Tdap; 18-Apr-2023 18:27; Tanya Jaime (ALEX); [employer]; [id number] (Exp. Date: 15-Feb-2025); IntraMuscular; Deltoid Left.; 0.5 milliLiter(s); VIS (VIS Published: 09-May-2013, VIS Presented: 18-Apr-2023);

## 2025-05-19 NOTE — DISCHARGE NOTE NURSING/CASE MANAGEMENT/SOCIAL WORK - NSDCPEFALRISK_GEN_ALL_CORE
For information on Fall & Injury Prevention, visit: [email or web address] OR  [email or web address] OR  https://www.cdc.gov/steadi/patient.html

## 2025-05-19 NOTE — DISCHARGE NOTE PROVIDER - NSDCMRMEDTOKEN_GEN_ALL_CORE_FT
Aspirin Enteric Coated 81 mg oral delayed release tablet: 1 tab(s) orally once a day  atorvastatin 10 mg oral tablet: 1 tab(s) orally once a day (in the morning)  buPROPion 100 mg oral tablet: 1 tab(s) orally 3 times a day  cyclobenzaprine 5 mg oral tablet: 1 tab(s) orally 3 times a day As needed Muscle Spasm  Descovy 200 mg-25 mg oral tablet: 1 tab(s) orally once a day  Effexor  mg oral capsule, extended release: 1 cap(s) orally once a day  esomeprazole 40 mg oral delayed release capsule: 1 orally once a day  Famotidine: 20 orally once a day  gabapentin 300 mg oral capsule: 1 cap(s) orally 2 times a day  HIV Prep:   levothyroxine 25 mcg (0.025 mg) oral tablet: 1 tab(s) orally once a day  meclizine 25 mg oral tablet: 1 tab(s) orally 2 times a day  metoprolol tartrate 25 mg oral tablet: 0.5 tab(s) orally once a day  Multiple Vitamins oral tablet: 1 tab(s) orally once a day  Omega-3 1000 mg oral capsule: 1 cap(s) orally once a day  oxycodone-acetaminophen 5 mg-325 mg oral tablet: 1 tab(s) orally 4 times a day as needed for pain  Probiotic Formula oral capsule: 1 cap(s) orally once a day  Tylenol 325 mg oral capsule: 1 orally prn  venlafaxine 75 mg oral tablet: 1 tab(s) orally 2 times a day  Vitamin D3 25 mcg (1000 intl units) oral capsule: 1 cap(s) orally once a day  Vraylar 3 mg oral capsule: 1 cap(s) orally once a day  Xanax 0.25 mg oral tablet: 1 tab(s) orally 2 times a day

## 2025-05-28 DIAGNOSIS — Z21 ASYMPTOMATIC HUMAN IMMUNODEFICIENCY VIRUS [HIV] INFECTION STATUS: ICD-10-CM

## 2025-05-28 DIAGNOSIS — Z79.890 HORMONE REPLACEMENT THERAPY: ICD-10-CM

## 2025-05-28 DIAGNOSIS — M48.062 SPINAL STENOSIS, LUMBAR REGION WITH NEUROGENIC CLAUDICATION: ICD-10-CM

## 2025-05-28 DIAGNOSIS — F17.210 NICOTINE DEPENDENCE, CIGARETTES, UNCOMPLICATED: ICD-10-CM

## 2025-05-28 DIAGNOSIS — E87.6 HYPOKALEMIA: ICD-10-CM

## 2025-05-28 DIAGNOSIS — I10 ESSENTIAL (PRIMARY) HYPERTENSION: ICD-10-CM

## 2025-05-28 DIAGNOSIS — Z95.4 PRESENCE OF OTHER HEART-VALVE REPLACEMENT: ICD-10-CM

## 2025-05-28 DIAGNOSIS — E03.9 HYPOTHYROIDISM, UNSPECIFIED: ICD-10-CM

## 2025-05-28 DIAGNOSIS — F41.1 GENERALIZED ANXIETY DISORDER: ICD-10-CM

## 2025-05-28 DIAGNOSIS — M96.1 POSTLAMINECTOMY SYNDROME, NOT ELSEWHERE CLASSIFIED: ICD-10-CM

## 2025-05-28 DIAGNOSIS — D62 ACUTE POSTHEMORRHAGIC ANEMIA: ICD-10-CM

## 2025-05-28 DIAGNOSIS — M51.16 INTERVERTEBRAL DISC DISORDERS WITH RADICULOPATHY, LUMBAR REGION: ICD-10-CM

## 2025-05-28 DIAGNOSIS — Z85.828 PERSONAL HISTORY OF OTHER MALIGNANT NEOPLASM OF SKIN: ICD-10-CM

## 2025-05-28 DIAGNOSIS — T84.216A BREAKDOWN (MECHANICAL) OF INTERNAL FIXATION DEVICE OF VERTEBRAE, INITIAL ENCOUNTER: ICD-10-CM

## 2025-05-28 DIAGNOSIS — F31.9 BIPOLAR DISORDER, UNSPECIFIED: ICD-10-CM

## 2025-05-28 DIAGNOSIS — Z79.82 LONG TERM (CURRENT) USE OF ASPIRIN: ICD-10-CM

## 2025-05-28 DIAGNOSIS — H91.90 UNSPECIFIED HEARING LOSS, UNSPECIFIED EAR: ICD-10-CM

## 2025-05-28 DIAGNOSIS — Z86.19 PERSONAL HISTORY OF OTHER INFECTIOUS AND PARASITIC DISEASES: ICD-10-CM
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
## 2025-05-28 DIAGNOSIS — E78.5 HYPERLIPIDEMIA, UNSPECIFIED: ICD-10-CM

## 2025-05-28 DIAGNOSIS — K21.9 GASTRO-ESOPHAGEAL REFLUX DISEASE WITHOUT ESOPHAGITIS: ICD-10-CM

## 2025-05-28 DIAGNOSIS — Z98.1 ARTHRODESIS STATUS: ICD-10-CM

## 2025-05-28 DIAGNOSIS — Z98.84 BARIATRIC SURGERY STATUS: ICD-10-CM
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
